# Patient Record
Sex: FEMALE | Race: WHITE | Employment: OTHER | URBAN - METROPOLITAN AREA
[De-identification: names, ages, dates, MRNs, and addresses within clinical notes are randomized per-mention and may not be internally consistent; named-entity substitution may affect disease eponyms.]

---

## 2020-01-17 ENCOUNTER — HOSPITAL ENCOUNTER (EMERGENCY)
Age: 40
Discharge: HOME | End: 2020-01-17
Payer: COMMERCIAL

## 2020-01-17 VITALS
HEART RATE: 104 BPM | BODY MASS INDEX: 25.51 KG/M2 | TEMPERATURE: 98.4 F | SYSTOLIC BLOOD PRESSURE: 147 MMHG | RESPIRATION RATE: 15 BRPM | OXYGEN SATURATION: 100 % | DIASTOLIC BLOOD PRESSURE: 107 MMHG | WEIGHT: 135 LBS

## 2020-01-17 LAB
EKG ATRIAL RATE: 98 BPM
EKG DIAGNOSIS: NORMAL
EKG P AXIS: 36 DEGREES
EKG P-R INTERVAL: 140 MS
EKG Q-T INTERVAL: 388 MS
EKG QRS DURATION: 86 MS
EKG QTC CALCULATION (BAZETT): 495 MS
EKG R AXIS: -25 DEGREES
EKG T AXIS: 55 DEGREES
EKG VENTRICULAR RATE: 98 BPM

## 2020-01-17 PROCEDURE — 93010 ELECTROCARDIOGRAM REPORT: CPT | Performed by: INTERNAL MEDICINE

## 2020-01-17 PROCEDURE — 6370000000 HC RX 637 (ALT 250 FOR IP): Performed by: NURSE PRACTITIONER

## 2020-01-17 PROCEDURE — 99284 EMERGENCY DEPT VISIT MOD MDM: CPT

## 2020-01-17 PROCEDURE — 93005 ELECTROCARDIOGRAM TRACING: CPT | Performed by: NURSE PRACTITIONER

## 2020-01-17 RX ADMIN — LIDOCAINE HYDROCHLORIDE: 20 SOLUTION ORAL; TOPICAL at 14:15

## 2020-01-17 ASSESSMENT — PAIN SCALES - GENERAL: PAINLEVEL_OUTOF10: 5

## 2020-01-17 NOTE — ED PROVIDER NOTES
CHIEF COMPLAINT  Heartburn (started last night, has not taken any meds to help herself, )      HISTORY OF PRESENT ILLNESS  Jasper Kaur is a 47 y.o. female who presents to the ED complaining of \"heartburn\". Patient is nontoxic in appearance and in no acute distress. Patient presents to the emergency department via private vehicle. Patient reports that last night she began with epigastric abdominal pain. Reports history of \"heartburn\" and states that symptoms are consistent with prior heartburn. States that she does have medication for this however reports that she did not take anything for this. Reports 5 out 10 burning discomfort to the epigastric region. Denies exacerbating or relieving factors. Denies radiation of pain. Denies associated chest pain or SOB. Denies nausea or vomiting, diarrhea or constipation. Denies urinary complaints. No reports of fever or chills. No other complaints, modifying factors or associated symptoms. Nursing notes reviewed.    Past Medical History:   Diagnosis Date    Anemia     Bacteremia 09/05/2017    E coli    Chronic kidney disease     [facility] mellitus type 1 ([facility] [address]     Hemodialysis patient ([facility] [address]     Hyperlipidemia     Hypertension     No history of procedure 4/20/16    colonoscopy     Past Surgical History:   Procedure Laterality Date    DENTAL SURGERY      DIALYSIS FISTULA CREATION      KIDNEY TRANSPLANT      OTHER SURGICAL HISTORY Left 9/29/2014    Port placed left chest    OTHER SURGICAL HISTORY  04/11/2016    removal of port-a-cath and insertion of new port-a-cath    UPPER GASTROINTESTINAL ENDOSCOPY  04-    Esophagitis     Family History   Problem Relation Age of Onset    Diabetes Mother     Cancer Maternal Aunt     Cancer Paternal Uncle      Social History     Socioeconomic History    Marital status: Single     Spouse name: Not on file    Number of children: Not on file    Years of education: Not on file    Highest education level: Not on file   Occupational History    Not on file   Social Needs    Financial resource strain: Not on file    Food insecurity:     Worry: Not on file     Inability: Not on file    Transportation needs:     Medical: Not on file     Non-medical: Not on file   Tobacco Use    Smoking status: Never Smoker    Smokeless tobacco: Never Used   Substance and Sexual Activity    Alcohol use: No    Drug use: No    Sexual activity: Never   Lifestyle    Physical activity:     Days per week: Not on file     Minutes per session: Not on file    Stress: Not on file   Relationships    Social connections:     Talks on phone: Not on file     Gets together: Not on file     Attends Roman Catholic service: Not on file     Active member of club or organization: Not on file     Attends meetings of clubs or organizations: Not on file     Relationship status: Not on file    Intimate partner violence:     Fear of current or ex partner: Not on file     Emotionally abused: Not on file     Physically abused: Not on file     Forced sexual activity: Not on file   Other Topics Concern    Not on file   Social History Narrative    Not on file     No current facility-administered medications for this encounter.       Current Outpatient Medications   Medication Sig Dispense Refill    tacrolimus (PROGRAF) 1 MG capsule Take 1 mg by mouth 2 times daily      insulin detemir (LEVEMIR) 100 UNIT/ML injection vial Inject 22 Units into the skin nightly      famotidine (PEPCID) 20 MG tablet Take 20 mg by mouth 2 times daily      nystatin (MYCOSTATIN) 382181 UNIT/ML suspension Take 500,000 Units by mouth 3 times daily      mycophenolate (CELLCEPT) 250 MG capsule Take 250 mg by mouth 2 times daily      folic acid (FOLVITE) 1 MG tablet Take 1 mg by mouth daily      sulfamethoxazole-trimethoprim (BACTRIM;SEPTRA) 400-80 MG per tablet Take 1 tablet by mouth      calcium carbonate (TUMS) 500 MG chewable tablet Take 1 tablet by mouth daily as needed rhythm without murmurs. LUNGS: Respirations unlabored. Speaking comfortably in full sentences. Bilateral lung sounds clear to auscultation without wheezing, rhonchi or rales. ABDOMEN: Soft, non-distended and non-tender. No hernias or masses appreciated. No organomegaly. No rebound or guarding. Bowel sounds audible and active in all four quadrants. EXTREMITIES: Moves all extremities equally and neurovascularly intact. No edema. SKIN: Pink ,warm and dry. No acute rashes. NEUROLOGICAL: Alert and oriented x4. Speech clear. No gross facial drooping. Strength is 5/5 in all extremities and sensation intact. Gait normal.   PSYCHIATRIC: Normal mood and affect. ED COURSE  I have evaluated this patient. Dr. Aaron Larose was available for consultation. Patient seen and evaluated. Old records reviewed. Patient presenting for evaluation of \"heartburn\". Patient noted to be hypertensive as well as mildly tachycardic upon arrival to the emergency department with otherwise stable vitals. Patient primarily requesting medication for her heartburn. She denies any further complaints. States that she has history of this and does have medication to take; however, she was unable to find it and therefore presented to the emergency department. I did offer the patient further work-up; however she declined. Patient was given GI cocktail with resolvent of her symptoms and patient requesting discharge home. Patient did not feel it was necessary to repeat her vitals and states that since she was feeling well she just wanted to go home. A discussion was had with the patient regarding plan of care. Patient instructed to follow up with her PCP in 2-3 days for further evaluation/treatment and repeat blood pressure. Patient will be discharged home with a prescription for the following medications below. Patient verbalizes understanding, all questions were answered and she is agreeable with the plan of care.  Patient will return to ED for any new/worsening symptoms. Patient was given scripts for the following medications. I counseled patient how to take these medications. Discharge Medication List as of 1/17/2020  3:39 PM        MDM  Results for orders placed or performed during the hospital encounter of 01/17/20   EKG 12 Lead   Result Value Ref Range    Ventricular Rate 98 BPM    Atrial Rate 98 BPM    P-R Interval 140 ms    QRS Duration 86 ms    Q-T Interval 388 ms    QTc Calculation (Bazett) 495 ms    P Axis 36 degrees    R Axis -25 degrees    T Axis 55 degrees    Diagnosis       Normal sinus rhythmLeft axis deviationProlonged QTAbnormal ECGConfirmed by Rossy Powell MD, Ceci Gunderson ([id number]) on 1/17/2020 5:11:38 PM       I estimate there is LOW risk for PULMONARY EMBOLISM, ACUTE CORONARY SYNDROME, OR THORACIC AORTIC DISSECTION, thus I consider the discharge disposition reasonable. Sonu 1827 and I have discussed the diagnosis and risks, and we agree with discharging home to follow-up with their primary doctor. We also discussed returning to the Emergency Department immediately if new or worsening symptoms occur. We have discussed the symptoms which are most concerning (e.g., bloody sputum, fever, worsening pain or shortness of breath, vomiting) that necessitate immediate return. FINAL Impression    1. Dyspepsia    2. Elevated blood pressure reading        Blood pressure (!) 147/107, pulse 104, temperature 98.4 °F (36.9 °C), temperature source Oral, resp. rate 15, weight 135 lb (61.2 kg), SpO2 100 %. DISPOSITION  Patient was discharged to home in stable condition. DISCLAIMER:  Please note this report has been produced using speech recognition Dragon software and may contain errors related to that system including errors in grammar, punctuation, and spelling, as well as words and phrases that may be inappropriate.  If there are any questions or concerns please feel free to contact the dictating provider for clarification.                 Saud Antunez, NIRAV - CNP  01/18/20 153

## 2020-06-14 ENCOUNTER — HOSPITAL ENCOUNTER (INPATIENT)
Age: 40
LOS: 1 days | Discharge: HOME | DRG: 690 | End: 2020-06-16
Attending: INTERNAL MEDICINE | Admitting: INTERNAL MEDICINE
Payer: COMMERCIAL

## 2020-06-14 LAB
A/G RATIO: 1 (ref 1.1–2.2)
ALBUMIN SERPL-MCNC: 3.6 G/DL (ref 3.4–5)
ALP BLD-CCNC: 149 U/L (ref 40–129)
ALT SERPL-CCNC: 7 U/L (ref 10–40)
ANION GAP SERPL CALCULATED.3IONS-SCNC: 15 MMOL/L (ref 3–16)
AST SERPL-CCNC: 12 U/L (ref 15–37)
BASE EXCESS VENOUS: 2.2 MMOL/L (ref -3–3)
BASOPHILS ABSOLUTE: 0.1 K/UL (ref 0–0.2)
BASOPHILS RELATIVE PERCENT: 0.8 %
BILIRUB SERPL-MCNC: 0.4 MG/DL (ref 0–1)
BILIRUBIN URINE: NEGATIVE
BLOOD, URINE: ABNORMAL
BUN BLDV-MCNC: 7 MG/DL (ref 7–20)
CALCIUM SERPL-MCNC: 8.7 MG/DL (ref 8.3–10.6)
CARBOXYHEMOGLOBIN: 2.2 % (ref 0–1.5)
CHLORIDE BLD-SCNC: 96 MMOL/L (ref 99–110)
CHP ED QC CHECK: 452
CLARITY: ABNORMAL
CO2: 25 MMOL/L (ref 21–32)
COLOR: YELLOW
COMMENT UA: ABNORMAL
CREAT SERPL-MCNC: 1.1 MG/DL (ref 0.6–1.1)
EOSINOPHILS ABSOLUTE: 0 K/UL (ref 0–0.6)
EOSINOPHILS RELATIVE PERCENT: 0.1 %
GFR AFRICAN AMERICAN: >60
GFR NON-AFRICAN AMERICAN: 55
GLOBULIN: 3.5 G/DL
GLUCOSE BLD-MCNC: 360 MG/DL (ref 70–99)
GLUCOSE BLD-MCNC: 452 MG/DL (ref 70–99)
GLUCOSE BLD-MCNC: 469 MG/DL (ref 70–99)
GLUCOSE URINE: >=1000 MG/DL
HCG QUALITATIVE: NEGATIVE
HCO3 VENOUS: 28.1 MMOL/L (ref 23–29)
HCT VFR BLD CALC: 39.4 % (ref 36–48)
HEMOGLOBIN: 12.5 G/DL (ref 12–16)
KETONES, URINE: 40 MG/DL
LEUKOCYTE ESTERASE, URINE: ABNORMAL
LIPASE: 5 U/L (ref 13–60)
LYMPHOCYTES ABSOLUTE: 0.6 K/UL (ref 1–5.1)
LYMPHOCYTES RELATIVE PERCENT: 5.2 %
MAGNESIUM: 1.5 MG/DL (ref 1.8–2.4)
MCH RBC QN AUTO: 25.2 PG (ref 26–34)
MCHC RBC AUTO-ENTMCNC: 31.8 G/DL (ref 31–36)
MCV RBC AUTO: 79.2 FL (ref 80–100)
METHEMOGLOBIN VENOUS: 0.6 %
MICROSCOPIC EXAMINATION: YES
MONOCYTES ABSOLUTE: 0.3 K/UL (ref 0–1.3)
MONOCYTES RELATIVE PERCENT: 2.4 %
NEUTROPHILS ABSOLUTE: 10.9 K/UL (ref 1.7–7.7)
NEUTROPHILS RELATIVE PERCENT: 91.5 %
NITRITE, URINE: NEGATIVE
O2 CONTENT, VEN: 16 VOL %
O2 SAT, VEN: 84 %
O2 THERAPY: ABNORMAL
PCO2, VEN: 48.6 MMHG (ref 40–50)
PDW BLD-RTO: 16.5 % (ref 12.4–15.4)
PERFORMED ON: ABNORMAL
PERFORMED ON: ABNORMAL
PH UA: 7.5 (ref 5–8)
PH VENOUS: 7.38 (ref 7.35–7.45)
PLATELET # BLD: 184 K/UL (ref 135–450)
PMV BLD AUTO: 10.1 FL (ref 5–10.5)
PO2, VEN: 48.4 MMHG (ref 25–40)
POTASSIUM SERPL-SCNC: 3.6 MMOL/L (ref 3.5–5.1)
PROTEIN UA: 30 MG/DL
RBC # BLD: 4.97 M/UL (ref 4–5.2)
RBC UA: ABNORMAL /HPF (ref 0–4)
SODIUM BLD-SCNC: 136 MMOL/L (ref 136–145)
SPECIFIC GRAVITY UA: 1.01 (ref 1–1.03)
TCO2 CALC VENOUS: 30 MMOL/L
TOTAL PROTEIN: 7.1 G/DL (ref 6.4–8.2)
URINE REFLEX TO CULTURE: YES
URINE TYPE: ABNORMAL
UROBILINOGEN, URINE: 0.2 E.U./DL
WBC # BLD: 11.9 K/UL (ref 4–11)
WBC UA: >100 /HPF (ref 0–5)

## 2020-06-14 PROCEDURE — 84703 CHORIONIC GONADOTROPIN ASSAY: CPT

## 2020-06-14 PROCEDURE — 80053 COMPREHEN METABOLIC PANEL: CPT

## 2020-06-14 PROCEDURE — 85025 COMPLETE CBC W/AUTO DIFF WBC: CPT

## 2020-06-14 PROCEDURE — 87077 CULTURE AEROBIC IDENTIFY: CPT

## 2020-06-14 PROCEDURE — 83690 ASSAY OF LIPASE: CPT

## 2020-06-14 PROCEDURE — 6360000002 HC RX W HCPCS: Performed by: PHYSICIAN ASSISTANT

## 2020-06-14 PROCEDURE — 2580000003 HC RX 258: Performed by: PHYSICIAN ASSISTANT

## 2020-06-14 PROCEDURE — 87186 SC STD MICRODIL/AGAR DIL: CPT

## 2020-06-14 PROCEDURE — 87086 URINE CULTURE/COLONY COUNT: CPT

## 2020-06-14 PROCEDURE — 81001 URINALYSIS AUTO W/SCOPE: CPT

## 2020-06-14 PROCEDURE — 99285 EMERGENCY DEPT VISIT HI MDM: CPT

## 2020-06-14 PROCEDURE — 83735 ASSAY OF MAGNESIUM: CPT

## 2020-06-14 PROCEDURE — 96365 THER/PROPH/DIAG IV INF INIT: CPT

## 2020-06-14 PROCEDURE — 96367 TX/PROPH/DG ADDL SEQ IV INF: CPT

## 2020-06-14 PROCEDURE — 6370000000 HC RX 637 (ALT 250 FOR IP): Performed by: PHYSICIAN ASSISTANT

## 2020-06-14 PROCEDURE — 82803 BLOOD GASES ANY COMBINATION: CPT

## 2020-06-14 PROCEDURE — 96375 TX/PRO/DX INJ NEW DRUG ADDON: CPT

## 2020-06-14 RX ORDER — POTASSIUM CHLORIDE 7.45 MG/ML
10 INJECTION INTRAVENOUS ONCE
Status: COMPLETED | OUTPATIENT
Start: 2020-06-14 | End: 2020-06-15

## 2020-06-14 RX ORDER — MAGNESIUM SULFATE 1 G/100ML
1 INJECTION INTRAVENOUS ONCE
Status: COMPLETED | OUTPATIENT
Start: 2020-06-14 | End: 2020-06-15

## 2020-06-14 RX ORDER — DIPHENHYDRAMINE HYDROCHLORIDE 50 MG/ML
12.5 INJECTION INTRAMUSCULAR; INTRAVENOUS ONCE
Status: COMPLETED | OUTPATIENT
Start: 2020-06-15 | End: 2020-06-15

## 2020-06-14 RX ORDER — MORPHINE SULFATE 4 MG/ML
4 INJECTION, SOLUTION INTRAMUSCULAR; INTRAVENOUS ONCE
Status: COMPLETED | OUTPATIENT
Start: 2020-06-14 | End: 2020-06-14

## 2020-06-14 RX ORDER — PROMETHAZINE HYDROCHLORIDE 25 MG/ML
25 INJECTION, SOLUTION INTRAMUSCULAR; INTRAVENOUS ONCE
Status: COMPLETED | OUTPATIENT
Start: 2020-06-14 | End: 2020-06-14

## 2020-06-14 RX ORDER — 0.9 % SODIUM CHLORIDE 0.9 %
2000 INTRAVENOUS SOLUTION INTRAVENOUS ONCE
Status: COMPLETED | OUTPATIENT
Start: 2020-06-14 | End: 2020-06-15

## 2020-06-14 RX ORDER — ONDANSETRON 2 MG/ML
4 INJECTION INTRAMUSCULAR; INTRAVENOUS ONCE
Status: COMPLETED | OUTPATIENT
Start: 2020-06-14 | End: 2020-06-14

## 2020-06-14 RX ORDER — METOCLOPRAMIDE HYDROCHLORIDE 5 MG/ML
10 INJECTION INTRAMUSCULAR; INTRAVENOUS ONCE
Status: COMPLETED | OUTPATIENT
Start: 2020-06-15 | End: 2020-06-15

## 2020-06-14 RX ADMIN — POTASSIUM CHLORIDE 10 MEQ: 7.46 INJECTION, SOLUTION INTRAVENOUS at 23:32

## 2020-06-14 RX ADMIN — PROMETHAZINE HYDROCHLORIDE 25 MG: 25 INJECTION INTRAMUSCULAR; INTRAVENOUS at 22:17

## 2020-06-14 RX ADMIN — INSULIN HUMAN 10 UNITS: 100 INJECTION, SOLUTION PARENTERAL at 23:32

## 2020-06-14 RX ADMIN — MORPHINE SULFATE 4 MG: 4 INJECTION, SOLUTION INTRAMUSCULAR; INTRAVENOUS at 22:06

## 2020-06-14 RX ADMIN — CEFTRIAXONE SODIUM 1 G: 1 INJECTION, POWDER, FOR SOLUTION INTRAMUSCULAR; INTRAVENOUS at 23:01

## 2020-06-14 RX ADMIN — MAGNESIUM SULFATE HEPTAHYDRATE 1 G: 1 INJECTION, SOLUTION INTRAVENOUS at 23:32

## 2020-06-14 RX ADMIN — ONDANSETRON 4 MG: 2 INJECTION INTRAMUSCULAR; INTRAVENOUS at 22:00

## 2020-06-14 RX ADMIN — SODIUM CHLORIDE 2000 ML: 9 INJECTION, SOLUTION INTRAVENOUS at 22:00

## 2020-06-14 ASSESSMENT — PAIN DESCRIPTION - DESCRIPTORS: DESCRIPTORS: TENDER

## 2020-06-14 ASSESSMENT — PAIN SCALES - GENERAL
PAINLEVEL_OUTOF10: 8
PAINLEVEL_OUTOF10: 8
PAINLEVEL_OUTOF10: 6

## 2020-06-14 ASSESSMENT — ENCOUNTER SYMPTOMS
SHORTNESS OF BREATH: 0
BACK PAIN: 1
EYES NEGATIVE: 1
VOMITING: 1
ABDOMINAL PAIN: 1
NAUSEA: 1
COUGH: 0

## 2020-06-14 ASSESSMENT — PAIN DESCRIPTION - PAIN TYPE: TYPE: ACUTE PAIN

## 2020-06-14 ASSESSMENT — PAIN DESCRIPTION - LOCATION: LOCATION: ABDOMEN

## 2020-06-14 ASSESSMENT — PAIN DESCRIPTION - FREQUENCY: FREQUENCY: CONTINUOUS

## 2020-06-15 ENCOUNTER — APPOINTMENT (OUTPATIENT)
Dept: CT IMAGING | Age: 40
DRG: 690 | End: 2020-06-15
Payer: COMMERCIAL

## 2020-06-15 PROBLEM — Z94.0 HISTORY OF RENAL TRANSPLANT: Status: ACTIVE | Noted: 2020-06-15

## 2020-06-15 PROBLEM — I51.3 RIGHT ATRIAL THROMBUS: Chronic | Status: ACTIVE | Noted: 2020-06-15

## 2020-06-15 PROBLEM — E10.65 TYPE 1 DIABETES MELLITUS WITH HYPERGLYCEMIA (HCC): Status: ACTIVE | Noted: 2020-06-15

## 2020-06-15 PROBLEM — T86.10 RENAL TRANSPLANT DISORDER: Chronic | Status: ACTIVE | Noted: 2020-06-15

## 2020-06-15 PROBLEM — Z79.01 CHRONIC ANTICOAGULATION: Chronic | Status: ACTIVE | Noted: 2020-06-15

## 2020-06-15 PROBLEM — N39.0 UTI (URINARY TRACT INFECTION): Status: ACTIVE | Noted: 2020-06-15

## 2020-06-15 LAB
ANION GAP SERPL CALCULATED.3IONS-SCNC: 7 MMOL/L (ref 3–16)
BASOPHILS ABSOLUTE: 0 K/UL (ref 0–0.2)
BASOPHILS RELATIVE PERCENT: 0.3 %
BUN BLDV-MCNC: 6 MG/DL (ref 7–20)
CALCIUM SERPL-MCNC: 8.4 MG/DL (ref 8.3–10.6)
CHLORIDE BLD-SCNC: 107 MMOL/L (ref 99–110)
CO2: 28 MMOL/L (ref 21–32)
CREAT SERPL-MCNC: 1 MG/DL (ref 0.6–1.1)
EOSINOPHILS ABSOLUTE: 0 K/UL (ref 0–0.6)
EOSINOPHILS RELATIVE PERCENT: 0 %
ESTIMATED AVERAGE GLUCOSE: 300.6 MG/DL
GFR AFRICAN AMERICAN: >60
GFR NON-AFRICAN AMERICAN: >60
GLUCOSE BLD-MCNC: 117 MG/DL (ref 70–99)
GLUCOSE BLD-MCNC: 137 MG/DL (ref 70–99)
GLUCOSE BLD-MCNC: 226 MG/DL (ref 70–99)
GLUCOSE BLD-MCNC: 257 MG/DL (ref 70–99)
GLUCOSE BLD-MCNC: 294 MG/DL (ref 70–99)
GLUCOSE BLD-MCNC: 81 MG/DL (ref 70–99)
GLUCOSE BLD-MCNC: 89 MG/DL (ref 70–99)
HBA1C MFR BLD: 12.1 %
HCT VFR BLD CALC: 33.5 % (ref 36–48)
HEMOGLOBIN: 10.6 G/DL (ref 12–16)
LYMPHOCYTES ABSOLUTE: 0.9 K/UL (ref 1–5.1)
LYMPHOCYTES RELATIVE PERCENT: 6.8 %
MAGNESIUM: 1.8 MG/DL (ref 1.8–2.4)
MCH RBC QN AUTO: 24.8 PG (ref 26–34)
MCHC RBC AUTO-ENTMCNC: 31.8 G/DL (ref 31–36)
MCV RBC AUTO: 77.9 FL (ref 80–100)
MONOCYTES ABSOLUTE: 0.6 K/UL (ref 0–1.3)
MONOCYTES RELATIVE PERCENT: 4.4 %
NEUTROPHILS ABSOLUTE: 11.1 K/UL (ref 1.7–7.7)
NEUTROPHILS RELATIVE PERCENT: 88.5 %
PDW BLD-RTO: 16.6 % (ref 12.4–15.4)
PERFORMED ON: ABNORMAL
PERFORMED ON: NORMAL
PERFORMED ON: NORMAL
PLATELET # BLD: 177 K/UL (ref 135–450)
PMV BLD AUTO: 9.6 FL (ref 5–10.5)
POTASSIUM SERPL-SCNC: 4.1 MMOL/L (ref 3.5–5.1)
RBC # BLD: 4.3 M/UL (ref 4–5.2)
SODIUM BLD-SCNC: 142 MMOL/L (ref 136–145)
TACROLIMUS BLOOD: 1 NG/ML (ref 5–20)
WBC # BLD: 12.6 K/UL (ref 4–11)

## 2020-06-15 PROCEDURE — 80197 ASSAY OF TACROLIMUS: CPT

## 2020-06-15 PROCEDURE — 80048 BASIC METABOLIC PNL TOTAL CA: CPT

## 2020-06-15 PROCEDURE — 6370000000 HC RX 637 (ALT 250 FOR IP): Performed by: INTERNAL MEDICINE

## 2020-06-15 PROCEDURE — 2580000003 HC RX 258: Performed by: INTERNAL MEDICINE

## 2020-06-15 PROCEDURE — 83735 ASSAY OF MAGNESIUM: CPT

## 2020-06-15 PROCEDURE — 1200000000 HC SEMI PRIVATE

## 2020-06-15 PROCEDURE — 6360000002 HC RX W HCPCS: Performed by: PHYSICIAN ASSISTANT

## 2020-06-15 PROCEDURE — 85025 COMPLETE CBC W/AUTO DIFF WBC: CPT

## 2020-06-15 PROCEDURE — 6360000002 HC RX W HCPCS: Performed by: INTERNAL MEDICINE

## 2020-06-15 PROCEDURE — 2580000003 HC RX 258: Performed by: NURSE PRACTITIONER

## 2020-06-15 PROCEDURE — C9113 INJ PANTOPRAZOLE SODIUM, VIA: HCPCS | Performed by: INTERNAL MEDICINE

## 2020-06-15 PROCEDURE — 2580000003 HC RX 258

## 2020-06-15 PROCEDURE — 83036 HEMOGLOBIN GLYCOSYLATED A1C: CPT

## 2020-06-15 PROCEDURE — 36591 DRAW BLOOD OFF VENOUS DEVICE: CPT

## 2020-06-15 PROCEDURE — 74176 CT ABD & PELVIS W/O CONTRAST: CPT

## 2020-06-15 RX ORDER — MAGNESIUM SULFATE 1 G/100ML
1 INJECTION INTRAVENOUS PRN
Status: DISCONTINUED | OUTPATIENT
Start: 2020-06-15 | End: 2020-06-16 | Stop reason: HOSPADM

## 2020-06-15 RX ORDER — PANTOPRAZOLE SODIUM 40 MG/10ML
40 INJECTION, POWDER, LYOPHILIZED, FOR SOLUTION INTRAVENOUS DAILY
Status: DISCONTINUED | OUTPATIENT
Start: 2020-06-15 | End: 2020-06-16 | Stop reason: HOSPADM

## 2020-06-15 RX ORDER — SODIUM CHLORIDE 0.9 % (FLUSH) 0.9 %
10 SYRINGE (ML) INJECTION PRN
Status: DISCONTINUED | OUTPATIENT
Start: 2020-06-15 | End: 2020-06-16 | Stop reason: HOSPADM

## 2020-06-15 RX ORDER — SODIUM CHLORIDE 9 MG/ML
INJECTION, SOLUTION INTRAVENOUS
Status: COMPLETED
Start: 2020-06-15 | End: 2020-06-16

## 2020-06-15 RX ORDER — DEXTROSE MONOHYDRATE 25 G/50ML
12.5 INJECTION, SOLUTION INTRAVENOUS PRN
Status: DISCONTINUED | OUTPATIENT
Start: 2020-06-15 | End: 2020-06-16 | Stop reason: HOSPADM

## 2020-06-15 RX ORDER — PROMETHAZINE HYDROCHLORIDE 25 MG/1
12.5 TABLET ORAL EVERY 4 HOURS PRN
Status: DISCONTINUED | OUTPATIENT
Start: 2020-06-15 | End: 2020-06-16 | Stop reason: HOSPADM

## 2020-06-15 RX ORDER — ACETAMINOPHEN 650 MG/1
650 SUPPOSITORY RECTAL EVERY 4 HOURS PRN
Status: DISCONTINUED | OUTPATIENT
Start: 2020-06-15 | End: 2020-06-16 | Stop reason: HOSPADM

## 2020-06-15 RX ORDER — SODIUM CHLORIDE, SODIUM LACTATE, POTASSIUM CHLORIDE, CALCIUM CHLORIDE 600; 310; 30; 20 MG/100ML; MG/100ML; MG/100ML; MG/100ML
2000 INJECTION, SOLUTION INTRAVENOUS ONCE
Status: COMPLETED | OUTPATIENT
Start: 2020-06-15 | End: 2020-06-15

## 2020-06-15 RX ORDER — MYCOPHENOLATE MOFETIL 250 MG/1
1000 CAPSULE ORAL 2 TIMES DAILY
Status: DISCONTINUED | OUTPATIENT
Start: 2020-06-15 | End: 2020-06-16 | Stop reason: HOSPADM

## 2020-06-15 RX ORDER — ONDANSETRON 2 MG/ML
4 INJECTION INTRAMUSCULAR; INTRAVENOUS EVERY 4 HOURS PRN
Status: DISCONTINUED | OUTPATIENT
Start: 2020-06-15 | End: 2020-06-15

## 2020-06-15 RX ORDER — PROMETHAZINE HYDROCHLORIDE 6.25 MG/5ML
12.5 SYRUP ORAL EVERY 4 HOURS PRN
Status: DISCONTINUED | OUTPATIENT
Start: 2020-06-15 | End: 2020-06-16 | Stop reason: HOSPADM

## 2020-06-15 RX ORDER — NICOTINE POLACRILEX 4 MG
15 LOZENGE BUCCAL PRN
Status: DISCONTINUED | OUTPATIENT
Start: 2020-06-15 | End: 2020-06-16 | Stop reason: HOSPADM

## 2020-06-15 RX ORDER — PROCHLORPERAZINE EDISYLATE 5 MG/ML
10 INJECTION INTRAMUSCULAR; INTRAVENOUS EVERY 6 HOURS PRN
Status: DISCONTINUED | OUTPATIENT
Start: 2020-06-15 | End: 2020-06-16 | Stop reason: HOSPADM

## 2020-06-15 RX ORDER — ONDANSETRON 4 MG/1
4 TABLET, ORALLY DISINTEGRATING ORAL EVERY 4 HOURS PRN
Status: DISCONTINUED | OUTPATIENT
Start: 2020-06-15 | End: 2020-06-15

## 2020-06-15 RX ORDER — ACETAMINOPHEN 160 MG/5ML
650 SOLUTION ORAL EVERY 4 HOURS PRN
Status: DISCONTINUED | OUTPATIENT
Start: 2020-06-15 | End: 2020-06-16 | Stop reason: HOSPADM

## 2020-06-15 RX ORDER — MORPHINE SULFATE 2 MG/ML
2 INJECTION, SOLUTION INTRAMUSCULAR; INTRAVENOUS EVERY 4 HOURS PRN
Status: DISCONTINUED | OUTPATIENT
Start: 2020-06-15 | End: 2020-06-16 | Stop reason: HOSPADM

## 2020-06-15 RX ORDER — POTASSIUM CHLORIDE 20 MEQ/1
40 TABLET, EXTENDED RELEASE ORAL PRN
Status: DISCONTINUED | OUTPATIENT
Start: 2020-06-15 | End: 2020-06-16 | Stop reason: HOSPADM

## 2020-06-15 RX ORDER — SODIUM CHLORIDE, SODIUM LACTATE, POTASSIUM CHLORIDE, CALCIUM CHLORIDE 600; 310; 30; 20 MG/100ML; MG/100ML; MG/100ML; MG/100ML
INJECTION, SOLUTION INTRAVENOUS CONTINUOUS
Status: DISCONTINUED | OUTPATIENT
Start: 2020-06-15 | End: 2020-06-16

## 2020-06-15 RX ORDER — TACROLIMUS 0.5 MG/1
0.5 CAPSULE ORAL 2 TIMES DAILY
Status: DISCONTINUED | OUTPATIENT
Start: 2020-06-15 | End: 2020-06-16

## 2020-06-15 RX ORDER — POTASSIUM CHLORIDE 7.45 MG/ML
10 INJECTION INTRAVENOUS PRN
Status: DISCONTINUED | OUTPATIENT
Start: 2020-06-15 | End: 2020-06-16 | Stop reason: HOSPADM

## 2020-06-15 RX ORDER — PROMETHAZINE HYDROCHLORIDE 25 MG/ML
12.5 INJECTION, SOLUTION INTRAMUSCULAR; INTRAVENOUS EVERY 4 HOURS PRN
Status: DISCONTINUED | OUTPATIENT
Start: 2020-06-15 | End: 2020-06-16 | Stop reason: HOSPADM

## 2020-06-15 RX ORDER — ACETAMINOPHEN 325 MG/1
650 TABLET ORAL EVERY 4 HOURS PRN
Status: DISCONTINUED | OUTPATIENT
Start: 2020-06-15 | End: 2020-06-16 | Stop reason: HOSPADM

## 2020-06-15 RX ORDER — INSULIN GLARGINE 100 [IU]/ML
22 INJECTION, SOLUTION SUBCUTANEOUS NIGHTLY
Status: DISCONTINUED | OUTPATIENT
Start: 2020-06-15 | End: 2020-06-16 | Stop reason: HOSPADM

## 2020-06-15 RX ORDER — DEXTROSE MONOHYDRATE 50 MG/ML
100 INJECTION, SOLUTION INTRAVENOUS PRN
Status: DISCONTINUED | OUTPATIENT
Start: 2020-06-15 | End: 2020-06-16 | Stop reason: HOSPADM

## 2020-06-15 RX ADMIN — INSULIN GLARGINE 22 UNITS: 100 INJECTION, SOLUTION SUBCUTANEOUS at 02:08

## 2020-06-15 RX ADMIN — SODIUM CHLORIDE, POTASSIUM CHLORIDE, SODIUM LACTATE AND CALCIUM CHLORIDE: 600; 310; 30; 20 INJECTION, SOLUTION INTRAVENOUS at 20:52

## 2020-06-15 RX ADMIN — METOCLOPRAMIDE 10 MG: 5 INJECTION, SOLUTION INTRAMUSCULAR; INTRAVENOUS at 00:39

## 2020-06-15 RX ADMIN — INSULIN LISPRO 3 UNITS: 100 INJECTION, SOLUTION INTRAVENOUS; SUBCUTANEOUS at 02:08

## 2020-06-15 RX ADMIN — INSULIN LISPRO 2 UNITS: 100 INJECTION, SOLUTION INTRAVENOUS; SUBCUTANEOUS at 05:01

## 2020-06-15 RX ADMIN — TACROLIMUS 0.5 MG: 0.5 CAPSULE ORAL at 20:31

## 2020-06-15 RX ADMIN — MORPHINE SULFATE 2 MG: 2 INJECTION, SOLUTION INTRAMUSCULAR; INTRAVENOUS at 02:04

## 2020-06-15 RX ADMIN — PROMETHAZINE HYDROCHLORIDE 12.5 MG: 25 INJECTION INTRAMUSCULAR; INTRAVENOUS at 20:34

## 2020-06-15 RX ADMIN — Medication 10 ML: at 02:05

## 2020-06-15 RX ADMIN — SODIUM CHLORIDE, POTASSIUM CHLORIDE, SODIUM LACTATE AND CALCIUM CHLORIDE 2000 ML: 600; 310; 30; 20 INJECTION, SOLUTION INTRAVENOUS at 02:06

## 2020-06-15 RX ADMIN — SODIUM CHLORIDE, POTASSIUM CHLORIDE, SODIUM LACTATE AND CALCIUM CHLORIDE: 600; 310; 30; 20 INJECTION, SOLUTION INTRAVENOUS at 20:25

## 2020-06-15 RX ADMIN — PROMETHAZINE HYDROCHLORIDE 12.5 MG: 25 INJECTION INTRAMUSCULAR; INTRAVENOUS at 02:04

## 2020-06-15 RX ADMIN — PANTOPRAZOLE SODIUM 40 MG: 40 INJECTION, POWDER, FOR SOLUTION INTRAVENOUS at 09:22

## 2020-06-15 RX ADMIN — MYCOPHENOLATE MOFETIL 1000 MG: 250 CAPSULE ORAL at 20:29

## 2020-06-15 RX ADMIN — SODIUM CHLORIDE, POTASSIUM CHLORIDE, SODIUM LACTATE AND CALCIUM CHLORIDE: 600; 310; 30; 20 INJECTION, SOLUTION INTRAVENOUS at 11:04

## 2020-06-15 RX ADMIN — SODIUM CHLORIDE, POTASSIUM CHLORIDE, SODIUM LACTATE AND CALCIUM CHLORIDE: 600; 310; 30; 20 INJECTION, SOLUTION INTRAVENOUS at 20:28

## 2020-06-15 RX ADMIN — ONDANSETRON 4 MG: 2 INJECTION INTRAMUSCULAR; INTRAVENOUS at 15:00

## 2020-06-15 RX ADMIN — DIPHENHYDRAMINE HYDROCHLORIDE 12.5 MG: 50 INJECTION, SOLUTION INTRAMUSCULAR; INTRAVENOUS at 00:39

## 2020-06-15 RX ADMIN — CEFTRIAXONE SODIUM 1 G: 1 INJECTION, POWDER, FOR SOLUTION INTRAMUSCULAR; INTRAVENOUS at 20:42

## 2020-06-15 RX ADMIN — SODIUM CHLORIDE 250 ML: 9 INJECTION, SOLUTION INTRAVENOUS at 20:38

## 2020-06-15 RX ADMIN — ENOXAPARIN SODIUM 40 MG: 40 INJECTION SUBCUTANEOUS at 09:22

## 2020-06-15 RX ADMIN — INSULIN GLARGINE 22 UNITS: 100 INJECTION, SOLUTION SUBCUTANEOUS at 22:15

## 2020-06-15 RX ADMIN — Medication 2 MG: at 20:31

## 2020-06-15 ASSESSMENT — PAIN DESCRIPTION - DESCRIPTORS: DESCRIPTORS: TENDER

## 2020-06-15 ASSESSMENT — PAIN SCALES - GENERAL
PAINLEVEL_OUTOF10: 7
PAINLEVEL_OUTOF10: 3
PAINLEVEL_OUTOF10: 3

## 2020-06-15 ASSESSMENT — PAIN DESCRIPTION - FREQUENCY: FREQUENCY: CONTINUOUS

## 2020-06-15 ASSESSMENT — PAIN DESCRIPTION - LOCATION: LOCATION: ABDOMEN

## 2020-06-15 ASSESSMENT — PAIN DESCRIPTION - PAIN TYPE: TYPE: ACUTE PAIN

## 2020-06-15 NOTE — CARE COORDINATION
CASE MANAGEMENT INITIAL ASSESSMENT      Reviewed chart and completed assessment with: patient  Explained Case Management role/services. Primary contact information: Mark Palmer    Admit date/status: 6/15/20  Diagnosis: UTI  Is this a Readmission?:  n    Insurance: [place] required for SNF - Y        3 night stay required - N    Living arrangements, Adls, care needs, prior to admission:lives in home with mother. Will be moving to [place] when friend gets here    Transportation: [facility]    [address] at home: Walker__Cane__RTS__ BSC__Shower Chair__  02__ HHN__ CPAP__  [facility] Bed__ W/C___ Other__________    Services in the home and/or outpatient, prior to admission: none    Dialysis Facility (if applicable) none  · Name:  · Address:  · Dialysis Schedule:  · Phone:  · Fax:    PT/OT recs: none    Hospital Exemption Notification (HEN): needed for SNF    Barriers to discharge: none    Plan/comments: spoke with patient. states will d/c to her mothers home at discharge. Reports she has no home of her own. Will be moving to [place] when her friend gets here to pick her up. Is a kidney transplant patient. Does not receive dialysis. Denied any homeless resources.  Elías Luong, RN      ECOC on chart for MD signature

## 2020-06-15 NOTE — CONSULTS
[facility]  Consult Note           Reason for Consult:  Kidney transplant  Requesting Physician:  Dr. Shy Perkins    Chief Complaint:  Nausea, vomiting  History Obtained From:  patient, electronic medical record    History of Present Ilness:    47year old female DM1, living related kidney transplant in 2016 admitted with refractory nausea, vomiting. We have been asked to assist in further mgmt of her kidney transplant. States has been throwing up since Sunday night with associated abdominal pain. Intake reduced, no dysuria, no fevers, no sob. Past Medical History:        Diagnosis Date    Anemia     Bacteremia 09/05/2017    E coli    Chronic kidney disease     Diabetes mellitus type 1 ([facility] [address]     Hyperlipidemia     Hypertension     Kidney transplant recipient     No history of procedure 4/20/16    colonoscopy       Past Surgical History:        Procedure Laterality Date    DENTAL SURGERY      DIALYSIS FISTULA CREATION      KIDNEY TRANSPLANT      OTHER SURGICAL HISTORY Left 9/29/2014    Port placed left chest    OTHER SURGICAL HISTORY  04/11/2016    removal of port-a-cath and insertion of new port-a-cath    UPPER GASTROINTESTINAL ENDOSCOPY  04-    Esophagitis       Home Medications:    No current facility-administered medications on file prior to encounter.       Current Outpatient Medications on File Prior to Encounter   Medication Sig Dispense Refill    tacrolimus (PROGRAF) 1 MG capsule Take 1 mg by mouth 2 times daily      insulin detemir (LEVEMIR) 100 UNIT/ML injection vial Inject 22 Units into the skin nightly      famotidine (PEPCID) 20 MG tablet Take 20 mg by mouth 2 times daily      nystatin (MYCOSTATIN) 465456 UNIT/ML suspension Take 500,000 Units by mouth 3 times daily      mycophenolate (CELLCEPT) 250 MG capsule Take 250 mg by mouth 2 times daily      folic acid (FOLVITE) 1 MG tablet Take 1 mg by mouth daily      sulfamethoxazole-trimethoprim 06/15/2020    MCH 24.8 06/15/2020    MCHC 31.8 06/15/2020    RDW 16.6 06/15/2020     06/15/2020    MPV 9.6 06/15/2020     BMP:    Lab Results   Component Value Date     06/15/2020    K 4.1 06/15/2020     06/15/2020    CO2 28 06/15/2020    BUN 6 06/15/2020    LABALBU 3.6 06/14/2020    CREATININE 1.0 06/15/2020    CALCIUM 8.4 06/15/2020    GFRAA >60 06/15/2020    GFRAA >60 05/17/2013    LABGLOM >60 06/15/2020    GLUCOSE 257 06/15/2020         Assessment/    Living related donor renal transplant   - End-stage renal disease secondary to Diabetic nephropathy, now status post kidney transplant. She received a renal transplant on 5/17/2016. 3-antigen mismatch kidney with 0% PRA. She was in the BEST protocol and randomized to group C and received standard of care therapy. - Renal function stable. Cr 1.2. Chronic DSA noted. - Developed class II donor specific antibodies. Renal transplant biopsy showed borderline rejection   without evidence of antibody mediated rejection. She was treated with intravenous Solu-Medrol   followed by oral prednisone taper. She completed the steroid taper on the day prior to admission. DM type 1    Hypertension     Refractory n/v, UTI  - CT A/P unrevealing      Plan/    - Continue home doses of tacrolimus 0.5 mg po bid & cellcept 1,000 mg po bid  - Trend labs, prograf levels, & urine culture      Thank you for the consultation. Please do not hesitate to call with questions.     [employer]

## 2020-06-15 NOTE — ED NOTES
[facility] @ [id number]   Re: Intractable nausea and vomiting, UTI, diabetic with hyperglycemia  Dr. Shy Perkins @ [place] [id number]  06/14/20 [id number]

## 2020-06-15 NOTE — ED NOTES
Attempted to place patient in fall precautions. Pt refuses to wear arm band or yellow socks. PT initially refused bed alarm. Pt states that if the bed alarm goes off one time she will throw it and not care if it hits anyone. Yellow socks tied to bedrails. Be safe sign placed on door. Patient instructed on importance of not getting out of bed or ambulating without assistance for safety.         Sean Vaz RN  06/14/20 [id number]

## 2020-06-15 NOTE — ED PROVIDER NOTES
[facility]  ED  EMERGENCY DEPARTMENT ENCOUNTER        Pt Name: Tapan Wright  MRN: [id number]  Armschacortagfkay 1980  Date of evaluation: 6/14/2020  Provider: Payal Olvera PA-C  PCP: No primary care provider on file. ED Attending: Desiree Sheppard DO      This patient was not seen by the attending provider     History provided by the patient    CHIEF COMPLAINT:     Chief Complaint   Patient presents with    Emesis     emesis starting today; Diabetic; cannot find glucometer;  frequent DKA       HISTORY OF PRESENT ILLNESS:      Tapan Wright is a 47 y.o. female who arrives to the ED by private vehicle. Patient is here describing nausea and vomiting that started this morning. She reports being a type II diabetic and says she has been in DKA in the past.  She was unable to check her blood sugar today because she states she could not find her glucometer. She is here describing generalized body pain including abdominal pain as well as chest pain, headaches and body aches. She has not taken anything for symptom relief and cannot identify exacerbating or alleviating factors to her symptoms. Nursing Notes were reviewed     REVIEW OF SYSTEMS:     Review of Systems   Constitutional: Positive for activity change and appetite change. Negative for chills and fever. HENT: Negative. Eyes: Negative. Respiratory: Negative for cough and shortness of breath. Cardiovascular: Positive for chest pain. Gastrointestinal: Positive for abdominal pain, nausea and vomiting. Genitourinary: Negative for difficulty urinating and dysuria. Musculoskeletal: Positive for arthralgias, back pain, myalgias and neck pain. Negative for gait problem. Skin: Negative for rash. Neurological: Positive for headaches. Negative for dizziness. All other systems reviewed and are negative. Except as noted above in the ROS, all other systems were reviewed and negative.          PAST MEDICAL HISTORY:     Past Medical by mouth 3 times daily    OMEPRAZOLE (PRILOSEC) 20 MG CAPSULE    Take 1 capsule by mouth daily. ONDANSETRON (ZOFRAN) 4 MG TABLET    Take 1 tablet by mouth every 8 hours as needed for Nausea    OXYCODONE (ROXICODONE) 5 MG IMMEDIATE RELEASE TABLET    Take 1-2 tablets PO every 4 hours as needed for pain.     SULFAMETHOXAZOLE-TRIMETHOPRIM (BACTRIM;SEPTRA) 400-80 MG PER TABLET    Take 1 tablet by mouth    TACROLIMUS (PROGRAF) 1 MG CAPSULE    Take 1 mg by mouth 2 times daily         ALLERGIES:    Latex and Hydrocodone    FAMILY HISTORY:       Family History   Problem Relation Age of Onset    Diabetes Mother     Cancer Maternal Aunt     Cancer Paternal Uncle           SOCIAL HISTORY:       Social History     Socioeconomic History    Marital status: Single     Spouse name: None    Number of children: None    Years of education: None    Highest education level: None   Occupational History    None   Social Needs    Financial resource strain: None    Food insecurity     Worry: None     Inability: None    Transportation needs     Medical: None     Non-medical: None   Tobacco Use    Smoking status: Never Smoker    Smokeless tobacco: Never Used   Substance and Sexual Activity    Alcohol use: No    Drug use: No    Sexual activity: Never   Lifestyle    Physical activity     Days per week: None     Minutes per session: None    Stress: None   Relationships    Social connections     Talks on phone: None     Gets together: None     Attends Judaism service: None     Active member of club or organization: None     Attends meetings of clubs or organizations: None     Relationship status: None    Intimate partner violence     Fear of current or ex partner: None     Emotionally abused: None     Physically abused: None     Forced sexual activity: None   Other Topics Concern    None   Social History Narrative    None       SCREENINGS:             PHYSICAL EXAM:       ED Triage Vitals [06/14/20 2034]   BP Temp Temp Urinalysis Comments see below    POCT glucose   Result Value Ref Range    QC OK? 452    POCT Glucose   Result Value Ref Range    POC Glucose 452 (H) 70 - 99 mg/dl    Performed on ACCU-CHEK    POCT Glucose   Result Value Ref Range    POC Glucose 360 (H) 70 - 99 mg/dl    Performed on ACCU-CHEK    POCT Glucose   Result Value Ref Range    POC Glucose 294 (H) 70 - 99 mg/dl    Performed on ACCU-CHEK          RADIOLOGY:  All x-ray studies areviewed/reviewed by me. Formal interpretations per the radiologist are as follows:    Ct Abdomen Pelvis Wo Contrast Additional Contrast? None    Result Date: 6/15/2020  EXAMINATION: CT OF THE ABDOMEN AND PELVIS WITHOUT CONTRAST 6/14/2020 11:01 pm TECHNIQUE: CT of the abdomen and pelvis was performed without the administration of intravenous contrast. Multiplanar reformatted images are provided for review. Dose modulation, iterative reconstruction, and/or weight based adjustment of the mA/kV was utilized to reduce the radiation dose to as low as reasonably achievable. COMPARISON: September 5, 2017 HISTORY: ORDERING SYSTEM PROVIDED HISTORY: UTI presenting in early sepsis. Study requested to evaluate for  tract obstruction, pyelonephritis, etc. TECHNOLOGIST PROVIDED HISTORY: Additional Contrast?->None Reason for exam:->UTI presenting in early sepsis. Study requested to evaluate for  tract obstruction, pyelonephritis, etc. Is the patient pregnant?->No Reason for Exam: UTI presenting in early sepsis. Study requested to evaluate for  tract obstruction, pyelonephritis, Acuity: Acute Type of Exam: Initial FINDINGS: Lower Chest: Lung bases are clear. Organs: Hepatic steatosis is present. The gallbladder surgically absent. Pancreas is largely fatty replaced. The spleen and adrenal glands appear normal.  Native kidneys are atrophic.   Transplant kidney is present within the right lower quadrant minimal fullness of the pelvocaliceal system and ureter is again noted, with mild degree of

## 2020-06-15 NOTE — PLAN OF CARE
Problem: Nutrition  Goal: Optimal nutrition therapy  Outcome: Ongoing  Note: Nutrition Problem: Inadequate oral intake  Intervention: Food and/or Nutrient Delivery: Continue current diet  Nutritional Goals:  Tolerate oral diet progression without further N/V and consume greater than 50% of meals this admission

## 2020-06-15 NOTE — DISCHARGE INSTR - COC
Continuity of Care Form    Patient Name: Dilcia Patel   :  1980  MRN:  [id number]    Admit date:  2020  Discharge date:  ***    Code Status Order: Full Code   Advance Directives:   Advance Care Flowsheet Documentation     Date/Time Healthcare Directive Type of Healthcare Directive Copy in [address] Agent's Name Healthcare Agent's Phone Number    06/15/20 0121  No, patient does not have an advance directive for healthcare treatment -- -- -- -- --          Admitting Physician:  Zoey York MD  PCP: No primary care provider on file. Discharging Nurse: [facility] Unit/Room#: 1399/2412-77  Discharging Unit Phone Number: ***    Emergency Contact:   Extended Emergency Contact Information  Primary Emergency Contact: Darvin Adrien  Address: [address] [place] [address], [address] [facility] Phone: [phone]  Relation: Parent    Past Surgical History:  Past Surgical History:   Procedure Laterality Date    DENTAL SURGERY      DIALYSIS FISTULA CREATION      KIDNEY TRANSPLANT      OTHER SURGICAL HISTORY Left 2014    Port placed left chest    OTHER SURGICAL HISTORY  2016    removal of port-a-cath and insertion of new port-a-cath    UPPER GASTROINTESTINAL ENDOSCOPY  2016    Esophagitis       Immunization History:   Immunization History   Administered Date(s) Administered    Influenza Virus Vaccine 10/05/2014, 2015    Influenza Whole 2010    Pneumococcal Polysaccharide ([id number]) 2014       Active Problems:  Patient Active Problem List   Diagnosis Code    Hypertension I10    Hyperlipidemia E78.5    Nausea & vomiting R11.2    Hypokalemia E87.6    Vomiting R11.10    DM (diabetes mellitus) ([facility] [address] E11.9    ESRD (end stage renal disease) ([facility] [address] N18.6    Fluid overload E87.70    Vascular port complication D45. 9XXA    UTI (urinary tract infection) N39.0    Type 1 diabetes mellitus

## 2020-06-15 NOTE — ED PROVIDER NOTES
Ketones, Urine 40 (A) Negative mg/dL    Specific Gravity, UA 1.015 1.005 - 1.030    Blood, Urine SMALL (A) Negative    pH, UA 7.5 5.0 - 8.0    Protein, UA 30 (A) Negative mg/dL    Urobilinogen, Urine 0.2 <2.0 E.U./dL    Nitrite, Urine Negative Negative    Leukocyte Esterase, Urine SMALL (A) Negative    Microscopic Examination YES     Urine Type NotGiven     Urine Reflex to Culture Yes    Microscopic Urinalysis   Result Value Ref Range    WBC, UA >100 (A) 0 - 5 /HPF    RBC, UA see below (A) 0 - 4 /HPF    Urinalysis Comments see below    POCT glucose   Result Value Ref Range    QC OK? 452    POCT Glucose   Result Value Ref Range    POC Glucose 452 (H) 70 - 99 mg/dl    Performed on ACCU-CHEK    POCT Glucose   Result Value Ref Range    POC Glucose 360 (H) 70 - 99 mg/dl    Performed on ACCU-CHEK        CT ABDOMEN PELVIS WO CONTRAST Additional Contrast? None   Final Result   1. Transplant kidney again seen within the right lower quadrant, with minimal   fullness of the pelvocaliceal system and ureter, similar compared to the   prior study. Minimal soft tissue stranding is again seen around the   transplant kidney. No intrarenal calculi are present   2. Atrophic appearance to the native kidneys again visualized   3. Hepatic steatosis   4. Prior cholecystectomy             CLINICAL IMPRESSION  1. Intractable nausea and vomiting    2. Urinary tract infection without hematuria, site unspecified    3. Type 1 diabetes mellitus with hyperglycemia (HCC)    4. Hypomagnesemia    5. Glucosuria        Blood pressure (!) 143/105, pulse 108, temperature 98.2 °F (36.8 °C), temperature source Oral, resp. rate 15, height 5' 1\" (1.549 m), weight 126 lb (57.2 kg), last menstrual period 05/17/2020, SpO2 100 %. DISPOSITION  Som Cabrales was dmitted in stable condition.      This chart was generated in part by using Dragon Dictation system and may contain errors related to that system including errors in grammar, punctuation, and

## 2020-06-15 NOTE — H&P
06/14/2020    MCV 79.2 (L) 06/14/2020     06/14/2020     Lab Results   Component Value Date    CREATININE 1.1 06/14/2020    BUN 7 06/14/2020     06/14/2020    K 3.6 06/14/2020    CL 96 (L) 06/14/2020    CO2 25 06/14/2020     Lab Results   Component Value Date    ALT 7 (L) 06/14/2020    AST 12 (L) 06/14/2020    ALKPHOS 149 (H) 06/14/2020    BILITOT 0.4 06/14/2020     Lab Results   Component Value Date    TROPONINI 0.08 (H) 10/04/2015     No results for input(s): PHART, TGB8WCK, PO2ART in the last 72 hours. IMAGING:  CT Abdomen/Pelvis w/o contrast    1. Transplant kidney again seen within the right lower quadrant, with minimal fullness of the pelvocaliceal system and ureter, similar compared to the prior study. Minimal soft tissue stranding is again seen around the transplant kidney. No intrarenal calculi are present 2. Atrophic appearance to the native kidneys again visualized 3. Hepatic steatosis 4. Prior cholecystectomy       Active Hospital Problems    Diagnosis Date Noted    UTI (urinary tract infection) [N39.0] 06/15/2020    Type 1 diabetes mellitus with hyperglycemia ([facility] [address] [E10.65] 06/15/2020    History of renal transplant [Z94.0] 06/15/2020    Renal transplant disorder [T86.10] 06/15/2020    Right atrial thrombus [I51.3] 06/15/2020    Chronic anticoagulation [Z79.01] 06/15/2020       ASSESSMENT & PLAN  UTI,  H/O renal transplant (5/17/2016), immune suppression  -  Recent baseline creatinine has been ~ 1.2 mg/dL. Creatinine is currently at baseline.  -  CT Ab/P w/o e/o obstruction of the engrafted kidney's urine collection system. -  Avoid nephrotoxin exposure where possible. -  Will request nephrology assistance to manage immune suppression. Home regimen is cellcept 1000mg bid and tacrolimus 0.5mg bid. Tacrolimus level will be added to her a.m labs. -  Based on prior cultures, ceftriaxone should be appropriate antimicrobial coverage. Will continue at 1g daily.     Type 1 DM w/

## 2020-06-16 VITALS
TEMPERATURE: 98.1 F | OXYGEN SATURATION: 99 % | SYSTOLIC BLOOD PRESSURE: 97 MMHG | DIASTOLIC BLOOD PRESSURE: 68 MMHG | WEIGHT: 131.25 LBS | HEART RATE: 77 BPM | HEIGHT: 61 IN | RESPIRATION RATE: 14 BRPM | BODY MASS INDEX: 24.78 KG/M2

## 2020-06-16 LAB
ANION GAP SERPL CALCULATED.3IONS-SCNC: 7 MMOL/L (ref 3–16)
BASOPHILS ABSOLUTE: 0.1 K/UL (ref 0–0.2)
BASOPHILS RELATIVE PERCENT: 1 %
BUN BLDV-MCNC: 4 MG/DL (ref 7–20)
CALCIUM SERPL-MCNC: 8.4 MG/DL (ref 8.3–10.6)
CHLORIDE BLD-SCNC: 103 MMOL/L (ref 99–110)
CO2: 27 MMOL/L (ref 21–32)
CREAT SERPL-MCNC: 1.1 MG/DL (ref 0.6–1.1)
EOSINOPHILS ABSOLUTE: 0.2 K/UL (ref 0–0.6)
EOSINOPHILS RELATIVE PERCENT: 1.9 %
GFR AFRICAN AMERICAN: >60
GFR NON-AFRICAN AMERICAN: 55
GLUCOSE BLD-MCNC: 184 MG/DL (ref 70–99)
GLUCOSE BLD-MCNC: 59 MG/DL (ref 70–99)
GLUCOSE BLD-MCNC: 64 MG/DL (ref 70–99)
GLUCOSE BLD-MCNC: 71 MG/DL (ref 70–99)
GLUCOSE BLD-MCNC: 72 MG/DL (ref 70–99)
GLUCOSE BLD-MCNC: 74 MG/DL (ref 70–99)
GLUCOSE BLD-MCNC: 75 MG/DL (ref 70–99)
GLUCOSE BLD-MCNC: 78 MG/DL (ref 70–99)
HCT VFR BLD CALC: 35.8 % (ref 36–48)
HEMOGLOBIN: 11.4 G/DL (ref 12–16)
LYMPHOCYTES ABSOLUTE: 1.7 K/UL (ref 1–5.1)
LYMPHOCYTES RELATIVE PERCENT: 17.1 %
MAGNESIUM: 1.6 MG/DL (ref 1.8–2.4)
MCH RBC QN AUTO: 25.3 PG (ref 26–34)
MCHC RBC AUTO-ENTMCNC: 32 G/DL (ref 31–36)
MCV RBC AUTO: 78.9 FL (ref 80–100)
MONOCYTES ABSOLUTE: 0.7 K/UL (ref 0–1.3)
MONOCYTES RELATIVE PERCENT: 7.3 %
NEUTROPHILS ABSOLUTE: 7.2 K/UL (ref 1.7–7.7)
NEUTROPHILS RELATIVE PERCENT: 72.7 %
ORGANISM: ABNORMAL
PDW BLD-RTO: 16.5 % (ref 12.4–15.4)
PERFORMED ON: ABNORMAL
PERFORMED ON: NORMAL
PLATELET # BLD: 186 K/UL (ref 135–450)
PMV BLD AUTO: 9.5 FL (ref 5–10.5)
POTASSIUM SERPL-SCNC: 3.6 MMOL/L (ref 3.5–5.1)
RBC # BLD: 4.53 M/UL (ref 4–5.2)
SODIUM BLD-SCNC: 137 MMOL/L (ref 136–145)
TACROLIMUS BLOOD: 3 NG/ML (ref 5–20)
URINE CULTURE, ROUTINE: ABNORMAL
URINE CULTURE, ROUTINE: ABNORMAL
WBC # BLD: 9.9 K/UL (ref 4–11)

## 2020-06-16 PROCEDURE — 80048 BASIC METABOLIC PNL TOTAL CA: CPT

## 2020-06-16 PROCEDURE — C9113 INJ PANTOPRAZOLE SODIUM, VIA: HCPCS | Performed by: INTERNAL MEDICINE

## 2020-06-16 PROCEDURE — 6360000002 HC RX W HCPCS: Performed by: INTERNAL MEDICINE

## 2020-06-16 PROCEDURE — 85025 COMPLETE CBC W/AUTO DIFF WBC: CPT

## 2020-06-16 PROCEDURE — 83735 ASSAY OF MAGNESIUM: CPT

## 2020-06-16 PROCEDURE — 6370000000 HC RX 637 (ALT 250 FOR IP): Performed by: NURSE PRACTITIONER

## 2020-06-16 PROCEDURE — 2580000003 HC RX 258: Performed by: INTERNAL MEDICINE

## 2020-06-16 PROCEDURE — 80197 ASSAY OF TACROLIMUS: CPT

## 2020-06-16 PROCEDURE — 36591 DRAW BLOOD OFF VENOUS DEVICE: CPT

## 2020-06-16 RX ORDER — AMOXICILLIN AND CLAVULANATE POTASSIUM 875; 125 MG/1; MG/1
1 TABLET, FILM COATED ORAL 2 TIMES DAILY
Qty: 28 TABLET | Refills: 0 | Status: SHIPPED | OUTPATIENT
Start: 2020-06-16 | End: 2020-06-30

## 2020-06-16 RX ORDER — CIPROFLOXACIN 500 MG/1
500 TABLET, FILM COATED ORAL EVERY 12 HOURS SCHEDULED
Qty: 10 TABLET | Refills: 0 | Status: SHIPPED | OUTPATIENT
Start: 2020-06-16 | End: 2020-06-16 | Stop reason: HOSPADM

## 2020-06-16 RX ORDER — LISINOPRIL 5 MG/1
5 TABLET ORAL DAILY
Status: DISCONTINUED | OUTPATIENT
Start: 2020-06-16 | End: 2020-06-16 | Stop reason: HOSPADM

## 2020-06-16 RX ORDER — CIPROFLOXACIN 500 MG/1
500 TABLET, FILM COATED ORAL EVERY 12 HOURS SCHEDULED
Status: DISCONTINUED | OUTPATIENT
Start: 2020-06-16 | End: 2020-06-16

## 2020-06-16 RX ORDER — DOXAZOSIN 2 MG/1
2 TABLET ORAL NIGHTLY
Status: CANCELLED | OUTPATIENT
Start: 2020-06-16

## 2020-06-16 RX ORDER — TACROLIMUS 0.5 MG/1
0.5 CAPSULE ORAL 2 TIMES DAILY
Qty: 60 CAPSULE | Refills: 0 | Status: SHIPPED | OUTPATIENT
Start: 2020-06-16

## 2020-06-16 RX ORDER — TACROLIMUS 1 MG/1
1 CAPSULE ORAL 2 TIMES DAILY
Status: DISCONTINUED | OUTPATIENT
Start: 2020-06-16 | End: 2020-06-16

## 2020-06-16 RX ORDER — ASPIRIN 81 MG/1
81 TABLET ORAL DAILY
Status: CANCELLED | OUTPATIENT
Start: 2020-06-16

## 2020-06-16 RX ORDER — METOPROLOL TARTRATE 50 MG/1
50 TABLET, FILM COATED ORAL 2 TIMES DAILY
Status: DISCONTINUED | OUTPATIENT
Start: 2020-06-16 | End: 2020-06-16 | Stop reason: HOSPADM

## 2020-06-16 RX ADMIN — PROMETHAZINE HYDROCHLORIDE 12.5 MG: 25 INJECTION INTRAMUSCULAR; INTRAVENOUS at 08:18

## 2020-06-16 RX ADMIN — PANTOPRAZOLE SODIUM 40 MG: 40 INJECTION, POWDER, FOR SOLUTION INTRAVENOUS at 09:12

## 2020-06-16 RX ADMIN — APIXABAN 2.5 MG: 2.5 TABLET, FILM COATED ORAL at 13:07

## 2020-06-16 RX ADMIN — MAGNESIUM SULFATE HEPTAHYDRATE 1 G: 1 INJECTION, SOLUTION INTRAVENOUS at 09:14

## 2020-06-16 RX ADMIN — MYCOPHENOLATE MOFETIL 1000 MG: 250 CAPSULE ORAL at 09:12

## 2020-06-16 RX ADMIN — ENOXAPARIN SODIUM 40 MG: 40 INJECTION SUBCUTANEOUS at 09:12

## 2020-06-16 RX ADMIN — METOPROLOL TARTRATE 50 MG: 50 TABLET, FILM COATED ORAL at 13:07

## 2020-06-16 RX ADMIN — LISINOPRIL 5 MG: 5 TABLET ORAL at 13:06

## 2020-06-16 RX ADMIN — MAGNESIUM SULFATE HEPTAHYDRATE 1 G: 1 INJECTION, SOLUTION INTRAVENOUS at 06:48

## 2020-06-16 RX ADMIN — Medication 10 ML: at 08:18

## 2020-06-16 RX ADMIN — MORPHINE SULFATE 2 MG: 2 INJECTION, SOLUTION INTRAMUSCULAR; INTRAVENOUS at 09:24

## 2020-06-16 RX ADMIN — TACROLIMUS 0.5 MG: 0.5 CAPSULE ORAL at 09:19

## 2020-06-16 ASSESSMENT — PAIN DESCRIPTION - DESCRIPTORS: DESCRIPTORS: TENDER

## 2020-06-16 ASSESSMENT — PAIN DESCRIPTION - LOCATION: LOCATION: ABDOMEN

## 2020-06-16 ASSESSMENT — PAIN DESCRIPTION - FREQUENCY: FREQUENCY: INTERMITTENT

## 2020-06-16 ASSESSMENT — PAIN SCALES - GENERAL
PAINLEVEL_OUTOF10: 5
PAINLEVEL_OUTOF10: 2
PAINLEVEL_OUTOF10: 2
PAINLEVEL_OUTOF10: 5

## 2020-06-16 ASSESSMENT — PAIN DESCRIPTION - PAIN TYPE: TYPE: ACUTE PAIN

## 2020-06-16 ASSESSMENT — PAIN DESCRIPTION - ORIENTATION: ORIENTATION: RIGHT

## 2020-06-16 ASSESSMENT — PAIN DESCRIPTION - PROGRESSION: CLINICAL_PROGRESSION: GRADUALLY WORSENING

## 2020-06-16 NOTE — PROGRESS NOTES
gallops. Abdomen: Soft, non-tender, non-distended with normal bowel sounds. Musculoskeletal: No clubbing, cyanosis or edema bilaterally. Full range of motion without deformity. Skin: Skin color, texture, turgor normal.  No rashes or lesions. Neurologic:  Neurovascularly intact without any focal sensory/motor deficits. Cranial nerves: II-XII intact, grossly non-focal.  Psychiatric: Alert and oriented, thought content appropriate, normal insight  Capillary Refill: Brisk,< 3 seconds   Peripheral Pulses: +2 palpable, equal bilaterally       Labs:   Recent Labs     06/14/20  2157 06/15/20  0503 06/16/20  0400   WBC 11.9* 12.6* 9.9   HGB 12.5 10.6* 11.4*   HCT 39.4 33.5* 35.8*    177 186     Recent Labs     06/14/20  2157 06/15/20  0503 06/16/20  0400    142 137   K 3.6 4.1 3.6   CL 96* 107 103   CO2 25 28 27   BUN 7 6* 4*   CREATININE 1.1 1.0 1.1   CALCIUM 8.7 8.4 8.4     Recent Labs     06/14/20 2157   AST 12*   ALT 7*   BILITOT 0.4   ALKPHOS 149*     No results for input(s): INR in the last 72 hours. No results for input(s): Willena Valle in the last 72 hours. Urinalysis:      Lab Results   Component Value Date    NITRU Negative 06/14/2020    WBCUA >100 06/14/2020    BACTERIA 2+ 09/29/2015    RBCUA see below 06/14/2020    BLOODU SMALL 06/14/2020    SPECGRAV 1.015 06/14/2020    GLUCOSEU >=1000 06/14/2020    GLUCOSEU NEGATIVE 08/04/2011       Radiology:  CT ABDOMEN PELVIS WO CONTRAST Additional Contrast? None   Final Result   1. Transplant kidney again seen within the right lower quadrant, with minimal   fullness of the pelvocaliceal system and ureter, similar compared to the   prior study. Minimal soft tissue stranding is again seen around the   transplant kidney. No intrarenal calculi are present   2. Atrophic appearance to the native kidneys again visualized   3. Hepatic steatosis   4.  Prior cholecystectomy                 Assessment/Plan:    Active Hospital Problems    Diagnosis    UTI (urinary tract infection) [N39.0]    Type 1 diabetes mellitus with hyperglycemia (HCC) [E10.65]    History of renal transplant [Z94.0]    Right atrial thrombus [I51.3]    Chronic anticoagulation [Z79.01]     Acute cystitis, H/O renal transplant (5/17/2016), immune suppression  -  Recent baseline creatinine has been ~ 1.2 mg/dL. Creatinine is currently at baseline.  -  CT Ab/P w/o e/o obstruction of the engrafted kidney's urine collection system. -  Avoid nephrotoxin exposure where possible. -  Will request nephrology assistance to manage immune suppression. Home regimen is cellcept 1000mg bid and tacrolimus 1 mg bid. Tacrolimus level was low. - Ucx reveals E. Faecalis - changed abx to Cipro 6/16 based on sensitivities.     Type 1 DM w/ hyperglycemia  -  Start s.c. Insulin regimen based on home regimen. -  Antiemetics available prn.     Intracardiac thrombosis, Chronic anticoagulation  -  Chronic right atrial thrombus.   Anticoagulated with apixaban.       DVT Prophylaxis: Eliquis  Diet: DIET GENERAL;  Code Status: Full Code    PT/OT Eval Status: not indicated    Dispo - possibly later today if tolerating PO    NIRAV Casey - CNP

## 2020-06-16 NOTE — CARE COORDINATION
Chart reviewed. Per notes could d/c later today if tolerating po. Denied any DCP needs. Will go to mothers home till friend gets here to move her to [place].  Kalee Tavarez RN

## 2020-06-16 NOTE — PLAN OF CARE
Problem: Falls - Risk of:  Goal: Will remain free from falls  Description: Will remain free from falls  Outcome: Ongoing     Problem: Falls - Risk of:  Goal: Absence of physical injury  Description: Absence of physical injury  6/16/2020 1033 by Paula Marie RN  Outcome: Ongoing  6/16/2020 0035 by Jesi Cope RN  Outcome: Ongoing  Note: Pt remained free from any accidental physical injury or harm this shift. Will continue to monitor. Problem: Falls - Risk of:  Goal: Will remain free from falls  Description: Will remain free from falls  Outcome: Ongoing  Goal: Absence of physical injury  Description: Absence of physical injury  6/16/2020 1033 by Paula Marie RN  Outcome: Ongoing  6/16/2020 0035 by Jesi Cope RN  Outcome: Ongoing  Note: Pt remained free from any accidental physical injury or harm this shift. Will continue to monitor. Problem: Pain:  Goal: Pain level will decrease  Description: Pain level will decrease  Outcome: Ongoing  Goal: Control of acute pain  Description: Control of acute pain  Outcome: Ongoing     Problem: Coping:  Goal: Level of anxiety will decrease  Description: Level of anxiety will decrease  Outcome: Ongoing     Problem: Health Behavior:  Goal: Ability to manage health-related needs will improve  Description: Ability to manage health-related needs will improve  Outcome: Ongoing     Problem: Sensory:  Goal: Pain level will decrease  Description: Pain level will decrease  Outcome: Ongoing     Bed in lowest position, wheels locked, 2/4 side rails up, nonskid footwear on. Bed/ chair check alarm in place, call light within reach. Pt instructed to call out when needing assistance. Pt stated understanding. Nurse will continue to monitor.      Reviewed appropriate pain management and educated on medication

## 2020-06-16 NOTE — PLAN OF CARE
Problem: Falls - Risk of:  Goal: Absence of physical injury  Description: Absence of physical injury  Outcome: Ongoing  Note: Pt remained free from any accidental physical injury or harm this shift. Will continue to monitor.

## 2020-06-16 NOTE — PROGRESS NOTES
Patient and family given discharge instructions. All questions and concerns were addressed. Patient port de-accessed and clean dressing placed on site. Patient wheeled to car with all patient belongings.

## 2020-06-16 NOTE — PROGRESS NOTES
CT A/P unrevealing    Plan/      - Continue home doses of tacrolimus 0.5 mg po bid & cellcept 1,000 mg po bid  - Suggest 2 week course of augmentin for UTI  - Trend labs, prograf levels    Okay for discharge  Case d/w Admitting and Transplant team  Arranged for repeat labs in 2 weeks, f/u with us in office

## 2020-06-24 NOTE — DISCHARGE SUMMARY
the skin 2 times daily (before meals), Disp-1 vial, R-3      lisinopril (PRINIVIL;ZESTRIL) 5 MG tablet Take 1 tablet by mouth nightly, Disp-30 tablet, R-3      ezetimibe (ZETIA) 10 MG tablet Take 10 mg by mouth daily      furosemide (LASIX) 40 MG tablet Take 40 mg by mouth daily      doxazosin (CARDURA) 1 MG tablet Take 2 mg by mouth nightly       omeprazole (PRILOSEC) 20 MG capsule Take 1 capsule by mouth daily. , Disp-30 capsule, R-0      metoprolol (LOPRESSOR) 50 MG tablet Take 1 tablet by mouth 2 times daily. , Disp-60 tablet, R-0      Cholecalciferol (VITAMIN D) 2000 UNITS TABS Take  by mouth. aspirin 81 MG EC tablet Take 81 mg by mouth daily. Time Spent on discharge is more than 30 minutes in the examination, evaluation, counseling and review of medications and discharge plan. Signed:    NIRAV Escobedo CNP   6/23/2020      Thank you No primary care provider on file. for the opportunity to be involved in this patient's care. If you have any questions or concerns please feel free to contact me at [phone].

## 2020-07-15 PROBLEM — N39.0 UTI (URINARY TRACT INFECTION): Status: RESOLVED | Noted: 2020-06-15 | Resolved: 2020-07-15

## 2021-07-10 ENCOUNTER — HOSPITAL ENCOUNTER (INPATIENT)
Age: 41
LOS: 1 days | Discharge: ANOTHER HOSPITAL | DRG: 699 | End: 2021-07-11
Attending: EMERGENCY MEDICINE | Admitting: INTERNAL MEDICINE
Payer: COMMERCIAL

## 2021-07-10 DIAGNOSIS — N17.9 AKI (ACUTE KIDNEY INJURY) (HCC): Primary | ICD-10-CM

## 2021-07-10 DIAGNOSIS — R73.9 HYPERGLYCEMIA: ICD-10-CM

## 2021-07-10 LAB
A/G RATIO: 0.8 (ref 1.1–2.2)
ALBUMIN SERPL-MCNC: 3.8 G/DL (ref 3.4–5)
ALP BLD-CCNC: 177 U/L (ref 40–129)
ALT SERPL-CCNC: 10 U/L (ref 10–40)
ANION GAP SERPL CALCULATED.3IONS-SCNC: 12 MMOL/L (ref 3–16)
AST SERPL-CCNC: 15 U/L (ref 15–37)
BASE EXCESS VENOUS: 3.6 MMOL/L (ref -3–3)
BASOPHILS ABSOLUTE: 0.1 K/UL (ref 0–0.2)
BASOPHILS RELATIVE PERCENT: 0.7 %
BILIRUB SERPL-MCNC: 0.3 MG/DL (ref 0–1)
BUN BLDV-MCNC: 18 MG/DL (ref 7–20)
CALCIUM SERPL-MCNC: 9.5 MG/DL (ref 8.3–10.6)
CARBOXYHEMOGLOBIN: 1.9 % (ref 0–1.5)
CHLORIDE BLD-SCNC: 91 MMOL/L (ref 99–110)
CO2: 26 MMOL/L (ref 21–32)
CREAT SERPL-MCNC: 1.9 MG/DL (ref 0.6–1.1)
EOSINOPHILS ABSOLUTE: 0.2 K/UL (ref 0–0.6)
EOSINOPHILS RELATIVE PERCENT: 1.1 %
GFR AFRICAN AMERICAN: 36
GFR NON-AFRICAN AMERICAN: 29
GLOBULIN: 5 G/DL
GLUCOSE BLD-MCNC: 466 MG/DL (ref 70–99)
HCO3 VENOUS: 29.1 MMOL/L (ref 23–29)
HCT VFR BLD CALC: 34.7 % (ref 36–48)
HEMOGLOBIN: 11.6 G/DL (ref 12–16)
LYMPHOCYTES ABSOLUTE: 1 K/UL (ref 1–5.1)
LYMPHOCYTES RELATIVE PERCENT: 7 %
MCH RBC QN AUTO: 27.5 PG (ref 26–34)
MCHC RBC AUTO-ENTMCNC: 33.5 G/DL (ref 31–36)
MCV RBC AUTO: 82.1 FL (ref 80–100)
METHEMOGLOBIN VENOUS: 0.7 %
MONOCYTES ABSOLUTE: 0.5 K/UL (ref 0–1.3)
MONOCYTES RELATIVE PERCENT: 3.8 %
NEUTROPHILS ABSOLUTE: 12.8 K/UL (ref 1.7–7.7)
NEUTROPHILS RELATIVE PERCENT: 87.4 %
O2 SAT, VEN: 81 %
O2 THERAPY: ABNORMAL
PCO2, VEN: 47.6 MMHG (ref 40–50)
PDW BLD-RTO: 14.3 % (ref 12.4–15.4)
PH VENOUS: 7.4 (ref 7.35–7.45)
PLATELET # BLD: 252 K/UL (ref 135–450)
PMV BLD AUTO: 8.7 FL (ref 5–10.5)
PO2, VEN: 44.4 MMHG (ref 25–40)
POTASSIUM SERPL-SCNC: 5 MMOL/L (ref 3.5–5.1)
RBC # BLD: 4.23 M/UL (ref 4–5.2)
SODIUM BLD-SCNC: 129 MMOL/L (ref 136–145)
SPECIMEN STATUS: NORMAL
TCO2 CALC VENOUS: 31 MMOL/L
TOTAL PROTEIN: 8.8 G/DL (ref 6.4–8.2)
WBC # BLD: 14.6 K/UL (ref 4–11)

## 2021-07-10 PROCEDURE — 96375 TX/PRO/DX INJ NEW DRUG ADDON: CPT

## 2021-07-10 PROCEDURE — 96361 HYDRATE IV INFUSION ADD-ON: CPT

## 2021-07-10 PROCEDURE — 99285 EMERGENCY DEPT VISIT HI MDM: CPT

## 2021-07-10 PROCEDURE — 85025 COMPLETE CBC W/AUTO DIFF WBC: CPT

## 2021-07-10 PROCEDURE — 6360000002 HC RX W HCPCS: Performed by: NURSE PRACTITIONER

## 2021-07-10 PROCEDURE — 82803 BLOOD GASES ANY COMBINATION: CPT

## 2021-07-10 PROCEDURE — 96374 THER/PROPH/DIAG INJ IV PUSH: CPT

## 2021-07-10 PROCEDURE — 80053 COMPREHEN METABOLIC PANEL: CPT

## 2021-07-10 PROCEDURE — 2580000003 HC RX 258: Performed by: NURSE PRACTITIONER

## 2021-07-10 RX ORDER — MORPHINE SULFATE 4 MG/ML
4 INJECTION, SOLUTION INTRAMUSCULAR; INTRAVENOUS
Status: COMPLETED | OUTPATIENT
Start: 2021-07-10 | End: 2021-07-11

## 2021-07-10 RX ORDER — 0.9 % SODIUM CHLORIDE 0.9 %
1000 INTRAVENOUS SOLUTION INTRAVENOUS ONCE
Status: COMPLETED | OUTPATIENT
Start: 2021-07-10 | End: 2021-07-11

## 2021-07-10 RX ORDER — ONDANSETRON 2 MG/ML
4 INJECTION INTRAMUSCULAR; INTRAVENOUS ONCE
Status: COMPLETED | OUTPATIENT
Start: 2021-07-10 | End: 2021-07-10

## 2021-07-10 RX ADMIN — MORPHINE SULFATE 4 MG: 4 INJECTION, SOLUTION INTRAMUSCULAR; INTRAVENOUS at 23:18

## 2021-07-10 RX ADMIN — ONDANSETRON 4 MG: 2 INJECTION INTRAMUSCULAR; INTRAVENOUS at 23:20

## 2021-07-10 RX ADMIN — SODIUM CHLORIDE 1000 ML: 9 INJECTION, SOLUTION INTRAVENOUS at 23:17

## 2021-07-10 ASSESSMENT — PAIN SCALES - GENERAL
PAINLEVEL_OUTOF10: 8
PAINLEVEL_OUTOF10: 8

## 2021-07-11 ENCOUNTER — APPOINTMENT (OUTPATIENT)
Dept: ULTRASOUND IMAGING | Age: 41
DRG: 699 | End: 2021-07-11
Payer: COMMERCIAL

## 2021-07-11 VITALS
BODY MASS INDEX: 23.98 KG/M2 | TEMPERATURE: 97.8 F | DIASTOLIC BLOOD PRESSURE: 81 MMHG | WEIGHT: 127 LBS | RESPIRATION RATE: 18 BRPM | HEIGHT: 61 IN | OXYGEN SATURATION: 98 % | SYSTOLIC BLOOD PRESSURE: 132 MMHG | HEART RATE: 83 BPM

## 2021-07-11 PROBLEM — N17.9 AKI (ACUTE KIDNEY INJURY) (HCC): Status: ACTIVE | Noted: 2021-07-11

## 2021-07-11 LAB
ANION GAP SERPL CALCULATED.3IONS-SCNC: 10 MMOL/L (ref 3–16)
BILIRUBIN URINE: NEGATIVE
BLOOD, URINE: ABNORMAL
BUN BLDV-MCNC: 16 MG/DL (ref 7–20)
CALCIUM SERPL-MCNC: 9 MG/DL (ref 8.3–10.6)
CHLORIDE BLD-SCNC: 102 MMOL/L (ref 99–110)
CLARITY: ABNORMAL
CO2: 25 MMOL/L (ref 21–32)
COLOR: YELLOW
CREAT SERPL-MCNC: 1.8 MG/DL (ref 0.6–1.1)
EPITHELIAL CELLS, UA: ABNORMAL /HPF (ref 0–5)
GFR AFRICAN AMERICAN: 38
GFR NON-AFRICAN AMERICAN: 31
GLUCOSE BLD-MCNC: 141 MG/DL (ref 70–99)
GLUCOSE BLD-MCNC: 143 MG/DL (ref 70–99)
GLUCOSE BLD-MCNC: 147 MG/DL (ref 70–99)
GLUCOSE BLD-MCNC: 262 MG/DL (ref 70–99)
GLUCOSE URINE: >=1000 MG/DL
KETONES, URINE: NEGATIVE MG/DL
LEUKOCYTE ESTERASE, URINE: ABNORMAL
MICROSCOPIC EXAMINATION: YES
NITRITE, URINE: NEGATIVE
PERFORMED ON: ABNORMAL
PH UA: 7.5 (ref 5–8)
POTASSIUM REFLEX MAGNESIUM: 4.8 MMOL/L (ref 3.5–5.1)
PROTEIN UA: ABNORMAL MG/DL
RBC UA: ABNORMAL /HPF (ref 0–4)
SODIUM BLD-SCNC: 137 MMOL/L (ref 136–145)
SPECIFIC GRAVITY UA: 1.01 (ref 1–1.03)
URINE TYPE: ABNORMAL
UROBILINOGEN, URINE: 0.2 E.U./DL
WBC UA: ABNORMAL /HPF (ref 0–5)

## 2021-07-11 PROCEDURE — 6370000000 HC RX 637 (ALT 250 FOR IP): Performed by: NURSE PRACTITIONER

## 2021-07-11 PROCEDURE — 76770 US EXAM ABDO BACK WALL COMP: CPT

## 2021-07-11 PROCEDURE — 6360000002 HC RX W HCPCS: Performed by: NURSE PRACTITIONER

## 2021-07-11 PROCEDURE — 6360000002 HC RX W HCPCS: Performed by: EMERGENCY MEDICINE

## 2021-07-11 PROCEDURE — 2580000003 HC RX 258: Performed by: INTERNAL MEDICINE

## 2021-07-11 PROCEDURE — 2580000003 HC RX 258: Performed by: NURSE PRACTITIONER

## 2021-07-11 PROCEDURE — 80048 BASIC METABOLIC PNL TOTAL CA: CPT

## 2021-07-11 PROCEDURE — 6360000002 HC RX W HCPCS: Performed by: INTERNAL MEDICINE

## 2021-07-11 PROCEDURE — 6370000000 HC RX 637 (ALT 250 FOR IP): Performed by: INTERNAL MEDICINE

## 2021-07-11 PROCEDURE — 96361 HYDRATE IV INFUSION ADD-ON: CPT

## 2021-07-11 PROCEDURE — 96375 TX/PRO/DX INJ NEW DRUG ADDON: CPT

## 2021-07-11 PROCEDURE — 87086 URINE CULTURE/COLONY COUNT: CPT

## 2021-07-11 PROCEDURE — 96365 THER/PROPH/DIAG IV INF INIT: CPT

## 2021-07-11 PROCEDURE — 2060000000 HC ICU INTERMEDIATE R&B

## 2021-07-11 PROCEDURE — 81001 URINALYSIS AUTO W/SCOPE: CPT

## 2021-07-11 PROCEDURE — 96372 THER/PROPH/DIAG INJ SC/IM: CPT

## 2021-07-11 RX ORDER — SODIUM CHLORIDE 0.9 % (FLUSH) 0.9 %
5-40 SYRINGE (ML) INJECTION PRN
Status: DISCONTINUED | OUTPATIENT
Start: 2021-07-11 | End: 2021-07-11 | Stop reason: HOSPADM

## 2021-07-11 RX ORDER — FAMOTIDINE 20 MG/1
20 TABLET, FILM COATED ORAL DAILY
Status: DISCONTINUED | OUTPATIENT
Start: 2021-07-11 | End: 2021-07-11 | Stop reason: HOSPADM

## 2021-07-11 RX ORDER — MYCOPHENOLATE MOFETIL 250 MG/1
250 CAPSULE ORAL 2 TIMES DAILY
Status: DISCONTINUED | OUTPATIENT
Start: 2021-07-11 | End: 2021-07-11

## 2021-07-11 RX ORDER — DOXAZOSIN 2 MG/1
2 TABLET ORAL NIGHTLY
Status: DISCONTINUED | OUTPATIENT
Start: 2021-07-11 | End: 2021-07-11 | Stop reason: HOSPADM

## 2021-07-11 RX ORDER — ASPIRIN 81 MG/1
81 TABLET ORAL DAILY
Status: DISCONTINUED | OUTPATIENT
Start: 2021-07-11 | End: 2021-07-11 | Stop reason: HOSPADM

## 2021-07-11 RX ORDER — SODIUM CHLORIDE 0.9 % (FLUSH) 0.9 %
5-40 SYRINGE (ML) INJECTION EVERY 12 HOURS SCHEDULED
Status: DISCONTINUED | OUTPATIENT
Start: 2021-07-11 | End: 2021-07-11 | Stop reason: HOSPADM

## 2021-07-11 RX ORDER — DEXTROSE MONOHYDRATE 25 G/50ML
12.5 INJECTION, SOLUTION INTRAVENOUS PRN
Status: DISCONTINUED | OUTPATIENT
Start: 2021-07-11 | End: 2021-07-11 | Stop reason: HOSPADM

## 2021-07-11 RX ORDER — INSULIN GLARGINE 100 [IU]/ML
20 INJECTION, SOLUTION SUBCUTANEOUS NIGHTLY
Status: DISCONTINUED | OUTPATIENT
Start: 2021-07-11 | End: 2021-07-11 | Stop reason: HOSPADM

## 2021-07-11 RX ORDER — ONDANSETRON 2 MG/ML
4 INJECTION INTRAMUSCULAR; INTRAVENOUS ONCE
Status: COMPLETED | OUTPATIENT
Start: 2021-07-11 | End: 2021-07-11

## 2021-07-11 RX ORDER — TACROLIMUS 1 MG/1
1 CAPSULE ORAL 2 TIMES DAILY
Status: DISCONTINUED | OUTPATIENT
Start: 2021-07-11 | End: 2021-07-11

## 2021-07-11 RX ORDER — TACROLIMUS 0.5 MG/1
0.5 CAPSULE ORAL 2 TIMES DAILY
Status: DISCONTINUED | OUTPATIENT
Start: 2021-07-11 | End: 2021-07-11

## 2021-07-11 RX ORDER — MYCOPHENOLATE MOFETIL 250 MG/1
1000 CAPSULE ORAL 2 TIMES DAILY
Status: DISCONTINUED | OUTPATIENT
Start: 2021-07-11 | End: 2021-07-11 | Stop reason: HOSPADM

## 2021-07-11 RX ORDER — ACETAMINOPHEN 650 MG/1
650 SUPPOSITORY RECTAL EVERY 6 HOURS PRN
Status: DISCONTINUED | OUTPATIENT
Start: 2021-07-11 | End: 2021-07-11 | Stop reason: HOSPADM

## 2021-07-11 RX ORDER — ONDANSETRON 4 MG/1
4 TABLET, ORALLY DISINTEGRATING ORAL EVERY 8 HOURS PRN
Status: DISCONTINUED | OUTPATIENT
Start: 2021-07-11 | End: 2021-07-11

## 2021-07-11 RX ORDER — MYCOPHENOLATE MOFETIL 250 MG/1
1000 CAPSULE ORAL 2 TIMES DAILY
Status: DISCONTINUED | OUTPATIENT
Start: 2021-07-11 | End: 2021-07-11

## 2021-07-11 RX ORDER — SODIUM CHLORIDE 9 MG/ML
INJECTION, SOLUTION INTRAVENOUS CONTINUOUS
Status: DISCONTINUED | OUTPATIENT
Start: 2021-07-11 | End: 2021-07-11 | Stop reason: HOSPADM

## 2021-07-11 RX ORDER — METOPROLOL TARTRATE 50 MG/1
50 TABLET, FILM COATED ORAL 2 TIMES DAILY
Status: DISCONTINUED | OUTPATIENT
Start: 2021-07-11 | End: 2021-07-11 | Stop reason: HOSPADM

## 2021-07-11 RX ORDER — DOCUSATE SODIUM 100 MG/1
100 CAPSULE, LIQUID FILLED ORAL 2 TIMES DAILY
Status: DISCONTINUED | OUTPATIENT
Start: 2021-07-11 | End: 2021-07-11 | Stop reason: HOSPADM

## 2021-07-11 RX ORDER — ONDANSETRON 2 MG/ML
4 INJECTION INTRAMUSCULAR; INTRAVENOUS EVERY 6 HOURS PRN
Status: DISCONTINUED | OUTPATIENT
Start: 2021-07-11 | End: 2021-07-11 | Stop reason: HOSPADM

## 2021-07-11 RX ORDER — FOLIC ACID 1 MG/1
1 TABLET ORAL DAILY
Status: DISCONTINUED | OUTPATIENT
Start: 2021-07-11 | End: 2021-07-11 | Stop reason: HOSPADM

## 2021-07-11 RX ORDER — ACETAMINOPHEN 325 MG/1
650 TABLET ORAL EVERY 6 HOURS PRN
Status: DISCONTINUED | OUTPATIENT
Start: 2021-07-11 | End: 2021-07-11 | Stop reason: HOSPADM

## 2021-07-11 RX ORDER — NICOTINE POLACRILEX 4 MG
15 LOZENGE BUCCAL PRN
Status: DISCONTINUED | OUTPATIENT
Start: 2021-07-11 | End: 2021-07-11 | Stop reason: HOSPADM

## 2021-07-11 RX ORDER — CALCIUM CARBONATE 200(500)MG
1 TABLET,CHEWABLE ORAL DAILY PRN
Status: DISCONTINUED | OUTPATIENT
Start: 2021-07-11 | End: 2021-07-11 | Stop reason: HOSPADM

## 2021-07-11 RX ORDER — EZETIMIBE 10 MG/1
10 TABLET ORAL DAILY
Status: DISCONTINUED | OUTPATIENT
Start: 2021-07-11 | End: 2021-07-11 | Stop reason: HOSPADM

## 2021-07-11 RX ORDER — PANTOPRAZOLE SODIUM 40 MG/1
40 TABLET, DELAYED RELEASE ORAL
Status: DISCONTINUED | OUTPATIENT
Start: 2021-07-12 | End: 2021-07-11 | Stop reason: HOSPADM

## 2021-07-11 RX ORDER — SODIUM CHLORIDE 9 MG/ML
1000 INJECTION, SOLUTION INTRAVENOUS CONTINUOUS
Status: DISCONTINUED | OUTPATIENT
Start: 2021-07-11 | End: 2021-07-11

## 2021-07-11 RX ORDER — SODIUM CHLORIDE 9 MG/ML
25 INJECTION, SOLUTION INTRAVENOUS PRN
Status: DISCONTINUED | OUTPATIENT
Start: 2021-07-11 | End: 2021-07-11 | Stop reason: HOSPADM

## 2021-07-11 RX ORDER — DEXTROSE MONOHYDRATE 50 MG/ML
100 INJECTION, SOLUTION INTRAVENOUS PRN
Status: DISCONTINUED | OUTPATIENT
Start: 2021-07-11 | End: 2021-07-11 | Stop reason: HOSPADM

## 2021-07-11 RX ORDER — TACROLIMUS 1 MG/1
1 CAPSULE ORAL 2 TIMES DAILY
Status: DISCONTINUED | OUTPATIENT
Start: 2021-07-11 | End: 2021-07-11 | Stop reason: HOSPADM

## 2021-07-11 RX ADMIN — SODIUM CHLORIDE 1000 ML: 9 INJECTION, SOLUTION INTRAVENOUS at 04:24

## 2021-07-11 RX ADMIN — FAMOTIDINE 20 MG: 20 TABLET ORAL at 13:12

## 2021-07-11 RX ADMIN — MYCOPHENOLATE MOFETIL 1000 MG: 250 CAPSULE ORAL at 13:11

## 2021-07-11 RX ADMIN — ONDANSETRON 4 MG: 2 INJECTION INTRAMUSCULAR; INTRAVENOUS at 12:46

## 2021-07-11 RX ADMIN — CEFTRIAXONE SODIUM 1000 MG: 1 INJECTION, POWDER, FOR SOLUTION INTRAMUSCULAR; INTRAVENOUS at 01:19

## 2021-07-11 RX ADMIN — ASPIRIN 81 MG: 81 TABLET, COATED ORAL at 13:12

## 2021-07-11 RX ADMIN — SODIUM CHLORIDE: 9 INJECTION, SOLUTION INTRAVENOUS at 12:40

## 2021-07-11 RX ADMIN — EZETIMIBE 10 MG: 10 TABLET ORAL at 13:12

## 2021-07-11 RX ADMIN — FOLIC ACID 1 MG: 1 TABLET ORAL at 13:12

## 2021-07-11 RX ADMIN — METOPROLOL TARTRATE 50 MG: 50 TABLET, FILM COATED ORAL at 13:12

## 2021-07-11 RX ADMIN — ONDANSETRON 4 MG: 2 INJECTION INTRAMUSCULAR; INTRAVENOUS at 04:46

## 2021-07-11 RX ADMIN — TACROLIMUS 0.5 MG: 1 CAPSULE ORAL at 12:18

## 2021-07-11 RX ADMIN — MORPHINE SULFATE 4 MG: 4 INJECTION, SOLUTION INTRAMUSCULAR; INTRAVENOUS at 04:46

## 2021-07-11 RX ADMIN — MEROPENEM 1000 MG: 1 INJECTION, POWDER, FOR SOLUTION INTRAVENOUS at 12:44

## 2021-07-11 RX ADMIN — INSULIN LISPRO 8 UNITS: 100 INJECTION, SOLUTION INTRAVENOUS; SUBCUTANEOUS at 00:34

## 2021-07-11 RX ADMIN — APIXABAN 2.5 MG: 5 TABLET, FILM COATED ORAL at 12:18

## 2021-07-11 ASSESSMENT — PAIN SCALES - GENERAL
PAINLEVEL_OUTOF10: 8
PAINLEVEL_OUTOF10: 0
PAINLEVEL_OUTOF10: 8

## 2021-07-11 NOTE — ED NOTES
Spoke with [facility] @ [id number] regarding bed status. MAC call back with update, Sarabjit still discharge dependent with no predictable openings. They will contact MAC when there is a status change.      Ja Sellers  07/11/21 9031

## 2021-07-11 NOTE — ED TRIAGE NOTES
Pt states she is on two antibiotics for an abscess the had on her labia, she states she has abd pain every time she takes her medication and she has not been taking them with any food.

## 2021-07-11 NOTE — ED PROVIDER NOTES
Emergency Department Attending Provider Note  Location: [facility]  ED  7/10/2021     Patient Identification  Vince Moore is a 46 y.o. female      Vince Moore was evaluated in the Emergency Department for generalized abdominal pain nausea and vomiting. Renal transplant patient noncompliant with medications had transplant 2016 followed by [facility] nephrology. History of chronic UTIs. Expresses depressive symptoms denies suicidal ideation denies active plan. .     Physical exam revealed:  Vital signs reviewed  Gen: Alert and oriented, no acute distress  Card: RRR, no murmurs, equal radial pulses  Resp: CBSBL, no wheezes rales or rhonchi  Abd: Soft nontender, nondistended abdomen, no guarding or rebound, no CVA tenderness  Ext: No deformities noted  Neuro: Grossly normal moving extremities equally      Patient seen and evaluated. Relevant records reviewed. Patient here with nausea and vomiting renal transplant in setting of medication noncompliance and I suspect depressive symptoms play a part of her noncompliance. On exam she is nontoxic-appearing in no acute distress hemodynamically stable. Has metabolic derangements including leukocytosis hyperglycemia and MARCO A. Her creatinine is now up to 1.9 previously 1.3. Also has some evidence of urinary tract infection. She was given antibiotics in the emergency department. Her transplant nephrology services at [facility].  Attempted to admit per patient preference there initially however they have no available beds and cannot anticipate when bed availability will occur. Hospital service was then contacted here at [facility] who has declined admission and recommends transferring to [facility] if [facility] is not available because patient would benefit from renal transplant service. Of note patient has not had recent recent renal transplants proximately 5 years ago.   SOFÍA discussed the case with our nephrology service  Harish who agrees there is no emergent procedure or treatment at this time that cannot be performed here at [facility].  See SOFÍA note for details of this conversation. I then discussed the case with the hospitalist at [facility], Dr. Tin Botello, who states that she would agree to accept patient's care if they had any bed availability. Unfortunately still unable to anticipate when any bed would be available. Given the fact that there is no emergent procedure requiring emergent transfer, I feel it is in the patient's best interest to be admitted to our hospital service given the fact that she will be a delayed disposition to [address] [place] [address] or any other at their facility with a renal transplant service. 7:08 AM          Although initial history and physical exam information was obtained by SOFÍA/NPP/MD/ (who also dictated a record of this visit), I independently examined and evaluated this patient and made all diagnostic, treatment, and disposition decisions. This chart was generated in part by using Dragon Dictation system and may contain errors related to that system including errors in grammar, punctuation, and spelling, as well as words and phrases that may be inappropriate. If there are any questions or concerns please feel free to contact the dictating provider for clarification.      Armando Denney MD  [address] MD  07/11/21 1240

## 2021-07-11 NOTE — ED NOTES
[facility] @ 0906  Re: MARCO A, UTI, kidney txp  Dr Cristobal Baxter responded @ [facility]  07/11/21 0694

## 2021-07-11 NOTE — ED NOTES
Current Outpatient Medications:    magnesium oxide 400 mg (241.3 mg magnesium) tablet, Take 400 mg by mouth daily. , Disp: , Rfl:    acetaminophen (TYLENOL) 325 mg tablet, Take 2 tablets (650 mg total) by mouth., Disp: 30 tablet, Rfl:    cholecalciferol (VITAMIN D-3) 25 mcg (1,000 unit) capsule, Take 1 capsule (1,000 Units total) by mouth., Disp: , Rfl:    Eliquis 2.5 mg tablet, Take 1 tablet by mouth 2 (two) times a day., Disp: , Rfl:    escitalopram (LEXAPRO) 10 mg tablet, Take 1 tablet (10 mg total) by mouth 1 (one) time each day., Disp: , Rfl: 2   ferric citrate (Auryxia) 210 mg iron tablet, Take 210 mg by mouth daily. , Disp: , Rfl:    folic acid (FOLVITE) 1 mg tablet, Take 1 tablet (1,000 mcg total) by mouth 1 (one) time each day in the morning., Disp: , Rfl: 2   insulin aspart U-100 (NovoLOG Flexpen U-100 Insulin) 100 unit/mL (3 mL) insulin pen, , Disp: , Rfl:    insulin glargine (Basaglar KwikPen U-100 Insulin) 100 unit/mL (3 mL) insulin pen, Inject 20 Units under the skin., Disp: , Rfl:    melatonin 3 mg tablet, TAKE 1 TAB BY MOUTH NIGHTLY AS NEEDED FOR OTHER (INSOMNIA). , Disp: , Rfl:    mycophenolate (CELLCEPT) 250 mg capsule, , Disp: , Rfl: 3   ondansetron ODT (ZOFRAN-ODT) 4 mg dispersible tablet, TAKE 1 TAB BY MOUTH EVERY 8 HOURS AS NEEDED FOR NAUSEA FOR UP TO 90 DAYS., Disp: , Rfl:    pantoprazole (PROTONIX) 40 mg EC tablet, Take 1 tablet (40 mg total) by mouth 1 (one) time each day., Disp: , Rfl: 6   sodium bicarbonate 650 mg tablet, Take 1 tablet (650 mg total) by mouth 2 (two) times a day., Disp: , Rfl:    tacrolimus (PROGRAF) 1 mg capsule, Take 1 capsule (1 mg total) by mouth 1 (one) time each day in the morning., Disp: , Rfl: 5   Zetia 10 mg tablet, Take 1 tablet (10 mg total) by mouth every night., Disp: , Rfl: 3  Medication review completed?: Yes       Aryan Diaz RN  07/11/21 3228

## 2021-07-11 NOTE — CONSULTS
[facility], [address]                                  CONSULTATION    PATIENT NAME: Robb Lord                     :        1980  MED REC NO:   [id number]                          ROOM:       [id number]  ACCOUNT NO:   [de-identified]                           ADMIT DATE: 07/10/2021  PROVIDER:     Shanelle Duarte MD    CONSULT DATE:  2021    REASON FOR CONSULTATION:  Acute kidney injury. HISTORY OF PRESENT ILLNESS:  The patient is a 28-year-old   female patient with a past medical history significant for end-stage  renal disease, status post living related kidney transplant back in  . The patient presented to [facility] with a few days  duration of nausea, vomiting, and abdominal pain. She also stated that  she stopped taking her immunosuppressive medications for the last few  weeks. Upon presentation, she was noted to have an acute kidney injury  with a serum creatinine of 1.9 (baseline 1 to 1.2). The patient was  started on IV fluids and IV antibiotics for signs of urinary tract  infection and Nephrology were consulted for further management of her  kidney transplant. PAST MEDICAL HISTORY:  1. End-stage renal disease. 2.  Kidney transplant. 3.  Hypertension. 4.  Hyperlipidemia. 5.  Diabetes mellitus. 6.  Anemia. PAST SURGICAL HISTORY:  1.  AV fistula creation. 2.  Kidney transplant. 3.  EGD. ALLERGIES:  The patient is allergic to LATEX, ACETAMINOPHEN, and  HYDROCODONE.    SOCIAL HISTORY:  The patient does not smoke or drink alcohol. FAMILY HISTORY:  Significant for diabetes mellitus in her mother. REVIEW OF SYSTEM:  The patient denied any fever, chills, cough or  expectorations. Otherwise, a 10-point review of systems was relatively  unremarkable.     PHYSICAL EXAMINATION:  VITAL SIGNS:  Blood pressure 132/81, heart rate 83, respirations 18,  temperature 97.8 Fahrenheit. The patient is saturating 98% on room air. GENERAL APPEARANCE:  The patient is alert, oriented x3, not in acute  distress. HEENT:  Eyes reveal normal conjunctivae, reactive pupils. NECK:  Revealed midline trachea, nonpalpable thyroid. LUNGS:  Clear to anterior auscultation bilaterally. Nonlabored  breathing. CARDIOVASCULAR:  Exam revealed S1, S2.  Regular rate and rhythm. No  murmurs. No rubs. No peripheral edema. ABDOMEN:  Exam revealed a soft abdomen. No organomegaly. SKIN:  Exam revealed no lesion or rash. Warm to touch. PSYCHIATRIC:  Good judgment and insight. LYMPHATICS:  Revealed no cervical or axillary adenopathy. ASSESSMENT AND PLAN:  1. Acute kidney injury likely secondary to a prerenal component,  although kidney transplant rejection cannot be ruled out (though less  likely) as the patient has not been taking her immunosuppressive  medications. 2.  Kidney transplant living related donor back in 05/2016. We will  resume Prograf and CellCept per outpatient orders. 3.  Hyponatremia/hyperkalemia secondary to hyperglycemia, monitor. 4.  Hypertension, blood pressure within acceptable range. 5.  Anemia, stable hemoglobin, monitor. 6.  Urinary tract infection, currently on antibiotics per primary team.    RECOMMENDATIONS:  1. Continue volume expansion. 2.  Check kidney transplant ultrasound. 3.  Avoid all nephrotoxic agents at this time. 4.  Maintain systolic blood pressure above 120 mmHg.         Kobe Rodriges MD    D: 07/11/2021 13:12:03       T: 07/11/2021 13:15:50     [email or web address]  Job#: [id number]     Doc#: [id number]    CC:

## 2021-07-11 NOTE — PROGRESS NOTES
Pt ok for discharge per MD. Discharge instructions and script given. Chest port in place. Report recalled to inform receiving RN of status. No questions or concerns at this time.  Transported by squad for transfer to [facility]

## 2021-07-11 NOTE — PROGRESS NOTES
Md notified: Pt arrived to floor no diet order in place and pt refusing tele. Pt states should be on diabetic diet at home but states does not follow it.

## 2021-07-11 NOTE — H&P
Department of Internal Medicine  General Internal Medicine   History and Physical      CHIEF COMPLAINT:  abdominal pain, vomiting. Reason for Admission:  MARCO A    HISTORY OF PRESENT ILLNESS:    46 y.o. female with h/o kidney transplant (2016), chronic UTIs, HTN, diabetes and depression presented to ED with complaints of abdominal pain and vomiting. Patient states that she stopped taking all of her medication several weeks ago. She does not take her medication because she is depressed and forgets to take them. Currently denies abdominal pain or nausea. Asking for regular food.     Past Medical History:        Diagnosis Date    Anemia     Bacteremia 09/05/2017    E coli    Chronic kidney disease     Diabetes mellitus type 1 ([facility] [address]     Hyperlipidemia     Hypertension     Kidney transplant recipient     No history of procedure 4/20/16    colonoscopy     Past Surgical History:        Procedure Laterality Date    DENTAL SURGERY      DIALYSIS FISTULA CREATION      KIDNEY TRANSPLANT      OTHER SURGICAL HISTORY Left 9/29/2014    Port placed left chest    OTHER SURGICAL HISTORY  04/11/2016    removal of port-a-cath and insertion of new port-a-cath    UPPER GASTROINTESTINAL ENDOSCOPY  04-    Esophagitis     Medications Prior to Admission:    Medications Prior to Admission: apixaban (ELIQUIS) 2.5 MG TABS tablet, Take 1 tablet by mouth 2 times daily  tacrolimus (PROGRAF) 0.5 MG capsule, Take 1 capsule by mouth 2 times daily (Patient taking differently: Take 1 mg by mouth 2 times daily )  insulin detemir (LEVEMIR) 100 UNIT/ML injection vial, Inject 22 Units into the skin nightly  famotidine (PEPCID) 20 MG tablet, Take 20 mg by mouth 2 times daily  nystatin (MYCOSTATIN) 822159 UNIT/ML suspension, Take 500,000 Units by mouth 3 times daily  mycophenolate (CELLCEPT) 250 MG capsule, Take 1,000 mg by mouth 2 times daily   folic acid (FOLVITE) 1 MG tablet, Take 1 mg by mouth daily  calcium carbonate (TUMS) 500 MG chewable tablet, Take 1 tablet by mouth daily as needed for Heartburn  docusate sodium (COLACE) 100 MG capsule, Take 100 mg by mouth 2 times daily  ondansetron (ZOFRAN) 4 MG tablet, Take 1 tablet by mouth every 8 hours as needed for Nausea  insulin NPH (NOVOLIN N) 100 UNIT/ML injection vial, Inject 18 Units into the skin 2 times daily (before meals)  lisinopril (PRINIVIL;ZESTRIL) 5 MG tablet, Take 1 tablet by mouth nightly (Patient taking differently: Take 20 mg by mouth nightly )  ezetimibe (ZETIA) 10 MG tablet, Take 10 mg by mouth daily  furosemide (LASIX) 40 MG tablet, Take 40 mg by mouth daily  doxazosin (CARDURA) 1 MG tablet, Take 2 mg by mouth nightly   omeprazole (PRILOSEC) 20 MG capsule, Take 1 capsule by mouth daily. (Patient taking differently: Take 40 mg by mouth daily )  metoprolol (LOPRESSOR) 50 MG tablet, Take 1 tablet by mouth 2 times daily. Cholecalciferol (VITAMIN D) 2000 UNITS TABS, Take  by mouth. aspirin 81 MG EC tablet, Take 81 mg by mouth daily. Allergies:  Latex, Acetaminophen, Hydrocodone, and Hydrocodone-acetaminophen    Social History:   Socioeconomic History    Marital status: Single       Spouse name: Not on file    Number of children: Not on file    Years of education: Not on file    Highest education level: Not on file   Occupational History    Not on file   Tobacco Use    Smoking status: Never Smoker    Smokeless tobacco: Never Used   Substance and Sexual Activity    Alcohol use: No    Drug use: No    Sexual activity: Never         Family History:       Problem Relation Age of Onset    Diabetes Mother     Cancer Maternal Aunt     Cancer Paternal Uncle      REVIEW OF SYSTEMS:  All systems, atotal of 10, are reviewed and negative except for those that were just noted in history present illness.     PHYSICAL EXAM:    Vitals:  /81   Pulse 83   Temp 97.8 °F (36.6 °C) (Oral)   Resp 18   Wt 127 lb (57.6 kg)   LMP 06/12/2021   SpO2 98%   BMI 24.00 kg/m² CONSTITUTIONAL: Awake and alert. Cooperative. Well-developed. Well-nourished  HENT: Normocephalic. Atraumatic. External ears normal, without discharge. TMs clear bilaterally. No nasal discharge. Oropharynx clear, no erythema. Mucous membranes moist.  EYES: Conjunctiva non-injected, no lid abnormalities noted. No scleral icterus. PERRL. EOM's grossly intact. Anterior chambers clear. NECK: Supple. Normal ROM. No meningismus. No thyroid tenderness or swelling noted. CARDIOVASCULAR: RRR. No Murmer. PULMONARY/CHEST WALL: Effort normal. No tachypnea. Lungs clear to ausculation. ABDOMEN: Normal BS. Soft. Nondistended. Mild diffuse tenderness without focality. No guarding. No hernias noted. No splenomegaly. Back: Spine is midline. No ecchymosis. No crepitus on palpation. No obvious subluxation of vertebral column. No saddle anesthesia or evidence of cauda equina. /ANORECTAL: Not assessed  MUSKULOSKELETAL: Normal ROM. No acute deformities. No edema. No tenderness to palpate. SKIN: Warm and dry. NEUROLOGICAL:  GCS 15. CN II-XII grossly intact. Strength is 5/5 in allextremities and sensation is intact. PSYCHIATRIC: Somewhat withdrawn, admits to depression. Denied active suicidality. Has hx of suicidal attempt    DATA:  Renal US: Bilateral native kidneys are atrophic compatible with underlying medical  renal disease.     Minimal dilation of the transplant kidney collecting system which appears to  be chronic.     Urinary bladder appears grossly unremarkable. ASSESSMENT AND PLAN:      MARCO A  -Likely secondary to a pre-renal component, although kidney transplant rejection cannot be ruled out  -Resume resume Prograf and Cellcept per outpatient orders.  -Nephrology consulted  -Continue volume expansion  -Renal US w/o acute process  -Avoid all nephrotoxic agents at this time.     UTI  - IV meropenem to treat the UTI in the setting of recent hospitalization  - F/u urine cx results     Hyponatremia/Hyperkalemia  - Secondary to hyperglycemia  - Monitor closely     DM II  - Cont home lantus  - ISS  - Diabetic diet     Depression  - Pt is severely depressed  - Refusing Psych consult    Full code  DVT ppx: lovenox

## 2021-07-11 NOTE — ED NOTES
Called [facility] @ [phone]  RE: Tee Thrasher, transplant patient, hyperglycemia, uti per Toby Scruggs NP  [facility] is discharge dependent; Toby Scruggs NP to speak to the pt first before proceeding. Pt has agreed to be admitted to [facility] recommended pt be transferred to [address] [place] [address] @ [phone] to place a call to Mushtaq Hunt called back @ [phone]; UC also discharge dependent.   Called [facility] @ [phone] to admit pt to [facility]  Dr. Umanzor [facility]) called back @ [phone]  Pt admitted to [facility] pending room assignment     Meti Lemu  07/11/21 0226       Meti Lemu  07/11/21 0245       Meti Lemu  07/11/21 [id number]

## 2021-07-11 NOTE — DISCHARGE SUMMARY
Physician Discharge Summary     Patient ID:  Vargas Berumen  [id number]  87 y.o.  1980    Admit date: 7/10/2021    Discharge date and time: 7/11/2021  3:52 PM     Admitting Physician: Chelsea Robertson MD     Discharge Physician: Chelsea Robertson MD    Admission Diagnoses: MARCO A (acute kidney injury) [facility]) [N17.9]    Discharge Diagnoses: MARCO A    Admission Condition: fair    Discharged Condition: good    Indication for Admission: MARCO A    Hospital Course:  46 y.o. female with h/o kidney transplant (2016), chronic UTIs, HTN, diabetes and depression presented to [facility] complaints of abdominal pain and vomiting. Patient stopped taking all of her medication several weeks ago. She does not take her medication because she is depressed and forgets to take them. Started on iv fluids. Renal US negative for acute process. Seen by Nephrology. Transferred to  for further management. Consults: Nephrology    Discharge Exam:  CONSTITUTIONAL: Awake and alert. Cooperative. Well-developed. Well-nourished  HENT: Normocephalic. Atraumatic. External ears normal, without discharge. TMs clear bilaterally. No nasal discharge. Oropharynx clear, no erythema. Mucous membranes moist.  EYES: Conjunctiva non-injected, no lid abnormalities noted. No scleral icterus. PERRL. EOM's grossly intact. Anterior chambers clear. NECK: Supple. Normal ROM. No meningismus. No thyroid tenderness or swelling noted. CARDIOVASCULAR: RRR. No Murmer. PULMONARY/CHEST WALL: Effort normal. No tachypnea. Lungs clear to ausculation. ABDOMEN: Normal BS. Soft. Nondistended. Mild diffuse tenderness without focality. No guarding. No hernias noted. No splenomegaly. Disposition: Transfer to     In process/preliminary results:  Outstanding Order Results     No orders found for last 30 day(s).           Patient Instructions:   Discharge Medication List as of 7/11/2021  4:31 PM      CONTINUE these medications which have NOT CHANGED    Details   apixaban (ELIQUIS) 2.5 MG TABS tablet Take 1 tablet by mouth 2 times daily, Disp-60 tablet, R-2NO PRINT      tacrolimus (PROGRAF) 0.5 MG capsule Take 1 capsule by mouth 2 times daily, Disp-60 capsule, R-0Print      insulin detemir (LEVEMIR) 100 UNIT/ML injection vial Inject 22 Units into the skin nightly      famotidine (PEPCID) 20 MG tablet Take 20 mg by mouth 2 times daily      nystatin (MYCOSTATIN) 848562 UNIT/ML suspension Take 500,000 Units by mouth 3 times daily, Oral, 3 TIMES DAILY, Until Discontinued, Historical Med      mycophenolate (CELLCEPT) 250 MG capsule Take 1,000 mg by mouth 2 times daily Historical Med      folic acid (FOLVITE) 1 MG tablet Take 1 mg by mouth daily      calcium carbonate (TUMS) 500 MG chewable tablet Take 1 tablet by mouth daily as needed for HeartburnHistorical Med      docusate sodium (COLACE) 100 MG capsule Take 100 mg by mouth 2 times dailyHistorical Med      ondansetron (ZOFRAN) 4 MG tablet Take 1 tablet by mouth every 8 hours as needed for Nausea, Disp-10 tablet, R-0      insulin NPH (NOVOLIN N) 100 UNIT/ML injection vial Inject 18 Units into the skin 2 times daily (before meals), Disp-1 vial, R-3      lisinopril (PRINIVIL;ZESTRIL) 5 MG tablet Take 1 tablet by mouth nightly, Disp-30 tablet, R-3      ezetimibe (ZETIA) 10 MG tablet Take 10 mg by mouth daily      furosemide (LASIX) 40 MG tablet Take 40 mg by mouth daily      doxazosin (CARDURA) 1 MG tablet Take 2 mg by mouth nightly       omeprazole (PRILOSEC) 20 MG capsule Take 1 capsule by mouth daily. , Disp-30 capsule, R-0      metoprolol (LOPRESSOR) 50 MG tablet Take 1 tablet by mouth 2 times daily. , Disp-60 tablet, R-0      Cholecalciferol (VITAMIN D) 2000 UNITS TABS Take  by mouth. aspirin 81 MG EC tablet Take 81 mg by mouth daily.          STOP taking these medications       oxyCODONE (ROXICODONE) 5 MG immediate release tablet Comments:   Reason for Stopping:         acetaminophen 650 MG TABS Comments:   Reason for Stopping:             Activity: activity as tolerated  Diet: renal diet  Wound Care: none needed    Signed:  Ana Cheng MD  Time spent > 35 mins  7/11/2021  4:51 PM

## 2021-07-11 NOTE — ED PROVIDER NOTES
EMERGENCY DEPARTMENT ENCOUNTER      This patient was seen and evaluated by the attending physician. Pt Name: Lynsey Guardado  MRN: [id number]  Kaitlyngfkay 1980  Date of evaluation: 7/10/2021  Provider: NIRAV AvilaC  PCP: No primary care provider on file. ED Attending: Brittney Julien MD    History provided by the patient. CHIEF COMPLAINT:     Chief Complaint   Patient presents with    Abdominal Pain    Emesis       HISTORY OF PRESENT ILLNESS:      Lynsey Guardado is a 46 y.o. female who presents [facility]  ED with complaints of abdominal pain, vomiting. Patient states that she stopped taking all of her medication several weeks ago, states that she does not take her medication because she is depressed, states that sometimes she forgets but also does not take them because she is depressed. Patient states that she is had vomiting abdominal pain today. Patient is a kidney transplant patient, had a transplant 2016. Patient denies any trauma, denies any fevers, does have a history of chronic UTIs. She is here for further evaluation, patient states that she has been on some antibiotics for labial abscess that was drained. Ziggy Benitez  Severity:8  Duration:today  Modifying factors:none noted    Nursing Notes were reviewed     REVIEW OF SYSTEMS:     Review of Systems  All systems, atotal of 10, are reviewed and negative except for those that were just noted in history present illness.         PAST MEDICAL HISTORY:     Past Medical History:   Diagnosis Date    Anemia     Bacteremia 09/05/2017    E coli    Chronic kidney disease     Diabetes mellitus type 1 ([facility] [address]     Hyperlipidemia     Hypertension     Kidney transplant recipient     No history of procedure 4/20/16    colonoscopy         SURGICAL HISTORY:      Past Surgical History:   Procedure Laterality Date    DENTAL SURGERY      DIALYSIS FISTULA CREATION      KIDNEY TRANSPLANT  OTHER SURGICAL HISTORY Left 9/29/2014    Port placed left chest    OTHER SURGICAL HISTORY  04/11/2016    removal of port-a-cath and insertion of new port-a-cath    UPPER GASTROINTESTINAL ENDOSCOPY  04-    Esophagitis         CURRENT MEDICATIONS:       Previous Medications    APIXABAN (ELIQUIS) 2.5 MG TABS TABLET    Take 1 tablet by mouth 2 times daily    ASPIRIN 81 MG EC TABLET    Take 81 mg by mouth daily. CALCIUM CARBONATE (TUMS) 500 MG CHEWABLE TABLET    Take 1 tablet by mouth daily as needed for Heartburn    CHOLECALCIFEROL (VITAMIN D) 2000 UNITS TABS    Take  by mouth. DOCUSATE SODIUM (COLACE) 100 MG CAPSULE    Take 100 mg by mouth 2 times daily    DOXAZOSIN (CARDURA) 1 MG TABLET    Take 2 mg by mouth nightly     EZETIMIBE (ZETIA) 10 MG TABLET    Take 10 mg by mouth daily    FAMOTIDINE (PEPCID) 20 MG TABLET    Take 20 mg by mouth 2 times daily    FOLIC ACID (FOLVITE) 1 MG TABLET    Take 1 mg by mouth daily    FUROSEMIDE (LASIX) 40 MG TABLET    Take 40 mg by mouth daily    INSULIN DETEMIR (LEVEMIR) 100 UNIT/ML INJECTION VIAL    Inject 22 Units into the skin nightly    INSULIN NPH (NOVOLIN N) 100 UNIT/ML INJECTION VIAL    Inject 18 Units into the skin 2 times daily (before meals)    LISINOPRIL (PRINIVIL;ZESTRIL) 5 MG TABLET    Take 1 tablet by mouth nightly    METOPROLOL (LOPRESSOR) 50 MG TABLET    Take 1 tablet by mouth 2 times daily. MYCOPHENOLATE (CELLCEPT) 250 MG CAPSULE    Take 250 mg by mouth 2 times daily    NYSTATIN (MYCOSTATIN) 465526 UNIT/ML SUSPENSION    Take 500,000 Units by mouth 3 times daily    OMEPRAZOLE (PRILOSEC) 20 MG CAPSULE    Take 1 capsule by mouth daily.     ONDANSETRON (ZOFRAN) 4 MG TABLET    Take 1 tablet by mouth every 8 hours as needed for Nausea    TACROLIMUS (PROGRAF) 0.5 MG CAPSULE    Take 1 capsule by mouth 2 times daily         ALLERGIES:    Latex, Acetaminophen, Hydrocodone, and Hydrocodone-acetaminophen    FAMILY HISTORY:       Family History   Problem Relation Age of Onset    Diabetes Mother     Cancer Maternal Aunt     Cancer Paternal Uncle           SOCIAL HISTORY:       Social History     Socioeconomic History    Marital status: Single     Spouse name: Not on file    Number of children: Not on file    Years of education: Not on file    Highest education level: Not on file   Occupational History    Not on file   Tobacco Use    Smoking status: Never Smoker    Smokeless tobacco: Never Used   Substance and Sexual Activity    Alcohol use: No    Drug use: No    Sexual activity: Never   Other Topics Concern    Not on file   Social History Narrative    Not on file     Social Determinants of Health     Financial Resource Strain:     Difficulty of Paying Living Expenses:    Food Insecurity:     Worried About Running Out of Food in the Last Year:     Ran Out of Food in the Last Year:    Transportation Needs:     Lack of Transportation (Medical):  Lack of Transportation (Non-Medical):    Physical Activity:     Days of Exercise per Week:     Minutes of Exercise per Session:    Stress:     Feeling of Stress :    Social Connections:     Frequency of Communication with Friends and Family:     Frequency of Social Gatherings with Friends and Family:     Attends Church Services:     Active Member of Clubs or Organizations:     Attends Club or Organization Meetings:     Marital Status:    Intimate Partner Violence:     Fear of Current or Ex-Partner:     Emotionally Abused:     Physically Abused:     Sexually Abused:        SCREENINGS:            PHYSICAL EXAM:       ED Triage Vitals [07/10/21 2142]   BP Temp Temp Source Pulse Resp SpO2 Height Weight   (!) 131/90 97.7 °F (36.5 °C) Temporal 80 16 99 % -- 127 lb (57.6 kg)       Physical Exam    CONSTITUTIONAL: Awake and alert. Cooperative. Well-developed. Well-nourished.     Vitals:    07/10/21 2142 07/11/21 0021 07/11/21 0150 07/11/21 0250   BP: (!) 131/90 (!) 160/96 106/76 94/68   Pulse: 80 81 73 76   Resp: 16 18 18 16   Temp: 97.7 °F (36.5 °C)      TempSrc: Temporal      SpO2: 99% 97% 98% 97%   Weight: 127 lb (57.6 kg)        HENT: Normocephalic. Atraumatic. External ears normal, without discharge. TMs clear bilaterally. No nasal discharge. Oropharynx clear, no erythema. Mucous membranes moist.  EYES: Conjunctiva non-injected, no lid abnormalities noted. No scleral icterus. PERRL. EOM's grossly intact. Anterior chambers clear. NECK: Supple. Normal ROM. No meningismus. No thyroid tenderness or swelling noted. CARDIOVASCULAR: RRR. No Murmer. PULMONARY/CHEST WALL: Effort normal. No tachypnea. Lungs clear to ausculation. ABDOMEN: Normal BS. Soft. Nondistended. Mild diffuse tenderness without focality. No guarding. No hernias noted. No splenomegaly. Back: Spine is midline. No ecchymosis. No crepitus on palpation. No obvious subluxation of vertebral column. No saddle anesthesia or evidence of cauda equina. /ANORECTAL: Not assessed  MUSKULOSKELETAL: Normal ROM. No acute deformities. No edema. No tenderness to palpate. SKIN: Warm and dry. NEUROLOGICAL:  GCS 15. CN II-XII grossly intact. Strength is 5/5 in allextremities and sensation is intact. PSYCHIATRIC: Somewhat withdrawn, admits to depression. Denied active suicidality.  Has hx of suicidal attempt        DIAGNOSTIC RESULTS:     LABS:    Results for orders placed or performed during the hospital encounter of 07/10/21   CBC auto differential   Result Value Ref Range    WBC 14.6 (H) 4.0 - 11.0 K/uL    RBC 4.23 4.00 - 5.20 M/uL    Hemoglobin 11.6 (L) 12.0 - 16.0 g/dL    Hematocrit 34.7 (L) 36.0 - 48.0 %    MCV 82.1 80.0 - 100.0 fL    MCH 27.5 26.0 - 34.0 pg    MCHC 33.5 31.0 - 36.0 g/dL    RDW 14.3 12.4 - 15.4 %    Platelets 885 023 - 257 K/uL    MPV 8.7 5.0 - 10.5 fL    Neutrophils % 87.4 %    Lymphocytes % 7.0 %    Monocytes % 3.8 %    Eosinophils % 1.1 %    Basophils % 0.7 %    Neutrophils Absolute 12.8 (H) 1.7 - 7.7 K/uL    Lymphocytes Absolute 1.0 1.0 - 5.1 K/uL    Monocytes Absolute 0.5 0.0 - 1.3 K/uL    Eosinophils Absolute 0.2 0.0 - 0.6 K/uL    Basophils Absolute 0.1 0.0 - 0.2 K/uL   Comprehensive metabolic panel   Result Value Ref Range    Sodium 129 (L) 136 - 145 mmol/L    Potassium 5.0 3.5 - 5.1 mmol/L    Chloride 91 (L) 99 - 110 mmol/L    CO2 26 21 - 32 mmol/L    Anion Gap 12 3 - 16    Glucose 466 (H) 70 - 99 mg/dL    BUN 18 7 - 20 mg/dL    CREATININE 1.9 (H) 0.6 - 1.1 mg/dL    GFR Non-African American 29 (A) >60    GFR  36 (A) >60    Calcium 9.5 8.3 - 10.6 mg/dL    Total Protein 8.8 (H) 6.4 - 8.2 g/dL    Albumin 3.8 3.4 - 5.0 g/dL    Albumin/Globulin Ratio 0.8 (L) 1.1 - 2.2    Total Bilirubin 0.3 0.0 - 1.0 mg/dL    Alkaline Phosphatase 177 (H) 40 - 129 U/L    ALT 10 10 - 40 U/L    AST 15 15 - 37 U/L    Globulin 5.0 g/dL   Urinalysis   Result Value Ref Range    Color, UA Yellow Straw/Yellow    Clarity, UA CLOUDY (A) Clear    Glucose, Ur >=1000 (A) Negative mg/dL    Bilirubin Urine Negative Negative    Ketones, Urine Negative Negative mg/dL    Specific Gravity, UA 1.010 1.005 - 1.030    Blood, Urine TRACE-INTACT (A) Negative    pH, UA 7.5 5.0 - 8.0    Protein, UA TRACE (A) Negative mg/dL    Urobilinogen, Urine 0.2 <2.0 E.U./dL    Nitrite, Urine Negative Negative    Leukocyte Esterase, Urine MODERATE (A) Negative    Microscopic Examination YES     Urine Type NotGiven    Blood Gas, Venous   Result Value Ref Range    pH, Gustavo 7.404 7.350 - 7.450    pCO2, Gustavo 47.6 40.0 - 50.0 mmHg    pO2, Gustavo 44.4 (H) 25 - 40 mmHg    HCO3, Venous 29.1 (H) 23.0 - 29.0 mmol/L    Base Excess, Gustavo 3.6 (H) -3.0 - 3.0 mmol/L    O2 Sat, Gustavo 81 Not Established %    Carboxyhemoglobin 1.9 (H) 0.0 - 1.5 %    MetHgb, Gustavo 0.7 <1.5 %    TC02 (Calc), Gustavo 31 Not Established mmol/L    O2 Therapy Unknown    Sample possible blood bank testing   Result Value Ref Range    Specimen Status TE    Microscopic Urinalysis   Result Value Ref Range    WBC, UA 21-50 (A) 0 - 5 /HPF RBC, UA 3-4 0 - 4 /HPF    Epithelial Cells, UA 11-20 (A) 0 - 5 /HPF         RADIOLOGY:  All x-ray studies are viewed/reviewedby me. Formal interpretations per the radiologist are as follows: No orders to display           EKG:  See EKG interpretation by an attending phsyician      PROCEDURES:   N/A    CRITICAL CARE TIME:   Total critical care time today provided was at least 43 minutes. This excludes seperately billable procedure. Critical care time provided for ese with kidney transplant that required close evaluation and/or intervention with concern for patient decompensation. CONSULTS:  IP CONSULT TO HOSPITALIST  IP CONSULT TO NEPHROLOGY      EMERGENCYDEPARTMENT COURSE and DIFFERENTIAL DIAGNOSIS/MDM:   Vitals:    Vitals:    07/10/21 2142 07/11/21 0021 07/11/21 0150 07/11/21 0250   BP: (!) 131/90 (!) 160/96 106/76 94/68   Pulse: 80 81 73 76   Resp: 16 18 18 16   Temp: 97.7 °F (36.5 °C)      TempSrc: Temporal      SpO2: 99% 97% 98% 97%   Weight: 127 lb (57.6 kg)          Patient was given the following medications:  Medications   morphine (PF) injection 4 mg (4 mg Intravenous Given 7/10/21 2318)   0.9 % sodium chloride bolus (1,000 mLs Intravenous New Bag 7/10/21 2317)   ondansetron (ZOFRAN) injection 4 mg (4 mg Intravenous Given 7/10/21 2320)   insulin lispro (HUMALOG) injection vial 8 Units (8 Units Subcutaneous Given 7/11/21 0034)   cefTRIAXone (ROCEPHIN) 1000 mg IVPB in 50 mL D5W minibag (0 mg Intravenous Stopped 7/11/21 0207)         Patient was evaluated by both myself and Jessica Villagomez MD.   Patient presented to the emergency room today with complaints of nausea and vomiting abdominal discomfort. Patient is an uncontrolled diabetic, she is noncompliant with her medications, has a history of a kidney transplant. Her work-up today showed an elevated white blood cell count of 14.6 with no bandemia.   Patient glucose significantly elevated at 466, her creatinine level was elevated 1.9 up from was communicated to the hospitalist, he did come down and speak with the attending physician, see the attending physician notations for full details but he did persistently declined admission, see his consultation note for details there. Patient is been hemodynamically stable here, she is given a dose of pain medication. At this time I did sign the patient out to the attending physician he will do final disposition on this patient.,   Patient laboratory studies, radiographic imaging, andassessment were all discussed with the patient and/or patient family. There was shared decision-making between myself, the attending physician, as well as the patient and/or their surrogate and we are all in agreement with admission/transfer. There was an opportunity for questions and all questions were answered to the best of my ability and to the satisfaction of the patient and/or patient family. FINAL IMPRESSION:      1. MARCO A (acute kidney injury) ([facility] [address]    2. Hyperglycemia          DISPOSITION/PLAN:   DISPOSITIONDecision To Admit      PATIENT REFERRED TO:  No follow-up provider specified.     DISCHARGE MEDICATIONS:  New Prescriptions    No medications on file                  (Please note that portions of this note were completed with a voice recognition program.  Efforts were made to edit the dictations, but occasionally words are mis-transcribed.)    NIRAV Oreilly CNP-C (electronically signed)        NIRAV Oreilly CNP  07/11/21 NIRAV Jorge CNP  07/16/21 0417

## 2021-07-11 NOTE — ED NOTES
Pt resting comfortably. No needs at this time. IV fluid maintained.       Dionne Colón RN  07/11/21 8473

## 2021-07-11 NOTE — SIGNIFICANT EVENT
I was paged for patient to be admitted for MARCO A . Patient is a 72-year-old female with past medical history of kidney transplantation at [facility] presented for vomiting and abdominal pain. Patient reported that she is stopped taking her medications few months ago. Patient does report that she is depressed and is having suicidal thoughts but no plan. Patient reports that she also been having forgetfulness and lives with her mom. Patient reports that there has been no change in diet or oral intake and continues to stay hydrated. Patient was mostly recently admitted to our facility on 06/14/2020 for acute cystitis during this admission patient's creatinine was at baseline. On examination patient is tearful, depressed, euvolemic on exam, no guarding or tenderness and abdominal exam.    Patient today is having acute renal failure with creatinine 1.9. I discussed with ED provider that she is best suited to be placed at [facility] or any facility that has transplant  work-up treatment capabilities. Patient's last admission here was for a UTI during that admission patient did not have acute kidney injury to warrant transfer. I also explained the importance for patients with acute rejection to undergo further investigation needs to occur in addition to looking for the etiology of the acute transplant rejection and severity which we do not have the capabilities to perform in our facility. This patient is inappropriately being requested to be placed in our facility. Patient needs to be at a facility that can best serve the patient. I recommended patient to be transferred to a facility that has transplant capabilities or if possible to do an ED to an ED transfer for higher level of care in a timely manner for best care for Ms. Arguelles ideally at [facility].  I also do not recommend allowing the patient to leave AMA as she is having suicidal thoughts. This was share with the patient.       Until patient is transferred to a different facility I recommend primary (The ED provider) taking care of this patient to:    1)Nephrology consultation specifically both at [facility] and our facility for assistance with management. 2) BMP every 6 hours  3) inflammatory markers  4) IV methylprednisolone 300 mg once  5)Recommend avoiding using morphine for pain in the setting of MARCO A. Recommend using low-dose Dilaudid if needed  6)Recommend meropenem to treat the UTI in the setting of recent hospitalization and broad abx usage   7) insulin sliding scale every 4 hours with IVF  8) psych consultation for suicidal thoughts     I also sent a message via perfect serve around 2/3am sharing that I documented my consult note. Thank you for your consultation.

## 2021-07-11 NOTE — ED PROVIDER NOTES
The patient's care was signed out to my by the preceding provider. Please refer to their documentation for further information. CHIEF COMPLAINT  Chief Complaint   Patient presents with    Abdominal Pain    Emesis       Briefly, Niurka Guidry is a 46 y.o. female  who presents to the ED complaining of Win pain nausea and vomiting    FOCUSED PHYSICAL EXAMINATION  /81   Pulse 83   Temp 97.8 °F (36.6 °C) (Oral)   Resp 18   Ht 5' 1\" (1.549 m)   Wt 127 lb (57.6 kg)   LMP 06/12/2021   SpO2 98%   BMI 24.00 kg/m²      Focused physical examination:    General appearance:  Cooperative. No acute distress. Skin:  Warm. Dry. Ears, nose, mouth and throat:  Oral mucosa moist,  Perfusion:  intact  Respiratory: Respirations nonlabored. Neurological:  Alert. Moves all extremities spontaneously  Musculoskeletal:   Normal ROM, no deformities          Psychiatric:  Normal mood      MDM: Patient was seen in the emergency department and ultimately diagnosed with evidence of acute renal failure and urinary tract infection started on antibiotics given IV fluids. She was hyperglycemic here was also given insulin. Previous providers attempted to transfer the patient to [facility] where her transplant nephrologist practices, but unfortunate there is no beds and [facility] is unable to provide any outlook as to when a bed will be available. Previous team then tried the [facility], but they too had no beds were unable to accept the transfer. At that point time we attempted to admit the patient to the hospitalist here as we had no other alternatives however the hospitalist was reticent to accept the patient without transplant nephrology. At that point time we did speak with on-call nephrology who were able to speak with the patient's transplant nephrologist himself and ultimately the nephrology team has felt comfortable admitting the patient here with IV fluids and antibiotics.   Given nephrology his ultimate acceptance, the hospitalist has agreed to admit the patient here to the hospital for continued care    During the patient's ED course, the patient was given:  Medications   0.9 % sodium chloride bolus (0 mLs Intravenous Stopped 7/11/21 0411)   ondansetron (ZOFRAN) injection 4 mg (4 mg Intravenous Given 7/10/21 2320)   morphine (PF) injection 4 mg (4 mg Intravenous Given 7/11/21 0446)   insulin lispro (HUMALOG) injection vial 8 Units (8 Units Subcutaneous Given 7/11/21 0034)   cefTRIAXone (ROCEPHIN) 1000 mg IVPB in 50 mL D5W minibag (0 mg Intravenous Stopped 7/11/21 0207)   ondansetron (ZOFRAN) injection 4 mg (4 mg Intravenous Given 7/11/21 0446)        CLINICAL IMPRESSION  1. MARCO A (acute kidney injury) ([facility] [address]    2. Hyperglycemia        DISPOSITION  Admission      This chart was created using Dragon dictation software. Efforts were made by me to ensure accuracy, however some errors may be present due to limitations of this technology.         Danika Johansen MD  07/13/21 2232

## 2021-07-11 NOTE — PROGRESS NOTES
Md notified: [facility] bed available. Awaiting transport time. Pt does not drive or have ride available.

## 2021-07-11 NOTE — PROGRESS NOTES
Report called to [facility] for pt transfer, all questions answered.

## 2021-07-11 NOTE — CONSULTS
Patient seen and examined, consult note dictated. Assessment and Plan:    1- MARCO A: Likely secondary to a pre-renal component, although kidney transplant rejection cannot be ruled out (though less likely) as patient has not been taking her immunosuppressive medications. - Continue volume expansion.  - Check kidney transplant ultrasound. - Avoid all nephrotoxic agents at this time. - Maintain systolic blood pressure > 120 mmHg. 2- Kidney transplant: Living related donor back in May of 2016; will resume Prograf and Cellcept per outpatient orders. 3- Hyponatremia/Hyperkalemia: Secondary to hyperglycemia, monitor. 4- HTN: Blood pressure within acceptable range. 5- Anemia: Stable hemoglobin, monitor.   6- UTI: Currently on antibiotics per primary team.

## 2021-07-12 LAB
ORGANISM: ABNORMAL
URINE CULTURE, ROUTINE: ABNORMAL
URINE CULTURE, ROUTINE: ABNORMAL

## 2023-08-10 ENCOUNTER — HOSPITAL ENCOUNTER (EMERGENCY)
Age: 43
Discharge: HOME | End: 2023-08-10
Attending: STUDENT IN AN ORGANIZED HEALTH CARE EDUCATION/TRAINING PROGRAM
Payer: COMMERCIAL

## 2023-08-10 ENCOUNTER — APPOINTMENT (OUTPATIENT)
Dept: GENERAL RADIOLOGY | Age: 43
End: 2023-08-10
Payer: COMMERCIAL

## 2023-08-10 VITALS
RESPIRATION RATE: 18 BRPM | BODY MASS INDEX: 24.84 KG/M2 | SYSTOLIC BLOOD PRESSURE: 135 MMHG | HEART RATE: 80 BPM | WEIGHT: 135 LBS | HEIGHT: 62 IN | OXYGEN SATURATION: 99 % | TEMPERATURE: 97.8 F | DIASTOLIC BLOOD PRESSURE: 90 MMHG

## 2023-08-10 DIAGNOSIS — N39.0 URINARY TRACT INFECTION WITH HEMATURIA, SITE UNSPECIFIED: ICD-10-CM

## 2023-08-10 DIAGNOSIS — R20.2 PARESTHESIA: Primary | ICD-10-CM

## 2023-08-10 DIAGNOSIS — R31.9 URINARY TRACT INFECTION WITH HEMATURIA, SITE UNSPECIFIED: ICD-10-CM

## 2023-08-10 LAB
ALBUMIN SERPL-MCNC: 3.5 G/DL (ref 3.4–5)
ALBUMIN/GLOB SERPL: 0.9 {RATIO} (ref 1.1–2.2)
ALP SERPL-CCNC: 166 U/L (ref 40–129)
ALT SERPL-CCNC: 8 U/L (ref 10–40)
ANION GAP SERPL CALCULATED.3IONS-SCNC: 11 MMOL/L (ref 3–16)
AST SERPL-CCNC: 14 U/L (ref 15–37)
BACTERIA URNS QL MICRO: ABNORMAL /HPF
BASE EXCESS BLDV CALC-SCNC: -4.1 MMOL/L (ref -3–3)
BASOPHILS # BLD: 0 K/UL (ref 0–0.2)
BASOPHILS NFR BLD: 0.4 %
BILIRUB SERPL-MCNC: 0.3 MG/DL (ref 0–1)
BILIRUB UR QL STRIP.AUTO: NEGATIVE
BUN SERPL-MCNC: 15 MG/DL (ref 7–20)
CALCIUM SERPL-MCNC: 8.3 MG/DL (ref 8.3–10.6)
CHLORIDE SERPL-SCNC: 105 MMOL/L (ref 99–110)
CLARITY UR: ABNORMAL
CO2 BLDV-SCNC: 20 MMOL/L
CO2 SERPL-SCNC: 21 MMOL/L (ref 21–32)
COHGB MFR BLDV: 1.6 % (ref 0–1.5)
COLOR UR: YELLOW
CREAT SERPL-MCNC: 1.6 MG/DL (ref 0.6–1.1)
DEPRECATED RDW RBC AUTO: 13.6 % (ref 12.4–15.4)
EKG ATRIAL RATE: 85 BPM
EKG DIAGNOSIS: NORMAL
EKG P AXIS: 54 DEGREES
EKG P-R INTERVAL: 148 MS
EKG Q-T INTERVAL: 384 MS
EKG QRS DURATION: 86 MS
EKG QTC CALCULATION (BAZETT): 456 MS
EKG R AXIS: 0 DEGREES
EKG T AXIS: 42 DEGREES
EKG VENTRICULAR RATE: 85 BPM
EOSINOPHIL # BLD: 0.2 K/UL (ref 0–0.6)
EOSINOPHIL NFR BLD: 1.4 %
GFR SERPLBLD CREATININE-BSD FMLA CKD-EPI: 41 ML/MIN/{1.73_M2}
GLUCOSE BLD-MCNC: 364 MG/DL (ref 70–99)
GLUCOSE SERPL-MCNC: 188 MG/DL (ref 70–99)
GLUCOSE UR STRIP.AUTO-MCNC: 500 MG/DL
HCG UR QL: NEGATIVE
HCO3 BLDV-SCNC: 19 MMOL/L (ref 23–29)
HCT VFR BLD AUTO: 35.3 % (ref 36–48)
HGB BLD-MCNC: 11.8 G/DL (ref 12–16)
HGB UR QL STRIP.AUTO: ABNORMAL
KETONES UR STRIP.AUTO-MCNC: NEGATIVE MG/DL
LEUKOCYTE ESTERASE UR QL STRIP.AUTO: ABNORMAL
LYMPHOCYTES # BLD: 1.4 K/UL (ref 1–5.1)
LYMPHOCYTES NFR BLD: 12 %
MCH RBC QN AUTO: 28.7 PG (ref 26–34)
MCHC RBC AUTO-ENTMCNC: 33.3 G/DL (ref 31–36)
MCV RBC AUTO: 86.3 FL (ref 80–100)
METHGB MFR BLDV: 0.6 %
MONOCYTES # BLD: 0.5 K/UL (ref 0–1.3)
MONOCYTES NFR BLD: 4.5 %
NEUTROPHILS # BLD: 9.7 K/UL (ref 1.7–7.7)
NEUTROPHILS NFR BLD: 81.7 %
NITRITE UR QL STRIP.AUTO: NEGATIVE
O2 THERAPY: ABNORMAL
PCO2 BLDV: 29.2 MMHG (ref 40–50)
PERFORMED ON: ABNORMAL
PH BLDV: 7.43 [PH] (ref 7.35–7.45)
PH UR STRIP.AUTO: 6 [PH] (ref 5–8)
PLATELET # BLD AUTO: 306 K/UL (ref 135–450)
PMV BLD AUTO: 8.5 FL (ref 5–10.5)
PO2 BLDV: 150.7 MMHG (ref 25–40)
POTASSIUM SERPL-SCNC: 4 MMOL/L (ref 3.5–5.1)
PROT SERPL-MCNC: 7.3 G/DL (ref 6.4–8.2)
PROT UR STRIP.AUTO-MCNC: ABNORMAL MG/DL
RBC # BLD AUTO: 4.09 M/UL (ref 4–5.2)
RBC #/AREA URNS HPF: ABNORMAL /HPF (ref 0–4)
SAO2 % BLDV: 99 %
SODIUM SERPL-SCNC: 137 MMOL/L (ref 136–145)
SP GR UR STRIP.AUTO: 1.01 (ref 1–1.03)
UA COMPLETE W REFLEX CULTURE PNL UR: YES
UA DIPSTICK W REFLEX MICRO PNL UR: YES
URN SPEC COLLECT METH UR: ABNORMAL
UROBILINOGEN UR STRIP-ACNC: 0.2 E.U./DL
WBC # BLD AUTO: 11.9 K/UL (ref 4–11)
WBC #/AREA URNS HPF: >100 /HPF (ref 0–5)

## 2023-08-10 PROCEDURE — 87186 SC STD MICRODIL/AGAR DIL: CPT

## 2023-08-10 PROCEDURE — 6370000000 HC RX 637 (ALT 250 FOR IP): Performed by: STUDENT IN AN ORGANIZED HEALTH CARE EDUCATION/TRAINING PROGRAM

## 2023-08-10 PROCEDURE — 81001 URINALYSIS AUTO W/SCOPE: CPT

## 2023-08-10 PROCEDURE — 93010 ELECTROCARDIOGRAM REPORT: CPT | Performed by: INTERNAL MEDICINE

## 2023-08-10 PROCEDURE — 84703 CHORIONIC GONADOTROPIN ASSAY: CPT

## 2023-08-10 PROCEDURE — 87086 URINE CULTURE/COLONY COUNT: CPT

## 2023-08-10 PROCEDURE — 87077 CULTURE AEROBIC IDENTIFY: CPT

## 2023-08-10 PROCEDURE — 85025 COMPLETE CBC W/AUTO DIFF WBC: CPT

## 2023-08-10 PROCEDURE — 82803 BLOOD GASES ANY COMBINATION: CPT

## 2023-08-10 PROCEDURE — 80053 COMPREHEN METABOLIC PANEL: CPT

## 2023-08-10 PROCEDURE — 71045 X-RAY EXAM CHEST 1 VIEW: CPT

## 2023-08-10 PROCEDURE — 93005 ELECTROCARDIOGRAM TRACING: CPT | Performed by: STUDENT IN AN ORGANIZED HEALTH CARE EDUCATION/TRAINING PROGRAM

## 2023-08-10 PROCEDURE — 99285 EMERGENCY DEPT VISIT HI MDM: CPT

## 2023-08-10 RX ORDER — CEFUROXIME AXETIL 500 MG/1
250 TABLET ORAL 2 TIMES DAILY
Qty: 7 TABLET | Refills: 0 | Status: SHIPPED | OUTPATIENT
Start: 2023-08-10 | End: 2023-08-17

## 2023-08-10 RX ORDER — CEFUROXIME AXETIL 250 MG/1
500 TABLET ORAL ONCE
Status: COMPLETED | OUTPATIENT
Start: 2023-08-10 | End: 2023-08-10

## 2023-08-10 RX ADMIN — CEFUROXIME AXETIL 500 MG: 250 TABLET ORAL at 17:29

## 2023-08-10 ASSESSMENT — ENCOUNTER SYMPTOMS
VOMITING: 0
NAUSEA: 0
SHORTNESS OF BREATH: 0
COUGH: 0
CONSTIPATION: 0
DIARRHEA: 0
WHEEZING: 0

## 2023-08-10 NOTE — DISCHARGE INSTRUCTIONS
Return to nearest ED if you have severe worsening numbness and tingling, weakness, or fevers. Follow-up to PCP in 2 to 3 days.

## 2023-08-10 NOTE — ED NOTES
Accessed patient's port, no blood return noted. Pt feels properly accessed d/t being able to taste and smell the saline flush. No blood return despite multiple efforts. Pt ambulatory with cane, baseline, to bathroom to provide UA.      Alpesh Gibson RN  08/10/23 1569

## 2023-08-10 NOTE — ED NOTES
INTERVENTIONAL RADIOLOGY    PROCEDURE: Port exchange    ANESTHESIA: Conscious sedation    DESCRIPTION: Single lumen power injectable Xcela port exchanged through original access and pocket. Tip at cavoatrial junction. Ready to use. Above note copied from patient's visit at [facility] on July 20, 2023.      Makayla Ireland RN  08/10/23 4476

## 2023-08-12 LAB
BACTERIA UR CULT: ABNORMAL
ORGANISM: ABNORMAL

## 2023-09-16 ENCOUNTER — APPOINTMENT (OUTPATIENT)
Dept: CT IMAGING | Age: 43
End: 2023-09-16
Payer: COMMERCIAL

## 2023-09-16 ENCOUNTER — APPOINTMENT (OUTPATIENT)
Dept: GENERAL RADIOLOGY | Age: 43
End: 2023-09-16
Payer: COMMERCIAL

## 2023-09-16 ENCOUNTER — HOSPITAL ENCOUNTER (EMERGENCY)
Age: 43
Discharge: ANOTHER HOSPITAL | End: 2023-09-16
Attending: EMERGENCY MEDICINE
Payer: COMMERCIAL

## 2023-09-16 VITALS
HEART RATE: 104 BPM | OXYGEN SATURATION: 100 % | RESPIRATION RATE: 20 BRPM | TEMPERATURE: 97.9 F | DIASTOLIC BLOOD PRESSURE: 85 MMHG | SYSTOLIC BLOOD PRESSURE: 103 MMHG

## 2023-09-16 DIAGNOSIS — N18.9 ACUTE RENAL FAILURE SUPERIMPOSED ON CHRONIC KIDNEY DISEASE, UNSPECIFIED CKD STAGE, UNSPECIFIED ACUTE RENAL FAILURE TYPE (HCC): Primary | ICD-10-CM

## 2023-09-16 DIAGNOSIS — N30.01 ACUTE CYSTITIS WITH HEMATURIA: ICD-10-CM

## 2023-09-16 DIAGNOSIS — R93.6 ABNORMAL COMPUTED TOMOGRAPHY OF LOWER EXTREMITY: ICD-10-CM

## 2023-09-16 DIAGNOSIS — N17.9 ACUTE RENAL FAILURE SUPERIMPOSED ON CHRONIC KIDNEY DISEASE, UNSPECIFIED CKD STAGE, UNSPECIFIED ACUTE RENAL FAILURE TYPE (HCC): Primary | ICD-10-CM

## 2023-09-16 LAB
ALBUMIN SERPL-MCNC: 3.1 G/DL (ref 3.4–5)
ALBUMIN/GLOB SERPL: 0.7 {RATIO} (ref 1.1–2.2)
ALP SERPL-CCNC: 135 U/L (ref 40–129)
ALT SERPL-CCNC: 12 U/L (ref 10–40)
ANION GAP SERPL CALCULATED.3IONS-SCNC: 29 MMOL/L (ref 3–16)
AST SERPL-CCNC: 21 U/L (ref 15–37)
BASE EXCESS BLDV CALC-SCNC: -19.9 MMOL/L (ref -3–3)
BASOPHILS # BLD: 0 K/UL (ref 0–0.2)
BASOPHILS NFR BLD: 0.3 %
BETA-HYDROXYBUTYRATE: 0.6 MMOL/L (ref 0–0.27)
BILIRUB SERPL-MCNC: 0.3 MG/DL (ref 0–1)
BILIRUB UR QL STRIP.AUTO: NEGATIVE
BUN SERPL-MCNC: 104 MG/DL (ref 7–20)
CALCIUM SERPL-MCNC: 7.6 MG/DL (ref 8.3–10.6)
CHLORIDE SERPL-SCNC: 91 MMOL/L (ref 99–110)
CLARITY UR: CLEAR
CO2 BLDV-SCNC: 9 MMOL/L
CO2 SERPL-SCNC: 7 MMOL/L (ref 21–32)
COHGB MFR BLDV: 0.4 % (ref 0–1.5)
COLOR UR: YELLOW
CREAT SERPL-MCNC: 11.7 MG/DL (ref 0.6–1.1)
DEPRECATED RDW RBC AUTO: 14.5 % (ref 12.4–15.4)
EOSINOPHIL # BLD: 0 K/UL (ref 0–0.6)
EOSINOPHIL NFR BLD: 0.3 %
GFR SERPLBLD CREATININE-BSD FMLA CKD-EPI: 4 ML/MIN/{1.73_M2}
GLUCOSE BLD-MCNC: 226 MG/DL (ref 70–99)
GLUCOSE SERPL-MCNC: 249 MG/DL (ref 70–99)
GLUCOSE UR STRIP.AUTO-MCNC: NEGATIVE MG/DL
HCO3 BLDV-SCNC: 8.5 MMOL/L (ref 23–29)
HCT VFR BLD AUTO: 28.5 % (ref 36–48)
HGB BLD-MCNC: 9.2 G/DL (ref 12–16)
HGB UR QL STRIP.AUTO: ABNORMAL
KETONES UR STRIP.AUTO-MCNC: NEGATIVE MG/DL
LEUKOCYTE ESTERASE UR QL STRIP.AUTO: ABNORMAL
LIPASE SERPL-CCNC: 7 U/L (ref 13–60)
LYMPHOCYTES # BLD: 0.2 K/UL (ref 1–5.1)
LYMPHOCYTES NFR BLD: 2 %
MCH RBC QN AUTO: 27.9 PG (ref 26–34)
MCHC RBC AUTO-ENTMCNC: 32.5 G/DL (ref 31–36)
MCV RBC AUTO: 85.8 FL (ref 80–100)
METHGB MFR BLDV: 0.3 %
MONOCYTES # BLD: 0.4 K/UL (ref 0–1.3)
MONOCYTES NFR BLD: 4.2 %
NEUTROPHILS # BLD: 9.3 K/UL (ref 1.7–7.7)
NEUTROPHILS NFR BLD: 93.2 %
NITRITE UR QL STRIP.AUTO: POSITIVE
O2 THERAPY: ABNORMAL
PCO2 BLDV: 28.3 MMHG (ref 40–50)
PERFORMED ON: ABNORMAL
PH BLDV: 7.09 [PH] (ref 7.35–7.45)
PH UR STRIP.AUTO: 6 [PH] (ref 5–8)
PLATELET # BLD AUTO: 164 K/UL (ref 135–450)
PMV BLD AUTO: 10.6 FL (ref 5–10.5)
PO2 BLDV: 56.6 MMHG (ref 25–40)
POTASSIUM SERPL-SCNC: 4.8 MMOL/L (ref 3.5–5.1)
PROT SERPL-MCNC: 7.5 G/DL (ref 6.4–8.2)
PROT UR STRIP.AUTO-MCNC: >=300 MG/DL
RBC # BLD AUTO: 3.32 M/UL (ref 4–5.2)
RBC #/AREA URNS HPF: ABNORMAL /HPF (ref 0–4)
SAO2 % BLDV: 79 %
SODIUM SERPL-SCNC: 127 MMOL/L (ref 136–145)
SP GR UR STRIP.AUTO: 1.02 (ref 1–1.03)
TROPONIN, HIGH SENSITIVITY: 31 NG/L (ref 0–14)
TROPONIN, HIGH SENSITIVITY: 34 NG/L (ref 0–14)
UA COMPLETE W REFLEX CULTURE PNL UR: YES
UA DIPSTICK W REFLEX MICRO PNL UR: YES
URN SPEC COLLECT METH UR: ABNORMAL
UROBILINOGEN UR STRIP-ACNC: 0.2 E.U./DL
WBC # BLD AUTO: 10 K/UL (ref 4–11)
WBC #/AREA URNS HPF: ABNORMAL /HPF (ref 0–5)

## 2023-09-16 PROCEDURE — 70450 CT HEAD/BRAIN W/O DYE: CPT

## 2023-09-16 PROCEDURE — 2580000003 HC RX 258: Performed by: EMERGENCY MEDICINE

## 2023-09-16 PROCEDURE — 87086 URINE CULTURE/COLONY COUNT: CPT

## 2023-09-16 PROCEDURE — 85025 COMPLETE CBC W/AUTO DIFF WBC: CPT

## 2023-09-16 PROCEDURE — 74176 CT ABD & PELVIS W/O CONTRAST: CPT

## 2023-09-16 PROCEDURE — 96366 THER/PROPH/DIAG IV INF ADDON: CPT | Performed by: EMERGENCY MEDICINE

## 2023-09-16 PROCEDURE — 80053 COMPREHEN METABOLIC PANEL: CPT

## 2023-09-16 PROCEDURE — 96361 HYDRATE IV INFUSION ADD-ON: CPT | Performed by: EMERGENCY MEDICINE

## 2023-09-16 PROCEDURE — 6360000002 HC RX W HCPCS: Performed by: EMERGENCY MEDICINE

## 2023-09-16 PROCEDURE — 82803 BLOOD GASES ANY COMBINATION: CPT

## 2023-09-16 PROCEDURE — 2500000003 HC RX 250 WO HCPCS: Performed by: EMERGENCY MEDICINE

## 2023-09-16 PROCEDURE — 99285 EMERGENCY DEPT VISIT HI MDM: CPT | Performed by: EMERGENCY MEDICINE

## 2023-09-16 PROCEDURE — 82010 KETONE BODYS QUAN: CPT

## 2023-09-16 PROCEDURE — 87088 URINE BACTERIA CULTURE: CPT

## 2023-09-16 PROCEDURE — 83690 ASSAY OF LIPASE: CPT

## 2023-09-16 PROCEDURE — 84484 ASSAY OF TROPONIN QUANT: CPT

## 2023-09-16 PROCEDURE — 71045 X-RAY EXAM CHEST 1 VIEW: CPT

## 2023-09-16 PROCEDURE — 36415 COLL VENOUS BLD VENIPUNCTURE: CPT

## 2023-09-16 PROCEDURE — 96365 THER/PROPH/DIAG IV INF INIT: CPT | Performed by: EMERGENCY MEDICINE

## 2023-09-16 PROCEDURE — 81001 URINALYSIS AUTO W/SCOPE: CPT

## 2023-09-16 PROCEDURE — 87186 SC STD MICRODIL/AGAR DIL: CPT

## 2023-09-16 PROCEDURE — 93005 ELECTROCARDIOGRAM TRACING: CPT | Performed by: EMERGENCY MEDICINE

## 2023-09-16 PROCEDURE — 96375 TX/PRO/DX INJ NEW DRUG ADDON: CPT | Performed by: EMERGENCY MEDICINE

## 2023-09-16 RX ORDER — ONDANSETRON 2 MG/ML
4 INJECTION INTRAMUSCULAR; INTRAVENOUS ONCE
Status: COMPLETED | OUTPATIENT
Start: 2023-09-16 | End: 2023-09-16

## 2023-09-16 RX ORDER — 0.9 % SODIUM CHLORIDE 0.9 %
500 INTRAVENOUS SOLUTION INTRAVENOUS ONCE
Status: COMPLETED | OUTPATIENT
Start: 2023-09-16 | End: 2023-09-16

## 2023-09-16 RX ORDER — MORPHINE SULFATE 2 MG/ML
2 INJECTION, SOLUTION INTRAMUSCULAR; INTRAVENOUS ONCE
Status: COMPLETED | OUTPATIENT
Start: 2023-09-16 | End: 2023-09-16

## 2023-09-16 RX ADMIN — MORPHINE SULFATE 2 MG: 2 INJECTION, SOLUTION INTRAMUSCULAR; INTRAVENOUS at 16:40

## 2023-09-16 RX ADMIN — SODIUM BICARBONATE: 84 INJECTION, SOLUTION INTRAVENOUS at 19:07

## 2023-09-16 RX ADMIN — ONDANSETRON 4 MG: 2 INJECTION INTRAMUSCULAR; INTRAVENOUS at 16:40

## 2023-09-16 RX ADMIN — SODIUM CHLORIDE 500 ML: 9 INJECTION, SOLUTION INTRAVENOUS at 16:39

## 2023-09-16 RX ADMIN — CEFTRIAXONE SODIUM 1000 MG: 1 INJECTION, POWDER, FOR SOLUTION INTRAMUSCULAR; INTRAVENOUS at 19:32

## 2023-09-16 ASSESSMENT — PAIN SCALES - GENERAL
PAINLEVEL_OUTOF10: 10
PAINLEVEL_OUTOF10: 0

## 2023-09-16 ASSESSMENT — ENCOUNTER SYMPTOMS
ABDOMINAL PAIN: 1
SHORTNESS OF BREATH: 0
DIARRHEA: 0
BACK PAIN: 1
VOMITING: 1

## 2023-09-16 ASSESSMENT — PAIN - FUNCTIONAL ASSESSMENT: PAIN_FUNCTIONAL_ASSESSMENT: 0-10

## 2023-09-16 ASSESSMENT — PAIN DESCRIPTION - LOCATION: LOCATION: GENERALIZED

## 2023-09-16 ASSESSMENT — PAIN DESCRIPTION - FREQUENCY: FREQUENCY: CONTINUOUS

## 2023-09-16 ASSESSMENT — PAIN DESCRIPTION - PAIN TYPE: TYPE: CHRONIC PAIN

## 2023-09-16 NOTE — ED NOTES
1835-Call placed to [facility] requesting consult to Hospitalist at [facility], placed by Dr. Teofilo Morillo.      Mayur Medley  09/16/23 1837

## 2023-09-16 NOTE — ED NOTES
Mara Wilcox returned phone call with the  From [facility] on the line who spoke directly to Dr. Geronimo Half  09/16/23 1913

## 2023-09-16 NOTE — ED NOTES
1726-Call placed to Nephrology for consult placed by Dr. Marciano Phelan. Joann Quivers  09/16/23 1726  1728-Call placed to Critical care pulmonology for consult placed by Dr. Marciano Phelan. Tova Quivers  09/16/23 1730  1743-Call back received from Nephrology  [facility] spoke with Dr. Marciano Phelan. 1745-Call back received from [facility] spoke with Dr. Marciano Phelan.       Tova Quivers  09/16/23 1747

## 2023-09-17 LAB
BACTERIA UR CULT: ABNORMAL
EKG ATRIAL RATE: 106 BPM
EKG DIAGNOSIS: NORMAL
EKG P AXIS: 38 DEGREES
EKG P-R INTERVAL: 152 MS
EKG Q-T INTERVAL: 402 MS
EKG QRS DURATION: 76 MS
EKG QTC CALCULATION (BAZETT): 533 MS
EKG R AXIS: 20 DEGREES
EKG T AXIS: 77 DEGREES
EKG VENTRICULAR RATE: 106 BPM
ORGANISM: ABNORMAL

## 2023-09-17 PROCEDURE — 93010 ELECTROCARDIOGRAM REPORT: CPT | Performed by: INTERNAL MEDICINE

## 2023-09-18 LAB
BACTERIA UR CULT: ABNORMAL
ORGANISM: ABNORMAL

## 2024-02-21 ENCOUNTER — APPOINTMENT (OUTPATIENT)
Dept: CT IMAGING | Age: 44
DRG: 641 | End: 2024-02-21
Payer: COMMERCIAL

## 2024-02-21 ENCOUNTER — HOSPITAL ENCOUNTER (INPATIENT)
Age: 44
LOS: 5 days | Discharge: HOME HEALTH | DRG: 641 | End: 2024-02-26
Attending: EMERGENCY MEDICINE | Admitting: STUDENT IN AN ORGANIZED HEALTH CARE EDUCATION/TRAINING PROGRAM
Payer: COMMERCIAL

## 2024-02-21 DIAGNOSIS — Z78.9 INTERMITTENT SELF-CATHETERIZATION OF BLADDER: ICD-10-CM

## 2024-02-21 DIAGNOSIS — R74.8 ELEVATED LIVER ENZYMES: ICD-10-CM

## 2024-02-21 DIAGNOSIS — Z94.0 H/O KIDNEY TRANSPLANT: ICD-10-CM

## 2024-02-21 DIAGNOSIS — N17.9 ACUTE KIDNEY INJURY SUPERIMPOSED ON CKD (HCC): Primary | ICD-10-CM

## 2024-02-21 DIAGNOSIS — N39.0 URINARY TRACT INFECTION WITHOUT HEMATURIA, SITE UNSPECIFIED: ICD-10-CM

## 2024-02-21 DIAGNOSIS — N18.9 ACUTE KIDNEY INJURY SUPERIMPOSED ON CKD (HCC): Primary | ICD-10-CM

## 2024-02-21 DIAGNOSIS — R33.9 URINARY RETENTION: ICD-10-CM

## 2024-02-21 DIAGNOSIS — Z93.1 GASTROSTOMY TUBE IN PLACE (HCC): ICD-10-CM

## 2024-02-21 PROBLEM — E83.51 HYPOCALCEMIA: Status: ACTIVE | Noted: 2024-02-21

## 2024-02-21 LAB
ALBUMIN SERPL-MCNC: 3.8 G/DL (ref 3.4–5)
ALBUMIN/GLOB SERPL: 1.2 {RATIO} (ref 1.1–2.2)
ALP SERPL-CCNC: 369 U/L (ref 40–129)
ALT SERPL-CCNC: 108 U/L (ref 10–40)
ANION GAP SERPL CALCULATED.3IONS-SCNC: 23 MMOL/L (ref 3–16)
AST SERPL-CCNC: 167 U/L (ref 15–37)
BACTERIA URNS QL MICRO: ABNORMAL /HPF
BASOPHILS # BLD: 0 K/UL (ref 0–0.2)
BASOPHILS NFR BLD: 0.3 %
BILIRUB SERPL-MCNC: 0.3 MG/DL (ref 0–1)
BILIRUB UR QL STRIP.AUTO: NEGATIVE
BUN SERPL-MCNC: 31 MG/DL (ref 7–20)
CA-I BLD-SCNC: 3.07 MMOL/L (ref 1.12–1.32)
CALCIUM SERPL-MCNC: 5 MG/DL (ref 8.3–10.6)
CHLORIDE SERPL-SCNC: 97 MMOL/L (ref 99–110)
CLARITY UR: ABNORMAL
CO2 SERPL-SCNC: 23 MMOL/L (ref 21–32)
COLOR UR: ABNORMAL
CREAT SERPL-MCNC: 3.2 MG/DL (ref 0.6–1.1)
DEPRECATED RDW RBC AUTO: 16.6 % (ref 12.4–15.4)
EOSINOPHIL # BLD: 0 K/UL (ref 0–0.6)
EOSINOPHIL NFR BLD: 0.2 %
GFR SERPLBLD CREATININE-BSD FMLA CKD-EPI: 18 ML/MIN/{1.73_M2}
GLUCOSE SERPL-MCNC: 137 MG/DL (ref 70–99)
GLUCOSE UR STRIP.AUTO-MCNC: NEGATIVE MG/DL
HCT VFR BLD AUTO: 28.5 % (ref 36–48)
HGB BLD-MCNC: 9.4 G/DL (ref 12–16)
HGB UR QL STRIP.AUTO: NEGATIVE
KETONES UR STRIP.AUTO-MCNC: NEGATIVE MG/DL
LEUKOCYTE ESTERASE UR QL STRIP.AUTO: ABNORMAL
LIPASE SERPL-CCNC: 9 U/L (ref 13–60)
LYMPHOCYTES # BLD: 1 K/UL (ref 1–5.1)
LYMPHOCYTES NFR BLD: 10.2 %
MAGNESIUM SERPL-MCNC: 1.1 MG/DL (ref 1.8–2.4)
MCH RBC QN AUTO: 29.5 PG (ref 26–34)
MCHC RBC AUTO-ENTMCNC: 32.9 G/DL (ref 31–36)
MCV RBC AUTO: 89.7 FL (ref 80–100)
MONOCYTES # BLD: 0.3 K/UL (ref 0–1.3)
MONOCYTES NFR BLD: 3.2 %
NEUTROPHILS # BLD: 8.7 K/UL (ref 1.7–7.7)
NEUTROPHILS NFR BLD: 86.1 %
NITRITE UR QL STRIP.AUTO: POSITIVE
PH BLDV: 7.38 [PH] (ref 7.35–7.45)
PH UR STRIP.AUTO: 6 [PH] (ref 5–8)
PLATELET # BLD AUTO: 160 K/UL (ref 135–450)
PMV BLD AUTO: 8.4 FL (ref 5–10.5)
POTASSIUM SERPL-SCNC: 3.7 MMOL/L (ref 3.5–5.1)
PROT SERPL-MCNC: 6.9 G/DL (ref 6.4–8.2)
PROT UR STRIP.AUTO-MCNC: NEGATIVE MG/DL
RBC # BLD AUTO: 3.18 M/UL (ref 4–5.2)
RBC #/AREA URNS HPF: ABNORMAL /HPF (ref 0–4)
SODIUM SERPL-SCNC: 143 MMOL/L (ref 136–145)
SP GR UR STRIP.AUTO: 1.01 (ref 1–1.03)
UA DIPSTICK W REFLEX MICRO PNL UR: YES
URN SPEC COLLECT METH UR: ABNORMAL
UROBILINOGEN UR STRIP-ACNC: 0.2 E.U./DL
WBC # BLD AUTO: 10.1 K/UL (ref 4–11)
WBC #/AREA URNS HPF: ABNORMAL /HPF (ref 0–5)

## 2024-02-21 PROCEDURE — 87077 CULTURE AEROBIC IDENTIFY: CPT

## 2024-02-21 PROCEDURE — 83735 ASSAY OF MAGNESIUM: CPT

## 2024-02-21 PROCEDURE — 83690 ASSAY OF LIPASE: CPT

## 2024-02-21 PROCEDURE — 80197 ASSAY OF TACROLIMUS: CPT

## 2024-02-21 PROCEDURE — 82330 ASSAY OF CALCIUM: CPT

## 2024-02-21 PROCEDURE — 85025 COMPLETE CBC W/AUTO DIFF WBC: CPT

## 2024-02-21 PROCEDURE — 6360000002 HC RX W HCPCS: Performed by: NURSE PRACTITIONER

## 2024-02-21 PROCEDURE — 2580000003 HC RX 258: Performed by: INTERNAL MEDICINE

## 2024-02-21 PROCEDURE — 96367 TX/PROPH/DG ADDL SEQ IV INF: CPT

## 2024-02-21 PROCEDURE — 6370000000 HC RX 637 (ALT 250 FOR IP): Performed by: EMERGENCY MEDICINE

## 2024-02-21 PROCEDURE — 87086 URINE CULTURE/COLONY COUNT: CPT

## 2024-02-21 PROCEDURE — 96366 THER/PROPH/DIAG IV INF ADDON: CPT

## 2024-02-21 PROCEDURE — 99285 EMERGENCY DEPT VISIT HI MDM: CPT

## 2024-02-21 PROCEDURE — 6360000002 HC RX W HCPCS: Performed by: INTERNAL MEDICINE

## 2024-02-21 PROCEDURE — 6360000002 HC RX W HCPCS: Performed by: STUDENT IN AN ORGANIZED HEALTH CARE EDUCATION/TRAINING PROGRAM

## 2024-02-21 PROCEDURE — 74176 CT ABD & PELVIS W/O CONTRAST: CPT

## 2024-02-21 PROCEDURE — 1200000000 HC SEMI PRIVATE

## 2024-02-21 PROCEDURE — 80053 COMPREHEN METABOLIC PANEL: CPT

## 2024-02-21 PROCEDURE — 2580000003 HC RX 258: Performed by: NURSE PRACTITIONER

## 2024-02-21 PROCEDURE — 81001 URINALYSIS AUTO W/SCOPE: CPT

## 2024-02-21 PROCEDURE — 96365 THER/PROPH/DIAG IV INF INIT: CPT

## 2024-02-21 PROCEDURE — 6370000000 HC RX 637 (ALT 250 FOR IP): Performed by: STUDENT IN AN ORGANIZED HEALTH CARE EDUCATION/TRAINING PROGRAM

## 2024-02-21 PROCEDURE — 2580000003 HC RX 258: Performed by: STUDENT IN AN ORGANIZED HEALTH CARE EDUCATION/TRAINING PROGRAM

## 2024-02-21 RX ORDER — ASPIRIN 81 MG/1
81 TABLET ORAL DAILY
Status: DISCONTINUED | OUTPATIENT
Start: 2024-02-22 | End: 2024-02-26 | Stop reason: HOSPADM

## 2024-02-21 RX ORDER — TACROLIMUS 0.5 MG/1
0.5 CAPSULE ORAL 2 TIMES DAILY
Status: DISCONTINUED | OUTPATIENT
Start: 2024-02-21 | End: 2024-02-26 | Stop reason: HOSPADM

## 2024-02-21 RX ORDER — ONDANSETRON 2 MG/ML
4 INJECTION INTRAMUSCULAR; INTRAVENOUS EVERY 6 HOURS PRN
Status: DISCONTINUED | OUTPATIENT
Start: 2024-02-21 | End: 2024-02-21

## 2024-02-21 RX ORDER — MAGNESIUM SULFATE IN WATER 40 MG/ML
2000 INJECTION, SOLUTION INTRAVENOUS ONCE
Status: COMPLETED | OUTPATIENT
Start: 2024-02-21 | End: 2024-02-21

## 2024-02-21 RX ORDER — FLUOXETINE HYDROCHLORIDE 20 MG/1
20 CAPSULE ORAL DAILY
Status: DISCONTINUED | OUTPATIENT
Start: 2024-02-22 | End: 2024-02-26 | Stop reason: HOSPADM

## 2024-02-21 RX ORDER — OLANZAPINE 5 MG/1
7.5 TABLET ORAL NIGHTLY
Status: DISCONTINUED | OUTPATIENT
Start: 2024-02-21 | End: 2024-02-26 | Stop reason: HOSPADM

## 2024-02-21 RX ORDER — ONDANSETRON 4 MG/1
4 TABLET, ORALLY DISINTEGRATING ORAL EVERY 8 HOURS PRN
Status: DISCONTINUED | OUTPATIENT
Start: 2024-02-21 | End: 2024-02-26 | Stop reason: HOSPADM

## 2024-02-21 RX ORDER — TRAZODONE HYDROCHLORIDE 50 MG/1
100 TABLET ORAL NIGHTLY
Status: DISCONTINUED | OUTPATIENT
Start: 2024-02-21 | End: 2024-02-26 | Stop reason: HOSPADM

## 2024-02-21 RX ORDER — CALCIUM GLUCONATE 20 MG/ML
2000 INJECTION, SOLUTION INTRAVENOUS ONCE
Status: COMPLETED | OUTPATIENT
Start: 2024-02-21 | End: 2024-02-21

## 2024-02-21 RX ORDER — TORSEMIDE 20 MG/1
20 TABLET ORAL DAILY
Status: DISCONTINUED | OUTPATIENT
Start: 2024-02-22 | End: 2024-02-22

## 2024-02-21 RX ORDER — ACETAMINOPHEN 500 MG
1000 TABLET ORAL ONCE
Status: COMPLETED | OUTPATIENT
Start: 2024-02-21 | End: 2024-02-21

## 2024-02-21 RX ORDER — SODIUM CHLORIDE 0.9 % (FLUSH) 0.9 %
5-40 SYRINGE (ML) INJECTION EVERY 12 HOURS SCHEDULED
Status: DISCONTINUED | OUTPATIENT
Start: 2024-02-21 | End: 2024-02-26 | Stop reason: HOSPADM

## 2024-02-21 RX ORDER — POLYETHYLENE GLYCOL 3350 17 G/17G
17 POWDER, FOR SOLUTION ORAL DAILY PRN
Status: DISCONTINUED | OUTPATIENT
Start: 2024-02-21 | End: 2024-02-26 | Stop reason: HOSPADM

## 2024-02-21 RX ORDER — SODIUM CHLORIDE 9 MG/ML
INJECTION, SOLUTION INTRAVENOUS PRN
Status: DISCONTINUED | OUTPATIENT
Start: 2024-02-21 | End: 2024-02-26 | Stop reason: HOSPADM

## 2024-02-21 RX ORDER — SODIUM CHLORIDE, SODIUM LACTATE, POTASSIUM CHLORIDE, CALCIUM CHLORIDE 600; 310; 30; 20 MG/100ML; MG/100ML; MG/100ML; MG/100ML
INJECTION, SOLUTION INTRAVENOUS CONTINUOUS
Status: DISCONTINUED | OUTPATIENT
Start: 2024-02-21 | End: 2024-02-23

## 2024-02-21 RX ORDER — ALOGLIPTIN 6.25 MG/1
6.25 TABLET, FILM COATED ORAL DAILY
Status: DISCONTINUED | OUTPATIENT
Start: 2024-02-22 | End: 2024-02-26 | Stop reason: HOSPADM

## 2024-02-21 RX ORDER — MIDODRINE HYDROCHLORIDE 5 MG/1
10 TABLET ORAL
Status: DISCONTINUED | OUTPATIENT
Start: 2024-02-22 | End: 2024-02-22

## 2024-02-21 RX ORDER — METHOCARBAMOL 500 MG/1
500 TABLET, FILM COATED ORAL 4 TIMES DAILY
Status: DISCONTINUED | OUTPATIENT
Start: 2024-02-21 | End: 2024-02-26 | Stop reason: HOSPADM

## 2024-02-21 RX ORDER — EZETIMIBE 10 MG/1
10 TABLET ORAL DAILY
Status: DISCONTINUED | OUTPATIENT
Start: 2024-02-22 | End: 2024-02-26 | Stop reason: HOSPADM

## 2024-02-21 RX ORDER — ENOXAPARIN SODIUM 100 MG/ML
30 INJECTION SUBCUTANEOUS DAILY
Status: DISCONTINUED | OUTPATIENT
Start: 2024-02-22 | End: 2024-02-26 | Stop reason: HOSPADM

## 2024-02-21 RX ORDER — ATORVASTATIN CALCIUM 40 MG/1
40 TABLET, FILM COATED ORAL NIGHTLY
Status: DISCONTINUED | OUTPATIENT
Start: 2024-02-21 | End: 2024-02-26 | Stop reason: HOSPADM

## 2024-02-21 RX ORDER — PANTOPRAZOLE SODIUM 40 MG/1
40 TABLET, DELAYED RELEASE ORAL
Status: DISCONTINUED | OUTPATIENT
Start: 2024-02-22 | End: 2024-02-26 | Stop reason: HOSPADM

## 2024-02-21 RX ORDER — METHOCARBAMOL 500 MG/1
1000 TABLET, FILM COATED ORAL ONCE
Status: COMPLETED | OUTPATIENT
Start: 2024-02-21 | End: 2024-02-21

## 2024-02-21 RX ORDER — PREDNISONE 10 MG/1
10 TABLET ORAL DAILY
Status: DISCONTINUED | OUTPATIENT
Start: 2024-02-22 | End: 2024-02-26 | Stop reason: HOSPADM

## 2024-02-21 RX ORDER — SODIUM CHLORIDE 0.9 % (FLUSH) 0.9 %
5-40 SYRINGE (ML) INJECTION PRN
Status: DISCONTINUED | OUTPATIENT
Start: 2024-02-21 | End: 2024-02-26 | Stop reason: HOSPADM

## 2024-02-21 RX ORDER — CLOPIDOGREL BISULFATE 75 MG/1
75 TABLET ORAL DAILY
Status: DISCONTINUED | OUTPATIENT
Start: 2024-02-22 | End: 2024-02-26 | Stop reason: HOSPADM

## 2024-02-21 RX ORDER — FOLIC ACID 1 MG/1
1 TABLET ORAL DAILY
Status: DISCONTINUED | OUTPATIENT
Start: 2024-02-22 | End: 2024-02-26 | Stop reason: HOSPADM

## 2024-02-21 RX ADMIN — ATORVASTATIN CALCIUM 40 MG: 40 TABLET, FILM COATED ORAL at 22:36

## 2024-02-21 RX ADMIN — Medication 10 ML: at 22:35

## 2024-02-21 RX ADMIN — CALCIUM GLUCONATE 2000 MG: 20 INJECTION, SOLUTION INTRAVENOUS at 14:53

## 2024-02-21 RX ADMIN — SODIUM CHLORIDE, POTASSIUM CHLORIDE, SODIUM LACTATE AND CALCIUM CHLORIDE: 600; 310; 30; 20 INJECTION, SOLUTION INTRAVENOUS at 22:34

## 2024-02-21 RX ADMIN — CALCIUM GLUCONATE 2000 MG: 20 INJECTION, SOLUTION INTRAVENOUS at 19:53

## 2024-02-21 RX ADMIN — SODIUM CHLORIDE, POTASSIUM CHLORIDE, SODIUM LACTATE AND CALCIUM CHLORIDE: 600; 310; 30; 20 INJECTION, SOLUTION INTRAVENOUS at 19:50

## 2024-02-21 RX ADMIN — OLANZAPINE 7.5 MG: 5 TABLET, FILM COATED ORAL at 22:36

## 2024-02-21 RX ADMIN — METHOCARBAMOL 1000 MG: 500 TABLET ORAL at 17:14

## 2024-02-21 RX ADMIN — MAGNESIUM SULFATE HEPTAHYDRATE 2000 MG: 40 INJECTION, SOLUTION INTRAVENOUS at 16:10

## 2024-02-21 RX ADMIN — CEFTRIAXONE SODIUM 1000 MG: 1 INJECTION, POWDER, FOR SOLUTION INTRAMUSCULAR; INTRAVENOUS at 18:12

## 2024-02-21 RX ADMIN — TACROLIMUS 0.5 MG: 0.5 CAPSULE ORAL at 22:56

## 2024-02-21 RX ADMIN — METHOCARBAMOL 500 MG: 500 TABLET ORAL at 22:36

## 2024-02-21 RX ADMIN — ACETAMINOPHEN 1000 MG: 500 TABLET ORAL at 17:14

## 2024-02-21 RX ADMIN — TRAZODONE HYDROCHLORIDE 100 MG: 50 TABLET ORAL at 22:36

## 2024-02-21 ASSESSMENT — PAIN - FUNCTIONAL ASSESSMENT: PAIN_FUNCTIONAL_ASSESSMENT: 0-10

## 2024-02-21 ASSESSMENT — PAIN SCALES - GENERAL
PAINLEVEL_OUTOF10: 4
PAINLEVEL_OUTOF10: 0
PAINLEVEL_OUTOF10: 5
PAINLEVEL_OUTOF10: 5

## 2024-02-21 ASSESSMENT — PAIN DESCRIPTION - ORIENTATION
ORIENTATION: LOWER

## 2024-02-21 ASSESSMENT — PAIN DESCRIPTION - DESCRIPTORS: DESCRIPTORS: ACHING

## 2024-02-21 ASSESSMENT — PAIN DESCRIPTION - LOCATION
LOCATION: BACK

## 2024-02-21 NOTE — ED NOTES
Pt baseline is mostly dependent of family for her care. Has been home from nursing home for ~3 weeks. History of CVA in July 2023. Kidney transplant at [facility] in 2016. Was taken off dialysis completely 1 month ago. Family reports loose stools over last few days. Pt is alert, oriented. Baseline for patient per family at bedside.

## 2024-02-21 NOTE — ED PROVIDER NOTES
HPI.     MEDICAL HISTORY     Past Medical History:   Diagnosis Date    Anemia     Bacteremia 09/05/2017    E coli    Chronic kidney disease     Diabetes mellitus type 1 (HCC)     Hyperlipidemia     Hypertension     Kidney transplant recipient     No history of procedure 4/20/16    colonoscopy       SURGICAL HISTORY     Past Surgical History:   Procedure Laterality Date    DENTAL SURGERY      DIALYSIS FISTULA CREATION      KIDNEY TRANSPLANT      OTHER SURGICAL HISTORY Left 9/29/2014    Port placed left chest    OTHER SURGICAL HISTORY  04/11/2016    removal of port-a-cath and insertion of new port-a-cath    UPPER GASTROINTESTINAL ENDOSCOPY  04-    Esophagitis       CURRENTMEDICATIONS       Previous Medications    APIXABAN (ELIQUIS) 2.5 MG TABS TABLET    Take 1 tablet by mouth 2 times daily    ASPIRIN 81 MG EC TABLET    Take 81 mg by mouth daily.    CALCIUM CARBONATE (TUMS) 500 MG CHEWABLE TABLET    Take 1 tablet by mouth daily as needed for Heartburn    CHOLECALCIFEROL (VITAMIN D) 2000 UNITS TABS    Take  by mouth.      DOCUSATE SODIUM (COLACE) 100 MG CAPSULE    Take 100 mg by mouth 2 times daily    DOXAZOSIN (CARDURA) 1 MG TABLET    Take 2 mg by mouth nightly     EZETIMIBE (ZETIA) 10 MG TABLET    Take 10 mg by mouth daily    FAMOTIDINE (PEPCID) 20 MG TABLET    Take 20 mg by mouth 2 times daily    FOLIC ACID (FOLVITE) 1 MG TABLET    Take 1 mg by mouth daily    FUROSEMIDE (LASIX) 40 MG TABLET    Take 40 mg by mouth daily    INSULIN DETEMIR (LEVEMIR) 100 UNIT/ML INJECTION VIAL    Inject 22 Units into the skin nightly    INSULIN NPH (NOVOLIN N) 100 UNIT/ML INJECTION VIAL    Inject 18 Units into the skin 2 times daily (before meals)    LISINOPRIL (PRINIVIL;ZESTRIL) 5 MG TABLET    Take 1 tablet by mouth nightly    METOPROLOL (LOPRESSOR) 50 MG TABLET    Take 1 tablet by mouth 2 times daily.    MYCOPHENOLATE (CELLCEPT) 250 MG CAPSULE    Take 1,000 mg by mouth 2 times daily     NYSTATIN (MYCOSTATIN) [id number] UNIT/ML

## 2024-02-21 NOTE — ED NOTES
Patient changed for jordin colored bowel movement. Patient is alert and oriented. Blankets replaced at rails up x2 call light in reach.

## 2024-02-21 NOTE — ED NOTES
1447 - PS to Dr Garza at [facility]  Re: low calcium, elevated creatinine  1501 - called [facility]. Grover is on-call. PS to Dr Ness at this time  1520 - Dr Ness called back to speak with JULIA Chamorro

## 2024-02-21 NOTE — ED PROVIDER NOTES
Seems slightly pale in appearance, and has aches and pains all over.          CT ABDOMEN PELVIS WO CONTRAST Additional Contrast? None   Final Result   Air in the nondependent right renal calyx in a transplanted kidney, right   ureter, and within the non dependent bladder which may be iatrogenic or   infectious in etiology.  This finding similar to the prior examination.   Stable diffuse mural thickening of the bladder wall related to infectious or   inflammatory cystitis.  Correlation with urine laboratory exams may be   helpful.      Small appearing native kidneys.  No hydronephrosis involving the native or   transplanted kidney.      No acute hepatic abnormality identified.      Mural thickening of the distal descending colon, sigmoid rectum junction, and   rectum which may be inflammatory in etiology.      Additional findings noted above.             Labs Reviewed   CBC WITH AUTO DIFFERENTIAL - Abnormal; Notable for the following components:       Result Value    RBC 3.18 (*)     Hemoglobin 9.4 (*)     Hematocrit 28.5 (*)     RDW 16.6 (*)     Neutrophils Absolute 8.7 (*)     All other components within normal limits   COMPREHENSIVE METABOLIC PANEL W/ REFLEX TO MG FOR LOW K - Abnormal; Notable for the following components:    Chloride 97 (*)     Anion Gap 23 (*)     Glucose 137 (*)     BUN 31 (*)     Creatinine 3.2 (*)     Est, Glom Filt Rate 18 (*)     Calcium 5.0 (*)     Alkaline Phosphatase 369 (*)      (*)      (*)     All other components within normal limits    Narrative:     CALL  Elias  [facility] tel. [phone],  Chemistry results called to and read back by quincy rodríguez rn, 02/21/2024 14:38,  by PATRIC   LIPASE - Abnormal; Notable for the following components:    Lipase 9.0 (*)     All other components within normal limits    Narrative:     CALL  Elias  [facility] tel. [phone],  Chemistry results called to and read back by quincy rodríguez rn, 02/21/2024 14:38,  by PATRIC   URINALYSIS - Abnormal; Notable for

## 2024-02-22 LAB
ALBUMIN SERPL-MCNC: 3.5 G/DL (ref 3.4–5)
ALBUMIN/GLOB SERPL: 1.3 {RATIO} (ref 1.1–2.2)
ALP SERPL-CCNC: 331 U/L (ref 40–129)
ALT SERPL-CCNC: 111 U/L (ref 10–40)
ANION GAP SERPL CALCULATED.3IONS-SCNC: 24 MMOL/L (ref 3–16)
AST SERPL-CCNC: 157 U/L (ref 15–37)
BACTERIA UR CULT: NORMAL
BASOPHILS # BLD: 0 K/UL (ref 0–0.2)
BASOPHILS NFR BLD: 0.6 %
BILIRUB SERPL-MCNC: 0.3 MG/DL (ref 0–1)
BUN SERPL-MCNC: 28 MG/DL (ref 7–20)
CALCIUM SERPL-MCNC: 6 MG/DL (ref 8.3–10.6)
CHLORIDE SERPL-SCNC: 101 MMOL/L (ref 99–110)
CO2 SERPL-SCNC: 20 MMOL/L (ref 21–32)
CREAT SERPL-MCNC: 3 MG/DL (ref 0.6–1.1)
DEPRECATED RDW RBC AUTO: 16.3 % (ref 12.4–15.4)
EOSINOPHIL # BLD: 0 K/UL (ref 0–0.6)
EOSINOPHIL NFR BLD: 0.5 %
GFR SERPLBLD CREATININE-BSD FMLA CKD-EPI: 19 ML/MIN/{1.73_M2}
GLUCOSE BLD-MCNC: 117 MG/DL (ref 70–99)
GLUCOSE BLD-MCNC: 123 MG/DL (ref 70–99)
GLUCOSE BLD-MCNC: 176 MG/DL (ref 70–99)
GLUCOSE BLD-MCNC: 84 MG/DL (ref 70–99)
GLUCOSE BLD-MCNC: 85 MG/DL (ref 70–99)
GLUCOSE SERPL-MCNC: 93 MG/DL (ref 70–99)
HCT VFR BLD AUTO: 26.8 % (ref 36–48)
HGB BLD-MCNC: 8.9 G/DL (ref 12–16)
LYMPHOCYTES # BLD: 1.5 K/UL (ref 1–5.1)
LYMPHOCYTES NFR BLD: 22.1 %
MAGNESIUM SERPL-MCNC: 1.3 MG/DL (ref 1.8–2.4)
MCH RBC QN AUTO: 29.5 PG (ref 26–34)
MCHC RBC AUTO-ENTMCNC: 33.1 G/DL (ref 31–36)
MCV RBC AUTO: 89.2 FL (ref 80–100)
MONOCYTES # BLD: 0.3 K/UL (ref 0–1.3)
MONOCYTES NFR BLD: 5.2 %
NEUTROPHILS # BLD: 4.8 K/UL (ref 1.7–7.7)
NEUTROPHILS NFR BLD: 71.6 %
PERFORMED ON: ABNORMAL
PERFORMED ON: NORMAL
PERFORMED ON: NORMAL
PLATELET # BLD AUTO: 133 K/UL (ref 135–450)
PMV BLD AUTO: 8.5 FL (ref 5–10.5)
POTASSIUM SERPL-SCNC: 3.4 MMOL/L (ref 3.5–5.1)
PROT SERPL-MCNC: 6.3 G/DL (ref 6.4–8.2)
RBC # BLD AUTO: 3 M/UL (ref 4–5.2)
SODIUM SERPL-SCNC: 145 MMOL/L (ref 136–145)
TACROLIMUS BLOOD: 0.8 NG/ML (ref 5–20)
WBC # BLD AUTO: 6.6 K/UL (ref 4–11)

## 2024-02-22 PROCEDURE — 6360000002 HC RX W HCPCS: Performed by: INTERNAL MEDICINE

## 2024-02-22 PROCEDURE — 85025 COMPLETE CBC W/AUTO DIFF WBC: CPT

## 2024-02-22 PROCEDURE — 51798 US URINE CAPACITY MEASURE: CPT

## 2024-02-22 PROCEDURE — 6360000002 HC RX W HCPCS: Performed by: STUDENT IN AN ORGANIZED HEALTH CARE EDUCATION/TRAINING PROGRAM

## 2024-02-22 PROCEDURE — 2580000003 HC RX 258: Performed by: INTERNAL MEDICINE

## 2024-02-22 PROCEDURE — 80053 COMPREHEN METABOLIC PANEL: CPT

## 2024-02-22 PROCEDURE — 83735 ASSAY OF MAGNESIUM: CPT

## 2024-02-22 PROCEDURE — 83036 HEMOGLOBIN GLYCOSYLATED A1C: CPT

## 2024-02-22 PROCEDURE — 51701 INSERT BLADDER CATHETER: CPT

## 2024-02-22 PROCEDURE — 6370000000 HC RX 637 (ALT 250 FOR IP): Performed by: STUDENT IN AN ORGANIZED HEALTH CARE EDUCATION/TRAINING PROGRAM

## 2024-02-22 PROCEDURE — 2580000003 HC RX 258: Performed by: STUDENT IN AN ORGANIZED HEALTH CARE EDUCATION/TRAINING PROGRAM

## 2024-02-22 PROCEDURE — 6370000000 HC RX 637 (ALT 250 FOR IP): Performed by: INTERNAL MEDICINE

## 2024-02-22 PROCEDURE — 36415 COLL VENOUS BLD VENIPUNCTURE: CPT

## 2024-02-22 PROCEDURE — 1200000000 HC SEMI PRIVATE

## 2024-02-22 RX ORDER — MYCOPHENOLATE MOFETIL 250 MG/1
500 CAPSULE ORAL 2 TIMES DAILY
Status: DISCONTINUED | OUTPATIENT
Start: 2024-02-22 | End: 2024-02-26

## 2024-02-22 RX ORDER — TRAZODONE HYDROCHLORIDE 100 MG/1
100 TABLET ORAL NIGHTLY
COMMUNITY

## 2024-02-22 RX ORDER — METHOCARBAMOL 500 MG/1
500 TABLET, FILM COATED ORAL EVERY 6 HOURS PRN
COMMUNITY

## 2024-02-22 RX ORDER — TORSEMIDE 20 MG/1
20 TABLET ORAL DAILY
COMMUNITY

## 2024-02-22 RX ORDER — DEXTROSE MONOHYDRATE 100 MG/ML
INJECTION, SOLUTION INTRAVENOUS CONTINUOUS PRN
Status: DISCONTINUED | OUTPATIENT
Start: 2024-02-22 | End: 2024-02-26 | Stop reason: HOSPADM

## 2024-02-22 RX ORDER — INSULIN LISPRO 100 [IU]/ML
0-4 INJECTION, SOLUTION INTRAVENOUS; SUBCUTANEOUS NIGHTLY
Status: DISCONTINUED | OUTPATIENT
Start: 2024-02-22 | End: 2024-02-26 | Stop reason: HOSPADM

## 2024-02-22 RX ORDER — MIDODRINE HYDROCHLORIDE 5 MG/1
10 TABLET ORAL 3 TIMES DAILY
Status: ON HOLD | COMMUNITY
End: 2024-02-26 | Stop reason: HOSPADM

## 2024-02-22 RX ORDER — INSULIN LISPRO 100 [IU]/ML
0-4 INJECTION, SOLUTION INTRAVENOUS; SUBCUTANEOUS
Status: DISCONTINUED | OUTPATIENT
Start: 2024-02-22 | End: 2024-02-26 | Stop reason: HOSPADM

## 2024-02-22 RX ORDER — PREDNISONE 10 MG/1
10 TABLET ORAL DAILY
COMMUNITY

## 2024-02-22 RX ORDER — FLUOXETINE HYDROCHLORIDE 20 MG/1
20 CAPSULE ORAL DAILY
COMMUNITY

## 2024-02-22 RX ORDER — MAGNESIUM SULFATE IN WATER 40 MG/ML
2000 INJECTION, SOLUTION INTRAVENOUS ONCE
Status: COMPLETED | OUTPATIENT
Start: 2024-02-22 | End: 2024-02-22

## 2024-02-22 RX ORDER — OLANZAPINE 7.5 MG/1
7.5 TABLET, FILM COATED ORAL NIGHTLY
COMMUNITY

## 2024-02-22 RX ORDER — ATORVASTATIN CALCIUM 40 MG/1
40 TABLET, FILM COATED ORAL DAILY
COMMUNITY

## 2024-02-22 RX ORDER — CLOPIDOGREL BISULFATE 75 MG/1
75 TABLET ORAL DAILY
COMMUNITY

## 2024-02-22 RX ADMIN — SODIUM CHLORIDE, POTASSIUM CHLORIDE, SODIUM LACTATE AND CALCIUM CHLORIDE: 600; 310; 30; 20 INJECTION, SOLUTION INTRAVENOUS at 06:27

## 2024-02-22 RX ADMIN — FOLIC ACID 1 MG: 1 TABLET ORAL at 09:34

## 2024-02-22 RX ADMIN — OLANZAPINE 7.5 MG: 5 TABLET, FILM COATED ORAL at 19:57

## 2024-02-22 RX ADMIN — TACROLIMUS 0.5 MG: 0.5 CAPSULE ORAL at 20:02

## 2024-02-22 RX ADMIN — ASPIRIN 81 MG: 81 TABLET, COATED ORAL at 09:34

## 2024-02-22 RX ADMIN — CEFTRIAXONE SODIUM 1000 MG: 1 INJECTION, POWDER, FOR SOLUTION INTRAMUSCULAR; INTRAVENOUS at 19:57

## 2024-02-22 RX ADMIN — ALOGLIPTIN 6.25 MG: 6.25 TABLET, FILM COATED ORAL at 09:35

## 2024-02-22 RX ADMIN — MAGNESIUM SULFATE HEPTAHYDRATE 2000 MG: 40 INJECTION, SOLUTION INTRAVENOUS at 14:30

## 2024-02-22 RX ADMIN — ENOXAPARIN SODIUM 30 MG: 100 INJECTION SUBCUTANEOUS at 09:34

## 2024-02-22 RX ADMIN — METHOCARBAMOL 500 MG: 500 TABLET ORAL at 09:35

## 2024-02-22 RX ADMIN — METHOCARBAMOL 500 MG: 500 TABLET ORAL at 19:58

## 2024-02-22 RX ADMIN — CALCIUM GLUCONATE 4000 MG: 98 INJECTION, SOLUTION INTRAVENOUS at 14:31

## 2024-02-22 RX ADMIN — TRAZODONE HYDROCHLORIDE 100 MG: 50 TABLET ORAL at 19:58

## 2024-02-22 RX ADMIN — CLOPIDOGREL BISULFATE 75 MG: 75 TABLET ORAL at 09:34

## 2024-02-22 RX ADMIN — METHOCARBAMOL 500 MG: 500 TABLET ORAL at 18:37

## 2024-02-22 RX ADMIN — FLUOXETINE HYDROCHLORIDE 20 MG: 20 CAPSULE ORAL at 09:35

## 2024-02-22 RX ADMIN — EZETIMIBE 10 MG: 10 TABLET ORAL at 09:35

## 2024-02-22 RX ADMIN — METHOCARBAMOL 500 MG: 500 TABLET ORAL at 14:32

## 2024-02-22 RX ADMIN — TACROLIMUS 0.5 MG: 0.5 CAPSULE ORAL at 09:35

## 2024-02-22 RX ADMIN — SODIUM CHLORIDE, POTASSIUM CHLORIDE, SODIUM LACTATE AND CALCIUM CHLORIDE: 600; 310; 30; 20 INJECTION, SOLUTION INTRAVENOUS at 22:56

## 2024-02-22 RX ADMIN — PANTOPRAZOLE SODIUM 40 MG: 40 TABLET, DELAYED RELEASE ORAL at 16:34

## 2024-02-22 RX ADMIN — ATORVASTATIN CALCIUM 40 MG: 40 TABLET, FILM COATED ORAL at 19:58

## 2024-02-22 RX ADMIN — PANTOPRAZOLE SODIUM 40 MG: 40 TABLET, DELAYED RELEASE ORAL at 06:25

## 2024-02-22 RX ADMIN — POTASSIUM BICARBONATE 25 MEQ: 978 TABLET, EFFERVESCENT ORAL at 19:58

## 2024-02-22 RX ADMIN — PREDNISONE 10 MG: 10 TABLET ORAL at 09:35

## 2024-02-22 ASSESSMENT — PAIN SCALES - GENERAL
PAINLEVEL_OUTOF10: 0

## 2024-02-22 NOTE — PLAN OF CARE
Preoperative History and Physical  Chief Complaint   Patient presents with   • Pre-Op Exam     Scheduled for Partial metatarsal head resection left foot with Dr. Hernandez on 5/9/19 at [facility] OR         SURGEON:  Dr. Javier Hernandez     PROCEDURE:  Partial Metatarsal head resection left foot     DATE OF PROCEDURE:  5/9/2019     FACILITY:  [facility] OR    HISTORY OF PRESENT ILLNESS:  The patient is a 84 year old male, who is being evaluated preoperatively at the request of . The patient states in January he developed ulceration of his great toe that  led to osteomyelitis and amputation in February. Since his amputation he has continued to have a non healing wound. He is undergoing hyperbaric treatments. He stop taking the Augmentin 875 mg today.  He is a non-insulin-dependent diabetic, with his blood sugars controlled. His blood pressure in office today is 140/62, Denies any dizziness or lightheadedness when going from a sitting to standing position. States taking medications as directed with no side effects. The patient denies chest pain, shortness of breath, palpitations, headaches.     ALLERGIES:    ALLERGIES:  No Known Allergies    CURRENT MEDICATIONS:    Current Outpatient Medications   Medication Sig Dispense Refill   • amoxicillin-clavulanate (AUGMENTIN) 875-125 MG per tablet Take 1 tablet by mouth 2 times daily for 14 days. 28 tablet 0   • glipiZIDE (GLUCOTROL) 5 MG tablet Take 5 mg by mouth daily (before breakfast).     • Cholecalciferol (VITAMIN D3) 2000 units Tab      • ferrous sulfate 325 (65 FE) MG tablet Take 325 mg by mouth daily (with breakfast).     • saccharomyces boulardii (FLORASTOR) 250 MG capsule Take 250 mg by mouth 2 times daily.     • ramipril (ALTACE) 2.5 MG capsule TAKE 1 CAPSULE BY MOUTH EVERY MORNING 90 capsule 0   • simvastatin (ZOCOR) 10 MG tablet Take 1 tablet by mouth daily. 90 tablet 0   • aspirin 81 MG tablet Take 1 tablet by mouth daily. 90 tablet 1   • ibuprofen  Problem: Pain  Goal: Verbalizes/displays adequate comfort level or baseline comfort level  Outcome: Progressing  Flowsheets (Taken 2/21/2024 2154)  Verbalizes/displays adequate comfort level or baseline comfort level:   Encourage patient to monitor pain and request assistance   Assess pain using appropriate pain scale   Administer analgesics based on type and severity of pain and evaluate response   Implement non-pharmacological measures as appropriate and evaluate response   Consider cultural and social influences on pain and pain management   Notify Licensed Independent Practitioner if interventions unsuccessful or patient reports new pain     Problem: Skin/Tissue Integrity  Goal: Absence of new skin breakdown  Description: 1.  Monitor for areas of redness and/or skin breakdown  2.  Assess vascular access sites hourly  3.  Every 4-6 hours minimum:  Change oxygen saturation probe site  4.  Every 4-6 hours:  If on nasal continuous positive airway pressure, respiratory therapy assess nares and determine need for appliance change or resting period.  Outcome: Progressing     Problem: Safety - Adult  Goal: Free from fall injury  Outcome: Progressing  Flowsheets (Taken 2/22/2024 0649)  Free From Fall Injury: Instruct family/caregiver on patient safety     Problem: Chronic Conditions and Co-morbidities  Goal: Patient's chronic conditions and co-morbidity symptoms are monitored and maintained or improved  Outcome: Progressing  Flowsheets (Taken 2/22/2024 0649)  Care Plan - Patient's Chronic Conditions and Co-Morbidity Symptoms are Monitored and Maintained or Improved:   Monitor and assess patient's chronic conditions and comorbid symptoms for stability, deterioration, or improvement   Collaborate with multidisciplinary team to address chronic and comorbid conditions and prevent exacerbation or deterioration   Update acute care plan with appropriate goals if chronic or comorbid symptoms are exacerbated and prevent overall  (MOTRIN) 200 MG tablet Take 200 mg by mouth daily as needed for Pain.     • metformin (GLUCOPHAGE) 1000 MG tablet Take 1,000 mg by mouth 2 times daily (with meals).     • metoPROLOL (TOPROL-XL) 50 MG 24 hr tablet Take 50 mg by mouth daily.     • Co-Enzyme Q-10 100 MG Cap Take 1 capsule by mouth daily.     • vitamin - therapeutic multivitamins w/minerals (CENTRUM SILVER,THERA-M) Tab Take 2 tablets by mouth daily.     • Omega-3 Fatty Acids (FISH OIL) 1000 MG capsule Take 2 g by mouth daily.     • travoprost, benzalkonium free, (TRAVATAN Z) 0.004 % ophthalmic solution Place 1 drop into both eyes nightly.       No current facility-administered medications for this visit.        MEDICAL HISTORY:    Past Medical History:   Diagnosis Date   • Anemia    • Arthritis    • Blood clot associated with vein wall inflammation    • CAD (coronary artery disease)     Angioplasty - Stent x5   • Diabetes mellitus (CMS/HCC)    • Diverticulosis of colon    • Essential (primary) hypertension    • Heart attack (CMS/HCC)    • High cholesterol    • Myocardial infarction (CMS/HCC)    • Osteoporosis    • PVD (peripheral vascular disease) (CMS/HCC)        SURGICAL HISTORY:    Past Surgical History:   Procedure Laterality Date   • Cardiac catherization     • Eye surgery         FAMILY HISTORY:    Family History   Problem Relation Age of Onset   • Diabetes Mother    • Myocardial Infarction Mother    • Diabetes Father        SOCIAL HISTORY:    Social History     Tobacco Use   • Smoking status: Never Smoker   • Smokeless tobacco: Never Used   Substance Use Topics   • Alcohol use: No   • Drug use: No       REVIEW OF SYSTEMS:  Constitutional:  Denies fevers.  Denies chills.  Denies tiredness or malaise.   Eyes:  Denies change in visual acuity.  Denies eye pain.  Denies eye burning.  Denies eye itching.   Immunologic:  Denies hives, seasonal allergies.   HENT:  Denies sinus problems.  Denies ear infections.  Denies nasal congestion. Denies nose  bleeding.  Denies gingival bleeding.  Denies sore throat.   Respiratory:  Denies cough, shortness of breath.  Denies history of problems with intubation or anesthesia.  Cardiovascular:  Denies chest pain, edema.   Gastrointestinal:  Denies abdominal pain, nausea, vomiting, bloody or dark stools, diarrhea.  Genitourinary:  Denies urine retention, painful urination, urinary frequency, blood in urine or nocturia.   Musculoskeletal:  Denies back pain, neck pain, joint pain  Integument:  Denies rash, itching.   Neurologic:  Denies headache, focal weakness or sensory changes.   Endocrine:  Denies polyuria, polydipsia or temperature intolerance.   Lymphatic:  Denies swollen glands, weight loss.  All other systems reviewed and negative.    PHYSICAL EXAM    VITAL SIGNS:    Vitals:    05/03/19 1632   BP: 140/62   Pulse: 76   Temp: 97.3 °F (36.3 °C)   SpO2: 99%   Weight: 72.4 kg   Height: 5' 9\" (1.753 m)     Constitutional:  A+O (Alert and oriented) x3.  In NAD (no acute distress).  HENT:  Normocephalic.  Atraumatic.  Bilateral external ears normal.  Oropharynx moist.  No oral exudates.  No tonsillar or uvular edema.  Nose normal.   Neck:  Normal range of motion.  No tenderness.  Supple.  No stridor.    Eyes:  PERRL (Pupils equal, round, reactive to light), EOMI (extraocular movements intact).  Conjunctivae normal.  No discharge.    Cardiovascular:  Normal rate.  Normal rhythm.  No murmurs, gallops, or rubs.    Respiratory:  No respiratory distress.  Normal breath sounds.  No rales.  No wheezing.    Gastrointestinal:  Bowel sounds normal.  Soft.  No tenderness.  No masses.  No pulsatile masses.    Integument:  Warm.  Dry.  No erythema.  No rash.    Musculoskeletal:  Intact distal pulses.     Back - No tenderness. Left foot in postop boot  Neurologic:  Alert and oriented x3.  Normal motor function.  Normal sensory function. No focal deficits noted.          Assessment    84 year old male with planned surgery as above.           plan      1. Left foot osteomyelitis. Scheduled for partial metatarsal head resection.  2. Patient is at intermediate risk going for a low-risk procedure therefore he is medically cleared to go ahead with the procedure.  3. Cardiac. Patient's hemodynamically stable. His EKG showed normal sinus rhythm no ST changes.      This is Corine Jefferson MA acting as a scribe for Dr. Dayday Gamino    The documentation recorded by the scribe accurately and completely reflects the service(s) I personally performed and the decisions made by me.       CC:  Dr. Javier Hernandez

## 2024-02-22 NOTE — CONSULTS
23   GLUCOSE 137*   MG 1.10*   BUN 31*   CREATININE 3.2*   LABGLOM 18*       Assessment:    Chronic Kidney Disease:  Stage 4  Failing transplant from 2016, kidney biopsy showed acute on chronic pyelonephritis as she had chronic UTIs.  Possible chronic rejection treated with steroids recently.  Had severe MARCO A and was on dialysis 9/2023 - 12/2023.  Now has recovered with baseline 2.5 - 3  Hypocalcemia:  Severely low.  ? Vitamin D deficiency vs. Medication effect vs. Low magnesium impairing the action of PTH  Hypomagnesemia:  Poor absorption and possible renal wasting   Malnutrition:  Has a PEG tube and planning to get it out.  Patient request that it get taken out here but seems she had an appointment in the next few weeks to get it out  Hypotension:  On midodrine   Anemia of chronic disease:  Due to CKD, no signs/symptoms of active bleeding   CVA:  prior CVA in the territory of the right PCA while on Eliquis   Access issues:  per report, patient has no central upper neck veins open for access     Plan:    IV calcium  IV fluids  Continue immunosuppression  Follow labs     Thank you for asking us to participate in the management of your patient, please do not hesitate to contact me for any concerns regarding my recommendations as outlined above.    -----------------------------  Lee Ann Ness M.D.   [facility]

## 2024-02-22 NOTE — H&P
Hospital Medicine History & Physical      Date of Admission: 2/21/2024    Date of Service:  Pt seen/examined on 02/21/2024     [x]Admitted to Inpatient with expected LOS greater than two midnights due to medical therapy.  []Placed in Observation status.    Chief Admission Complaint:  hypocalcemia     Presenting Admission History:      44 y.o. female with past medical history of CVA, renal failure s/p transplant, T2DM, depression, HLD presented to ED after outpatient labs found that she had increased Cr from baseline and Ca was low.  Patient does not have any complaints on exam      Assessment/Plan:      Current Principal Problem:  Hypocalcemia    Hypocalcemia   -Ca=5.0 on presentation   -prn replacement, nephro consult     MARCO A on CKD   -Cr= 3.2, baseline ~ 2.5   -IVF, nephro consult     UTI  -rocephin  -urine Cx pending     Hx of CVA  -asa, lipitor, plavix     HLD  -lipitor, zetia     Depression  -fluoxetine, zyprexa, trazodone     Hypotension   -midodrin    GERD   -protonix     Hx of renal transplant   -prednisone, tacrolimus    T2DM   -alogliptin, ssi, carb controlled diet     Discussed management and the need for Hospitalization of the patient w/ the Emergency Department Provider: Storm     CXR: I have reviewed the CXR with the following interpretation:   EKG:  I have reviewed the EKG with the following interpretation:     Physical Exam Performed:      BP (!) 148/97   Pulse 76   Temp 97.8 °F (36.6 °C) (Oral)   Resp 18   Ht 1.575 m (5' 2\")   Wt 54.4 kg (120 lb)   SpO2 98%   BMI 21.95 kg/m²     General appearance:  No apparent distress, appears stated age and cooperative.  HEENT:  Pupils equal, round, and reactive to light. Conjunctivae/corneas clear.  Respiratory:  Normal respiratory effort. Clear to auscultation, bilaterally without Rales/Wheezes/Rhonchi.  Cardiovascular:  Regular rate and rhythm with normal S1/S2 without murmurs, rubs or gallops.  Abdomen: hypoactive sounds, PEG tube in place

## 2024-02-22 NOTE — PLAN OF CARE
Problem: Pain  Goal: Verbalizes/displays adequate comfort level or baseline comfort level  2/22/2024 1045 by Elvia Lopez RN  Outcome: Progressing  Flowsheets (Taken 2/21/2024 2154 by Carolyn Hardy RN)  Verbalizes/displays adequate comfort level or baseline comfort level:   Encourage patient to monitor pain and request assistance   Assess pain using appropriate pain scale   Administer analgesics based on type and severity of pain and evaluate response   Implement non-pharmacological measures as appropriate and evaluate response   Consider cultural and social influences on pain and pain management   Notify Licensed Independent Practitioner if interventions unsuccessful or patient reports new pain     Problem: Skin/Tissue Integrity  Goal: Absence of new skin breakdown  Description: 1.  Monitor for areas of redness and/or skin breakdown  2.  Assess vascular access sites hourly  3.  Every 4-6 hours minimum:  Change oxygen saturation probe site  4.  Every 4-6 hours:  If on nasal continuous positive airway pressure, respiratory therapy assess nares and determine need for appliance change or resting period.  2/22/2024 1045 by Elvia Lopez RN  Outcome: Progressing     Problem: Safety - Adult  Goal: Free from fall injury  2/22/2024 1045 by Elvia Lopez RN  Outcome: Progressing  Flowsheets (Taken 2/22/2024 1045)  Free From Fall Injury: Based on caregiver fall risk screen, instruct family/caregiver to ask for assistance with transferring infant if caregiver noted to have fall risk factors

## 2024-02-22 NOTE — CARE COORDINATION
Case Management Assessment  Initial Evaluation    Date/Time of Evaluation: 2/22/2024 1:34 PM  Assessment Completed by: CHRISTOFER Mcarthur    If patient is discharged prior to next notation, then this note serves as note for discharge by case management.    Patient Name: Carolina Arguelles                   YOB: 1980  Diagnosis: Hypocalcemia [E83.51]  Urinary retention [R33.9]  Elevated liver enzymes [R74.8]  H/O kidney transplant [Z94.0]  Gastrostomy tube in place (HCC) [Z93.1]  Intermittent self-catheterization of bladder [Z78.9]  Urinary tract infection without hematuria, site unspecified [N39.0]  Acute kidney injury superimposed on CKD (HCC) [N17.9, N18.9]                   Date / Time: 2/21/2024  1:16 PM    Patient Admission Status: Inpatient   Readmission Risk (Low < 19, Mod (19-27), High > 27): Readmission Risk Score: 17.6    Current PCP: Radha Pompa APRN - CNP  PCP verified by CM? Yes    Chart Reviewed: Yes      History Provided by: Patient  Patient Orientation: Alert and Oriented, Person, Situation, Self    Patient Cognition: Alert    Hospitalization in the last 30 days (Readmission):  No      Advance Directives:      Code Status: Full Code   Patient's Primary Decision Maker is: Legal Next of Kin  Kelly Arguelles Parent [phone]    Discharge Planning:    Patient lives with: Parent Type of Home: House  Primary Care Giver: Self  Patient Support Systems include: Parent, Family Members, Home Care Staff   Current Financial resources: Medicare, Medicaid  Current community resources: None  Current services prior to admission: Durable Medical Equipment, Home Care            Current DME: Shower Chair, Wheelchair            Type of Home Care services:  PT, Nursing Services    ADLS  Prior functional level: Assistance with the following:, Bathing, Toileting, Cooking, Housework, Shopping, Mobility  Current functional level: Mobility, Assistance with the following:, Bathing, Toileting, Cooking, Housework,

## 2024-02-22 NOTE — CARE COORDINATION
Spoke with Mother via phone, updates provided. Mother report uses [facility], PT, OT- and aware in patient status.CHRISTOFER Mcarthur

## 2024-02-22 NOTE — ED NOTES
Report to floor RN at this time. Patient placed on tele and taken to the floor via stretcher. Patient has IV infusing and personal belongings to stretcher

## 2024-02-23 LAB
25(OH)D3 SERPL-MCNC: 11.2 NG/ML
ALBUMIN SERPL-MCNC: 3.4 G/DL (ref 3.4–5)
ALBUMIN/GLOB SERPL: 1.2 {RATIO} (ref 1.1–2.2)
ALP SERPL-CCNC: 322 U/L (ref 40–129)
ALT SERPL-CCNC: 92 U/L (ref 10–40)
ANION GAP SERPL CALCULATED.3IONS-SCNC: 18 MMOL/L (ref 3–16)
AST SERPL-CCNC: 84 U/L (ref 15–37)
BASOPHILS # BLD: 0 K/UL (ref 0–0.2)
BASOPHILS NFR BLD: 0.2 %
BILIRUB SERPL-MCNC: 0.3 MG/DL (ref 0–1)
BUN SERPL-MCNC: 24 MG/DL (ref 7–20)
CALCIUM SERPL-MCNC: 7.2 MG/DL (ref 8.3–10.6)
CHLORIDE SERPL-SCNC: 101 MMOL/L (ref 99–110)
CO2 SERPL-SCNC: 22 MMOL/L (ref 21–32)
CREAT SERPL-MCNC: 2.8 MG/DL (ref 0.6–1.1)
DEPRECATED RDW RBC AUTO: 16.2 % (ref 12.4–15.4)
EOSINOPHIL # BLD: 0 K/UL (ref 0–0.6)
EOSINOPHIL NFR BLD: 0.4 %
EST. AVERAGE GLUCOSE BLD GHB EST-MCNC: 162.8 MG/DL
GFR SERPLBLD CREATININE-BSD FMLA CKD-EPI: 21 ML/MIN/{1.73_M2}
GLUCOSE BLD-MCNC: 121 MG/DL (ref 70–99)
GLUCOSE BLD-MCNC: 285 MG/DL (ref 70–99)
GLUCOSE BLD-MCNC: 322 MG/DL (ref 70–99)
GLUCOSE BLD-MCNC: 372 MG/DL (ref 70–99)
GLUCOSE SERPL-MCNC: 119 MG/DL (ref 70–99)
HBA1C MFR BLD: 7.3 %
HCT VFR BLD AUTO: 27.6 % (ref 36–48)
HGB BLD-MCNC: 9.3 G/DL (ref 12–16)
LYMPHOCYTES # BLD: 0.9 K/UL (ref 1–5.1)
LYMPHOCYTES NFR BLD: 14.6 %
MAGNESIUM SERPL-MCNC: 1.8 MG/DL (ref 1.8–2.4)
MCH RBC QN AUTO: 29.8 PG (ref 26–34)
MCHC RBC AUTO-ENTMCNC: 33.6 G/DL (ref 31–36)
MCV RBC AUTO: 88.6 FL (ref 80–100)
MONOCYTES # BLD: 0.2 K/UL (ref 0–1.3)
MONOCYTES NFR BLD: 3.7 %
NEUTROPHILS # BLD: 5 K/UL (ref 1.7–7.7)
NEUTROPHILS NFR BLD: 81.1 %
PERFORMED ON: ABNORMAL
PHOSPHATE SERPL-MCNC: 6.8 MG/DL (ref 2.5–4.9)
PLATELET # BLD AUTO: 146 K/UL (ref 135–450)
PMV BLD AUTO: 8.5 FL (ref 5–10.5)
POTASSIUM SERPL-SCNC: 4.1 MMOL/L (ref 3.5–5.1)
POTASSIUM SERPL-SCNC: 4.1 MMOL/L (ref 3.5–5.1)
PROT SERPL-MCNC: 6.2 G/DL (ref 6.4–8.2)
PTH-INTACT SERPL-MCNC: 295.5 PG/ML (ref 14–72)
RBC # BLD AUTO: 3.12 M/UL (ref 4–5.2)
SODIUM SERPL-SCNC: 141 MMOL/L (ref 136–145)
WBC # BLD AUTO: 6.2 K/UL (ref 4–11)

## 2024-02-23 PROCEDURE — 51701 INSERT BLADDER CATHETER: CPT

## 2024-02-23 PROCEDURE — 97162 PT EVAL MOD COMPLEX 30 MIN: CPT

## 2024-02-23 PROCEDURE — 6370000000 HC RX 637 (ALT 250 FOR IP): Performed by: INTERNAL MEDICINE

## 2024-02-23 PROCEDURE — 97535 SELF CARE MNGMENT TRAINING: CPT

## 2024-02-23 PROCEDURE — 6360000002 HC RX W HCPCS: Performed by: INTERNAL MEDICINE

## 2024-02-23 PROCEDURE — 85025 COMPLETE CBC W/AUTO DIFF WBC: CPT

## 2024-02-23 PROCEDURE — 83970 ASSAY OF PARATHORMONE: CPT

## 2024-02-23 PROCEDURE — 87086 URINE CULTURE/COLONY COUNT: CPT

## 2024-02-23 PROCEDURE — 51798 US URINE CAPACITY MEASURE: CPT

## 2024-02-23 PROCEDURE — 97167 OT EVAL HIGH COMPLEX 60 MIN: CPT

## 2024-02-23 PROCEDURE — 2580000003 HC RX 258: Performed by: INTERNAL MEDICINE

## 2024-02-23 PROCEDURE — 97530 THERAPEUTIC ACTIVITIES: CPT

## 2024-02-23 PROCEDURE — 83036 HEMOGLOBIN GLYCOSYLATED A1C: CPT

## 2024-02-23 PROCEDURE — 6360000002 HC RX W HCPCS: Performed by: NURSE PRACTITIONER

## 2024-02-23 PROCEDURE — 82306 VITAMIN D 25 HYDROXY: CPT

## 2024-02-23 PROCEDURE — 36415 COLL VENOUS BLD VENIPUNCTURE: CPT

## 2024-02-23 PROCEDURE — 83735 ASSAY OF MAGNESIUM: CPT

## 2024-02-23 PROCEDURE — 6370000000 HC RX 637 (ALT 250 FOR IP): Performed by: STUDENT IN AN ORGANIZED HEALTH CARE EDUCATION/TRAINING PROGRAM

## 2024-02-23 PROCEDURE — 2580000003 HC RX 258: Performed by: STUDENT IN AN ORGANIZED HEALTH CARE EDUCATION/TRAINING PROGRAM

## 2024-02-23 PROCEDURE — 80053 COMPREHEN METABOLIC PANEL: CPT

## 2024-02-23 PROCEDURE — 2500000003 HC RX 250 WO HCPCS: Performed by: NURSE PRACTITIONER

## 2024-02-23 PROCEDURE — 1200000000 HC SEMI PRIVATE

## 2024-02-23 PROCEDURE — 6370000000 HC RX 637 (ALT 250 FOR IP): Performed by: NURSE PRACTITIONER

## 2024-02-23 PROCEDURE — 6360000002 HC RX W HCPCS: Performed by: STUDENT IN AN ORGANIZED HEALTH CARE EDUCATION/TRAINING PROGRAM

## 2024-02-23 RX ORDER — AMLODIPINE BESYLATE 5 MG/1
5 TABLET ORAL DAILY
Status: DISCONTINUED | OUTPATIENT
Start: 2024-02-23 | End: 2024-02-26 | Stop reason: HOSPADM

## 2024-02-23 RX ORDER — CALCIUM GLUCONATE 20 MG/ML
2000 INJECTION, SOLUTION INTRAVENOUS ONCE
Status: COMPLETED | OUTPATIENT
Start: 2024-02-23 | End: 2024-02-23

## 2024-02-23 RX ORDER — CALCIUM ACETATE 667 MG/1
1 CAPSULE ORAL
Status: DISCONTINUED | OUTPATIENT
Start: 2024-02-23 | End: 2024-02-26 | Stop reason: HOSPADM

## 2024-02-23 RX ORDER — METOPROLOL TARTRATE 1 MG/ML
2.5 INJECTION, SOLUTION INTRAVENOUS ONCE
Status: COMPLETED | OUTPATIENT
Start: 2024-02-23 | End: 2024-02-23

## 2024-02-23 RX ADMIN — METHOCARBAMOL 500 MG: 500 TABLET ORAL at 20:23

## 2024-02-23 RX ADMIN — FOLIC ACID 1 MG: 1 TABLET ORAL at 09:59

## 2024-02-23 RX ADMIN — PANTOPRAZOLE SODIUM 40 MG: 40 TABLET, DELAYED RELEASE ORAL at 16:36

## 2024-02-23 RX ADMIN — PREDNISONE 10 MG: 10 TABLET ORAL at 09:59

## 2024-02-23 RX ADMIN — ENOXAPARIN SODIUM 30 MG: 100 INJECTION SUBCUTANEOUS at 10:00

## 2024-02-23 RX ADMIN — CALCIUM GLUCONATE 4000 MG: 98 INJECTION, SOLUTION INTRAVENOUS at 16:33

## 2024-02-23 RX ADMIN — CALCIUM ACETATE 667 MG: 667 CAPSULE ORAL at 11:20

## 2024-02-23 RX ADMIN — ALOGLIPTIN 6.25 MG: 6.25 TABLET, FILM COATED ORAL at 09:58

## 2024-02-23 RX ADMIN — EZETIMIBE 10 MG: 10 TABLET ORAL at 10:00

## 2024-02-23 RX ADMIN — TACROLIMUS 0.5 MG: 0.5 CAPSULE ORAL at 10:00

## 2024-02-23 RX ADMIN — SODIUM CHLORIDE, POTASSIUM CHLORIDE, SODIUM LACTATE AND CALCIUM CHLORIDE: 600; 310; 30; 20 INJECTION, SOLUTION INTRAVENOUS at 09:06

## 2024-02-23 RX ADMIN — CLOPIDOGREL BISULFATE 75 MG: 75 TABLET ORAL at 10:00

## 2024-02-23 RX ADMIN — METOPROLOL TARTRATE 2.5 MG: 5 INJECTION INTRAVENOUS at 00:51

## 2024-02-23 RX ADMIN — POTASSIUM BICARBONATE 25 MEQ: 978 TABLET, EFFERVESCENT ORAL at 09:59

## 2024-02-23 RX ADMIN — TACROLIMUS 0.5 MG: 0.5 CAPSULE ORAL at 20:57

## 2024-02-23 RX ADMIN — CALCIUM ACETATE 667 MG: 667 CAPSULE ORAL at 16:35

## 2024-02-23 RX ADMIN — PANTOPRAZOLE SODIUM 40 MG: 40 TABLET, DELAYED RELEASE ORAL at 06:27

## 2024-02-23 RX ADMIN — CALCIUM GLUCONATE 2000 MG: 20 INJECTION, SOLUTION INTRAVENOUS at 12:13

## 2024-02-23 RX ADMIN — METHOCARBAMOL 500 MG: 500 TABLET ORAL at 16:36

## 2024-02-23 RX ADMIN — INSULIN LISPRO 4 UNITS: 100 INJECTION, SOLUTION INTRAVENOUS; SUBCUTANEOUS at 11:57

## 2024-02-23 RX ADMIN — INSULIN LISPRO 2 UNITS: 100 INJECTION, SOLUTION INTRAVENOUS; SUBCUTANEOUS at 16:33

## 2024-02-23 RX ADMIN — METHOCARBAMOL 500 MG: 500 TABLET ORAL at 09:59

## 2024-02-23 RX ADMIN — OLANZAPINE 7.5 MG: 5 TABLET, FILM COATED ORAL at 20:22

## 2024-02-23 RX ADMIN — AMLODIPINE BESYLATE 5 MG: 5 TABLET ORAL at 11:20

## 2024-02-23 RX ADMIN — CEFTRIAXONE SODIUM 1000 MG: 1 INJECTION, POWDER, FOR SOLUTION INTRAMUSCULAR; INTRAVENOUS at 22:58

## 2024-02-23 RX ADMIN — METOPROLOL TARTRATE 2.5 MG: 5 INJECTION INTRAVENOUS at 22:06

## 2024-02-23 RX ADMIN — ATORVASTATIN CALCIUM 40 MG: 40 TABLET, FILM COATED ORAL at 20:23

## 2024-02-23 RX ADMIN — FLUOXETINE HYDROCHLORIDE 20 MG: 20 CAPSULE ORAL at 10:00

## 2024-02-23 RX ADMIN — INSULIN LISPRO 4 UNITS: 100 INJECTION, SOLUTION INTRAVENOUS; SUBCUTANEOUS at 20:23

## 2024-02-23 RX ADMIN — ASPIRIN 81 MG: 81 TABLET, COATED ORAL at 10:00

## 2024-02-23 RX ADMIN — METHOCARBAMOL 500 MG: 500 TABLET ORAL at 12:15

## 2024-02-23 RX ADMIN — TRAZODONE HYDROCHLORIDE 100 MG: 50 TABLET ORAL at 20:23

## 2024-02-23 ASSESSMENT — PAIN SCALES - GENERAL
PAINLEVEL_OUTOF10: 0
PAINLEVEL_OUTOF10: 4
PAINLEVEL_OUTOF10: 0

## 2024-02-23 NOTE — CARE COORDINATION
Hospital day 2: Patient on C3 re Hypocalcemia care managed by IM and Nephrology. Patient from home with mother, active with [facility].CHRISTOFER Mcarthur

## 2024-02-23 NOTE — PLAN OF CARE
Problem: Safety - Adult  Goal: Free from fall injury  Outcome: Progressing  Flowsheets (Taken 2/22/2024 1045 by Elvia Lopez, RN)  Free From Fall Injury: Based on caregiver fall risk screen, instruct family/caregiver to ask for assistance with transferring infant if caregiver noted to have fall risk factors

## 2024-02-24 LAB
ALBUMIN SERPL-MCNC: 3.4 G/DL (ref 3.4–5)
ALBUMIN/GLOB SERPL: 1.3 {RATIO} (ref 1.1–2.2)
ALP SERPL-CCNC: 295 U/L (ref 40–129)
ALT SERPL-CCNC: 75 U/L (ref 10–40)
ANION GAP SERPL CALCULATED.3IONS-SCNC: 14 MMOL/L (ref 3–16)
AST SERPL-CCNC: 43 U/L (ref 15–37)
BACTERIA UR CULT: NORMAL
BASOPHILS # BLD: 0 K/UL (ref 0–0.2)
BASOPHILS NFR BLD: 0.3 %
BILIRUB SERPL-MCNC: <0.2 MG/DL (ref 0–1)
BUN SERPL-MCNC: 23 MG/DL (ref 7–20)
CA-I BLD-SCNC: 1.02 MMOL/L (ref 1.12–1.32)
CALCIUM SERPL-MCNC: 8.2 MG/DL (ref 8.3–10.6)
CHLORIDE SERPL-SCNC: 100 MMOL/L (ref 99–110)
CO2 SERPL-SCNC: 23 MMOL/L (ref 21–32)
CREAT SERPL-MCNC: 2.8 MG/DL (ref 0.6–1.1)
DEPRECATED RDW RBC AUTO: 16 % (ref 12.4–15.4)
EOSINOPHIL # BLD: 0 K/UL (ref 0–0.6)
EOSINOPHIL NFR BLD: 0.5 %
EST. AVERAGE GLUCOSE BLD GHB EST-MCNC: 159.9 MG/DL
GFR SERPLBLD CREATININE-BSD FMLA CKD-EPI: 21 ML/MIN/{1.73_M2}
GLUCOSE BLD-MCNC: 226 MG/DL (ref 70–99)
GLUCOSE BLD-MCNC: 227 MG/DL (ref 70–99)
GLUCOSE BLD-MCNC: 231 MG/DL (ref 70–99)
GLUCOSE BLD-MCNC: 262 MG/DL (ref 70–99)
GLUCOSE SERPL-MCNC: 237 MG/DL (ref 70–99)
HBA1C MFR BLD: 7.2 %
HCT VFR BLD AUTO: 27.8 % (ref 36–48)
HGB BLD-MCNC: 9.2 G/DL (ref 12–16)
LYMPHOCYTES # BLD: 1.1 K/UL (ref 1–5.1)
LYMPHOCYTES NFR BLD: 16.6 %
MAGNESIUM SERPL-MCNC: 1.7 MG/DL (ref 1.8–2.4)
MCH RBC QN AUTO: 29.7 PG (ref 26–34)
MCHC RBC AUTO-ENTMCNC: 33.1 G/DL (ref 31–36)
MCV RBC AUTO: 89.8 FL (ref 80–100)
MONOCYTES # BLD: 0.3 K/UL (ref 0–1.3)
MONOCYTES NFR BLD: 4.2 %
NEUTROPHILS # BLD: 5 K/UL (ref 1.7–7.7)
NEUTROPHILS NFR BLD: 78.4 %
PERFORMED ON: ABNORMAL
PH BLDV: 7.36 [PH] (ref 7.35–7.45)
PHOSPHATE SERPL-MCNC: 5.1 MG/DL (ref 2.5–4.9)
PLATELET # BLD AUTO: 164 K/UL (ref 135–450)
PMV BLD AUTO: 8.3 FL (ref 5–10.5)
POTASSIUM SERPL-SCNC: 4.7 MMOL/L (ref 3.5–5.1)
POTASSIUM SERPL-SCNC: 4.7 MMOL/L (ref 3.5–5.1)
PROT SERPL-MCNC: 6.1 G/DL (ref 6.4–8.2)
RBC # BLD AUTO: 3.09 M/UL (ref 4–5.2)
SODIUM SERPL-SCNC: 137 MMOL/L (ref 136–145)
WBC # BLD AUTO: 6.3 K/UL (ref 4–11)

## 2024-02-24 PROCEDURE — 85025 COMPLETE CBC W/AUTO DIFF WBC: CPT

## 2024-02-24 PROCEDURE — 82330 ASSAY OF CALCIUM: CPT

## 2024-02-24 PROCEDURE — 6370000000 HC RX 637 (ALT 250 FOR IP): Performed by: STUDENT IN AN ORGANIZED HEALTH CARE EDUCATION/TRAINING PROGRAM

## 2024-02-24 PROCEDURE — 36415 COLL VENOUS BLD VENIPUNCTURE: CPT

## 2024-02-24 PROCEDURE — 6360000002 HC RX W HCPCS: Performed by: STUDENT IN AN ORGANIZED HEALTH CARE EDUCATION/TRAINING PROGRAM

## 2024-02-24 PROCEDURE — 80053 COMPREHEN METABOLIC PANEL: CPT

## 2024-02-24 PROCEDURE — 2580000003 HC RX 258: Performed by: STUDENT IN AN ORGANIZED HEALTH CARE EDUCATION/TRAINING PROGRAM

## 2024-02-24 PROCEDURE — 6370000000 HC RX 637 (ALT 250 FOR IP): Performed by: NURSE PRACTITIONER

## 2024-02-24 PROCEDURE — 2580000003 HC RX 258: Performed by: NURSE PRACTITIONER

## 2024-02-24 PROCEDURE — 1200000000 HC SEMI PRIVATE

## 2024-02-24 PROCEDURE — 2500000003 HC RX 250 WO HCPCS: Performed by: NURSE PRACTITIONER

## 2024-02-24 PROCEDURE — 83735 ASSAY OF MAGNESIUM: CPT

## 2024-02-24 PROCEDURE — 6360000002 HC RX W HCPCS: Performed by: NURSE PRACTITIONER

## 2024-02-24 PROCEDURE — 51798 US URINE CAPACITY MEASURE: CPT

## 2024-02-24 PROCEDURE — 51701 INSERT BLADDER CATHETER: CPT

## 2024-02-24 PROCEDURE — 2580000003 HC RX 258: Performed by: INTERNAL MEDICINE

## 2024-02-24 PROCEDURE — 6360000002 HC RX W HCPCS: Performed by: INTERNAL MEDICINE

## 2024-02-24 RX ORDER — INSULIN GLARGINE 100 [IU]/ML
0.25 INJECTION, SOLUTION SUBCUTANEOUS NIGHTLY
Status: DISCONTINUED | OUTPATIENT
Start: 2024-02-24 | End: 2024-02-26 | Stop reason: HOSPADM

## 2024-02-24 RX ORDER — MAGNESIUM SULFATE IN WATER 40 MG/ML
2000 INJECTION, SOLUTION INTRAVENOUS ONCE
Status: COMPLETED | OUTPATIENT
Start: 2024-02-24 | End: 2024-02-24

## 2024-02-24 RX ORDER — ERGOCALCIFEROL 1.25 MG/1
50000 CAPSULE ORAL WEEKLY
Status: DISCONTINUED | OUTPATIENT
Start: 2024-02-24 | End: 2024-02-26 | Stop reason: HOSPADM

## 2024-02-24 RX ADMIN — TACROLIMUS 0.5 MG: 0.5 CAPSULE ORAL at 08:26

## 2024-02-24 RX ADMIN — INSULIN LISPRO 1 UNITS: 100 INJECTION, SOLUTION INTRAVENOUS; SUBCUTANEOUS at 13:17

## 2024-02-24 RX ADMIN — PREDNISONE 10 MG: 10 TABLET ORAL at 08:26

## 2024-02-24 RX ADMIN — MAGNESIUM SULFATE HEPTAHYDRATE 2000 MG: 40 INJECTION, SOLUTION INTRAVENOUS at 13:23

## 2024-02-24 RX ADMIN — TRAZODONE HYDROCHLORIDE 100 MG: 50 TABLET ORAL at 20:48

## 2024-02-24 RX ADMIN — FLUOXETINE HYDROCHLORIDE 20 MG: 20 CAPSULE ORAL at 08:26

## 2024-02-24 RX ADMIN — ATORVASTATIN CALCIUM 40 MG: 40 TABLET, FILM COATED ORAL at 20:46

## 2024-02-24 RX ADMIN — METHOCARBAMOL 500 MG: 500 TABLET ORAL at 13:17

## 2024-02-24 RX ADMIN — PANTOPRAZOLE SODIUM 40 MG: 40 TABLET, DELAYED RELEASE ORAL at 16:49

## 2024-02-24 RX ADMIN — PANTOPRAZOLE SODIUM 40 MG: 40 TABLET, DELAYED RELEASE ORAL at 08:27

## 2024-02-24 RX ADMIN — CALCIUM ACETATE 667 MG: 667 CAPSULE ORAL at 08:26

## 2024-02-24 RX ADMIN — EZETIMIBE 10 MG: 10 TABLET ORAL at 08:35

## 2024-02-24 RX ADMIN — TACROLIMUS 0.5 MG: 0.5 CAPSULE ORAL at 20:51

## 2024-02-24 RX ADMIN — CEFTRIAXONE SODIUM 1000 MG: 1 INJECTION, POWDER, FOR SOLUTION INTRAMUSCULAR; INTRAVENOUS at 22:47

## 2024-02-24 RX ADMIN — INSULIN LISPRO 1 UNITS: 100 INJECTION, SOLUTION INTRAVENOUS; SUBCUTANEOUS at 08:25

## 2024-02-24 RX ADMIN — INSULIN GLARGINE 14 UNITS: 100 INJECTION, SOLUTION SUBCUTANEOUS at 20:48

## 2024-02-24 RX ADMIN — CALCIUM ACETATE 667 MG: 667 CAPSULE ORAL at 16:49

## 2024-02-24 RX ADMIN — Medication 10 ML: at 08:27

## 2024-02-24 RX ADMIN — CALCIUM ACETATE 667 MG: 667 CAPSULE ORAL at 13:17

## 2024-02-24 RX ADMIN — METHOCARBAMOL 500 MG: 500 TABLET ORAL at 16:49

## 2024-02-24 RX ADMIN — FOLIC ACID 1 MG: 1 TABLET ORAL at 08:26

## 2024-02-24 RX ADMIN — CLOPIDOGREL BISULFATE 75 MG: 75 TABLET ORAL at 08:26

## 2024-02-24 RX ADMIN — CALCIUM GLUCONATE 4000 MG: 98 INJECTION, SOLUTION INTRAVENOUS at 16:08

## 2024-02-24 RX ADMIN — ERGOCALCIFEROL 50000 UNITS: 1.25 CAPSULE ORAL at 13:17

## 2024-02-24 RX ADMIN — METHOCARBAMOL 500 MG: 500 TABLET ORAL at 20:46

## 2024-02-24 RX ADMIN — METHOCARBAMOL 500 MG: 500 TABLET ORAL at 08:26

## 2024-02-24 RX ADMIN — ALOGLIPTIN 6.25 MG: 6.25 TABLET, FILM COATED ORAL at 08:26

## 2024-02-24 RX ADMIN — ENOXAPARIN SODIUM 30 MG: 100 INJECTION SUBCUTANEOUS at 08:25

## 2024-02-24 RX ADMIN — AMLODIPINE BESYLATE 5 MG: 5 TABLET ORAL at 08:26

## 2024-02-24 RX ADMIN — OLANZAPINE 7.5 MG: 5 TABLET, FILM COATED ORAL at 20:48

## 2024-02-24 RX ADMIN — ASPIRIN 81 MG: 81 TABLET, COATED ORAL at 08:26

## 2024-02-24 RX ADMIN — INSULIN LISPRO 1 UNITS: 100 INJECTION, SOLUTION INTRAVENOUS; SUBCUTANEOUS at 16:49

## 2024-02-24 ASSESSMENT — PAIN DESCRIPTION - DESCRIPTORS: DESCRIPTORS: ACHING

## 2024-02-24 ASSESSMENT — PAIN DESCRIPTION - ORIENTATION: ORIENTATION: LEFT;LOWER

## 2024-02-24 ASSESSMENT — PAIN DESCRIPTION - LOCATION: LOCATION: RIB CAGE

## 2024-02-24 ASSESSMENT — PAIN SCALES - GENERAL: PAINLEVEL_OUTOF10: 5

## 2024-02-24 NOTE — PLAN OF CARE
Problem: Pain  Goal: Verbalizes/displays adequate comfort level or baseline comfort level  Outcome: Progressing     Problem: Skin/Tissue Integrity  Goal: Absence of new skin breakdown  Description: 1.  Monitor for areas of redness and/or skin breakdown  2.  Assess vascular access sites hourly  3.  Every 4-6 hours minimum:  Change oxygen saturation probe site  4.  Every 4-6 hours:  If on nasal continuous positive airway pressure, respiratory therapy assess nares and determine need for appliance change or resting period.  Outcome: Progressing     Problem: Safety - Adult  Goal: Free from fall injury  2/24/2024 0148 by Elodia Gutierrez, RN  Outcome: Progressing  2/23/2024 1244 by Krissy Reza, RN  Outcome: Progressing  Flowsheets (Taken 2/22/2024 1045 by Elvia Lopez, RN)  Free From Fall Injury: Based on caregiver fall risk screen, instruct family/caregiver to ask for assistance with transferring infant if caregiver noted to have fall risk factors     Problem: Chronic Conditions and Co-morbidities  Goal: Patient's chronic conditions and co-morbidity symptoms are monitored and maintained or improved  Outcome: Progressing

## 2024-02-25 LAB
ALBUMIN SERPL-MCNC: 3.4 G/DL (ref 3.4–5)
ALBUMIN/GLOB SERPL: 1.3 {RATIO} (ref 1.1–2.2)
ALP SERPL-CCNC: 285 U/L (ref 40–129)
ALT SERPL-CCNC: 71 U/L (ref 10–40)
ANION GAP SERPL CALCULATED.3IONS-SCNC: 14 MMOL/L (ref 3–16)
AST SERPL-CCNC: 51 U/L (ref 15–37)
BASOPHILS # BLD: 0 K/UL (ref 0–0.2)
BASOPHILS NFR BLD: 0 %
BILIRUB SERPL-MCNC: <0.2 MG/DL (ref 0–1)
BUN SERPL-MCNC: 21 MG/DL (ref 7–20)
CALCIUM SERPL-MCNC: 8.6 MG/DL (ref 8.3–10.6)
CHLORIDE SERPL-SCNC: 102 MMOL/L (ref 99–110)
CO2 SERPL-SCNC: 21 MMOL/L (ref 21–32)
CREAT SERPL-MCNC: 2.6 MG/DL (ref 0.6–1.1)
DEPRECATED RDW RBC AUTO: 16.3 % (ref 12.4–15.4)
EOSINOPHIL # BLD: 0.1 K/UL (ref 0–0.6)
EOSINOPHIL NFR BLD: 1 %
GFR SERPLBLD CREATININE-BSD FMLA CKD-EPI: 23 ML/MIN/{1.73_M2}
GLUCOSE BLD-MCNC: 174 MG/DL (ref 70–99)
GLUCOSE BLD-MCNC: 182 MG/DL (ref 70–99)
GLUCOSE BLD-MCNC: 196 MG/DL (ref 70–99)
GLUCOSE BLD-MCNC: 211 MG/DL (ref 70–99)
GLUCOSE BLD-MCNC: 213 MG/DL (ref 70–99)
GLUCOSE SERPL-MCNC: 178 MG/DL (ref 70–99)
HCT VFR BLD AUTO: 26.1 % (ref 36–48)
HGB BLD-MCNC: 8.6 G/DL (ref 12–16)
LYMPHOCYTES # BLD: 0.7 K/UL (ref 1–5.1)
LYMPHOCYTES NFR BLD: 11 %
MAGNESIUM SERPL-MCNC: 2.2 MG/DL (ref 1.8–2.4)
MCH RBC QN AUTO: 29.4 PG (ref 26–34)
MCHC RBC AUTO-ENTMCNC: 32.8 G/DL (ref 31–36)
MCV RBC AUTO: 89.6 FL (ref 80–100)
METAMYELOCYTES NFR BLD MANUAL: 2 %
MONOCYTES # BLD: 0.2 K/UL (ref 0–1.3)
MONOCYTES NFR BLD: 3 %
MYELOCYTES NFR BLD MANUAL: 2 %
NEUTROPHILS # BLD: 5.5 K/UL (ref 1.7–7.7)
NEUTROPHILS NFR BLD: 79 %
NEUTS BAND NFR BLD MANUAL: 1 % (ref 0–7)
PERFORMED ON: ABNORMAL
PHOSPHATE SERPL-MCNC: 4.3 MG/DL (ref 2.5–4.9)
PLATELET # BLD AUTO: 136 K/UL (ref 135–450)
PLATELET BLD QL SMEAR: ADEQUATE
PMV BLD AUTO: 8.3 FL (ref 5–10.5)
POTASSIUM SERPL-SCNC: 4.7 MMOL/L (ref 3.5–5.1)
PROMYELOCYTES NFR BLD MANUAL: 1 %
PROT SERPL-MCNC: 6.1 G/DL (ref 6.4–8.2)
RBC # BLD AUTO: 2.91 M/UL (ref 4–5.2)
RBC MORPH BLD: NORMAL
SLIDE REVIEW: ABNORMAL
SODIUM SERPL-SCNC: 137 MMOL/L (ref 136–145)
WBC # BLD AUTO: 6.5 K/UL (ref 4–11)

## 2024-02-25 PROCEDURE — 6370000000 HC RX 637 (ALT 250 FOR IP): Performed by: NURSE PRACTITIONER

## 2024-02-25 PROCEDURE — 6370000000 HC RX 637 (ALT 250 FOR IP): Performed by: STUDENT IN AN ORGANIZED HEALTH CARE EDUCATION/TRAINING PROGRAM

## 2024-02-25 PROCEDURE — 80053 COMPREHEN METABOLIC PANEL: CPT

## 2024-02-25 PROCEDURE — 2500000003 HC RX 250 WO HCPCS: Performed by: NURSE PRACTITIONER

## 2024-02-25 PROCEDURE — 1200000000 HC SEMI PRIVATE

## 2024-02-25 PROCEDURE — 83735 ASSAY OF MAGNESIUM: CPT

## 2024-02-25 PROCEDURE — 85025 COMPLETE CBC W/AUTO DIFF WBC: CPT

## 2024-02-25 PROCEDURE — 6360000002 HC RX W HCPCS: Performed by: STUDENT IN AN ORGANIZED HEALTH CARE EDUCATION/TRAINING PROGRAM

## 2024-02-25 PROCEDURE — 51701 INSERT BLADDER CATHETER: CPT

## 2024-02-25 PROCEDURE — 2580000003 HC RX 258: Performed by: STUDENT IN AN ORGANIZED HEALTH CARE EDUCATION/TRAINING PROGRAM

## 2024-02-25 PROCEDURE — 6360000002 HC RX W HCPCS: Performed by: INTERNAL MEDICINE

## 2024-02-25 PROCEDURE — 36415 COLL VENOUS BLD VENIPUNCTURE: CPT

## 2024-02-25 PROCEDURE — 84100 ASSAY OF PHOSPHORUS: CPT

## 2024-02-25 RX ORDER — ASPIRIN 81 MG/1
81 TABLET ORAL DAILY
Qty: 30 TABLET | Refills: 3 | Status: CANCELLED | OUTPATIENT
Start: 2024-02-26

## 2024-02-25 RX ORDER — CALCIUM ACETATE 667 MG/1
1 CAPSULE ORAL
Qty: 180 CAPSULE | Refills: 1 | Status: CANCELLED | OUTPATIENT
Start: 2024-02-25

## 2024-02-25 RX ORDER — AMLODIPINE BESYLATE 5 MG/1
5 TABLET ORAL DAILY
Qty: 30 TABLET | Refills: 3 | Status: CANCELLED | OUTPATIENT
Start: 2024-02-26

## 2024-02-25 RX ORDER — PANTOPRAZOLE SODIUM 40 MG/1
40 TABLET, DELAYED RELEASE ORAL
Qty: 30 TABLET | Refills: 3 | Status: CANCELLED | OUTPATIENT
Start: 2024-02-25

## 2024-02-25 RX ADMIN — METHOCARBAMOL 500 MG: 500 TABLET ORAL at 12:52

## 2024-02-25 RX ADMIN — FOLIC ACID 1 MG: 1 TABLET ORAL at 08:29

## 2024-02-25 RX ADMIN — TACROLIMUS 0.5 MG: 0.5 CAPSULE ORAL at 23:16

## 2024-02-25 RX ADMIN — METHOCARBAMOL 500 MG: 500 TABLET ORAL at 16:14

## 2024-02-25 RX ADMIN — EZETIMIBE 10 MG: 10 TABLET ORAL at 08:28

## 2024-02-25 RX ADMIN — CALCIUM ACETATE 667 MG: 667 CAPSULE ORAL at 16:14

## 2024-02-25 RX ADMIN — INSULIN GLARGINE 14 UNITS: 100 INJECTION, SOLUTION SUBCUTANEOUS at 21:17

## 2024-02-25 RX ADMIN — METHOCARBAMOL 500 MG: 500 TABLET ORAL at 21:17

## 2024-02-25 RX ADMIN — CALCIUM ACETATE 667 MG: 667 CAPSULE ORAL at 08:28

## 2024-02-25 RX ADMIN — ALOGLIPTIN 6.25 MG: 6.25 TABLET, FILM COATED ORAL at 08:31

## 2024-02-25 RX ADMIN — ASPIRIN 81 MG: 81 TABLET, COATED ORAL at 08:28

## 2024-02-25 RX ADMIN — PREDNISONE 10 MG: 10 TABLET ORAL at 08:28

## 2024-02-25 RX ADMIN — FLUOXETINE HYDROCHLORIDE 20 MG: 20 CAPSULE ORAL at 08:28

## 2024-02-25 RX ADMIN — PANTOPRAZOLE SODIUM 40 MG: 40 TABLET, DELAYED RELEASE ORAL at 05:54

## 2024-02-25 RX ADMIN — Medication 10 ML: at 08:29

## 2024-02-25 RX ADMIN — TRAZODONE HYDROCHLORIDE 100 MG: 50 TABLET ORAL at 21:17

## 2024-02-25 RX ADMIN — ENOXAPARIN SODIUM 30 MG: 100 INJECTION SUBCUTANEOUS at 08:28

## 2024-02-25 RX ADMIN — Medication 10 ML: at 21:21

## 2024-02-25 RX ADMIN — ATORVASTATIN CALCIUM 40 MG: 40 TABLET, FILM COATED ORAL at 21:17

## 2024-02-25 RX ADMIN — CALCIUM ACETATE 667 MG: 667 CAPSULE ORAL at 12:52

## 2024-02-25 RX ADMIN — TACROLIMUS 0.5 MG: 0.5 CAPSULE ORAL at 08:31

## 2024-02-25 RX ADMIN — CLOPIDOGREL BISULFATE 75 MG: 75 TABLET ORAL at 08:29

## 2024-02-25 RX ADMIN — PANTOPRAZOLE SODIUM 40 MG: 40 TABLET, DELAYED RELEASE ORAL at 16:14

## 2024-02-25 RX ADMIN — AMLODIPINE BESYLATE 5 MG: 5 TABLET ORAL at 08:29

## 2024-02-25 RX ADMIN — OLANZAPINE 7.5 MG: 5 TABLET, FILM COATED ORAL at 21:17

## 2024-02-25 RX ADMIN — METHOCARBAMOL 500 MG: 500 TABLET ORAL at 08:28

## 2024-02-25 RX ADMIN — MYCOPHENOLATE MOFETIL 500 MG: 250 CAPSULE ORAL at 21:20

## 2024-02-25 NOTE — PLAN OF CARE
Problem: Safety - Adult  Goal: Free from fall injury  Flowsheets (Taken 2/24/2024 5384)  Free From Fall Injury:   Instruct family/caregiver on patient safety   Based on caregiver fall risk screen, instruct family/caregiver to ask for assistance with transferring infant if caregiver noted to have fall risk factors

## 2024-02-26 VITALS
WEIGHT: 120 LBS | HEART RATE: 84 BPM | DIASTOLIC BLOOD PRESSURE: 85 MMHG | BODY MASS INDEX: 22.08 KG/M2 | TEMPERATURE: 97.5 F | OXYGEN SATURATION: 100 % | SYSTOLIC BLOOD PRESSURE: 142 MMHG | HEIGHT: 62 IN | RESPIRATION RATE: 17 BRPM

## 2024-02-26 LAB
ALBUMIN SERPL-MCNC: 3.1 G/DL (ref 3.4–5)
ALBUMIN/GLOB SERPL: 1.1 {RATIO} (ref 1.1–2.2)
ALP SERPL-CCNC: 270 U/L (ref 40–129)
ALT SERPL-CCNC: 69 U/L (ref 10–40)
ANION GAP SERPL CALCULATED.3IONS-SCNC: 11 MMOL/L (ref 3–16)
AST SERPL-CCNC: 52 U/L (ref 15–37)
BILIRUB SERPL-MCNC: <0.2 MG/DL (ref 0–1)
BUN SERPL-MCNC: 22 MG/DL (ref 7–20)
CALCIUM SERPL-MCNC: 8.6 MG/DL (ref 8.3–10.6)
CHLORIDE SERPL-SCNC: 105 MMOL/L (ref 99–110)
CO2 SERPL-SCNC: 24 MMOL/L (ref 21–32)
CREAT SERPL-MCNC: 2.5 MG/DL (ref 0.6–1.1)
GFR SERPLBLD CREATININE-BSD FMLA CKD-EPI: 24 ML/MIN/{1.73_M2}
GLUCOSE BLD-MCNC: 113 MG/DL (ref 70–99)
GLUCOSE BLD-MCNC: 115 MG/DL (ref 70–99)
GLUCOSE SERPL-MCNC: 111 MG/DL (ref 70–99)
PERFORMED ON: ABNORMAL
PERFORMED ON: ABNORMAL
PHOSPHATE SERPL-MCNC: 3.8 MG/DL (ref 2.5–4.9)
POTASSIUM SERPL-SCNC: 4.5 MMOL/L (ref 3.5–5.1)
POTASSIUM SERPL-SCNC: 4.5 MMOL/L (ref 3.5–5.1)
PROT SERPL-MCNC: 5.9 G/DL (ref 6.4–8.2)
SODIUM SERPL-SCNC: 140 MMOL/L (ref 136–145)

## 2024-02-26 PROCEDURE — 6370000000 HC RX 637 (ALT 250 FOR IP): Performed by: STUDENT IN AN ORGANIZED HEALTH CARE EDUCATION/TRAINING PROGRAM

## 2024-02-26 PROCEDURE — 80053 COMPREHEN METABOLIC PANEL: CPT

## 2024-02-26 PROCEDURE — 36415 COLL VENOUS BLD VENIPUNCTURE: CPT

## 2024-02-26 PROCEDURE — 6360000002 HC RX W HCPCS: Performed by: INTERNAL MEDICINE

## 2024-02-26 PROCEDURE — 6360000002 HC RX W HCPCS: Performed by: STUDENT IN AN ORGANIZED HEALTH CARE EDUCATION/TRAINING PROGRAM

## 2024-02-26 PROCEDURE — 51701 INSERT BLADDER CATHETER: CPT

## 2024-02-26 PROCEDURE — 6370000000 HC RX 637 (ALT 250 FOR IP): Performed by: NURSE PRACTITIONER

## 2024-02-26 PROCEDURE — 2500000003 HC RX 250 WO HCPCS: Performed by: NURSE PRACTITIONER

## 2024-02-26 RX ORDER — ASPIRIN 81 MG/1
81 TABLET ORAL DAILY
Qty: 30 TABLET | Refills: 3 | Status: SHIPPED | OUTPATIENT
Start: 2024-02-27

## 2024-02-26 RX ORDER — ALOGLIPTIN 6.25 MG/1
6.25 TABLET, FILM COATED ORAL DAILY
Qty: 30 TABLET | Refills: 0 | Status: CANCELLED | OUTPATIENT
Start: 2024-02-26 | End: 2024-03-27

## 2024-02-26 RX ORDER — PANTOPRAZOLE SODIUM 40 MG/1
40 TABLET, DELAYED RELEASE ORAL
Qty: 30 TABLET | Refills: 3 | Status: SHIPPED | OUTPATIENT
Start: 2024-02-26

## 2024-02-26 RX ORDER — CALCIUM ACETATE 667 MG/1
1 CAPSULE ORAL
Qty: 180 CAPSULE | Refills: 1 | Status: SHIPPED | OUTPATIENT
Start: 2024-02-26

## 2024-02-26 RX ORDER — TACROLIMUS 0.5 MG/1
1 CAPSULE ORAL 2 TIMES DAILY
COMMUNITY
Start: 2024-02-26

## 2024-02-26 RX ORDER — AMLODIPINE BESYLATE 5 MG/1
5 TABLET ORAL DAILY
Qty: 30 TABLET | Refills: 3 | Status: SHIPPED | OUTPATIENT
Start: 2024-02-27

## 2024-02-26 RX ADMIN — FLUOXETINE HYDROCHLORIDE 20 MG: 20 CAPSULE ORAL at 08:41

## 2024-02-26 RX ADMIN — CALCIUM ACETATE 667 MG: 667 CAPSULE ORAL at 08:41

## 2024-02-26 RX ADMIN — ENOXAPARIN SODIUM 30 MG: 100 INJECTION SUBCUTANEOUS at 08:42

## 2024-02-26 RX ADMIN — EZETIMIBE 10 MG: 10 TABLET ORAL at 08:41

## 2024-02-26 RX ADMIN — MYCOPHENOLATE MOFETIL 500 MG: 250 CAPSULE ORAL at 08:41

## 2024-02-26 RX ADMIN — PANTOPRAZOLE SODIUM 40 MG: 40 TABLET, DELAYED RELEASE ORAL at 08:41

## 2024-02-26 RX ADMIN — PREDNISONE 10 MG: 10 TABLET ORAL at 08:41

## 2024-02-26 RX ADMIN — ASPIRIN 81 MG: 81 TABLET, COATED ORAL at 08:41

## 2024-02-26 RX ADMIN — CALCIUM ACETATE 667 MG: 667 CAPSULE ORAL at 13:16

## 2024-02-26 RX ADMIN — METHOCARBAMOL 500 MG: 500 TABLET ORAL at 13:16

## 2024-02-26 RX ADMIN — METHOCARBAMOL 500 MG: 500 TABLET ORAL at 08:41

## 2024-02-26 RX ADMIN — TACROLIMUS 0.5 MG: 0.5 CAPSULE ORAL at 08:41

## 2024-02-26 RX ADMIN — AMLODIPINE BESYLATE 5 MG: 5 TABLET ORAL at 08:41

## 2024-02-26 RX ADMIN — ALOGLIPTIN 6.25 MG: 6.25 TABLET, FILM COATED ORAL at 08:41

## 2024-02-26 RX ADMIN — FOLIC ACID 1 MG: 1 TABLET ORAL at 08:41

## 2024-02-26 NOTE — DISCHARGE INSTR - COC
Continuity of Care Form    Patient Name: Carolina Arguelles   :  1980  MRN:  [id number]    Admit date:  2024  Discharge date:  2024    Code Status Order: Full Code   Advance Directives:     Admitting Physician:  Maximilian Santos DO  PCP: Radha Pompa APRN - CNP    Discharging Nurse: SHIRA Ortez  Discharging Hospital Unit/Room#: 0107/0107-01  Discharging Unit Phone Number: [phone]    Emergency Contact:   Extended Emergency Contact Information  Primary Emergency Contact: Kelly Arguelles  Address: [address] [place] [address] [facility]  Home Phone: [phone]  Relation: Parent    Past Surgical History:  Past Surgical History:   Procedure Laterality Date    DENTAL SURGERY      DIALYSIS FISTULA CREATION      KIDNEY TRANSPLANT      OTHER SURGICAL HISTORY Left 2014    Port placed left chest    OTHER SURGICAL HISTORY  2016    removal of port-a-cath and insertion of new port-a-cath    UPPER GASTROINTESTINAL ENDOSCOPY  2016    Esophagitis       Immunization History:   Immunization History   Administered Date(s) Administered    COVID-19, PFIZER GRAY top, DO NOT Dilute, (age 12 y+), IM, 30 mcg/0.3 mL 2022    COVID-19, PFIZER PURPLE top, DILUTE for use, (age 12 y+), 30mcg/0.3mL 2021, 10/19/2021    Influenza Virus Vaccine 10/05/2014, 2015    Influenza Whole 2010    Pneumococcal, PPSV23, PNEUMOVAX 23, (age 2y+), SC/IM, 0.5mL 2014       Active Problems:  Patient Active Problem List   Diagnosis Code    Hypertension I10    Hyperlipidemia E78.5    Nausea & vomiting R11.2    Hypokalemia E87.6    Vomiting R11.10    DM (diabetes mellitus) (HCC) E11.9    ESRD (end stage renal disease) (HCC) N18.6    Fluid overload E87.70    Vascular port complication T82.9XXA    Type 1 diabetes mellitus with hyperglycemia (HCC) E10.65    History of renal transplant Z94.0    Right atrial thrombus I51.3    Chronic anticoagulation Z79.01    MARCO A (acute kidney

## 2024-02-26 NOTE — DISCHARGE SUMMARY
hours.    Recent Labs     02/24/24  0553 02/25/24  1059 02/26/24  0738   AST 43* 51* 52*   ALT 75* 71* 69*   BILITOT <0.2 <0.2 <0.2   ALKPHOS 295* 285* 270*     No results for input(s): \"INR\", \"LACTA\", \"TSH\" in the last 72 hours.    Urine Cultures:   Lab Results   Component Value Date/Time    LABURIN  02/23/2024 11:55 AM     <50,000 CFU/ml mixed skin/urogenital aimee. No further workup     Blood Cultures:   Lab Results   Component Value Date/Time    BC No growth after 5 days of incubation. 09/05/2017 08:28 PM     Lab Results   Component Value Date/Time    BLOODCULT2 See additional report for complete BCID panel. 09/05/2017 10:51 PM    BLOODCULT2 POSITIVE for  Isolated one out of two bottles   09/05/2017 10:51 PM     Organism:   Lab Results   Component Value Date/Time    ORG Escherichia coli 09/16/2023 07:13 PM       Signed:    Paige Hauser, NIRAV - CNP

## 2024-02-26 NOTE — PLAN OF CARE
Problem: Pain  Goal: Verbalizes/displays adequate comfort level or baseline comfort level  2/26/2024 1434 by Neelima Rees, RN  Outcome: Completed     Problem: Skin/Tissue Integrity  Goal: Absence of new skin breakdown  Description: 1.  Monitor for areas of redness and/or skin breakdown  2.  Assess vascular access sites hourly  3.  Every 4-6 hours minimum:  Change oxygen saturation probe site  4.  Every 4-6 hours:  If on nasal continuous positive airway pressure, respiratory therapy assess nares and determine need for appliance change or resting period.  2/26/2024 1434 by Neelima Rees, RN  Outcome: Completed     Problem: Safety - Adult  Goal: Free from fall injury  2/26/2024 1434 by Neelima Rees, RN  Outcome: Completed     Problem: Chronic Conditions and Co-morbidities  Goal: Patient's chronic conditions and co-morbidity symptoms are monitored and maintained or improved  2/26/2024 1434 by Neelima Rees, RN  Outcome: Completed     Problem: Discharge Planning  Goal: Discharge to home or other facility with appropriate resources  2/26/2024 1434 by Neelima Rees, RN  Outcome: Completed

## 2024-02-26 NOTE — CARE COORDINATION
CASE  MANAGEMENT DISCHARGE SUMMARY:    Discharge to: Home with family support; [facility] with [facility]    Transportation Mode/Time: via [facility] 4:30 pm; [facility], [phone]    Family Notified: Yes, family aware and agreeable to plan    New Durable Medical Equipment: N    HENS if applicable: n/a    Nurse responsible for completion of pg 1/2 of [facility] (continuity of care form), med reconciliation/AVS, and to place copies of each in patient's hard chart.     Nurse to ensure that any written prescriptions; copy of the patients chart to accompany patient to facility.    Alicia Samuel MSW LSW

## 2024-02-26 NOTE — CARE COORDINATION
Kelly from [facility] called to inquire about dc plan;   [phone]; explained home at 4:30 with mother's care and [facility].     LG

## 2024-02-26 NOTE — PLAN OF CARE
Problem: Pain  Goal: Verbalizes/displays adequate comfort level or baseline comfort level  Outcome: Progressing     Problem: Skin/Tissue Integrity  Goal: Absence of new skin breakdown  Description: 1.  Monitor for areas of redness and/or skin breakdown  2.  Assess vascular access sites hourly  3.  Every 4-6 hours minimum:  Change oxygen saturation probe site  4.  Every 4-6 hours:  If on nasal continuous positive airway pressure, respiratory therapy assess nares and determine need for appliance change or resting period.  Outcome: Progressing     Problem: Safety - Adult  Goal: Free from fall injury  Outcome: Progressing     Problem: Chronic Conditions and Co-morbidities  Goal: Patient's chronic conditions and co-morbidity symptoms are monitored and maintained or improved  Outcome: Progressing  Note: Pt will have accuchecks before meals and at bedtime with sliding scale insulin in place for coverage. Will continue to monitor for signs and symptoms of hypoglycemia and hyperglycemia throughout shift.      Problem: Discharge Planning  Goal: Discharge to home or other facility with appropriate resources  Outcome: Progressing

## 2024-02-26 NOTE — PROGRESS NOTES
Progress Note    HISTORY     CC:  Hypocalcemia/paraesthesias       Subjective/   HPI:   She is doing ok.  Calcium still low.  On IV fluids and blood pressure is trending up.  Asking again about PEG removal.      ROS:  Constitutional:  No fevers, No Chills, + weakness  Cardiovascular:  No palpations, + edema  Respiratory:  No wheezing, no cough  Skin:  No rash, no itching  :  No hematuria, no dysuria     Social Hx:  No Family at the bedside     Past Medical and Surgical History:  - Reviewed, no changes     EXAM       Objective/     Vitals:    02/21/24 2341 02/22/24 0131 02/22/24 0930 02/22/24 1229   BP:  136/82 (!) 152/101 (!) 156/104   Pulse:  76 79 84   Resp:  18 18 16   Temp:  97.4 °F (36.3 °C) 97.5 °F (36.4 °C) 97.7 °F (36.5 °C)   TempSrc:  Oral Oral Oral   SpO2: 98% 96% 100% 99%   Weight:       Height:         24HR INTAKE/OUTPUT:    Intake/Output Summary (Last 24 hours) at 2/22/2024 1714  Last data filed at 2/22/2024 1419  Gross per 24 hour   Intake 1666.2 ml   Output 850 ml   Net 816.2 ml     Constitutional:  Alert, awake, no apparent distress  Eyes:  Pupils reactive, sclera clear   Neck:  Normal thyroid, no masses   Cardiovascular:  Regular, no rub  Respiratory:  No distress, no wheezing  Psychiatry:  Appropriate mood/affect, alert  Abdomen: +bs, soft, nt, no masses   Musculoskeletal: No LE edema, no clubbing   Lymphatics:  No LAD in neck, no supraclavicular nodes       MEDICAL DECISION MAKING       Data/  Recent Labs     02/21/24  1408 02/22/24  0548   WBC 10.1 6.6   HGB 9.4* 8.9*   HCT 28.5* 26.8*   MCV 89.7 89.2    133*     Recent Labs     02/21/24  1408 02/22/24  0548    145   K 3.7 3.4*   CL 97* 101   CO2 23 20*   GLUCOSE 137* 93   MG 1.10* 1.30*   BUN 31* 28*   CREATININE 3.2* 3.0*   LABGLOM 18* 19*       Assessment/     Chronic Kidney Disease:  Stage 4  Failing transplant from 2016, kidney biopsy showed acute on chronic pyelonephritis as she had chronic UTIs.  Possible chronic 
           Progress Note    HISTORY     CC:  Hypocalcemia/paraesthesias       Subjective/   HPI:   She is doing ok. Norvasc started and BP coming down.  Calcium improving but not to goal.  Unclear if patient was taking MMF, she is not sure      ROS:  Constitutional:  No fevers, No Chills, + weakness  Cardiovascular:  No palpations, + edema  Respiratory:  No wheezing, no cough  Skin:  No rash, no itching  :  No hematuria, no dysuria     Social Hx:  No Family at the bedside     Past Medical and Surgical History:  - Reviewed, no changes     EXAM       Objective/     Vitals:    02/24/24 0005 02/24/24 0359 02/24/24 0824 02/24/24 1327   BP: (!) 140/90 (!) 156/107 (!) 132/92 (!) 138/93   Pulse: 79 83 82 80   Resp: 16 17 18 18   Temp: 98 °F (36.7 °C) 98 °F (36.7 °C) 98 °F (36.7 °C) 97.8 °F (36.6 °C)   TempSrc: Oral Oral Oral Oral   SpO2: 98% 98% 98% 96%   Weight:       Height:         24HR INTAKE/OUTPUT:    Intake/Output Summary (Last 24 hours) at 2/24/2024 1345  Last data filed at 2/24/2024 1321  Gross per 24 hour   Intake 460 ml   Output 450 ml   Net 10 ml       Constitutional:  Alert, awake, no apparent distress  Eyes:  Pupils reactive, sclera clear   Neck:  Normal thyroid, no masses   Cardiovascular:  Regular, no rub  Respiratory:  No distress, no wheezing  Psychiatry:  Appropriate mood/affect, alert  Abdomen: +bs, soft, nt, no masses   Musculoskeletal: No LE edema, no clubbing   Lymphatics:  No LAD in neck, no supraclavicular nodes       MEDICAL DECISION MAKING       Data/  Recent Labs     02/22/24  0548 02/23/24  0654 02/24/24  0553   WBC 6.6 6.2 6.3   HGB 8.9* 9.3* 9.2*   HCT 26.8* 27.6* 27.8*   MCV 89.2 88.6 89.8   * 146 164       Recent Labs     02/22/24  0548 02/23/24  0655 02/24/24  0553    141 137   K 3.4* 4.1  4.1 4.7  4.7    101 100   CO2 20* 22 23   GLUCOSE 93 119* 237*   PHOS  --  6.8* 5.1*   MG 1.30* 1.80 1.70*   BUN 28* 24* 23*   CREATININE 3.0* 2.8* 2.8*   LABGLOM 19* 21* 21* 
           Progress Note    HISTORY     CC:  Hypocalcemia/paraesthesias       Subjective/   HPI:   She is doing ok. Norvasc started and BP coming down.  Calcium improving but not to goal.  Unclear if patient was taking MMF, she is not sure.  Labs at baseline       ROS:  Constitutional:  No fevers, No Chills, + weakness  Cardiovascular:  No palpations, + edema  Respiratory:  No wheezing, no cough  Skin:  No rash, no itching  :  No hematuria, no dysuria     Social Hx:  No Family at the bedside     Past Medical and Surgical History:  - Reviewed, no changes     EXAM       Objective/     Vitals:    02/24/24 1928 02/24/24 2342 02/25/24 0445 02/25/24 0745   BP: (!) 147/95 125/86 (!) 151/94 128/84   Pulse: 86 81 85 88   Resp: 16 16 16 17   Temp: 98.2 °F (36.8 °C) 97.9 °F (36.6 °C) 97.8 °F (36.6 °C) 97.3 °F (36.3 °C)   TempSrc: Oral Oral Oral Axillary   SpO2: 98% 99% 100% 100%   Weight:       Height:         24HR INTAKE/OUTPUT:    Intake/Output Summary (Last 24 hours) at 2/25/2024 1121  Last data filed at 2/25/2024 0851  Gross per 24 hour   Intake 1332.22 ml   Output 500 ml   Net 832.22 ml       Constitutional:  Alert, awake, no apparent distress  Eyes:  Pupils reactive, sclera clear   Neck:  Normal thyroid, no masses   Cardiovascular:  Regular, no rub  Respiratory:  No distress, no wheezing  Psychiatry:  Appropriate mood/affect, alert  Abdomen: +bs, soft, nt, no masses   Musculoskeletal: No LE edema, no clubbing   Lymphatics:  No LAD in neck, no supraclavicular nodes       MEDICAL DECISION MAKING       Data/  Recent Labs     02/23/24  0654 02/24/24  0553 02/25/24  1059   WBC 6.2 6.3 6.5   HGB 9.3* 9.2* 8.6*   HCT 27.6* 27.8* 26.1*   MCV 88.6 89.8 89.6    164 136       Recent Labs     02/23/24  0655 02/24/24  0553    137   K 4.1  4.1 4.7  4.7    100   CO2 22 23   GLUCOSE 119* 237*   PHOS 6.8* 5.1*   MG 1.80 1.70*   BUN 24* 23*   CREATININE 2.8* 2.8*   LABGLOM 21* 21*         Assessment/     Chronic 
           Progress Note    HISTORY     CC:  Hypocalcemia/paraesthesias       Subjective/   HPI:   She is doing ok. Norvasc started and BP coming down.  Calcium improving but not to goal.  Unclear if patient was taking MMF.      ROS:  Constitutional:  No fevers, No Chills, + weakness  Cardiovascular:  No palpations, + edema  Respiratory:  No wheezing, no cough  Skin:  No rash, no itching  :  No hematuria, no dysuria     Social Hx:  No Family at the bedside     Past Medical and Surgical History:  - Reviewed, no changes     EXAM       Objective/     Vitals:    02/23/24 1000 02/23/24 1053 02/23/24 1158 02/23/24 1407   BP: (!) 176/108 (!) 168/114 (!) 160/89 115/77   Pulse: 77 75 78 81   Resp: 18  16 16   Temp: 97.9 °F (36.6 °C)  97.4 °F (36.3 °C) 97.6 °F (36.4 °C)   TempSrc: Oral  Oral Oral   SpO2: 100% 100% 100% 98%   Weight:       Height:         24HR INTAKE/OUTPUT:    Intake/Output Summary (Last 24 hours) at 2/23/2024 1427  Last data filed at 2/23/2024 1126  Gross per 24 hour   Intake 250 ml   Output 700 ml   Net -450 ml       Constitutional:  Alert, awake, no apparent distress  Eyes:  Pupils reactive, sclera clear   Neck:  Normal thyroid, no masses   Cardiovascular:  Regular, no rub  Respiratory:  No distress, no wheezing  Psychiatry:  Appropriate mood/affect, alert  Abdomen: +bs, soft, nt, no masses   Musculoskeletal: No LE edema, no clubbing   Lymphatics:  No LAD in neck, no supraclavicular nodes       MEDICAL DECISION MAKING       Data/  Recent Labs     02/21/24  1408 02/22/24  0548 02/23/24  0654   WBC 10.1 6.6 6.2   HGB 9.4* 8.9* 9.3*   HCT 28.5* 26.8* 27.6*   MCV 89.7 89.2 88.6    133* 146       Recent Labs     02/21/24  1408 02/22/24  0548 02/23/24  0655    145 141   K 3.7 3.4* 4.1  4.1   CL 97* 101 101   CO2 23 20* 22   GLUCOSE 137* 93 119*   PHOS  --   --  6.8*   MG 1.10* 1.30* 1.80   BUN 31* 28* 24*   CREATININE 3.2* 3.0* 2.8*   LABGLOM 18* 19* 21*         Assessment/     Chronic Kidney 
           Progress Note    HISTORY     CC:  Hypocalcemia/paraesthesias       Subjective/   HPI:   resting     ROS:  Constitutional:  No fevers, No Chills, + weakness  Cardiovascular:  No palpations, no edema  Respiratory:  No wheezing, no cough  Skin:  No rash, no itching  :  No hematuria, no dysuria     Social Hx:  No Family at the bedside     Past Medical and Surgical History:  - Reviewed, no changes     EXAM       Objective/     Vitals:    02/25/24 1617 02/25/24 2100 02/26/24 0115 02/26/24 0705   BP: 128/85 (!) 150/100 125/82 (!) 141/88   Pulse: 84 80 80 79   Resp: 18 18 17 17   Temp: 97.6 °F (36.4 °C) 97.9 °F (36.6 °C) 97.8 °F (36.6 °C) 97.3 °F (36.3 °C)   TempSrc: Oral Oral Oral Oral   SpO2: 98% 100% 99% 99%   Weight:       Height:         24HR INTAKE/OUTPUT:    Intake/Output Summary (Last 24 hours) at 2/26/2024 1108  Last data filed at 2/26/2024 0030  Gross per 24 hour   Intake 120 ml   Output 800 ml   Net -680 ml       Constitutional:  Alert, awake, no apparent distress  Eyes:  Pupils reactive, sclera clear   Neck:  Normal thyroid, no masses   Cardiovascular:  Regular, no rub  Respiratory:  No distress, no wheezing  Psychiatry:  Appropriate mood/affect, alert  Abdomen: +bs, soft, nt, no masses   Musculoskeletal: No LE edema, no clubbing   Lymphatics:  No LAD in neck, no supraclavicular nodes       MEDICAL DECISION MAKING       Data/  Recent Labs     02/24/24  0553 02/25/24  1059   WBC 6.3 6.5   HGB 9.2* 8.6*   HCT 27.8* 26.1*   MCV 89.8 89.6    136       Recent Labs     02/24/24  0553 02/25/24  1059 02/26/24  0738    137 140   K 4.7  4.7 4.7 4.5  4.5    102 105   CO2 23 21 24   GLUCOSE 237* 178* 111*   PHOS 5.1* 4.3 3.8   MG 1.70* 2.20  --    BUN 23* 21* 22*   CREATININE 2.8* 2.6* 2.5*   LABGLOM 21* 23* 24*         Assessment/     Chronic Kidney Disease:  Stage 4  Failing transplant from 2016, kidney biopsy showed acute on chronic pyelonephritis as she had chronic UTIs.  Possible 
  Hospital Medicine Progress Note      Date of Admission: 2/21/2024  Hospital Day: 2    Chief Admission Complaint:  hypocalcemia     Subjective:  NAD, asking about PEG removal    Presenting Admission History:       \"44 y.o. female with past medical history of CVA, renal failure s/p transplant, T2DM, depression, HLD presented to ED after outpatient labs found that she had increased Cr from baseline and Ca was low.  Patient does not have any complaints on exam\"        Assessment/Plan:      Current Principal Problem:  Hypocalcemia    Hypocalcemia/Hypokalemia/Hypomagnesemia  -Ca=5.0 on presentation, 6.0 today  -prn replacement, nephro consult      CKD stage 4, Agustin ruled out, s/p renal transplant  -Cr= 3.2, baseline ~ 2.5-3  -IVF, nephro consult   -continue cellcept, prograff, prednisone     Abnl U/A-patient's mother assists with straight cath at home 3-4 x daily  -rocephin  -urine Cx: >50k cfu/ml mult org isolated     Hx of CVA  -asa, lipitor, plavix      HLD  -lipitor, zetia      Depression  -fluoxetine, zyprexa, trazodone      Hypotension   -midodrine on hold due to HTN     GERD   -protonix      T2DM   -alogliptin, ssi, carb controlled diet        Physical Exam Performed:      General appearance: NAD, chronically ill appearing  Respiratory:  Normal respiratory effort.   Cardiovascular:  Regular rate and rhythm.  Abdomen:  Soft, non-tender, non-distended., peg tune in place  Musculoskeletal:  Left arm and hand contractures due to prior CVA.   Neurologic:  Non-focal  Psychiatric:  Alert and oriented    /82   Pulse 76   Temp 97.4 °F (36.3 °C) (Oral)   Resp 18   Ht 1.575 m (5' 2\")   Wt 54.4 kg (120 lb)   SpO2 96%   BMI 21.95 kg/m²     Diet: ADULT DIET; Regular; 4 carb choices (60 gm/meal)  DVT Prophylaxis: [x]PPx LMWH  []SQ Heparin  []IPC/SCDs  []Eliquis  []Xarelto  []Coumadin  []Other -      Code status: Full Code  PT/OT Eval Status:   [x]NOT yet ordered  []Ordered and Pending   []Seen with Recommendations 
  Hospital Medicine Progress Note      Date of Admission: 2/21/2024  Hospital Day: 5    Chief Admission Complaint:  Hypocalcemia     Subjective:     Patient is lying in bed. She feels well. She is eager to go home. We discuss that we are waiting on her lab work to normalize and hopefully tomorrow she can be discharged home.    Spoke with Mother of patient: Unfortunately, the patient is on Plavix and will need to be off Plavix for 5 days prior to removal. I encouraged her to follow up as an outpatient to have this done after speaking with provider who prescribed Plavix.     Presenting Admission History:       This is a 44 y.o. female with past medical history of CVA, renal failure s/p transplant, T2DM, depression, HLD presented to ED after outpatient labs found that she had increased Cr from baseline and Ca was low.  Patient does not have any complaints on exam.     Assessment/Plan:      Current Principal Problem:  Hypocalcemia    Hypocalcemia 2/2 Vitamin D deficiency   Hypokalemia possibly secondary to poor PO intake  Hypomagnesemia possibly secondary to renal wasting or poor absorption  Hyperphosphatemia  - Ca+ 5.0 on presentation, 8.2 today. Ionized 1.02  - Replete all electrolytes as needed.   - Nephrology consulted; appreciate their input  - Vit D 11.2. Begin Vit D replacement today.   - PTH high, which is appropriate with hypocalcemia  - Continue phoslo       CKD stage 4, MARCO A ruled out, s/p failed renal transplant  - CRT 2.8 today, baseline ~ 2.5-3  - Continue IVF  - Nephrology consulted and following  - Continue prograf and prednisone  - Cellcept on hold. Last note from Dr. Garza on 1/3/24 states no Cellcept on medication list??? I will call his office in morning to confirm. [phone]      Abnl U/A  - Patient's mother assists with straight cath at home 3-4 x daily  - Stop Rocephin after today's dose, she will have received 3 days of treatment and culture is negative  - Urine Cx: >50k cfu/ml mult org 
  Physician Progress Note      PATIENT:               JENNIFER QUARLES  [facility] #:                  [id number]  :                       1980  ADMIT DATE:       2024 1:16 PM  DISCH DATE:  RESPONDING  PROVIDER #:        SPENCER GUTIERREZ          QUERY TEXT:    Pt admitted with Hypocalcemia/Hypokalemia/Hypomagnesemia/CKD-4, UTI.  Pt noted   to have intermittent self caths. If possible, please document in the progress   notes and discharge summary if you are evaluating and/or treating any of the   following:      The medical record reflects the following:  Risk Factors: CKD 4- renal transplant -Intermittent self-catheterization of   bladder  Clinical Indicators: ED- \"Intermittent self-catheterization of bladder,   Urinary tract infection without hematuria, site unspecified -UA with positive   nitrites, on microscopic there is  WBC and 4+ bacteria. This will be   sent for culture. Giving rocephin, I reviewed prior culture results and most   recently she had 2 different organisms and they were both sensitive to   rocephin.  PN- - Abnl U/A-patient's mother assists with straight cath at   home 3-4 x daily  Treatment: Rocephin IV, ua/ C&S, IVF, nephro consult -continue cellcept,   prograff, prednisone    Thank-You, Nahomi Bañuelos RN, BSN, CCDS  Options provided:  -- UTI due to self catheterization  -- UTI not due to self catheterization  -- Other - I will add my own diagnosis  -- Disagree - Not applicable / Not valid  -- Disagree - Clinically unable to determine / Unknown  -- Refer to Clinical Documentation Reviewer    PROVIDER RESPONSE TEXT:    Provider disagreed with this query.    Query created by: Shahrma Bañuelos on 2024 11:06 AM      Electronically signed by:  SPENCER GUTIERREZ 2024 6:27 PM          
.4 Eyes Skin Assessment     The patient is being assess for  4 Eye Shift Assessment     I agree that 2 RN's have performed a thorough Head to Toe Skin Assessment on the patient. ALL assessment sites listed below have been assessed.      Areas assessed by both nurses: SHIRA Rey &   [x]   Head, Face, and Ears   [x]   Shoulders, Back, and Chest  [x]   Arms, Elbows, and Hands   [x]   Coccyx, Sacrum, and IschIum  [x]   Legs, Feet, and Heels        Does the Patient have Skin Breakdown?  No         Bobby Prevention initiated:  Yes   Wound Care Orders initiated:  NA      [facility] nurse consulted for Pressure Injury (Stage 3,4, Unstageable, DTI, NWPT, and Complex wounds), New and Established Ostomies:  NA      Nurse 1 eSignature: Electronically signed by Krissy Reza RN on 2/23/24 at 12:52 PM EST    **SHARE this note so that the co-signing nurse is able to place an eSignature**    Nurse 2 eSignature: {Esignature:[id number]}             
4 Eyes Skin Assessment     NAME:  Carolina Arguelles  YOB: 1980  MEDICAL RECORD NUMBER:  [id number]    The patient is being assessed for  Low Bobby Scale    I agree that at least one RN has performed a thorough Head to Toe Skin Assessment on the patient. ALL assessment sites listed below have been assessed.      Areas assessed by both nurses:    Head, Face, Ears, Shoulders, Back, Chest, Arms, Elbows, Hands, Sacrum. Buttock, Coccyx, Ischium, and Legs. Feet and Heels        Does the Patient have a Wound? Yes wound(s) were present on assessment. LDA wound assessment was Initiated and completed by RN       Bobby Prevention initiated by RN: Yes  Wound Care Orders initiated by RN: No    Pressure Injury (Stage 3,4, Unstageable, DTI, NWPT, and Complex wounds) if present, place Wound referral order by RN under : No    New Ostomies, if present place, Ostomy referral order under : No     Nurse 1 eSignature: Electronically signed by Shyla Smith RN on 2/24/24 at 11:54 PM EST    **SHARE this note so that the co-signing nurse can place an eSignature**    Nurse 2 eSignature: Electronically signed by Carline Agee RN on 2/25/24 at 2:04 AM EST   
4 Eyes Skin Assessment     The patient is being assess for  Admission    I agree that 2 RN's have performed a thorough Head to Toe Skin Assessment on the patient. ALL assessment sites listed below have been assessed.       Areas assessed by both nurses: Carolyn / Sahara  [x]   Head, Face, and Ears   [x]   Shoulders, Back, and Chest  [x]   Arms, Elbows, and Hands   [x]   Coccyx, Sacrum, and IschIum  [x]   Legs, Feet, and Heels        Does the Patient have Skin Breakdown?  No         Bobby Prevention initiated:  Yes   Wound Care Orders initiated:  No      [facility] nurse consulted for Pressure Injury (Stage 3,4, Unstageable, DTI, NWPT, and Complex wounds), New and Established Ostomies:  No      Nurse 1 eSignature: Electronically signed by Carolyn Hardy RN on 2/22/24 at 12:59 AM EST    **SHARE this note so that the co-signing nurse is able to place an eSignature**    Nurse 2 eSignature: Electronically signed by Sahara Herrmann RN on 2/22/24 at 6:25 AM EST             
4 Eyes Skin Assessment     The patient is being assess for  Low bobby     I agree that 2 RN's have performed a thorough Head to Toe Skin Assessment on the patient. ALL assessment sites listed below have been assessed.       Areas assessed by both nurses: Magdalena/ Elvia   [x]   Head, Face, and Ears   [x]   Shoulders, Back, and Chest  [x]   Arms, Elbows, and Hands   [x]   Coccyx, Sacrum, and IschIum  [x]   Legs, Feet, and Heels        Does the Patient have Skin Breakdown?  No         Bobby Prevention initiated:  Yes   Wound Care Orders initiated:  No      [facility] nurse consulted for Pressure Injury (Stage 3,4, Unstageable, DTI, NWPT, and Complex wounds), New and Established Ostomies:  No      Nurse 1 eSignature: Electronically signed by Elvia Lopez RN on 2/22/24 at 10:07 AM EST    **SHARE this note so that the co-signing nurse is able to place an eSignature**    Nurse 2 eSignature: Electronically signed by Magdalena Whipple RN on 2/22/24 at 3:00 PM EST             
4 Eyes Skin Assessment     The patient is being assess for  Low kenneth    I agree that 2 RN's have performed a thorough Head to Toe Skin Assessment on the patient. ALL assessment sites listed below have been assessed.       Areas assessed by both nurses: Ese RN, Carolyn RN  [x]   Head, Face, and Ears   [x]   Shoulders, Back, and Chest  [x]   Arms, Elbows, and Hands   [x]   Coccyx, Sacrum, and Ischum  [x]   Legs, Feet, and Heels        Does the Patient have Skin Breakdown?  No         Kenneth Prevention initiated:  Yes   Wound Care Orders initiated:  SOFIA      [facility] nurse consulted for Pressure Injury (Stage 3,4, Unstageable, DTI, NWPT, and Complex wounds):  NA      Nurse 1 eSignature: Electronically signed by Ese Wang RN on 2/24/24 at 3:43 PM EST    **SHARE this note so that the co-signing nurse is able to place an eSignature**    Nurse 2 eSignature: {Esignature:[id number]}             
Awake on bed, alert and oriented. IV on left hand. Noted peg on Side rails up x 2. Bed alarm on. Call light within reach. Will continue to monitor.  
Bladder scan 454 mL.  Straight cath 500 mL malodorous, hazy yellow urine.  Electronically signed by Carolyn Hardy RN on 2/22/2024 at 2:01 AM    
I spoke with Dr. Garza's office and went over her medications. She was taken off Cellcept beginning of December 2023. She is to continue Prograf 0.5 mg PO BID and Prednisone 10 mg PO QD.    Paige Hauser, APRN - CNP    
Paged NP concerning straight cath order. Pt cath's at home 3-4 times a day. Awaiting orders.   
Paged nephro per MD request concerning K, Mag, and calcium replacement. Awaiting orders.       Per nephro they will take care of replacement orders.   
Physical Therapy  Facility/Department: [facility] C3 TELE/MED SURG/ONC  Physical Therapy Initial Assessment and Discharge    Name: Carolina Arguelles  : 1980  MRN: [id number]  Date of Service: 2024    Discharge Recommendations:  24 hour supervision or assist   PT Equipment Recommendations  Equipment Needed: No      Patient Diagnosis(es): The primary encounter diagnosis was Acute kidney injury superimposed on CKD (HCC). Diagnoses of Gastrostomy tube in place (HCC), Urinary retention, Intermittent self-catheterization of bladder, Urinary tract infection without hematuria, site unspecified, H/O kidney transplant, and Elevated liver enzymes were also pertinent to this visit.  Past Medical History:  has a past medical history of Anemia, Bacteremia, Chronic kidney disease, Diabetes mellitus type 1 (HCC), Hyperlipidemia, Hypertension, Kidney transplant recipient, and No history of procedure.  Past Surgical History:  has a past surgical history that includes Dental surgery; other surgical history (Left, 2014); Dialysis fistula creation; other surgical history (2016); Upper gastrointestinal endoscopy (2016); and Kidney transplant.    Assessment   Assessment: Pt is a 45 y/o female who presents with hypocalcemia. Pt was living with her mom in single story home with 1 ARAVIND and was requiring assist for transfers to wheelchair and was non ambulatory at baseline. Pt currently requires mod/max A for transfers and is functioning at baseline level. No further skilled PT indicated at this time. Pt safe to return home with 24hr assist.  Therapy Prognosis: Good  Decision Making: Medium Complexity  No Skilled PT: At baseline function  Requires PT Follow-Up: No  Activity Tolerance  Activity Tolerance: Patient tolerated evaluation without incident     Plan   Physical Therapy Plan  General Plan: Discharge with evaluation only  Current Treatment Recommendations: Therapeutic activities  Safety Devices  Type of Devices: All 
Provider Jose PITT notified of persistently elevated BP , advised to monitor for now. Patient asymptomatic at present .   
Pt a/o. VSS. Shift assessment updated and documented. Still with concerns of elevated blood pressure , oncall provider notified and one time dose of metoprolol 2.5mg IV ordered. Unable to void , bladder scanned as per order and straight cath. Hygiene care given , assisted to reposition. Denies other needs at this point.  
Pt a/o. VSS. Shift assessment updated and documented. Straight cath done at 11:30 this am, 400 mL out. Urine culture sent from straight cath collected. Paige Hauser NP notified of elevated BP this am, Norvasc added, see mar. Calcium 7.2 this am, 2,000 mg added and infusing at this time, see mar.   
Pt admitted to C3, room 328 from ED.  Transported by ED RN on stretcher.  Belongings at bedside.  Oriented to room, call light and POC.  A/O x 4.  VSS.  SpO2 >90% RA.  Admission assessment complete as charted.  4 eye skin assessment complete with SHIRA [place].  Pt incontinent of soft, formed jordin colored stool.  Tolerating PO intake.  Denies needs.  Bed in low position, alarm on, wheels locked, SR x 2.  Call light within reach.    
Pt assessment completed and charted. VSS. Pt a/ox4, but delayed. Pt has order to straight cathed PRN, see flowsheet for more info. Pt denies pain. Pt tolerating diet but not eating much throughout day, but at all of dinner. Pt having soft tan stools. Incontinent of stools.  Pt is bed bound at baseline r/t previous stroke and has L sided weakness. Pt is a set up to feed. Pt denies any other needs at this time.  Pt calls out appropriately.       Pt is a fall risk;  -Bed in lowest position and wheels locked.   -Call light within reach.   -Bedside table within reach.   -Non-skid footwear in place.  -bed check in place.   
Pt ok for discharge per MD. Discharge instructions given. IV removed. Telemetry monitor removed and CMU notified. No questions or concerns at this time. Transported via stretcher by [facility] to home with mother. Mother notified that patient is on the way home.   
Shift assessment completed. VSS. A/O x4, childlike. Patient with no complaints.   
Spoke with NP about elevated BP. NP asked for updated med rec.    Updated med rec per list that mother provided from nursing home. Informed NP.   
Spoke with nephron about BP. Per nephro just hold midodrine and continue to monitor BP. Pt's like this can be very sensitive with BP meds.   
Transported off unit in bed; en route to A107. Patient notified her mom prior to moving.   
position/activity restrictions: MDRO, PEG tube but does not use, up with assist    Subjective   General  Chart Reviewed: Yes  Patient assessed for rehabilitation services?: Yes  Family / Caregiver Present: No  Referring Practitioner: EDY Hauser  Diagnosis: hypocalcemia     Social/Functional History  Social/Functional History  Lives With: Parent (Lives with mom who provides 24/7 support)  Type of Home: House  Home Layout: One level  Home Access: Stairs to enter without rails  Entrance Stairs - Number of Steps: 1 ARAVIND at front entrance, 3 ARAVIND in back  (Pt is pushed up 1 step in front with w/c with assist from brother's girlfriend)  Bathroom Shower/Tub: Tub/Shower unit  Bathroom Toilet: Standard  Bathroom Equipment: Shower chair  Home Equipment: Wheelchair-manual (Pt sleeps on couch)  Has the patient had two or more falls in the past year or any fall with injury in the past year?: No  ADL Assistance: Needs assistance (She has assist from mother for sponge bathing, dressing and pericare/changing brief. Pt wears Depends for toileting.  Pt reports that she can feed herself and perform grooming.)  Toileting: Needs assistance  Homemaking Responsibilities: No  Ambulation Assistance: Non-ambulatory  Transfer Assistance: Needs assistance (Mother assists with transfers)  Leisure & Hobbies: video games     Objective   Pulse: 81  Heart Rate Source: Monitor  BP: 115/77  BP Location: Right upper arm  BP Method: Automatic  Patient Position: Semi fowlers  MAP (Calculated): 90  Respirations: 16  SpO2: 98 %  O2 Device: None (Room air)  Temp: 97.6 °F (36.4 °C)         Safety Devices  Type of Devices: All fall risk precautions in place;Call light within reach;Bed alarm in place;Patient at risk for falls;Left in bed;Nurse notified;Gait belt  Bed Mobility Training  Bed Mobility Training: Yes  Supine to Sit: Moderate assistance (assist with trunk, HOB elevated)  Sit to Supine: Moderate assistance  Balance  Sitting: Intact  Standing: 
    This patient has a high likelihood of being discharged tomorrow and is appropriate for A1 Discharge Unit in AM pending clinical course overnight: []Yes  [x]No    ------------------------------------------------------------------------------------------------------------------------------------------------------------------------    MDM      [x] High (any 2)    A. Problems (any 1)  [x] Acute/Chronic Illness/injury posing threat to life or bodily function:  Electrolyte derangement and CKD 4  [] Severe exacerbation of chronic illness:    ---------------------------------------------------------------------  B. Risk of Treatment (any 1)   [x] Drugs/treatments that require intensive monitoring for toxicity include:  Prograf and prednisone  [] Change in code status:    [] Decision to escalate care:    [] Major surgery/procedure with associated risk factors:    ----------------------------------------------------------------------  C. Data (any 2)  [x] Discussed current management and discharge planning options with Case Management.  [x] Discussed management of the case with: Nephrology     [] Telemetry personally reviewed and interpreted as documented above    [x] Imaging personally reviewed and interpreted, includes:  CXR  [x] Data Review (any 3)  [x] Collateral history obtained from:  EMR  [x] All available Consultant notes from yesterday/today were reviewed  [x] All current labs were reviewed and interpreted for clinical significance   [x] Appropriate follow-up labs were ordered    Medications:  Personally reviewed in detail in conjunction w/ labs as documented for evidence of drug toxicity.     Infusion Medications    dextrose      sodium chloride       Scheduled Medications    insulin glargine  0.25 Units/kg SubCUTAneous Nightly    amLODIPine  5 mg Oral Daily    calcium acetate  1 capsule Oral TID WC    cefTRIAXone (ROCEPHIN) IV  1,000 mg IntraVENous Q24H    insulin lispro  0-4 Units SubCUTAneous TID WC    
times per day    enoxaparin  30 mg SubCUTAneous Daily     PRN Meds: glucose, dextrose bolus **OR** dextrose bolus, glucagon (rDNA), dextrose, sodium chloride flush, sodium chloride, ondansetron **OR** [DISCONTINUED] ondansetron, polyethylene glycol     Labs:  Personally reviewed and interpreted for clinical significance.     Recent Labs     02/21/24  1408 02/22/24  0548 02/23/24  0654   WBC 10.1 6.6 6.2   HGB 9.4* 8.9* 9.3*   HCT 28.5* 26.8* 27.6*    133* 146       Recent Labs     02/21/24  1408 02/22/24  0548 02/23/24  0655    145 141   K 3.7 3.4* 4.1   CL 97* 101 101   CO2 23 20* 22   BUN 31* 28* 24*   CREATININE 3.2* 3.0* 2.8*   CALCIUM 5.0* 6.0* 7.2*   MG 1.10* 1.30* 1.80   PHOS  --   --  6.8*       No results for input(s): \"PROBNP\", \"TROPHS\" in the last 72 hours.  No results for input(s): \"LABA1C\" in the last 72 hours.  Recent Labs     02/21/24  1408 02/22/24  0548 02/23/24  0655   * 157* 84*   * 111* 92*   BILITOT 0.3 0.3 0.3   ALKPHOS 369* 331* 322*       No results for input(s): \"INR\", \"LACTA\", \"TSH\" in the last 72 hours.    Urine Cultures:   Lab Results   Component Value Date/Time    LABURIN  02/21/2024 02:20 PM     >50,000 CFU/ml Mixed pathogens  Multiple organisms isolated, no predominance. Culture  indicates probable contamination. Please review colony count  and clinical indications to determine if a repeat culture is  necessary. No further workup to be done.       Blood Cultures:   Lab Results   Component Value Date/Time    BC No growth after 5 days of incubation. 09/05/2017 08:28 PM     Lab Results   Component Value Date/Time    BLOODCULT2 See additional report for complete BCID panel. 09/05/2017 10:51 PM    BLOODCULT2 POSITIVE for  Isolated one out of two bottles   09/05/2017 10:51 PM     Organism:   Lab Results   Component Value Date/Time    ORG Escherichia coli 09/16/2023 07:13 PM         NIRAV Hoffman - CNP

## 2024-03-06 ENCOUNTER — HOSPITAL ENCOUNTER (OUTPATIENT)
Age: 44
Setting detail: SPECIMEN
Discharge: HOME | End: 2024-03-06
Payer: COMMERCIAL

## 2024-03-06 LAB
AMORPH SED URNS QL MICRO: ABNORMAL /HPF
BACTERIA URNS QL MICRO: ABNORMAL /HPF
BILIRUB UR QL STRIP.AUTO: NEGATIVE
CLARITY UR: ABNORMAL
COLOR UR: YELLOW
CREAT UR-MCNC: 59.8 MG/DL (ref 28–259)
EPI CELLS #/AREA URNS HPF: ABNORMAL /HPF (ref 0–5)
GLUCOSE UR STRIP.AUTO-MCNC: NEGATIVE MG/DL
HGB UR QL STRIP.AUTO: ABNORMAL
KETONES UR STRIP.AUTO-MCNC: NEGATIVE MG/DL
LEUKOCYTE ESTERASE UR QL STRIP.AUTO: ABNORMAL
MUCOUS THREADS #/AREA URNS LPF: ABNORMAL /LPF
NITRITE UR QL STRIP.AUTO: NEGATIVE
PH UR STRIP.AUTO: 7 [PH] (ref 5–8)
PROT UR STRIP.AUTO-MCNC: 30 MG/DL
PROT UR-MCNC: 48 MG/DL
PROT/CREAT UR-RTO: 0.8 MG/DL
RBC #/AREA URNS HPF: ABNORMAL /HPF (ref 0–4)
SP GR UR STRIP.AUTO: 1.02 (ref 1–1.03)
UA DIPSTICK W REFLEX MICRO PNL UR: YES
URN SPEC COLLECT METH UR: ABNORMAL
UROBILINOGEN UR STRIP-ACNC: 0.2 E.U./DL
WBC #/AREA URNS HPF: ABNORMAL /HPF (ref 0–5)

## 2024-03-06 PROCEDURE — 87077 CULTURE AEROBIC IDENTIFY: CPT

## 2024-03-06 PROCEDURE — 81001 URINALYSIS AUTO W/SCOPE: CPT

## 2024-03-06 PROCEDURE — 82570 ASSAY OF URINE CREATININE: CPT

## 2024-03-06 PROCEDURE — 87186 SC STD MICRODIL/AGAR DIL: CPT

## 2024-03-06 PROCEDURE — 84156 ASSAY OF PROTEIN URINE: CPT

## 2024-03-06 PROCEDURE — 87086 URINE CULTURE/COLONY COUNT: CPT

## 2024-03-08 ENCOUNTER — HOSPITAL ENCOUNTER (INPATIENT)
Age: 44
LOS: 3 days | Discharge: HOME HEALTH | DRG: 690 | End: 2024-03-12
Attending: STUDENT IN AN ORGANIZED HEALTH CARE EDUCATION/TRAINING PROGRAM | Admitting: FAMILY MEDICINE
Payer: COMMERCIAL

## 2024-03-08 DIAGNOSIS — E83.51 HYPOCALCEMIA: Primary | ICD-10-CM

## 2024-03-08 DIAGNOSIS — I95.9 HYPOTENSION, UNSPECIFIED HYPOTENSION TYPE: ICD-10-CM

## 2024-03-08 DIAGNOSIS — N30.90 CYSTITIS: ICD-10-CM

## 2024-03-08 LAB
ALBUMIN SERPL-MCNC: 3.4 G/DL (ref 3.4–5)
ALBUMIN/GLOB SERPL: 1.2 {RATIO} (ref 1.1–2.2)
ALP SERPL-CCNC: 171 U/L (ref 40–129)
ALT SERPL-CCNC: 26 U/L (ref 10–40)
ANION GAP SERPL CALCULATED.3IONS-SCNC: 19 MMOL/L (ref 3–16)
AST SERPL-CCNC: 27 U/L (ref 15–37)
BACTERIA URNS QL MICRO: ABNORMAL /HPF
BASOPHILS # BLD: 0 K/UL (ref 0–0.2)
BASOPHILS NFR BLD: 0.3 %
BILIRUB SERPL-MCNC: <0.2 MG/DL (ref 0–1)
BILIRUB UR QL STRIP.AUTO: NEGATIVE
BUN SERPL-MCNC: 29 MG/DL (ref 7–20)
CALCIUM SERPL-MCNC: 5.2 MG/DL (ref 8.3–10.6)
CHLORIDE SERPL-SCNC: 97 MMOL/L (ref 99–110)
CLARITY UR: CLEAR
CO2 SERPL-SCNC: 26 MMOL/L (ref 21–32)
COLOR UR: YELLOW
CREAT SERPL-MCNC: 2.9 MG/DL (ref 0.6–1.1)
DEPRECATED RDW RBC AUTO: 16.9 % (ref 12.4–15.4)
EOSINOPHIL # BLD: 0.1 K/UL (ref 0–0.6)
EOSINOPHIL NFR BLD: 1.1 %
EPI CELLS #/AREA URNS HPF: ABNORMAL /HPF (ref 0–5)
GFR SERPLBLD CREATININE-BSD FMLA CKD-EPI: 20 ML/MIN/{1.73_M2}
GLUCOSE SERPL-MCNC: 117 MG/DL (ref 70–99)
GLUCOSE UR STRIP.AUTO-MCNC: NEGATIVE MG/DL
HCT VFR BLD AUTO: 25.9 % (ref 36–48)
HGB BLD-MCNC: 8.5 G/DL (ref 12–16)
HGB UR QL STRIP.AUTO: ABNORMAL
KETONES UR STRIP.AUTO-MCNC: NEGATIVE MG/DL
LACTATE BLDV-SCNC: 1.9 MMOL/L (ref 0.4–1.9)
LEUKOCYTE ESTERASE UR QL STRIP.AUTO: ABNORMAL
LYMPHOCYTES # BLD: 1.3 K/UL (ref 1–5.1)
LYMPHOCYTES NFR BLD: 15.7 %
MCH RBC QN AUTO: 29.4 PG (ref 26–34)
MCHC RBC AUTO-ENTMCNC: 32.7 G/DL (ref 31–36)
MCV RBC AUTO: 90.2 FL (ref 80–100)
MONOCYTES # BLD: 0.5 K/UL (ref 0–1.3)
MONOCYTES NFR BLD: 6 %
NEUTROPHILS # BLD: 6.5 K/UL (ref 1.7–7.7)
NEUTROPHILS NFR BLD: 76.9 %
NITRITE UR QL STRIP.AUTO: POSITIVE
PH UR STRIP.AUTO: 7 [PH] (ref 5–8)
PLATELET # BLD AUTO: 123 K/UL (ref 135–450)
PMV BLD AUTO: 8.7 FL (ref 5–10.5)
POTASSIUM SERPL-SCNC: 3.8 MMOL/L (ref 3.5–5.1)
PROT SERPL-MCNC: 6.3 G/DL (ref 6.4–8.2)
PROT UR STRIP.AUTO-MCNC: ABNORMAL MG/DL
RBC # BLD AUTO: 2.87 M/UL (ref 4–5.2)
RBC #/AREA URNS HPF: ABNORMAL /HPF (ref 0–4)
SODIUM SERPL-SCNC: 142 MMOL/L (ref 136–145)
SP GR UR STRIP.AUTO: 1.01 (ref 1–1.03)
UA COMPLETE W REFLEX CULTURE PNL UR: YES
UA DIPSTICK W REFLEX MICRO PNL UR: YES
URN SPEC COLLECT METH UR: ABNORMAL
UROBILINOGEN UR STRIP-ACNC: 0.2 E.U./DL
WBC # BLD AUTO: 8.4 K/UL (ref 4–11)
WBC #/AREA URNS HPF: >100 /HPF (ref 0–5)

## 2024-03-08 PROCEDURE — 2580000003 HC RX 258: Performed by: PHYSICIAN ASSISTANT

## 2024-03-08 PROCEDURE — 87186 SC STD MICRODIL/AGAR DIL: CPT

## 2024-03-08 PROCEDURE — 87086 URINE CULTURE/COLONY COUNT: CPT

## 2024-03-08 PROCEDURE — 81001 URINALYSIS AUTO W/SCOPE: CPT

## 2024-03-08 PROCEDURE — 87077 CULTURE AEROBIC IDENTIFY: CPT

## 2024-03-08 PROCEDURE — 96366 THER/PROPH/DIAG IV INF ADDON: CPT

## 2024-03-08 PROCEDURE — 6360000002 HC RX W HCPCS: Performed by: PHYSICIAN ASSISTANT

## 2024-03-08 PROCEDURE — 99285 EMERGENCY DEPT VISIT HI MDM: CPT

## 2024-03-08 PROCEDURE — 96368 THER/DIAG CONCURRENT INF: CPT

## 2024-03-08 PROCEDURE — 96365 THER/PROPH/DIAG IV INF INIT: CPT

## 2024-03-08 PROCEDURE — 83605 ASSAY OF LACTIC ACID: CPT

## 2024-03-08 PROCEDURE — 85025 COMPLETE CBC W/AUTO DIFF WBC: CPT

## 2024-03-08 PROCEDURE — 80053 COMPREHEN METABOLIC PANEL: CPT

## 2024-03-08 RX ORDER — SODIUM CHLORIDE 9 MG/ML
30 INJECTION, SOLUTION INTRAVENOUS ONCE
Status: COMPLETED | OUTPATIENT
Start: 2024-03-08 | End: 2024-03-08

## 2024-03-08 RX ORDER — PIPERACILLIN SODIUM, TAZOBACTAM SODIUM 3; .375 G/15ML; G/15ML
INJECTION, POWDER, LYOPHILIZED, FOR SOLUTION INTRAVENOUS
Status: DISPENSED
Start: 2024-03-08 | End: 2024-03-09

## 2024-03-08 RX ORDER — SODIUM CHLORIDE 9 MG/ML
INJECTION, SOLUTION INTRAVENOUS
Status: DISCONTINUED
Start: 2024-03-08 | End: 2024-03-09

## 2024-03-08 RX ORDER — CALCIUM GLUCONATE 20 MG/ML
2000 INJECTION, SOLUTION INTRAVENOUS ONCE
Status: COMPLETED | OUTPATIENT
Start: 2024-03-08 | End: 2024-03-09

## 2024-03-08 RX ADMIN — SODIUM CHLORIDE 30 ML/KG/HR: 9 INJECTION, SOLUTION INTRAVENOUS at 20:03

## 2024-03-08 RX ADMIN — PIPERACILLIN AND TAZOBACTAM 3375 MG: 3; .375 INJECTION, POWDER, LYOPHILIZED, FOR SOLUTION INTRAVENOUS at 22:50

## 2024-03-08 RX ADMIN — CALCIUM GLUCONATE 2000 MG: 20 INJECTION, SOLUTION INTRAVENOUS at 20:36

## 2024-03-08 ASSESSMENT — PAIN - FUNCTIONAL ASSESSMENT: PAIN_FUNCTIONAL_ASSESSMENT: NONE - DENIES PAIN

## 2024-03-09 PROBLEM — N30.01 ACUTE CYSTITIS WITH HEMATURIA: Status: ACTIVE | Noted: 2024-03-09

## 2024-03-09 LAB
ANION GAP SERPL CALCULATED.3IONS-SCNC: 20 MMOL/L (ref 3–16)
BASOPHILS # BLD: 0 K/UL (ref 0–0.2)
BASOPHILS NFR BLD: 0.2 %
BUN SERPL-MCNC: 29 MG/DL (ref 7–20)
CALCIUM SERPL-MCNC: 5.3 MG/DL (ref 8.3–10.6)
CHLORIDE SERPL-SCNC: 101 MMOL/L (ref 99–110)
CO2 SERPL-SCNC: 26 MMOL/L (ref 21–32)
CREAT SERPL-MCNC: 2.7 MG/DL (ref 0.6–1.1)
DEPRECATED RDW RBC AUTO: 16 % (ref 12.4–15.4)
EOSINOPHIL # BLD: 0.1 K/UL (ref 0–0.6)
EOSINOPHIL NFR BLD: 1.6 %
GFR SERPLBLD CREATININE-BSD FMLA CKD-EPI: 22 ML/MIN/{1.73_M2}
GLUCOSE BLD-MCNC: 159 MG/DL (ref 70–99)
GLUCOSE BLD-MCNC: 244 MG/DL (ref 70–99)
GLUCOSE BLD-MCNC: 85 MG/DL (ref 70–99)
GLUCOSE SERPL-MCNC: 65 MG/DL (ref 70–99)
HCT VFR BLD AUTO: 25.7 % (ref 36–48)
HGB BLD-MCNC: 8.5 G/DL (ref 12–16)
LYMPHOCYTES # BLD: 1.5 K/UL (ref 1–5.1)
LYMPHOCYTES NFR BLD: 19.4 %
MAGNESIUM SERPL-MCNC: 1.1 MG/DL (ref 1.8–2.4)
MCH RBC QN AUTO: 29.9 PG (ref 26–34)
MCHC RBC AUTO-ENTMCNC: 33.2 G/DL (ref 31–36)
MCV RBC AUTO: 90.1 FL (ref 80–100)
MONOCYTES # BLD: 0.4 K/UL (ref 0–1.3)
MONOCYTES NFR BLD: 5.6 %
NEUTROPHILS # BLD: 5.6 K/UL (ref 1.7–7.7)
NEUTROPHILS NFR BLD: 73.2 %
PERFORMED ON: ABNORMAL
PERFORMED ON: ABNORMAL
PERFORMED ON: NORMAL
PLATELET # BLD AUTO: 119 K/UL (ref 135–450)
PMV BLD AUTO: 8.2 FL (ref 5–10.5)
POTASSIUM SERPL-SCNC: 3.2 MMOL/L (ref 3.5–5.1)
RBC # BLD AUTO: 2.85 M/UL (ref 4–5.2)
SODIUM SERPL-SCNC: 147 MMOL/L (ref 136–145)
WBC # BLD AUTO: 7.7 K/UL (ref 4–11)

## 2024-03-09 PROCEDURE — 97166 OT EVAL MOD COMPLEX 45 MIN: CPT

## 2024-03-09 PROCEDURE — 51701 INSERT BLADDER CATHETER: CPT

## 2024-03-09 PROCEDURE — 6360000002 HC RX W HCPCS: Performed by: INTERNAL MEDICINE

## 2024-03-09 PROCEDURE — 97162 PT EVAL MOD COMPLEX 30 MIN: CPT

## 2024-03-09 PROCEDURE — 6370000000 HC RX 637 (ALT 250 FOR IP): Performed by: NURSE PRACTITIONER

## 2024-03-09 PROCEDURE — 6360000002 HC RX W HCPCS: Performed by: NURSE PRACTITIONER

## 2024-03-09 PROCEDURE — 1200000000 HC SEMI PRIVATE

## 2024-03-09 PROCEDURE — 85025 COMPLETE CBC W/AUTO DIFF WBC: CPT

## 2024-03-09 PROCEDURE — 97535 SELF CARE MNGMENT TRAINING: CPT

## 2024-03-09 PROCEDURE — 2580000003 HC RX 258: Performed by: NURSE PRACTITIONER

## 2024-03-09 PROCEDURE — 83735 ASSAY OF MAGNESIUM: CPT

## 2024-03-09 PROCEDURE — 2500000003 HC RX 250 WO HCPCS: Performed by: FAMILY MEDICINE

## 2024-03-09 PROCEDURE — 2580000003 HC RX 258: Performed by: INTERNAL MEDICINE

## 2024-03-09 PROCEDURE — 97530 THERAPEUTIC ACTIVITIES: CPT

## 2024-03-09 PROCEDURE — 36415 COLL VENOUS BLD VENIPUNCTURE: CPT

## 2024-03-09 PROCEDURE — 83036 HEMOGLOBIN GLYCOSYLATED A1C: CPT

## 2024-03-09 PROCEDURE — 99223 1ST HOSP IP/OBS HIGH 75: CPT | Performed by: INTERNAL MEDICINE

## 2024-03-09 PROCEDURE — 6370000000 HC RX 637 (ALT 250 FOR IP): Performed by: INTERNAL MEDICINE

## 2024-03-09 PROCEDURE — 6370000000 HC RX 637 (ALT 250 FOR IP): Performed by: FAMILY MEDICINE

## 2024-03-09 PROCEDURE — 80048 BASIC METABOLIC PNL TOTAL CA: CPT

## 2024-03-09 RX ORDER — INSULIN LISPRO 100 [IU]/ML
0-8 INJECTION, SOLUTION INTRAVENOUS; SUBCUTANEOUS
Status: DISCONTINUED | OUTPATIENT
Start: 2024-03-09 | End: 2024-03-12 | Stop reason: HOSPADM

## 2024-03-09 RX ORDER — AMLODIPINE BESYLATE 5 MG/1
5 TABLET ORAL DAILY
Status: DISCONTINUED | OUTPATIENT
Start: 2024-03-09 | End: 2024-03-12 | Stop reason: HOSPADM

## 2024-03-09 RX ORDER — ONDANSETRON 4 MG/1
4 TABLET, ORALLY DISINTEGRATING ORAL EVERY 8 HOURS PRN
Status: DISCONTINUED | OUTPATIENT
Start: 2024-03-09 | End: 2024-03-12 | Stop reason: HOSPADM

## 2024-03-09 RX ORDER — POLYETHYLENE GLYCOL 3350 17 G/17G
17 POWDER, FOR SOLUTION ORAL DAILY PRN
Status: DISCONTINUED | OUTPATIENT
Start: 2024-03-09 | End: 2024-03-12 | Stop reason: HOSPADM

## 2024-03-09 RX ORDER — ONDANSETRON 2 MG/ML
4 INJECTION INTRAMUSCULAR; INTRAVENOUS EVERY 6 HOURS PRN
Status: DISCONTINUED | OUTPATIENT
Start: 2024-03-09 | End: 2024-03-12 | Stop reason: HOSPADM

## 2024-03-09 RX ORDER — CALCIUM ACETATE 667 MG/1
1 CAPSULE ORAL
Status: DISCONTINUED | OUTPATIENT
Start: 2024-03-09 | End: 2024-03-09

## 2024-03-09 RX ORDER — ATORVASTATIN CALCIUM 40 MG/1
40 TABLET, FILM COATED ORAL DAILY
Status: DISCONTINUED | OUTPATIENT
Start: 2024-03-09 | End: 2024-03-12 | Stop reason: HOSPADM

## 2024-03-09 RX ORDER — OLANZAPINE 5 MG/1
7.5 TABLET ORAL NIGHTLY
Status: DISCONTINUED | OUTPATIENT
Start: 2024-03-09 | End: 2024-03-12 | Stop reason: HOSPADM

## 2024-03-09 RX ORDER — SODIUM CHLORIDE 9 MG/ML
INJECTION, SOLUTION INTRAVENOUS PRN
Status: DISCONTINUED | OUTPATIENT
Start: 2024-03-09 | End: 2024-03-12 | Stop reason: HOSPADM

## 2024-03-09 RX ORDER — INSULIN LISPRO 100 [IU]/ML
0-4 INJECTION, SOLUTION INTRAVENOUS; SUBCUTANEOUS NIGHTLY
Status: DISCONTINUED | OUTPATIENT
Start: 2024-03-09 | End: 2024-03-12 | Stop reason: HOSPADM

## 2024-03-09 RX ORDER — HEPARIN SODIUM 5000 [USP'U]/ML
5000 INJECTION, SOLUTION INTRAVENOUS; SUBCUTANEOUS EVERY 8 HOURS SCHEDULED
Status: DISCONTINUED | OUTPATIENT
Start: 2024-03-09 | End: 2024-03-12 | Stop reason: HOSPADM

## 2024-03-09 RX ORDER — TRAZODONE HYDROCHLORIDE 50 MG/1
100 TABLET ORAL NIGHTLY
Status: DISCONTINUED | OUTPATIENT
Start: 2024-03-09 | End: 2024-03-12 | Stop reason: HOSPADM

## 2024-03-09 RX ORDER — FLUOXETINE HYDROCHLORIDE 20 MG/1
20 CAPSULE ORAL DAILY
Status: DISCONTINUED | OUTPATIENT
Start: 2024-03-09 | End: 2024-03-12 | Stop reason: HOSPADM

## 2024-03-09 RX ORDER — EZETIMIBE 10 MG/1
10 TABLET ORAL DAILY
Status: DISCONTINUED | OUTPATIENT
Start: 2024-03-09 | End: 2024-03-12 | Stop reason: HOSPADM

## 2024-03-09 RX ORDER — TACROLIMUS 1 MG/1
1 CAPSULE ORAL 2 TIMES DAILY
Status: DISCONTINUED | OUTPATIENT
Start: 2024-03-09 | End: 2024-03-12

## 2024-03-09 RX ORDER — INSULIN GLARGINE 100 [IU]/ML
22 INJECTION, SOLUTION SUBCUTANEOUS NIGHTLY
Status: DISCONTINUED | OUTPATIENT
Start: 2024-03-09 | End: 2024-03-12 | Stop reason: HOSPADM

## 2024-03-09 RX ORDER — VITAMIN B COMPLEX
2000 TABLET ORAL DAILY
Status: DISCONTINUED | OUTPATIENT
Start: 2024-03-09 | End: 2024-03-12 | Stop reason: HOSPADM

## 2024-03-09 RX ORDER — DEXTROSE AND SODIUM CHLORIDE 5; .2 G/100ML; G/100ML
INJECTION, SOLUTION INTRAVENOUS CONTINUOUS
Status: DISCONTINUED | OUTPATIENT
Start: 2024-03-09 | End: 2024-03-10

## 2024-03-09 RX ORDER — SODIUM CHLORIDE 0.9 % (FLUSH) 0.9 %
5-40 SYRINGE (ML) INJECTION EVERY 12 HOURS SCHEDULED
Status: DISCONTINUED | OUTPATIENT
Start: 2024-03-09 | End: 2024-03-12 | Stop reason: HOSPADM

## 2024-03-09 RX ORDER — ASPIRIN 81 MG/1
81 TABLET ORAL DAILY
Status: DISCONTINUED | OUTPATIENT
Start: 2024-03-09 | End: 2024-03-12 | Stop reason: HOSPADM

## 2024-03-09 RX ORDER — ACETAMINOPHEN 650 MG/1
650 SUPPOSITORY RECTAL EVERY 6 HOURS PRN
Status: DISCONTINUED | OUTPATIENT
Start: 2024-03-09 | End: 2024-03-12 | Stop reason: HOSPADM

## 2024-03-09 RX ORDER — SODIUM CHLORIDE 0.9 % (FLUSH) 0.9 %
5-40 SYRINGE (ML) INJECTION PRN
Status: DISCONTINUED | OUTPATIENT
Start: 2024-03-09 | End: 2024-03-12 | Stop reason: HOSPADM

## 2024-03-09 RX ORDER — ACETAMINOPHEN 325 MG/1
650 TABLET ORAL EVERY 6 HOURS PRN
Status: DISCONTINUED | OUTPATIENT
Start: 2024-03-09 | End: 2024-03-12 | Stop reason: HOSPADM

## 2024-03-09 RX ORDER — TORSEMIDE 20 MG/1
20 TABLET ORAL DAILY
Status: DISCONTINUED | OUTPATIENT
Start: 2024-03-09 | End: 2024-03-09

## 2024-03-09 RX ORDER — GLUCAGON 1 MG/ML
1 KIT INJECTION PRN
Status: DISCONTINUED | OUTPATIENT
Start: 2024-03-09 | End: 2024-03-12 | Stop reason: HOSPADM

## 2024-03-09 RX ORDER — MAGNESIUM SULFATE 1 G/100ML
1000 INJECTION INTRAVENOUS ONCE
Status: COMPLETED | OUTPATIENT
Start: 2024-03-09 | End: 2024-03-09

## 2024-03-09 RX ORDER — PREDNISONE 10 MG/1
10 TABLET ORAL DAILY
Status: DISCONTINUED | OUTPATIENT
Start: 2024-03-09 | End: 2024-03-12 | Stop reason: HOSPADM

## 2024-03-09 RX ORDER — METHOCARBAMOL 500 MG/1
500 TABLET, FILM COATED ORAL EVERY 6 HOURS PRN
Status: DISCONTINUED | OUTPATIENT
Start: 2024-03-09 | End: 2024-03-12 | Stop reason: HOSPADM

## 2024-03-09 RX ORDER — DEXTROSE MONOHYDRATE 100 MG/ML
INJECTION, SOLUTION INTRAVENOUS CONTINUOUS PRN
Status: DISCONTINUED | OUTPATIENT
Start: 2024-03-09 | End: 2024-03-12 | Stop reason: HOSPADM

## 2024-03-09 RX ORDER — CLOPIDOGREL BISULFATE 75 MG/1
75 TABLET ORAL DAILY
Status: DISCONTINUED | OUTPATIENT
Start: 2024-03-09 | End: 2024-03-12 | Stop reason: HOSPADM

## 2024-03-09 RX ORDER — PANTOPRAZOLE SODIUM 40 MG/1
40 TABLET, DELAYED RELEASE ORAL
Status: DISCONTINUED | OUTPATIENT
Start: 2024-03-09 | End: 2024-03-12 | Stop reason: HOSPADM

## 2024-03-09 RX ADMIN — TACROLIMUS 1 MG: 1 CAPSULE ORAL at 20:48

## 2024-03-09 RX ADMIN — PREDNISONE 10 MG: 10 TABLET ORAL at 09:16

## 2024-03-09 RX ADMIN — INSULIN GLARGINE 22 UNITS: 100 INJECTION, SOLUTION SUBCUTANEOUS at 20:52

## 2024-03-09 RX ADMIN — Medication 10 ML: at 09:17

## 2024-03-09 RX ADMIN — DEXTROSE AND SODIUM CHLORIDE: 5; 200 INJECTION, SOLUTION INTRAVENOUS at 12:29

## 2024-03-09 RX ADMIN — TRAZODONE HYDROCHLORIDE 100 MG: 50 TABLET ORAL at 20:48

## 2024-03-09 RX ADMIN — METHOCARBAMOL 500 MG: 500 TABLET ORAL at 17:15

## 2024-03-09 RX ADMIN — CLOPIDOGREL BISULFATE 75 MG: 75 TABLET ORAL at 09:13

## 2024-03-09 RX ADMIN — ASPIRIN 81 MG: 81 TABLET, COATED ORAL at 09:13

## 2024-03-09 RX ADMIN — HEPARIN SODIUM 5000 UNITS: 5000 INJECTION INTRAVENOUS; SUBCUTANEOUS at 21:06

## 2024-03-09 RX ADMIN — PIPERACILLIN AND TAZOBACTAM 3375 MG: 3; .375 INJECTION, POWDER, LYOPHILIZED, FOR SOLUTION INTRAVENOUS at 21:09

## 2024-03-09 RX ADMIN — ATORVASTATIN CALCIUM 40 MG: 40 TABLET, FILM COATED ORAL at 09:13

## 2024-03-09 RX ADMIN — OLANZAPINE 7.5 MG: 5 TABLET, FILM COATED ORAL at 20:48

## 2024-03-09 RX ADMIN — FLUOXETINE HYDROCHLORIDE 20 MG: 20 CAPSULE ORAL at 09:13

## 2024-03-09 RX ADMIN — PIPERACILLIN AND TAZOBACTAM 3375 MG: 3; .375 INJECTION, POWDER, LYOPHILIZED, FOR SOLUTION INTRAVENOUS at 13:50

## 2024-03-09 RX ADMIN — MAGNESIUM SULFATE HEPTAHYDRATE 1000 MG: 1 INJECTION, SOLUTION INTRAVENOUS at 10:29

## 2024-03-09 RX ADMIN — TACROLIMUS 1 MG: 1 CAPSULE ORAL at 10:27

## 2024-03-09 RX ADMIN — METHOCARBAMOL 500 MG: 500 TABLET ORAL at 10:40

## 2024-03-09 RX ADMIN — PANTOPRAZOLE SODIUM 40 MG: 40 TABLET, DELAYED RELEASE ORAL at 17:15

## 2024-03-09 RX ADMIN — CALCIUM ACETATE 667 MG: 667 CAPSULE ORAL at 09:13

## 2024-03-09 RX ADMIN — PIPERACILLIN AND TAZOBACTAM 3375 MG: 3; .375 INJECTION, POWDER, LYOPHILIZED, FOR SOLUTION INTRAVENOUS at 09:25

## 2024-03-09 RX ADMIN — EZETIMIBE 10 MG: 10 TABLET ORAL at 09:13

## 2024-03-09 RX ADMIN — AMLODIPINE BESYLATE 5 MG: 5 TABLET ORAL at 09:13

## 2024-03-09 RX ADMIN — PANTOPRAZOLE SODIUM 40 MG: 40 TABLET, DELAYED RELEASE ORAL at 09:13

## 2024-03-09 RX ADMIN — TORSEMIDE 20 MG: 20 TABLET ORAL at 09:13

## 2024-03-09 RX ADMIN — Medication 2000 UNITS: at 09:13

## 2024-03-09 RX ADMIN — HEPARIN SODIUM 5000 UNITS: 5000 INJECTION INTRAVENOUS; SUBCUTANEOUS at 13:49

## 2024-03-09 RX ADMIN — POTASSIUM BICARBONATE 25 MEQ: 978 TABLET, EFFERVESCENT ORAL at 10:26

## 2024-03-09 RX ADMIN — Medication 10 ML: at 20:52

## 2024-03-09 ASSESSMENT — PAIN SCALES - GENERAL
PAINLEVEL_OUTOF10: 0
PAINLEVEL_OUTOF10: 2

## 2024-03-09 NOTE — ED PROVIDER NOTES
I independently examined and evaluated Carolina Arguelles.  I personally saw the patient and performed a substantive portion of the visit including all aspects of the medical decision making.    In brief, this 44-year-old female is presenting due to rash to the left wrist, low blood pressure, and concern for UTI on outpatient workup.  Denies any fevers, chills, nausea, vomiting.  Was advised that she has 2 different bacteria in her urine and was sent in for evaluation and possible IV antibiotics.    Focused exam revealed   General: Alert, no acute distress, patient resting comfortably   Skin: warm, intact, no pallor noted   Head: Normocephalic, atraumatic   Eye: Normal conjunctiva   Cardiac: Normal peripheral perfusion  Respiratory: No acute distress   Musculoskeletal: No deformity, full ROM.   Neurological: alert and oriented, normal sensory and motor observed.   Psychiatric: Cooperative    ED course: Patient was hypotensive on arrival and given a liter of IV fluid with improvement.  Lab work shows mild azotemia, but near patient's baseline.  She does have a severe hypocalcemia of 5.2.  She has had hypocalcemia in the past and actually takes oral replacement, unclear of the etiology of this at this time.  Given 2 g calcium IV in the ED.  Her previous urine culture is reviewed and is growing Pseudomonas and Enterococcus.  These both seem to be sensitive to Zosyn, which we will start here in the ED.  Due to the patient's hypotension initially and her hypocalcemia, will admit for further treatment.    Critical care    Critical care time 32 minutes exclusive from separate billable procedures that were performed. The following was considered in the determination of critical care but not limited to the level of medical decision making, intensive cardiac and/or respiratory monitoring, frequent vital sign monitoring, evaluation of laboratory studies, evaluation of radiographic studies, oxygen monitoring, and constant  monitoring and speaking to family at bedside.    All diagnostic, treatment, and disposition decisions were made by myself in conjunction with the advanced practice provider/resident.    I personally saw the patient and made/approved the management plan and take responsibility for the patient management.     For all further details of the patient's emergency department visit, please see the advanced practice provider's/resident's documentation.    Comment: Please note this report has been produced using speech recognition software and may contain errors related to that system including errors in grammar, punctuation, and spelling, as well as words and phrases that may be inappropriate. If there are any questions or concerns please feel free to contact the dictating provider for clarification.        Vic Fallon,   03/09/24 0105

## 2024-03-09 NOTE — PLAN OF CARE
Problem: Safety - Adult  Goal: Free from fall injury  Outcome: Progressing  Flowsheets (Taken 3/9/2024 8031)  Free From Fall Injury: Instruct family/caregiver on patient safety

## 2024-03-09 NOTE — CONSULTS
Patient seen and examined, consult note dictated.    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 Her MARCO A is likely secondary to a pre-renal component, although a non oliguric ATN in the setting of hypotension cannot be ruled out. Her urinary output is well maintained and her serum creatinine is slowly trending down.  - Continue volume expansion.  - Avoid all nephrotoxic agents at this time.  - Maintain systolic blood pressure > 120 mmHg.    2- Hypernatremia: We will attempt hypotonic IV fluids.    3- Kidney transplant: Continue current immunosuppressive regimen consisting of Prograf and Prednisone.    4- Anemia: Stable hemoglobin, monitor.    5- Multi-drug resistant UTI: Currently on IV antibiotics per primary team.

## 2024-03-09 NOTE — PROGRESS NOTES
Occupational Therapy  Facility/Department: [place] [id number] [facility]  Occupational Therapy Initial Assessment    Name: Carolina Arguelles  : 1980  MRN: [id number]  Date of Service: 3/9/2024    Discharge Recommendations:  Subacute/Skilled Nursing Facility, 24 hour supervision or assist, Home with nursing aide, Home with Home health OT  OT Equipment Recommendations  Equipment Needed: Yes (CTA)  Mobility Devices: ADL Assistive Devices  ADL Assistive Devices: Transfer Tub Bench;Toileting - Drop Arm Commode, Heavy Duty Drop Arm Commode;Hand-held Shower     Therapy discharge recommendations are subject to collaboration from the patient’s interdisciplinary healthcare team, including MD and case management recommendations.    Barriers to Home Discharge:   [] Steps to access home entry or bed/bath:   [x] Unable to transfer, ambulate, or propel wheelchair household distances without assist   [] Limited available assist at home upon discharge    [] Patient or family requests d/c to post-acute facility    [x] Poor cognition/safety awareness for d/c to home alone    [] Unable to maintain ordered weight bearing status    [x] Patient with salient signs of long-standing immobility   [x] Decreased independence with ADLs   [x] Increased risk for falls   [] Other:    If pt is unable to be seen after this session, please let this note serve as discharge summary.  Please see case management note for discharge disposition.  Thank you.    Patient Diagnosis(es): The primary encounter diagnosis was Hypocalcemia. Diagnoses of Hypotension, unspecified hypotension type and Cystitis were also pertinent to this visit.  Past Medical History:  has a past medical history of Anemia, Bacteremia, Chronic kidney disease, Diabetes mellitus type 1 (HCC), Hyperlipidemia, Hypertension, Kidney transplant recipient, and No history of procedure.  Past Surgical History:  has a past surgical history that includes Dental surgery; other surgical history (Left,  Cognition  Education Outcome: Unable to demonstrate understanding;Unable to verbalize;Continued education needed                    AM-PAC - ADL  AM-PAC Daily Activity - Inpatient   How much help is needed for putting on and taking off regular lower body clothing?: Total  How much help is needed for bathing (which includes washing, rinsing, drying)?: Total  How much help is needed for toileting (which includes using toilet, bedpan, or urinal)?: Total  How much help is needed for putting on and taking off regular upper body clothing?: Total  How much help is needed for taking care of personal grooming?: A Lot  How much help for eating meals?: A Lot  AM-[facility] Inpatient Daily Activity Raw Score: 8  AM-PAC Inpatient ADL T-Scale Score : 22.86  ADL Inpatient CMS 0-100% Score: 85.69  ADL Inpatient CMS G-Code Modifier : CM    Goals  Short Term Goals  Time Frame for Short Term Goals: 1 week by 3/16/24  Short Term Goal 1: pt will complete stand pivot to BSC with mod A x1  Short Term Goal 2: pt will complete  Short Term Goal 3: pt  Patient Goals   Patient goals : did not verbalize       Therapy Time   Individual Concurrent Group Co-treatment   Time In 1240         Time Out 1326         Minutes 46         Timed Code Treatment Minutes: 36 Minutes (10 minute evaluation)       Ross Stringer OT

## 2024-03-09 NOTE — PLAN OF CARE
Problem: Discharge Planning  Goal: Discharge to home or other facility with appropriate resources  Outcome: Progressing     Problem: Pain  Goal: Verbalizes/displays adequate comfort level or baseline comfort level  3/9/2024 1014 by Wendy Lewis RN  Outcome: Progressing  3/9/2024 0538 by LAUREN WARREN  Outcome: Progressing

## 2024-03-09 NOTE — PROGRESS NOTES
Physical Therapy  Facility/Department: [place] [address] [facility]  Physical Therapy Initial Assessment    Name: Carolina Arguelles  : 1980  MRN: [id number]  Date of Service: 3/9/2024    Discharge Recommendations:  Subacute/Skilled Nursing Facility   PT Equipment Recommendations  Equipment Needed: No    Barriers to home discharge:   [x] Steps to access home entry or bed/bath:   [] No rail with steps to access home entry or bed/bath   [] Reported available assist at home upon discharge limited   [] Patient or family requests DC to other than home    [] Patient reports expectation of post acute Discharge disposition to other than home   [] Other:        Patient Diagnosis(es): The primary encounter diagnosis was Hypocalcemia. Diagnoses of Hypotension, unspecified hypotension type and Cystitis were also pertinent to this visit.  Past Medical History:  has a past medical history of Anemia, Bacteremia, Chronic kidney disease, Diabetes mellitus type 1 (HCC), Hyperlipidemia, Hypertension, Kidney transplant recipient, and No history of procedure.  Past Surgical History:  has a past surgical history that includes Dental surgery; other surgical history (Left, 2014); Dialysis fistula creation; other surgical history (2016); Upper gastrointestinal endoscopy (2016); and Kidney transplant.    Assessment   Body Structures, Functions, Activity Limitations Requiring Skilled Therapeutic Intervention: Decreased functional mobility ;Decreased cognition;Decreased endurance;Decreased ADL status;Decreased body mechanics;Decreased strength;Decreased safe awareness;Decreased high-level IADLs;Decreased vision/visual deficit;Decreased fine motor control;Decreased balance;Decreased coordination;Decreased posture  Assessment: Pt tolerated therapy session fair. Therapy this date focused on bed mobility, sitting balance, and sit to stand attempts with and without stedy. She requires moderate assist x2 people for bed mobility and  Wheelchair-manual  ADL Assistance: Needs assistance  Bath: Dependent/Total  Dressing: Dependent/Total  Toileting: Needs assistance  Homemaking Responsibilities: No  Ambulation Assistance: Needs assistance  Transfer Assistance: Needs assistance  Vision/Hearing  Vision  Vision: Impaired (endorses decreased L visual field)  Hearing  Hearing: Within functional limits    Cognition   Orientation  Orientation Level: Oriented to place;Oriented to person;Oriented to situation  Cognition  Overall Cognitive Status: Exceptions  Arousal/Alertness: Delayed responses to stimuli  Following Commands: Follows one step commands with increased time;Follows one step commands with repetition  Attention Span: Attends with cues to redirect  Memory: Decreased short term memory  Safety Judgement: Good awareness of safety precautions  Problem Solving: Assistance required to identify errors made  Insights: Decreased awareness of deficits  Initiation: Requires cues for some  Sequencing: Requires cues for some  Cognition Comment: cues for march, initially states February     Objective   Pulse: 78  Heart Rate Source: Monitor  BP: 121/87  BP Location: Right upper arm  BP Method: Automatic  Patient Position: Semi fowlers  MAP (Calculated): 98  Respirations: 16  SpO2: 99 %  O2 Device: None (Room air)  Temp: 97.8 °F (36.6 °C)     Observation/Palpation  Posture: Fair  Gross Assessment  AROM: Generally decreased, functional  Strength: Generally decreased, functional (RLE WNL, LLE grossly 4-/5)  Coordination: Generally decreased, functional                 Bed Mobility Training  Bed Mobility Training: Yes  Overall Level of Assistance: Moderate assistance;Assist X2  Supine to Sit: Moderate assistance  Sit to Supine: Moderate assistance  Gait Training  Gait Training: No  Bed mobility  Rolling to Left: Stand by assistance  Rolling to Right: Minimal assistance  Supine to Sit: Moderate assistance;2 Person assistance  Sit to Supine: Moderate assistance;2  Person assistance  Transfers  Sit to Stand: Moderate Assistance;2 Person Assistance (decreased initiation of task 2/2 fear of falling; x1 to RW, x1 to stedy)  Stand to Sit: Moderate Assistance;2 Person Assistance  Comment: pt endorses lightheadedness with prolonged standing in stedy; BP in room unable to take, returned to supine                    AM-PAC - Mobility    AM-PAC Basic Mobility - Inpatient   How much help is needed turning from your back to your side while in a flat bed without using bedrails?: A Little  How much help is needed moving from lying on your back to sitting on the side of a flat bed without using bedrails?: A Lot  How much help is needed moving to and from a bed to a chair?: Total  How much help is needed standing up from a chair using your arms?: A Lot  How much help is needed walking in hospital room?: Total  How much help is needed climbing 3-5 steps with a railing?: Total  AM-PAC Inpatient Mobility Raw Score : 10  AM-PAC Inpatient T-Scale Score : 32.29  Mobility Inpatient CMS 0-100% Score: 76.75  Mobility Inpatient CMS G-Code Modifier : CL         Goals  Short Term Goals  Time Frame for Short Term Goals: 3/16/24  Short Term Goal 1: Pt will complete bed mobility with minimal assistance  Short Term Goal 2: Pt will demo sit to stand with minimal assistance  Short Term Goal 3: Pt will transfer from bed>chair with LRAD and minimal assistance  Additional Goals?: No  Long Term Goals  Time Frame for Long Term Goals : 3/23/24  Long Term Goal 1: Pt will complete bed mobility with mod I  Long Term Goal 2: Pt will complete sit to stand with CGA  Long Term Goal 3: Pt will complete bed>chair transfer with SBA  Additional Goals?: No  Patient Goals   Patient Goals : To go home       Education  Patient Education  Education Given To: Patient  Education Provided: Role of Therapy;Plan of Care;Transfer Training;Orientation  Education Method: Demonstration;Verbal  Barriers to Learning: Cognition  Education

## 2024-03-09 NOTE — PROGRESS NOTES
Shift assessment completed.  Pt A&O, VSS.  Denies any needs at this time. Bed locked and in lowest position.  Call light within reach. Will continue to monitor.

## 2024-03-09 NOTE — PROGRESS NOTES
Pt arrived to floor via stretcher A&OX4, pt unable to ambulate at baseline, vitals obtained, admission assessment partially completed, med rec unable to be completed d/t patient not knowing what medications she takes at home. Bed alarm in place for safety and bed in lowest locked position with 2/4 rails up. Pt with bordered red area to left wrist. Call light in reach.

## 2024-03-09 NOTE — CONSULTS
[facility]              [address] [place] [address]                              CONSULTATION      PATIENT NAME: JENNIFER QUARLES               : 1980  MED REC NO: [id number]                      ROOM: 0343  ACCOUNT NO: [id number]                       ADMIT DATE: 2024  PROVIDER: Haile Parker MD      REASON FOR CONSULTATION:  Acute on chronic kidney injury.    HISTORY OF PRESENT ILLNESS:  The patient is a 44-year-old  female patient with past medical history significant for end-stage renal disease, status post kidney transplant.  She presented to [facility] complaining of progressive weakness and recurrent multi-drug resistant urinary tract infections.  The patient was admitted for further evaluation.  Nephrology was consulted for further management of her kidney transplant and worsening renal function.    PAST MEDICAL HISTORY:    1. Anemia.  2. End-stage renal disease.  3. Kidney transplant.  4. Hyperlipidemia.  5. Hypertension.  6. Diabetes mellitus.    PAST SURGICAL HISTORY:    1. Colonoscopy.  2. AV fistula creation.  3. Kidney transplant.    ALLERGIES:  THE PATIENT IS ALLERGIC TO LATEX, ACETAMINOPHEN, AND HYDROCODONE.      REVIEW OF SYSTEMS:  The patient denied any nausea, vomiting, or abdominal pain.  Otherwise, a 10-point review of systems was relatively unremarkable.    PHYSICAL EXAMINATION:  VITAL SIGNS:  Blood pressure 121/87, heart rate 78, respirations 16, temperature 97.8 Fahrenheit.  The patient satting 99% on room air.  GENERAL APPEARANCE:  The patient is alert, oriented x3, not in acute distress.  HEENT:  Eyes revealed normal conjunctivae, reactive pupils.  NECK:  Revealed midline trachea.  Nonpalpable thyroid.  LUNGS:  Clear to anterior auscultation bilaterally.  Nonlabored breathing.  CARDIOVASCULAR:  Revealed S1, S2.  Regular rate and rhythm.  No added murmurs or rubs.  No peripheral edema.  ABDOMEN:  Revealed

## 2024-03-09 NOTE — CONSULTS
Infectious Diseases   Consult Note      Reason for Consult: MDR UTI hx renal transplant    Requesting Physician: Dr. 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  Patient also reported a rash on the left upper arm with area of blanching erythema.  Denies any chest pain, shortness of breath, fevers or chills.  He denies any suprapubic or CVA tenderness.  Upon presentation, WBC was noted to be 8.4 and she was started on piperacillin/tazobactam.  Creatinine on presentation was 2.9.                      Current abx: [id number]         Past Surgical History:       Diagnosis Date    Anemia     Bacteremia 09/05/2017    E coli    Chronic kidney disease     Diabetes mellitus type 1 (HCC)     Hyperlipidemia     Hypertension     Kidney transplant recipient     No history of procedure 4/20/16    colonoscopy         Procedure Laterality Date    DENTAL SURGERY      DIALYSIS FISTULA CREATION      KIDNEY TRANSPLANT      OTHER SURGICAL HISTORY Left 9/29/2014    Port placed left chest    OTHER SURGICAL HISTORY  04/11/2016    removal of port-a-cath and insertion of new port-a-cath    UPPER GASTROINTESTINAL ENDOSCOPY  04-    Esophagitis       Social History:    TOBACCO:   reports that she has never smoked. She has never used smokeless tobacco.  ETOH:   reports no history of alcohol use.  There is no history of illicit drug use or other significant epidemiologic exposures.    Family History:       Problem Relation Age of Onset    Diabetes Mother       VITALS:  /75   Pulse 82   Temp 97.8 °F (36.6 °C)   Resp 16   Ht 1.575 m (5' 2\")   Wt 52.2 kg (115 lb 1.3 oz)   LMP  (LMP Unknown)   SpO2 97%   BMI 21.05 kg/m²      24HR INTAKE/OUTPUT:    Intake/Output Summary (Last 24 hours) at 3/9/2024 1239  Last data filed at 3/9/2024 1010  Gross per 24 hour   Intake --   Output 450 ml   Net -450 ml     CONSTITUTIONAL:  Awake, alert, cooperative, no apparent distress  HEENT: NCAT, PERRL, EOMI.  Sclera white, conjunctive full.  OP with moist mucosal membranes, no thrush, tongue protrudes midline  NECK:  Supple, symmetrical, trachea midline, no adenopathy  LUNGS:  no increased work of breathing and clear to auscultation. No accessory muscle use  CARDIOVASCULAR: S1 and S2, no murmur  ABDOMEN:  normal bowel sounds, non-tender, non-distended, no hepatosplenomegaly  LYMPHADENOPATHY:  no axillary or supraclavicular adenopathy. No cervical adnenopathy  PSYCHIATRIC: Oriented to person place and time. No obvious depression or anxiety.  MUSCULOSKELETAL: Left arm weakness from CVA.   SKIN:  normal skin color, texture, turgor and no redness, warmth, or swelling. No palpable nodules or stigmata of embolic phenomenon  NEUROLOGIC: nonfocal exam  ACCESS: PIV     DATA:    Old records have been reviewed    CBC:  Recent Labs     03/08/24 1905 03/09/24  0841   WBC 8.4 7.7   RBC 2.87* 2.85*   HGB 8.5* 8.5*   HCT 25.9* 25.7*   * 119*   MCV 90.2 90.1   MCH 29.4 29.9   MCHC 32.7 33.2   RDW 16.9* 16.0*      BMP:  Recent Labs     03/08/24 1905 03/09/24  0841    147*   K 3.8 3.2*   CL 97* 101   CO2 26 26   BUN 29* 29*   CREATININE 2.9* 2.7*   CALCIUM 5.2* 5.3*   GLUCOSE 117* 65*        Cultures:   Urine culture 3/6: 50,000 CFU Pseudomonas aeruginosa and 25,000 CFU Enterococcus faecalis.  Susceptibility     Pseudomonas aeruginosa Enterococcus faecalis     BACTERIAL SUSCEPTIBILITY PANEL BY GERALDO BACTERIAL SUSCEPTIBILITY PANEL BY GERALDO     ampicillin   8 mcg/mL Sensitive

## 2024-03-09 NOTE — ED PROVIDER NOTES
[facility]  ED  EMERGENCY DEPARTMENT ENCOUNTER        Pt Name: Carolina Arguelles  MRN: [id number]  Birthdate 1980  Date of evaluation: 3/8/2024  Provider: HERMELINDA Miller  PCP: Radha Pompa APRN - CNP  Note Started: 9:36 PM EST 3/8/24       I have seen and evaluated this patient with my supervising physician Vic Fallon DO.      CHIEF COMPLAINT       Chief Complaint   Patient presents with    Rash     Pt here for redness to left wrist area, pt family concerned for cellulitis. Pt denies any injury, pt has numbness in left arm due to CVA.       HISTORY OF PRESENT ILLNESS: 1 or more Elements     History from : Patient    Limitations to history : None    Carolina Arguelles is a 44 y.o. female who presents to the emergency department for concerns of failing outpatient antibiotic for urinary tract infection.  Patient also developed a rash on her left wrist over the past 2 days.  They did see their primary care doctor today who referred them to the emergency department for further evaluation.  She was diagnosed with urinary tract infection on 3/6 and started on antibiotics.  The urine culture came back today which shows 2 different types of bacteria.  She has not had any fevers or chills.  She is eating and drinking appropriately.  Denies any abdominal pain, nausea or vomiting.      Nursing Notes were all reviewed and agreed with or any disagreements were addressed in the HPI.    REVIEW OF SYSTEMS :      Review of Systems    Positives and Pertinent negatives as per HPI.     SURGICAL HISTORY     Past Surgical History:   Procedure Laterality Date    DENTAL SURGERY      DIALYSIS FISTULA CREATION      KIDNEY TRANSPLANT      OTHER SURGICAL HISTORY Left 9/29/2014    Port placed left chest    OTHER SURGICAL HISTORY  04/11/2016    removal of port-a-cath and insertion of new port-a-cath    UPPER GASTROINTESTINAL ENDOSCOPY  04-    Esophagitis       CURRENTMEDICATIONS       Previous Medications    Take 1 tablet by mouth nightly       ALLERGIES     Latex, Acetaminophen, Hydrocodone, and Hydrocodone-acetaminophen    FAMILYHISTORY       Family History   Problem Relation Age of Onset    Diabetes Mother     Cancer Maternal Aunt     Cancer Paternal Uncle         SOCIAL HISTORY       Social History     Tobacco Use    Smoking status: Never    Smokeless tobacco: Never   Vaping Use    Vaping Use: Never used   Substance Use Topics    Alcohol use: No    Drug use: No       SCREENINGS        Coker Coma Scale  Eye Opening: Spontaneous  Best Verbal Response: Oriented  Best Motor Response: Obeys commands  Clementine Coma Scale Score: 15                CIWA Assessment  BP: 99/68  Pulse: 81           PHYSICAL EXAM  1 or more Elements     ED Triage Vitals [03/08/24 1801]   BP Temp Temp Source Pulse Respirations SpO2 Height Weight - Scale   (!) 89/73 97.7 °F (36.5 °C) Oral 81 16 100 % -- 54.4 kg (120 lb)       Physical Exam  Vitals and nursing note reviewed.   Constitutional:       Appearance: Normal appearance. She is not diaphoretic.   HENT:      Head: Normocephalic and atraumatic.      Nose: Nose normal.      Mouth/Throat:      Mouth: Mucous membranes are moist.   Eyes:      General:         Right eye: No discharge.         Left eye: No discharge.      Extraocular Movements: Extraocular movements intact.   Cardiovascular:      Rate and Rhythm: Normal rate and regular rhythm.      Pulses: Normal pulses.      Heart sounds: Normal heart sounds. No murmur heard.     No friction rub. No gallop.   Pulmonary:      Effort: Pulmonary effort is normal. No respiratory distress.      Breath sounds: Normal breath sounds. No stridor. No wheezing, rhonchi or rales.   Musculoskeletal:         General: Normal range of motion.      Cervical back: Normal range of motion and neck supple.   Skin:     General: Skin is warm and dry.      Coloration: Skin is not pale.      Comments: Patient has some erythema to her left wrist, no area of fluctuance.

## 2024-03-09 NOTE — PROGRESS NOTES
.4 Eyes Skin Assessment     The patient is being assess for  Transfer to New Unit    I agree that 2 RN's have performed a thorough Head to Toe Skin Assessment on the patient. ALL assessment sites listed below have been assessed.      Areas assessed by both nurses: SHIRA Rey & SHIRA Kaiser  [x]   Head, Face, and Ears   [x]   Shoulders, Back, and Chest  [x]   Arms, Elbows, and Hands   [x]   Coccyx, Sacrum, and IschIum  [x]   Legs, Feet, and Heels        Does the Patient have Skin Breakdown?  No         Bobby Prevention initiated:  Yes   Wound Care Orders initiated:  NA      [facility] nurse consulted for Pressure Injury (Stage 3,4, Unstageable, DTI, NWPT, and Complex wounds), New and Established Ostomies:  NA      Nurse 1 eSignature: Electronically signed by Krissy Reza RN on 3/9/24 at 4:21 PM EST    **SHARE this note so that the co-signing nurse is able to place an eSignature**    Nurse 2 eSignature: Electronically signed by Awilda Wang RN on 3/9/24 at 7:17 PM EST

## 2024-03-09 NOTE — PROGRESS NOTES
Hospital Medicine Progress Note      Date of Admission: 3/8/2024  Hospital Day: 2    Chief Admission Complaint:   Weakness      Subjective: She is sitting up in bed.  In no distress.  States she is feeling a little better.  She is eating and drinking well.  She denies any chest pain or shortness of breath    Presenting Admission History:       44 y.o. female with past medical history of CVA, with a residual left upper and lower extremity weakness, chronic urinary retention who has to do daily self-catheterization who was recently treated for multidrug-resistant urinary tract infection outpatient by PCP.  Upon follow-up with PCP, patient was sent to the ER because urine cultures came back growing Pseudomonas and Enterococcus faecalis.    Patient also reports a rash on the outer aspect of the left upper arm.  The rash is about 10 cm in length with blanching erythema.  Patient otherwise looks comfortable in bed, she denies chest pain, shortness of breath, fever or chills.       Assessment/Plan:      Current Principal Problem:  Acute cystitis with hematuria    Multidrug-resistant urinary tract infection: Patient has been started on IV Zosyn in the ER, will continue same.  Follow urine cultures.  Will ask infectious disease to see in consultation for further input     Left upper extremity cellulitis: Continue with IV Zosyn as above.     Type 2 diabetes: Continue basal insulin with Accu-Cheks and sliding scale coverage.     History of renal transplant: Stable, continue tacrolimus and low-dose prednisone.  Will ask nephrology to see in consultation.      History of CVA: She does have residual left upper and lower extremity weakness.  Stable continue aspirin and Plavix.  Will ask PT/OT to eval     Hypertension: Continue Norvasc.  Follow BP adjust as needed optimize control     Hyperlipidemia: Continue Lipitor.     Depressive disorder: Continue antidepressants.  Her mood seems stable at this time     Discussed management  Oral TID WC    predniSONE  10 mg Oral Daily    Vitamin D  2,000 Units Oral Daily    magnesium sulfate  1,000 mg IntraVENous Once    potassium bicarbonate  25 mEq Oral Once    piperacillin-tazobactam         PRN Meds: sodium chloride flush, sodium chloride, ondansetron **OR** ondansetron, acetaminophen **OR** acetaminophen, polyethylene glycol, glucose, dextrose bolus **OR** dextrose bolus, glucagon (rDNA), dextrose, methocarbamol, piperacillin-tazobactam     Labs:  Personally reviewed and interpreted for clinical significance.     Recent Labs     03/08/24 1905 03/09/24  0841   WBC 8.4 7.7   HGB 8.5* 8.5*   HCT 25.9* 25.7*   * 119*     Recent Labs     03/08/24 1905 03/09/24  0841    147*   K 3.8 3.2*   CL 97* 101   CO2 26 26   BUN 29* 29*   CREATININE 2.9* 2.7*   CALCIUM 5.2* 5.3*   MG  --  1.10*     No results for input(s): \"PROBNP\", \"TROPHS\" in the last 72 hours.  No results for input(s): \"LABA1C\" in the last 72 hours.  Recent Labs     03/08/24 1905   AST 27   ALT 26   BILITOT <0.2   ALKPHOS 171*     No results for input(s): \"INR\", \"LACTA\", \"TSH\" in the last 72 hours.    Urine Cultures:   Lab Results   Component Value Date/Time    LABURIN 50,000 CFU/ml 03/06/2024 02:20 PM    LABURIN 25,000 CFU/ml 03/06/2024 02:20 PM     Blood Cultures:   Lab Results   Component Value Date/Time    BC No growth after 5 days of incubation. 09/05/2017 08:28 PM     Lab Results   Component Value Date/Time    BLOODCULT2 See additional report for complete BCID panel. 09/05/2017 10:51 PM    BLOODCULT2 POSITIVE for  Isolated one out of two bottles   09/05/2017 10:51 PM     Organism:   Lab Results   Component Value Date/Time    ORG Pseudomonas aeruginosa 03/06/2024 02:20 PM    ORG Enterococcus faecalis 03/06/2024 02:20 PM         ANGIE CANNON MD

## 2024-03-09 NOTE — PROGRESS NOTES
[place] [facility] [place] [facility].    Krissy Reza RN and SHIRA Kaiser performed complete skin assessment on transfer. Assessment WNL.    Upon transferring patient to ordered level of care, patient’s medications were gathered from the following locations and given to receiving nurse during bedside report:    Lock Box in room :     [x] Yes   [] No   Pyxis Bin:       [x] Yes   [] No      Pyxis Refrigerator:      [x] Yes   [] No   Tube System:       [x] Yes   [] No   LDA's documented:  [x] Yes   [] No          The following paperwork was transferred with patient:       12 hour chart check:       [x] Yes   [] No     Patient belongings:       [x] Yes   [] No      MD notified via Perfect Serve:   [x] Yes   [] No    Family notified of transfer:    [x] Yes   [] No

## 2024-03-09 NOTE — H&P
Hospital Medicine History & Physical      Date of Admission: 3/9/2024    Date of Service:  Pt seen/examined on 3/9/2024    [x]Admitted to Inpatient with expected LOS greater than two midnights due to medical therapy.  []Placed in Observation status.    Chief Admission Complaint: Weakness    Presenting Admission History:      44 y.o. female with past medical history of CVA, left upper extremity residual deficits, urinary retention with self-catheterization who was recently treated for multidrug-resistant urinary tract infection outpatient by PCP.  Upon follow-up with PCP, patient was sent to the ER because urine cultures came back growing Pseudomonas and Enterococcus faecalis.    Patient also reports a rash on the outer aspect of the left upper arm.  The rash is about 10 cm in length with blanching erythema.  Patient otherwise looks comfortable in bed, she denies chest pain, shortness of breath, fever or chills.    Assessment/Plan:    Multidrug-resistant urinary tract infection  Left upper extremity cellulitis  Type 2 diabetes  History of renal transplant  History of CVA  Hypertension  Hyperlipidemia  Depressive disorder    Plan  Multidrug-resistant urinary tract infection: Patient has been started on IV Zosyn in the ER, will continue same.  Follow urine cultures.  Consider infectious disease consultation pending culture results.    Left upper extremity cellulitis: Continue with IV Zosyn as above.    Type 2 diabetes: Continue basal insulin with Accu-Cheks and sliding scale coverage.    History of renal transplant: Stable, continue tacrolimus.    History of CVA: Stable continue aspirin and Plavix.    Hypertension: Continue Norvasc.    Hyperlipidemia: Continue Lipitor.    Depressive disorder: Continue antidepressants.    Discussed management and the need for Hospitalization of the patient w/ the Emergency Department Provider.      Physical Exam Performed:      BP 96/63   Pulse 79   Temp 98.1 °F (36.7 °C) (Axillary)

## 2024-03-10 LAB
ANION GAP SERPL CALCULATED.3IONS-SCNC: 16 MMOL/L (ref 3–16)
BACTERIA UR CULT: ABNORMAL
BACTERIA UR CULT: ABNORMAL
BASOPHILS # BLD: 0 K/UL (ref 0–0.2)
BASOPHILS NFR BLD: 0.3 %
BUN SERPL-MCNC: 27 MG/DL (ref 7–20)
CALCIUM SERPL-MCNC: 5.7 MG/DL (ref 8.3–10.6)
CHLORIDE SERPL-SCNC: 100 MMOL/L (ref 99–110)
CO2 SERPL-SCNC: 23 MMOL/L (ref 21–32)
CREAT SERPL-MCNC: 2.6 MG/DL (ref 0.6–1.1)
DEPRECATED RDW RBC AUTO: 16.1 % (ref 12.4–15.4)
EOSINOPHIL # BLD: 0.1 K/UL (ref 0–0.6)
EOSINOPHIL NFR BLD: 1.2 %
EST. AVERAGE GLUCOSE BLD GHB EST-MCNC: 145.6 MG/DL
GFR SERPLBLD CREATININE-BSD FMLA CKD-EPI: 23 ML/MIN/{1.73_M2}
GLUCOSE BLD-MCNC: 133 MG/DL (ref 70–99)
GLUCOSE BLD-MCNC: 141 MG/DL (ref 70–99)
GLUCOSE BLD-MCNC: 179 MG/DL (ref 70–99)
GLUCOSE BLD-MCNC: 192 MG/DL (ref 70–99)
GLUCOSE SERPL-MCNC: 193 MG/DL (ref 70–99)
HBA1C MFR BLD: 6.7 %
HCT VFR BLD AUTO: 23.1 % (ref 36–48)
HGB BLD-MCNC: 7.6 G/DL (ref 12–16)
LYMPHOCYTES # BLD: 0.9 K/UL (ref 1–5.1)
LYMPHOCYTES NFR BLD: 11.8 %
MAGNESIUM SERPL-MCNC: 1.5 MG/DL (ref 1.8–2.4)
MCH RBC QN AUTO: 29.6 PG (ref 26–34)
MCHC RBC AUTO-ENTMCNC: 32.9 G/DL (ref 31–36)
MCV RBC AUTO: 89.9 FL (ref 80–100)
MONOCYTES # BLD: 0.3 K/UL (ref 0–1.3)
MONOCYTES NFR BLD: 4.5 %
NEUTROPHILS # BLD: 6.1 K/UL (ref 1.7–7.7)
NEUTROPHILS NFR BLD: 82.2 %
ORGANISM: ABNORMAL
ORGANISM: ABNORMAL
PERFORMED ON: ABNORMAL
PLATELET # BLD AUTO: 96 K/UL (ref 135–450)
PLATELET BLD QL SMEAR: ABNORMAL
PMV BLD AUTO: 8.6 FL (ref 5–10.5)
POTASSIUM SERPL-SCNC: 3.9 MMOL/L (ref 3.5–5.1)
RBC # BLD AUTO: 2.57 M/UL (ref 4–5.2)
SLIDE REVIEW: ABNORMAL
SODIUM SERPL-SCNC: 139 MMOL/L (ref 136–145)
WBC # BLD AUTO: 7.4 K/UL (ref 4–11)

## 2024-03-10 PROCEDURE — 83735 ASSAY OF MAGNESIUM: CPT

## 2024-03-10 PROCEDURE — 6370000000 HC RX 637 (ALT 250 FOR IP): Performed by: FAMILY MEDICINE

## 2024-03-10 PROCEDURE — 1200000000 HC SEMI PRIVATE

## 2024-03-10 PROCEDURE — 99232 SBSQ HOSP IP/OBS MODERATE 35: CPT | Performed by: INTERNAL MEDICINE

## 2024-03-10 PROCEDURE — 51798 US URINE CAPACITY MEASURE: CPT

## 2024-03-10 PROCEDURE — 87449 NOS EACH ORGANISM AG IA: CPT

## 2024-03-10 PROCEDURE — 6370000000 HC RX 637 (ALT 250 FOR IP): Performed by: NURSE PRACTITIONER

## 2024-03-10 PROCEDURE — 51701 INSERT BLADDER CATHETER: CPT

## 2024-03-10 PROCEDURE — 36415 COLL VENOUS BLD VENIPUNCTURE: CPT

## 2024-03-10 PROCEDURE — 85025 COMPLETE CBC W/AUTO DIFF WBC: CPT

## 2024-03-10 PROCEDURE — 2580000003 HC RX 258: Performed by: NURSE PRACTITIONER

## 2024-03-10 PROCEDURE — 6360000002 HC RX W HCPCS: Performed by: INTERNAL MEDICINE

## 2024-03-10 PROCEDURE — 87324 CLOSTRIDIUM AG IA: CPT

## 2024-03-10 PROCEDURE — 6360000002 HC RX W HCPCS: Performed by: NURSE PRACTITIONER

## 2024-03-10 PROCEDURE — 2580000003 HC RX 258: Performed by: INTERNAL MEDICINE

## 2024-03-10 PROCEDURE — 80048 BASIC METABOLIC PNL TOTAL CA: CPT

## 2024-03-10 RX ORDER — SODIUM CHLORIDE 0.9 % (FLUSH) 0.9 %
5-40 SYRINGE (ML) INJECTION PRN
Status: DISCONTINUED | OUTPATIENT
Start: 2024-03-10 | End: 2024-03-12 | Stop reason: HOSPADM

## 2024-03-10 RX ORDER — LIDOCAINE HYDROCHLORIDE 10 MG/ML
5 INJECTION, SOLUTION INFILTRATION; PERINEURAL ONCE
Status: DISCONTINUED | OUTPATIENT
Start: 2024-03-10 | End: 2024-03-12 | Stop reason: HOSPADM

## 2024-03-10 RX ORDER — MAGNESIUM SULFATE 1 G/100ML
1000 INJECTION INTRAVENOUS ONCE
Status: COMPLETED | OUTPATIENT
Start: 2024-03-10 | End: 2024-03-10

## 2024-03-10 RX ORDER — SODIUM CHLORIDE 9 MG/ML
25 INJECTION, SOLUTION INTRAVENOUS PRN
Status: DISCONTINUED | OUTPATIENT
Start: 2024-03-10 | End: 2024-03-12 | Stop reason: HOSPADM

## 2024-03-10 RX ORDER — SODIUM CHLORIDE 0.9 % (FLUSH) 0.9 %
5-40 SYRINGE (ML) INJECTION EVERY 12 HOURS SCHEDULED
Status: DISCONTINUED | OUTPATIENT
Start: 2024-03-10 | End: 2024-03-12 | Stop reason: HOSPADM

## 2024-03-10 RX ORDER — SODIUM CHLORIDE 9 MG/ML
INJECTION, SOLUTION INTRAVENOUS CONTINUOUS
Status: DISCONTINUED | OUTPATIENT
Start: 2024-03-10 | End: 2024-03-12

## 2024-03-10 RX ADMIN — EZETIMIBE 10 MG: 10 TABLET ORAL at 08:19

## 2024-03-10 RX ADMIN — PIPERACILLIN AND TAZOBACTAM 3375 MG: 3; .375 INJECTION, POWDER, LYOPHILIZED, FOR SOLUTION INTRAVENOUS at 06:17

## 2024-03-10 RX ADMIN — HEPARIN SODIUM 5000 UNITS: 5000 INJECTION INTRAVENOUS; SUBCUTANEOUS at 06:19

## 2024-03-10 RX ADMIN — MAGNESIUM SULFATE HEPTAHYDRATE 1000 MG: 1 INJECTION, SOLUTION INTRAVENOUS at 14:29

## 2024-03-10 RX ADMIN — TACROLIMUS 1 MG: 1 CAPSULE ORAL at 20:01

## 2024-03-10 RX ADMIN — PANTOPRAZOLE SODIUM 40 MG: 40 TABLET, DELAYED RELEASE ORAL at 15:57

## 2024-03-10 RX ADMIN — TACROLIMUS 1 MG: 1 CAPSULE ORAL at 08:18

## 2024-03-10 RX ADMIN — FLUOXETINE HYDROCHLORIDE 20 MG: 20 CAPSULE ORAL at 08:19

## 2024-03-10 RX ADMIN — OLANZAPINE 7.5 MG: 5 TABLET, FILM COATED ORAL at 20:00

## 2024-03-10 RX ADMIN — INSULIN GLARGINE 22 UNITS: 100 INJECTION, SOLUTION SUBCUTANEOUS at 20:06

## 2024-03-10 RX ADMIN — Medication 2000 UNITS: at 08:19

## 2024-03-10 RX ADMIN — DEXTROSE AND SODIUM CHLORIDE: 5; 200 INJECTION, SOLUTION INTRAVENOUS at 06:18

## 2024-03-10 RX ADMIN — METHOCARBAMOL 500 MG: 500 TABLET ORAL at 08:46

## 2024-03-10 RX ADMIN — PANTOPRAZOLE SODIUM 40 MG: 40 TABLET, DELAYED RELEASE ORAL at 06:19

## 2024-03-10 RX ADMIN — ASPIRIN 81 MG: 81 TABLET, COATED ORAL at 08:19

## 2024-03-10 RX ADMIN — CLOPIDOGREL BISULFATE 75 MG: 75 TABLET ORAL at 08:19

## 2024-03-10 RX ADMIN — Medication 10 ML: at 08:16

## 2024-03-10 RX ADMIN — PREDNISONE 10 MG: 10 TABLET ORAL at 08:19

## 2024-03-10 RX ADMIN — SODIUM CHLORIDE: 9 INJECTION, SOLUTION INTRAVENOUS at 11:28

## 2024-03-10 RX ADMIN — Medication 10 ML: at 20:02

## 2024-03-10 RX ADMIN — ATORVASTATIN CALCIUM 40 MG: 40 TABLET, FILM COATED ORAL at 08:19

## 2024-03-10 RX ADMIN — TRAZODONE HYDROCHLORIDE 100 MG: 50 TABLET ORAL at 20:00

## 2024-03-10 RX ADMIN — PIPERACILLIN AND TAZOBACTAM 3375 MG: 3; .375 INJECTION, POWDER, LYOPHILIZED, FOR SOLUTION INTRAVENOUS at 15:56

## 2024-03-10 ASSESSMENT — PAIN SCALES - GENERAL
PAINLEVEL_OUTOF10: 0
PAINLEVEL_OUTOF10: 5

## 2024-03-10 NOTE — PLAN OF CARE
Problem: Discharge Planning  Goal: Discharge to home or other facility with appropriate resources  3/9/2024 2027 by Clark Hill, RN  Outcome: Progressing  Flowsheets (Taken 3/9/2024 2027)  Discharge to home or other facility with appropriate resources:   Identify barriers to discharge with patient and caregiver   Arrange for needed discharge resources and transportation as appropriate   Identify discharge learning needs (meds, wound care, etc)     Problem: Pain  Goal: Verbalizes/displays adequate comfort level or baseline comfort level  3/9/2024 2027 by Clark Hill, RN  Outcome: Progressing  Flowsheets (Taken 3/9/2024 2027)  Verbalizes/displays adequate comfort level or baseline comfort level:   Encourage patient to monitor pain and request assistance   Assess pain using appropriate pain scale   Administer analgesics based on type and severity of pain and evaluate response   Implement non-pharmacological measures as appropriate and evaluate response     Problem: Skin/Tissue Integrity  Goal: Absence of new skin breakdown  Description: 1.  Monitor for areas of redness and/or skin breakdown  2.  Assess vascular access sites hourly  3.  Every 4-6 hours minimum:  Change oxygen saturation probe site  4.  Every 4-6 hours:  If on nasal continuous positive airway pressure, respiratory therapy assess nares and determine need for appliance change or resting period.  Outcome: Progressing     Problem: Safety - Adult  Goal: Free from fall injury  3/9/2024 2027 by Clark Hill RN  Outcome: Progressing  Flowsheets (Taken 3/9/2024 1653 by Krissy Reza, RN)  Free From Fall Injury: Instruct family/caregiver on patient safety     Problem: Chronic Conditions and Co-morbidities  Goal: Patient's chronic conditions and co-morbidity symptoms are monitored and maintained or improved  Outcome: Progressing  Flowsheets (Taken 3/9/2024 2027)  Care Plan - Patient's Chronic Conditions and Co-Morbidity Symptoms are Monitored and

## 2024-03-10 NOTE — PROGRESS NOTES
Department of Internal Medicine  Nephrology Progress Note        SUBJECTIVE:    We are following this patient for A/CKD.  The patient was seen and examined; she feels well today with no CP, SOB, nausea or vomiting.    ROS: No fever or chills.  Social: No family at bedside.    Physical Exam:    VITALS:  /71   Pulse 79   Temp 97.7 °F (36.5 °C) (Oral)   Resp 16   Ht 1.575 m (5' 2\")   Wt 52.2 kg (115 lb 1.3 oz)   LMP  (LMP Unknown)   SpO2 99%   BMI 21.05 kg/m²     General appearance: Seems comfortable, no acute distress.  Neck: Trachea midline, thyroid normal.   Lungs:  Non labored breathing, CTA to anterior auscultation.  Heart:  S1S2 normal, rub or gallop. No peripheral edema.  Abdomen: Soft, non-tender, no organomegaly.   Skin: No lesions or rashes, warm to touch.     DATA:    CBC with Differential:    Lab Results   Component Value Date/Time    WBC 7.4 03/10/2024 06:08 AM    RBC 2.57 03/10/2024 06:08 AM    HGB 7.6 03/10/2024 06:08 AM    HCT 23.1 03/10/2024 06:08 AM    PLT 96 03/10/2024 06:08 AM    MCV 89.9 03/10/2024 06:08 AM    MCH 29.6 03/10/2024 06:08 AM    MCHC 32.9 03/10/2024 06:08 AM    RDW 16.1 03/10/2024 06:08 AM    SEGSPCT 85.9 05/17/2013 05:20 PM    BANDSPCT 1 02/25/2024 10:59 AM    METASPCT 2 02/25/2024 10:59 AM    LYMPHOPCT 11.8 03/10/2024 06:08 AM    PROMYELOPCT 1 02/25/2024 10:59 AM    MONOPCT 4.5 03/10/2024 06:08 AM    MYELOPCT 2 02/25/2024 10:59 AM    EOSPCT 1.0 08/04/2011 09:33 PM    BASOPCT 0.3 03/10/2024 06:08 AM    MONOSABS 0.3 03/10/2024 06:08 AM    LYMPHSABS 0.9 03/10/2024 06:08 AM    EOSABS 0.1 03/10/2024 06:08 AM    BASOSABS 0.0 03/10/2024 06:08 AM    DIFFTYPE Auto 05/17/2013 05:20 PM     BMP:    Lab Results   Component Value Date/Time     03/10/2024 06:08 AM    K 3.9 03/10/2024 06:08 AM    K 3.2 03/09/2024 08:41 AM     03/10/2024 06:08 AM    CO2 23 03/10/2024 06:08 AM    BUN 27 03/10/2024 06:08 AM    LABALBU 3.4 03/08/2024 07:05 PM    CREATININE 2.6 03/10/2024 06:08

## 2024-03-10 NOTE — PROGRESS NOTES
.4 Eyes Skin Assessment     The patient is being assess for  4 Eye Shift Assessment     I agree that 2 RN's have performed a thorough Head to Toe Skin Assessment on the patient. ALL assessment sites listed below have been assessed.      Areas assessed by both nurses: SHIRA Rey &   [x]   Head, Face, and Ears   [x]   Shoulders, Back, and Chest  [x]   Arms, Elbows, and Hands   [x]   Coccyx, Sacrum, and IschIum  [x]   Legs, Feet, and Heels        Does the Patient have Skin Breakdown?  No         Bobby Prevention initiated:  Yes   Wound Care Orders initiated:  NA      [facility] nurse consulted for Pressure Injury (Stage 3,4, Unstageable, DTI, NWPT, and Complex wounds), New and Established Ostomies:  NA      Nurse 1 eSignature: Electronically signed by Krissy Reza RN on 3/10/24 at 10:31 AM EDT    **SHARE this note so that the co-signing nurse is able to place an eSignature**    Nurse 2 eSignature: {Esignature:[id number]}

## 2024-03-10 NOTE — ACP (ADVANCE CARE PLANNING)
Advance Care Planning     General Advance Care Planning (ACP) Conversation    Date of Conversation: 3/10/2024  Conducted with: Patient with Decision Making Capacity    Healthcare Decision Maker:    Primary Decision Maker: Kelly Arguelles [facility] - [phone]  Click here to complete Healthcare Decision Makers including selection of the Healthcare Decision Maker Relationship (ie \"Primary\").   Today we documented Decision Maker(s) consistent with Legal Next of Kin hierarchy.    Content/Action Overview:  Has NO ACP documents-Information provided  Reviewed DNR/DNI and patient elects Full Code (Attempt Resuscitation)      Length of Voluntary ACP Conversation in minutes:  <16 minutes (Non-Billable)    Krysta New RN

## 2024-03-10 NOTE — PROGRESS NOTES
Hospital Medicine Progress Note      Date of Admission: 3/8/2024  Hospital Day: 3    Chief Admission Complaint:   Weakness     Subjective: She is lying in bed, she is in no distress.  Eating and drinking better today.  States she is feeling a little better today denies chest pain or shortness of breath    Presenting Admission History:       44 y.o. female with past medical history of CVA, with a residual left upper and lower extremity weakness, chronic urinary retention who has to do daily self-catheterization who was recently treated for multidrug-resistant urinary tract infection outpatient by PCP.  Upon follow-up with PCP, patient was sent to the ER because urine cultures came back growing Pseudomonas and Enterococcus faecalis.    Patient also reports a rash on the outer aspect of the left upper arm.  The rash is about 10 cm in length with blanching erythema.  Patient otherwise looks comfortable in bed, she denies chest pain, shortness of breath, fever or chills.    Assessment/Plan:      Current Principal Problem:  Acute cystitis with hematuria    Multidrug-resistant urinary tract infection: Patient has been started on IV Zosyn in the ER, will continue same.  Follow urine cultures.  Infectious disease has been consulted and their input is appreciated.  Will continue on IV Zosyn, follow-up on repeat cultures.     Left upper extremity cellulitis: Continue with IV Zosyn as above.     Type 2 diabetes: Continue basal insulin with Accu-Cheks and sliding scale coverage.     Thrombocytopenia: Have noted platelet count has been dropping.  No signs of bleeding or bruising noted.  May be secondary to infection.  But will hold subcutaneous heparin at this time and follow platelet count    History of renal transplant: Stable, continue tacrolimus and low-dose prednisone.  nephrology is following in consultation and input is noted and appreciated.     History of CVA: She does have residual left upper and lower extremity  Pseudomonas aeruginosa 03/08/2024 09:14 PM         ANGIE CANNON MD

## 2024-03-10 NOTE — PROGRESS NOTES
4 Eyes Skin Assessment     NAME:  Carolina Arguelles  YOB: 1980  MEDICAL RECORD NUMBER:  [id number]    The patient is being assessed for  Other Bobby score of 12    I agree that at least one RN has performed a thorough Head to Toe Skin Assessment on the patient. ALL assessment sites listed below have been assessed.      Areas assessed by both nurses: SHIRA Rodas/SHIRA Nugent    Head, Face, Ears, Shoulders, Back, Chest, Arms, Elbows, Hands, Sacrum. Buttock, Coccyx, Ischium, Legs. Feet and Heels, and Under Medical Devices         Does the Patient have a Wound? Peg tube in place, not in use.       Bobby Prevention initiated by RN: Yes  Wound Care Orders initiated by RN: No    Pressure Injury (Stage 3,4, Unstageable, DTI, NWPT, and Complex wounds) if present, place Wound referral order by RN under : No    New Ostomies, if present place, Ostomy referral order under : No     Nurse 1 eSignature: Electronically signed by Clark Hill RN on 3/9/24 at 9:12 PM EST    **SHARE this note so that the co-signing nurse can place an eSignature**    Nurse 2 eSignature: Electronically signed by Raffi Kennedy RN on 3/9/24 at 10:51 PM EST

## 2024-03-10 NOTE — CARE COORDINATION
Case Management Assessment  Initial Evaluation    Date/Time of Evaluation: 3/10/2024 1:02 PM  Assessment Completed by: Krysta New RN    If patient is discharged prior to next notation, then this note serves as note for discharge by case management.    Patient Name: Carolina Arguelles                   YOB: 1980  Diagnosis: Hypocalcemia [E83.51]  Cystitis [N30.90]  Acute cystitis with hematuria [N30.01]  Hypotension, unspecified hypotension type [I95.9]                   Date / Time: 3/8/2024  5:52 PM    Patient Admission Status: Inpatient   Readmission Risk (Low < 19, Mod (19-27), High > 27): Readmission Risk Score: 22.7    Current PCP: Radha Pompa APRN - CNP  PCP verified by CM? Yes    Chart Reviewed: Yes      History Provided by:    Patient Orientation: Alert and Oriented, Person, Place, Situation, Self    Patient Cognition: Alert    Hospitalization in the last 30 days (Readmission):  Yes    If yes, Readmission Assessment in CM Navigator will be completed.    Advance Directives:      Code Status: Full Code   Patient's Primary Decision Maker is: Legal Next of Kin    Primary Decision Maker: Kelly Arguelles - Parent - [phone]    Discharge Planning:    Patient lives with: Parent Type of Home: House  Primary Care Giver: Self  Patient Support Systems include: Parent   Current Financial resources: Medicare, Medicaid  Current community resources: None  Current services prior to admission: Other (Comment) (aide services 2x/week provided by [facility])            Current DME: Wheelchair            Type of Home Care services:  Aide Services    ADLS  Prior functional level: Assistance with the following:, Bathing, Dressing, Mobility, Shopping, Housework, Cooking, Toileting  Current functional level: Assistance with the following:, Bathing, Dressing, Toileting, Mobility, Shopping, Housework    PT AM-PAC: 10 /24  OT AM-PAC: 8 /24    Family can provide assistance at DC: Yes  Would you like Case

## 2024-03-10 NOTE — PLAN OF CARE
Problem: Safety - Adult  Goal: Free from fall injury  3/10/2024 1022 by Krissy Reza, RN  Outcome: Progressing  Flowsheets (Taken 3/9/2024 1653)  Free From Fall Injury: Instruct family/caregiver on patient safety

## 2024-03-10 NOTE — PROGRESS NOTES
Received patient on bed, alert and oriented x4. Denies pain nor dizziness at the time of assessment. Changed diaper, patient is clean and dry, mepilex in place. Repositioned every 2 hours to prevent skin breakdown.    Call bell within reach. Bed alarm on. Maintained a calm and comfortable environment. Shift assessment updated and documented.

## 2024-03-10 NOTE — PROGRESS NOTES
ID Follow-up NOTE    CC:   Sent from PCP for UTI tx   Antibiotics: Pip-tazo    Admit Date: 3/8/2024  Hospital Day: 3    Subjective:     Patient denies any fevers or chills, no dysuria.       Objective:     Patient Vitals for the past 8 hrs:   BP Temp Temp src Pulse Resp SpO2   03/10/24 1430 130/88 97.5 °F (36.4 °C) Oral 82 16 99 %     I/O last 3 completed shifts:  In: 967.1 [I.V.:845.4; IV Piggyback:121.8]  Out: 1400 [Urine:1400]  I/O this shift:  In: -   Out: 500 [Urine:500]    EXAM:  GENERAL: No apparent distress.    HEENT: Membranes moist, no oral lesion  NECK:  Supple, no lymphadenopathy  LUNGS: Clear b/l, no rales, no dullness  CARDIAC: RRR, no murmur appreciated  ABD:  + BS, soft / NT, PEG in place without induration at insertion, states has not been used   EXT:  No rash, no edema, no lesions  NEURO: Left arm weakness s/p CVA   PSYCH: Orientation, sensorium, mood normal  LINES:  Peripheral iv       Data Review:  Lab Results   Component Value Date    WBC 7.4 03/10/2024    HGB 7.6 (L) 03/10/2024    HCT 23.1 (L) 03/10/2024    MCV 89.9 03/10/2024    PLT 96 (L) 03/10/2024     Lab Results   Component Value Date    CREATININE 2.6 (H) 03/10/2024    BUN 27 (H) 03/10/2024     03/10/2024    K 3.9 03/10/2024     03/10/2024    CO2 23 03/10/2024       Hepatic Function Panel:   Lab Results   Component Value Date/Time    ALKPHOS 171 03/08/2024 07:05 PM    ALT 26 03/08/2024 07:05 PM    AST 27 03/08/2024 07:05 PM    PROT 6.3 03/08/2024 07:05 PM    PROT 8.4 08/12/2012 02:41 PM    BILITOT <0.2 03/08/2024 07:05 PM    BILIDIR 0.31 08/12/2012 02:41 PM    IBILI 0.51 08/12/2012 02:41 PM    LABALBU 3.4 03/08/2024 07:05 PM       MICRO:  Urine culture 3/6: 50,000 CFU Pseudomonas aeruginosa and 25,000 CFU Enterococcus faecalis.  Susceptibility                Pseudomonas aeruginosa Enterococcus faecalis       BACTERIAL SUSCEPTIBILITY PANEL BY GERALDO BACTERIAL SUSCEPTIBILITY PANEL BY GERALDO       ampicillin     8 mcg/mL Sensitive       diagnostic tests/interventions  Independent review of radiologic images  Microbiology cultures and other micro tests reviewed      Discussed with pt, primary team.    Jerrica Salazar MD

## 2024-03-11 LAB
ANION GAP SERPL CALCULATED.3IONS-SCNC: 16 MMOL/L (ref 3–16)
BACTERIA UR CULT: ABNORMAL
BUN SERPL-MCNC: 25 MG/DL (ref 7–20)
C DIFF TOX A+B STL QL IA: ABNORMAL
CALCIUM SERPL-MCNC: 6.8 MG/DL (ref 8.3–10.6)
CHLORIDE SERPL-SCNC: 103 MMOL/L (ref 99–110)
CO2 SERPL-SCNC: 22 MMOL/L (ref 21–32)
CREAT SERPL-MCNC: 2.6 MG/DL (ref 0.6–1.1)
DEPRECATED RDW RBC AUTO: 15.6 % (ref 12.4–15.4)
GFR SERPLBLD CREATININE-BSD FMLA CKD-EPI: 23 ML/MIN/{1.73_M2}
GLUCOSE BLD-MCNC: 169 MG/DL (ref 70–99)
GLUCOSE BLD-MCNC: 175 MG/DL (ref 70–99)
GLUCOSE BLD-MCNC: 180 MG/DL (ref 70–99)
GLUCOSE BLD-MCNC: 183 MG/DL (ref 70–99)
GLUCOSE BLD-MCNC: 207 MG/DL (ref 70–99)
GLUCOSE SERPL-MCNC: 157 MG/DL (ref 70–99)
HCT VFR BLD AUTO: 23.7 % (ref 36–48)
HGB BLD-MCNC: 7.8 G/DL (ref 12–16)
INR PPP: 1.11 (ref 0.84–1.16)
MCH RBC QN AUTO: 29.6 PG (ref 26–34)
MCHC RBC AUTO-ENTMCNC: 33 G/DL (ref 31–36)
MCV RBC AUTO: 89.6 FL (ref 80–100)
ORGANISM: ABNORMAL
PERFORMED ON: ABNORMAL
PLATELET # BLD AUTO: 114 K/UL (ref 135–450)
PMV BLD AUTO: 8.4 FL (ref 5–10.5)
POTASSIUM SERPL-SCNC: 3.7 MMOL/L (ref 3.5–5.1)
PROTHROMBIN TIME: 14.3 SEC (ref 11.5–14.8)
RBC # BLD AUTO: 2.64 M/UL (ref 4–5.2)
SODIUM SERPL-SCNC: 141 MMOL/L (ref 136–145)
WBC # BLD AUTO: 5.6 K/UL (ref 4–11)

## 2024-03-11 PROCEDURE — 36410 VNPNXR 3YR/> PHY/QHP DX/THER: CPT

## 2024-03-11 PROCEDURE — 85027 COMPLETE CBC AUTOMATED: CPT

## 2024-03-11 PROCEDURE — 80048 BASIC METABOLIC PNL TOTAL CA: CPT

## 2024-03-11 PROCEDURE — 6370000000 HC RX 637 (ALT 250 FOR IP): Performed by: NURSE PRACTITIONER

## 2024-03-11 PROCEDURE — 36415 COLL VENOUS BLD VENIPUNCTURE: CPT

## 2024-03-11 PROCEDURE — 2580000003 HC RX 258: Performed by: INTERNAL MEDICINE

## 2024-03-11 PROCEDURE — 6370000000 HC RX 637 (ALT 250 FOR IP): Performed by: FAMILY MEDICINE

## 2024-03-11 PROCEDURE — 51798 US URINE CAPACITY MEASURE: CPT

## 2024-03-11 PROCEDURE — 6360000002 HC RX W HCPCS: Performed by: NURSE PRACTITIONER

## 2024-03-11 PROCEDURE — 1200000000 HC SEMI PRIVATE

## 2024-03-11 PROCEDURE — C1751 CATH, INF, PER/CENT/MIDLINE: HCPCS

## 2024-03-11 PROCEDURE — 51701 INSERT BLADDER CATHETER: CPT

## 2024-03-11 PROCEDURE — 6370000000 HC RX 637 (ALT 250 FOR IP): Performed by: INTERNAL MEDICINE

## 2024-03-11 PROCEDURE — 2500000003 HC RX 250 WO HCPCS: Performed by: INTERNAL MEDICINE

## 2024-03-11 PROCEDURE — 85610 PROTHROMBIN TIME: CPT

## 2024-03-11 PROCEDURE — 2580000003 HC RX 258: Performed by: NURSE PRACTITIONER

## 2024-03-11 PROCEDURE — 05H933Z INSERTION OF INFUSION DEVICE INTO RIGHT BRACHIAL VEIN, PERCUTANEOUS APPROACH: ICD-10-PCS | Performed by: FAMILY MEDICINE

## 2024-03-11 PROCEDURE — 99232 SBSQ HOSP IP/OBS MODERATE 35: CPT | Performed by: INTERNAL MEDICINE

## 2024-03-11 RX ORDER — VANCOMYCIN HYDROCHLORIDE 50 MG/ML
250 KIT ORAL EVERY 6 HOURS SCHEDULED
Status: DISCONTINUED | OUTPATIENT
Start: 2024-03-11 | End: 2024-03-12 | Stop reason: HOSPADM

## 2024-03-11 RX ADMIN — METHOCARBAMOL 500 MG: 500 TABLET ORAL at 00:43

## 2024-03-11 RX ADMIN — PANTOPRAZOLE SODIUM 40 MG: 40 TABLET, DELAYED RELEASE ORAL at 06:07

## 2024-03-11 RX ADMIN — AMLODIPINE BESYLATE 5 MG: 5 TABLET ORAL at 09:58

## 2024-03-11 RX ADMIN — VANCOMYCIN HYDROCHLORIDE 250 MG: 250 POWDER, FOR SOLUTION ORAL at 13:11

## 2024-03-11 RX ADMIN — ATORVASTATIN CALCIUM 40 MG: 40 TABLET, FILM COATED ORAL at 09:58

## 2024-03-11 RX ADMIN — FLUOXETINE HYDROCHLORIDE 20 MG: 20 CAPSULE ORAL at 09:57

## 2024-03-11 RX ADMIN — VANCOMYCIN HYDROCHLORIDE 250 MG: 250 POWDER, FOR SOLUTION ORAL at 18:40

## 2024-03-11 RX ADMIN — PIPERACILLIN AND TAZOBACTAM 3375 MG: 3; .375 INJECTION, POWDER, LYOPHILIZED, FOR SOLUTION INTRAVENOUS at 10:17

## 2024-03-11 RX ADMIN — ASPIRIN 81 MG: 81 TABLET, COATED ORAL at 09:57

## 2024-03-11 RX ADMIN — PREDNISONE 10 MG: 10 TABLET ORAL at 09:57

## 2024-03-11 RX ADMIN — Medication 10 ML: at 20:43

## 2024-03-11 RX ADMIN — PIPERACILLIN AND TAZOBACTAM 3375 MG: 3; .375 INJECTION, POWDER, LYOPHILIZED, FOR SOLUTION INTRAVENOUS at 00:40

## 2024-03-11 RX ADMIN — PANTOPRAZOLE SODIUM 40 MG: 40 TABLET, DELAYED RELEASE ORAL at 18:40

## 2024-03-11 RX ADMIN — CLOPIDOGREL BISULFATE 75 MG: 75 TABLET ORAL at 09:58

## 2024-03-11 RX ADMIN — EZETIMIBE 10 MG: 10 TABLET ORAL at 09:58

## 2024-03-11 RX ADMIN — TACROLIMUS 1 MG: 1 CAPSULE ORAL at 10:18

## 2024-03-11 RX ADMIN — OLANZAPINE 7.5 MG: 5 TABLET, FILM COATED ORAL at 20:38

## 2024-03-11 RX ADMIN — SODIUM CHLORIDE: 9 INJECTION, SOLUTION INTRAVENOUS at 17:35

## 2024-03-11 RX ADMIN — PIPERACILLIN AND TAZOBACTAM 3375 MG: 3; .375 INJECTION, POWDER, LYOPHILIZED, FOR SOLUTION INTRAVENOUS at 18:41

## 2024-03-11 RX ADMIN — TACROLIMUS 1 MG: 1 CAPSULE ORAL at 20:38

## 2024-03-11 RX ADMIN — Medication 2000 UNITS: at 09:57

## 2024-03-11 RX ADMIN — Medication 10 ML: at 09:59

## 2024-03-11 RX ADMIN — INSULIN GLARGINE 22 UNITS: 100 INJECTION, SOLUTION SUBCUTANEOUS at 20:42

## 2024-03-11 RX ADMIN — Medication 10 ML: at 20:44

## 2024-03-11 RX ADMIN — TRAZODONE HYDROCHLORIDE 100 MG: 50 TABLET ORAL at 20:38

## 2024-03-11 ASSESSMENT — PAIN DESCRIPTION - LOCATION: LOCATION: FLANK

## 2024-03-11 ASSESSMENT — PAIN SCALES - GENERAL: PAINLEVEL_OUTOF10: 6

## 2024-03-11 ASSESSMENT — PAIN DESCRIPTION - ORIENTATION: ORIENTATION: LEFT

## 2024-03-11 ASSESSMENT — PAIN DESCRIPTION - DESCRIPTORS: DESCRIPTORS: ACHING

## 2024-03-11 NOTE — PROGRESS NOTES
Arrived to place Midline. Upon review of patient chart, no nephrology approval for midline placement at this time. Contacted Clark about nephrology approval. Will reach out to nephrology and call us for placement tomorrow.

## 2024-03-11 NOTE — PLAN OF CARE
Problem: Skin/Tissue Integrity  Goal: Absence of new skin breakdown  Description: 1.  Monitor for areas of redness and/or skin breakdown  2.  Assess vascular access sites hourly  3.  Every 4-6 hours minimum:  Change oxygen saturation probe site  4.  Every 4-6 hours:  If on nasal continuous positive airway pressure, respiratory therapy assess nares and determine need for appliance change or resting period.  Outcome: Progressing     Problem: Safety - Adult  Goal: Free from fall injury  Outcome: Progressing  Flowsheets (Taken 3/11/2024 7324)  Free From Fall Injury: Instruct family/caregiver on patient safety

## 2024-03-11 NOTE — PROGRESS NOTES
ID Follow-up NOTE    CC:   Sent from PCP for UTI tx   Antibiotics: Pip-tazo    Admit Date: 3/8/2024  Hospital Day: 4    Subjective:     Patient denies any fevers or chills, no dysuria.  Cdif testing came back positive, watery stools      Objective:     Patient Vitals for the past 8 hrs:   BP Temp Temp src Pulse Resp SpO2   03/11/24 0958 129/86 -- -- -- -- --   03/11/24 0840 113/78 97.5 °F (36.4 °C) Oral 81 16 99 %       I/O last 3 completed shifts:  In: 967.1 [I.V.:845.4; IV Piggyback:121.8]  Out: 1050 [Urine:1050]  I/O this shift:  In: 120 [P.O.:120]  Out: -     EXAM:  GENERAL: No apparent distress.    HEENT: Membranes moist, no oral lesion  NECK:  Supple, no lymphadenopathy  LUNGS: Clear b/l, no rales, no dullness  CARDIAC: RRR, no murmur appreciated  ABD:  + BS, soft / NT, PEG in place without induration at insertion, states has not been used   EXT:  No rash, no edema, no lesions  NEURO: Left arm weakness s/p CVA   PSYCH: Orientation, sensorium, mood normal  LINES:  Peripheral iv       Data Review:  Lab Results   Component Value Date    WBC 5.6 03/11/2024    HGB 7.8 (L) 03/11/2024    HCT 23.7 (L) 03/11/2024    MCV 89.6 03/11/2024     (L) 03/11/2024     Lab Results   Component Value Date    CREATININE 2.6 (H) 03/11/2024    BUN 25 (H) 03/11/2024     03/11/2024    K 3.7 03/11/2024     03/11/2024    CO2 22 03/11/2024       Hepatic Function Panel:   Lab Results   Component Value Date/Time    ALKPHOS 171 03/08/2024 07:05 PM    ALT 26 03/08/2024 07:05 PM    AST 27 03/08/2024 07:05 PM    PROT 6.3 03/08/2024 07:05 PM    PROT 8.4 08/12/2012 02:41 PM    BILITOT <0.2 03/08/2024 07:05 PM    BILIDIR 0.31 08/12/2012 02:41 PM    IBILI 0.51 08/12/2012 02:41 PM    LABALBU 3.4 03/08/2024 07:05 PM       MICRO:  Cdif toxin/ag 3/10: positive    Urine culture 3/8: >100,000 cfu pseudomonas aeruginosa   Susceptibility    Pseudomonas aeruginosa (1)    Antibiotic Interpretation Microscan  Method Status    cefepime  consult. We will sign off, please contact us for further questions or concerns.     Jerrica Salazar MD

## 2024-03-11 NOTE — PROGRESS NOTES
Pt assessment completed and charted. VSS. Pt a/o. Pt denies pain/nausea. Pt complete Q2T. Incontinent of stool. Q8 hr bladder scan/straight cath. Pt needs set up for meals but able to feed herself. Bed in lowest position and wheels locked. Call light within reach. Bedside table within reach. Non-skid socks in place. Pt denies any other needs at this time.  Pt calls out appropriately.

## 2024-03-11 NOTE — PROGRESS NOTES
Page sent to Dr. Dawn r/t authorization for Midline placement. Awaiting return call.     Dr. Dawn returned page. Ok to place Midline.   Electronically signed by Alicia Rivera RN on 3/11/2024 at 3:53 PM

## 2024-03-11 NOTE — PLAN OF CARE
Problem: Pain  Goal: Verbalizes/displays adequate comfort level or baseline comfort level  Outcome: Progressing  Flowsheets (Taken 3/9/2024 2027)  Verbalizes/displays adequate comfort level or baseline comfort level:   Encourage patient to monitor pain and request assistance   Assess pain using appropriate pain scale   Administer analgesics based on type and severity of pain and evaluate response   Implement non-pharmacological measures as appropriate and evaluate response     Problem: Skin/Tissue Integrity  Goal: Absence of new skin breakdown  Description: 1.  Monitor for areas of redness and/or skin breakdown  2.  Assess vascular access sites hourly  3.  Every 4-6 hours minimum:  Change oxygen saturation probe site  4.  Every 4-6 hours:  If on nasal continuous positive airway pressure, respiratory therapy assess nares and determine need for appliance change or resting period.  Outcome: Progressing     Problem: Safety - Adult  Goal: Free from fall injury  3/10/2024 2010 by Clark Hill, RN  Outcome: Progressing  Flowsheets (Taken 3/9/2024 1653 by Krissy Reza, RN)  Free From Fall Injury: Instruct family/caregiver on patient safety     Problem: Chronic Conditions and Co-morbidities  Goal: Patient's chronic conditions and co-morbidity symptoms are monitored and maintained or improved  Outcome: Progressing  Flowsheets (Taken 3/9/2024 2027)  Care Plan - Patient's Chronic Conditions and Co-Morbidity Symptoms are Monitored and Maintained or Improved:   Monitor and assess patient's chronic conditions and comorbid symptoms for stability, deterioration, or improvement   Collaborate with multidisciplinary team to address chronic and comorbid conditions and prevent exacerbation or deterioration   Update acute care plan with appropriate goals if chronic or comorbid symptoms are exacerbated and prevent overall improvement and discharge

## 2024-03-11 NOTE — PROGRESS NOTES
Perfect serve message sent to Dr. Dawn, \"Pt needs midline for 10 day of iv abx course. We need nephrology to sign off on placing line. Can you please add a note. Thanks. \", awaiting response.

## 2024-03-11 NOTE — CARE COORDINATION
Critical access hospital    Referral received from  to follow for home care services.     Per Kit CM active with another home care agency; [facility] will not follow    Plan midline placement    Iv antibiotics x14 days  [employer] notified to check benefits    Ananya Heller RN, BSN [phone]  [facility]   [phone] fax [phone]  [facility] [phone]  [facility] [phone]

## 2024-03-11 NOTE — CARE COORDINATION
Chart reviewed. Awaiting nephro ok with line placement. Will need IVATBX thru 3/18/24. [facility] will accept back at d/c. [facility] following for medication delivery. Krysta New RN

## 2024-03-11 NOTE — PROGRESS NOTES
[facility] Progress Note           Subjective/   44 y.o. year old female who we are seeing in consultation for MARCO A, Kidney transplant.     HPI:  Renal function slow to improve, non-oliguric.  Denies any shortness of breath, on RA.    ROS:  No fevers, intake reduced, +weak.    Objective/   GEN:  Chronically ill, /86   Pulse 81   Temp 97.5 °F (36.4 °C) (Oral)   Resp 16   Ht 1.575 m (5' 2\")   Wt 52.2 kg (115 lb 1.3 oz)   LMP  (LMP Unknown)   SpO2 99%   BMI 21.05 kg/m²   HEENT: non-icteric, no JVD  CV: S1, S2 without m/r/g; no LE edema  RESP: CTA B without w/r/r; breathing wnl  ABD: +bs, soft, nt, no hsm  SKIN: warm, no rashes    Data/  Recent Labs     03/09/24  0841 03/10/24  0608 03/11/24  0633   WBC 7.7 7.4 5.6   HGB 8.5* 7.6* 7.8*   HCT 25.7* 23.1* 23.7*   MCV 90.1 89.9 89.6   * 96* 114*     Recent Labs     03/09/24  0841 03/10/24  0608 03/11/24  0633   * 139 141   K 3.2* 3.9 3.7    100 103   CO2 26 23 22   GLUCOSE 65* 193* 157*   MG 1.10* 1.50*  --    BUN 29* 27* 25*   CREATININE 2.7* 2.6* 2.6*   LABGLOM 22* 23* 23*       Assessment/     - Acute kidney injury - pre-renal/ATN injury in setting of hypotension    - 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

## 2024-03-11 NOTE — PROGRESS NOTES
Arrived to place Midline with bedside RN Vy.  Pre-procedure and timeout done with RN, discussed limitations of placement and allergies. Cleaned with chloraprep, catheter trimmed and  guidewire inserted and advanced smoothly blood was free flowing and non-pulsatile from introducer.  Please use new IV tubing when connecting to the newly placed line.  Post procedure - reorganized pt table, placed pt in lowest position, with call light and educated on line care. Reported off to bedside RN.      Please call if you have any questions about the PICC or ML. The  will direct you to the PICC RN that is on call.      [phone]

## 2024-03-11 NOTE — DISCHARGE INSTR - COC
Continuity of Care Form    Patient Name: Carolina Arguelles   :  1980  MRN:  [id number]    Admit date:  3/8/2024  Discharge date:  24    Code Status Order: Full Code   Advance Directives:     Admitting Physician:  Vj Mckay MD  PCP: Radha Pompa APRN - CNP    Discharging Nurse: Alicia GARCIA RN  Discharging Hospital Unit/Room#: 0343/0343-01  Discharging Unit Phone Number: [phone]    Emergency Contact:   Extended Emergency Contact Information  Primary Emergency Contact: Kelly Arguelles  Address: [address] [place] [address] [facility]  Home Phone: [phone]  Relation: Parent    Past Surgical History:  Past Surgical History:   Procedure Laterality Date    DENTAL SURGERY      DIALYSIS FISTULA CREATION      KIDNEY TRANSPLANT      OTHER SURGICAL HISTORY Left 2014    Port placed left chest    OTHER SURGICAL HISTORY  2016    removal of port-a-cath and insertion of new port-a-cath    UPPER GASTROINTESTINAL ENDOSCOPY  2016    Esophagitis       Immunization History:   Immunization History   Administered Date(s) Administered    COVID-19, PFIZER GRAY top, DO NOT Dilute, (age 12 y+), IM, 30 mcg/0.3 mL 2022    COVID-19, PFIZER PURPLE top, DILUTE for use, (age 12 y+), 30mcg/0.3mL 2021, 10/19/2021    Influenza Virus Vaccine 10/05/2014, 2015    Influenza Whole 2010    Pneumococcal, PPSV23, PNEUMOVAX 23, (age 2y+), SC/IM, 0.5mL 2014       Active Problems:  Patient Active Problem List   Diagnosis Code    Hypertension I10    Hyperlipidemia E78.5    Nausea & vomiting R11.2    Hypokalemia E87.6    Vomiting R11.10    DM (diabetes mellitus) (HCC) E11.9    ESRD (end stage renal disease) (HCC) N18.6    Fluid overload E87.70    Vascular port complication T82.9XXA    Type 1 diabetes mellitus with hyperglycemia (HCC) E10.65    History of renal transplant Z94.0    Right atrial thrombus I51.3    Chronic anticoagulation Z79.01    MARCO A (acute kidney  Nutrition Therapy:   - Oral Diet:  General    Routes of Feeding: Oral  Liquids: Thin Liquids  Daily Fluid Restriction: no  Last Modified Barium Swallow with Video (Video Swallowing Test): not done    Treatments at the Time of Hospital Discharge:   Respiratory Treatments:   Oxygen Therapy:  is not on home oxygen therapy.  Ventilator:    - No ventilator support    Rehab Therapies: Physical Therapy and Occupational Therapy  Weight Bearing Status/Restrictions: No weight bearing restrictions  Other Medical Equipment (for information only, NOT a DME order):    Other Treatments:     Patient's personal belongings (please select all that are sent with patient):  None    RN SIGNATURE:  Electronically signed by Alicia Rivera RN on 3/12/24 at 4:45 PM EDT    CASE MANAGEMENT/SOCIAL WORK SECTION    Inpatient Status Date: 3/9/24    Readmission Risk Assessment Score:  Readmission Risk              Risk of Unplanned Readmission:  35           Discharging to Facility/ Agency   [facility]  [address] [place] [address]  [phone]   [facility] [phone]  / signature: Electronically signed by Krysta New RN on 3/11/24 at 1:33 PM EDT    PHYSICIAN SECTION    Prognosis: Good    Condition at Discharge: Stable    Rehab Potential (if transferring to Rehab): Good    Recommended Labs or Other Treatments After Discharge:   INFUSION ORDERS:  - Drug: IV Piperacillin/tazobactam 13.5g q24h around the clock as a continuous infusion daily   - Planned End date: 3/18/2024  - Diagnosis: Pseudomonas and enterococcus UTI in renal transplant   - Has received test dose in hospital  - Routine   - Check CBC w diff, CMP, Prograf trough level every Mon or Tue - FAX result to [phone] & [phone]  - Call with antibiotic / infusion issues, [phone]  - Call with any change in status, transfer in or out of a facility or to hospital; This ISSAC is only valid for current disposition -

## 2024-03-11 NOTE — PROGRESS NOTES
Minh of Vascular team went here last night for midline placement. However, he needs nephro clearance prior to placing one. Secured message sent to Dr. Haile Parker for clearance.

## 2024-03-11 NOTE — PROGRESS NOTES
4 Eyes Skin Assessment     NAME:  Carolina Arguelles  YOB: 1980  MEDICAL RECORD NUMBER:  [id number]    The patient is being assessed for  Other Bobby score of 12    I agree that at least one RN has performed a thorough Head to Toe Skin Assessment on the patient. ALL assessment sites listed below have been assessed.      Areas assessed by both nurses: SHIRA Rodas/SHIRA Desai    Head, Face, Ears, Shoulders, Back, Chest, Arms, Elbows, Hands, Sacrum. Buttock, Coccyx, Ischium, Legs. Feet and Heels, and Under Medical Devices         Does the Patient have a Wound?  Peg tube in place, not in use.        Bobby Prevention initiated by RN: Yes  Wound Care Orders initiated by RN: No    Pressure Injury (Stage 3,4, Unstageable, DTI, NWPT, and Complex wounds) if present, place Wound referral order by RN under : No    New Ostomies, if present place, Ostomy referral order under : No     Nurse 1 eSignature: Electronically signed by Clark Hill RN on 3/11/24 at 1:38 AM EDT    **SHARE this note so that the co-signing nurse can place an eSignature**    Nurse 2 eSignature: Electronically signed by Giselle Martin RN on 3/11/24 at 1:40 AM EDT

## 2024-03-11 NOTE — PROGRESS NOTES
Received patient on bed, alert and oriented x4. Denies pain nor dizziness at the time of assessment. Diaper is clean and dry. For straight catheterization every 8 hours. Repositioned every 2 hours to prevent skin breakdown.     Call bell within reach. Bed alarm on. Maintained a calm and comfortable environment. Shift assessment updated and documented.

## 2024-03-11 NOTE — PROGRESS NOTES
Hospital Medicine Progress Note      Date of Admission: 3/8/2024  Hospital Day: 4    Chief Admission Complaint:  weakness     Subjective:  no new complaints    Presenting Admission History:       44 y.o. female with past medical history of CVA, with a residual left upper and lower extremity weakness, chronic urinary retention who has to do daily self-catheterization who was recently treated for multidrug-resistant urinary tract infection outpatient by PCP.  Upon follow-up with PCP, patient was sent to the ER because urine cultures came back growing Pseudomonas and Enterococcus faecalis.    Patient also reports a rash on the outer aspect of the left upper arm.  The rash is about 10 cm in length with blanching erythema.  Patient otherwise looks comfortable in bed, she denies chest pain, shortness of breath, fever or chills.    Assessment/Plan:      Current Principal Problem:  Acute cystitis with hematuria    UTI  - urine culture with MDRO pseudomonas  - ID consulted  - continue zosyn. Will need IV abx on discharge    C diff  - ID consulted  - started on PO vanc. Will continue treatment one week beyond IV abx  - minimal diarrhea    CKD s/p renal transplant  - Nephrology consulted  - Cr around baseline  - continue IVF for now  - continue prograf and steroid    DMII  - well controlled  - continue lantus with SSI    H/O CVA  - continue ASA, plavix, statin    HTN  - well controlled  - continue norvasc    HLD  - continue statin    Thrombocytopenia  - likely due to acute infection  - improved today    Physical Exam Performed:      General appearance: She is lying in bed, looks weak and tired.  In no apparent distress  Respiratory: Bilateral breath sounds, no rales or rhonchi noted, normal respiratory effort.   Cardiovascular: S1, S2 regular rate and rhythm.  Abdomen: Positive bowel sounds soft, non-tender, non-distended.  PEG tube is in place site is clean  She informs me this was placed after her stroke and she has not  used it for several months as she is tolerating oral intake  Musculoskeletal:  No edema  Neurologic: She does have left sided residual weakness from CVA about a year ago.  Motor strength in left upper and lower extremities is 3/5.  Right side is 5/5  Psychiatric:  Alert and oriented    /86   Pulse 81   Temp 97.5 °F (36.4 °C) (Oral)   Resp 16   Ht 1.575 m (5' 2\")   Wt 52.2 kg (115 lb 1.3 oz)   LMP  (LMP Unknown)   SpO2 99%   BMI 21.05 kg/m²     Diet: ADULT DIET; Regular; 3 carb choices (45 gm/meal)  DVT Prophylaxis: [x]PPx LMWH  []SQ Heparin  []IPC/SCDs  []Eliquis  []Xarelto  []Coumadin  []Other -      Code status: Full Code  PT/OT Eval Status:   []NOT yet ordered  []Ordered and Pending   [x]Seen with Recommendations for:  []Home independently  []Home w/ assist  []HHC  [x]SNF  []Acute Rehab    Anticipated Discharge Day/Date:  tomorrow    Anticipated Discharge Location: []Home  [x]HHC  []SNF  []Acute Rehab  []ECF  []LTAC  []Hospice  []Other -      Consults:      IP CONSULT TO HOSPITALIST  IP CONSULT TO NEPHROLOGY  IP CONSULT TO NEPHROLOGY  IP CONSULT TO INFECTIOUS DISEASES      This patient has a high likelihood of being discharged tomorrow and is appropriate for A1 Discharge Unit in AM pending clinical course overnight: []Yes  [x]No    ------------------------------------------------------------------------------------------------------------------------------------------------------------------------    MDM      [x] High (any 2)    A. Problems (any 1)  [x] Acute/Chronic Illness/injury posing threat to life or bodily function:    [] Severe exacerbation of chronic illness:    ---------------------------------------------------------------------  B. Risk of Treatment (any 1)   [] Drugs/treatments that require intensive monitoring for toxicity include:    [] Change in code status:    [] Decision to escalate care:    [] Major surgery/procedure with associated risk factors:   polyethylene glycol, glucose, dextrose bolus **OR** dextrose bolus, glucagon (rDNA), dextrose, methocarbamol     Labs:  Personally reviewed and interpreted for clinical significance.     Recent Labs     03/09/24  0841 03/10/24  0608 03/11/24  0633   WBC 7.7 7.4 5.6   HGB 8.5* 7.6* 7.8*   HCT 25.7* 23.1* 23.7*   * 96* 114*     Recent Labs     03/09/24  0841 03/10/24  0608 03/11/24  0633   * 139 141   K 3.2* 3.9 3.7    100 103   CO2 26 23 22   BUN 29* 27* 25*   CREATININE 2.7* 2.6* 2.6*   CALCIUM 5.3* 5.7* 6.8*   MG 1.10* 1.50*  --      No results for input(s): \"PROBNP\", \"TROPHS\" in the last 72 hours.  Recent Labs     03/09/24  0841   LABA1C 6.7     Recent Labs     03/08/24  1905   AST 27   ALT 26   BILITOT <0.2   ALKPHOS 171*     Recent Labs     03/11/24  0911   INR 1.11       Urine Cultures:   Lab Results   Component Value Date/Time    LABURIN >100,000 CFU/ml 03/08/2024 09:14 PM     Blood Cultures:   Lab Results   Component Value Date/Time    BC No growth after 5 days of incubation. 09/05/2017 08:28 PM     Lab Results   Component Value Date/Time    BLOODCULT2 See additional report for complete BCID panel. 09/05/2017 10:51 PM    BLOODCULT2 POSITIVE for  Isolated one out of two bottles   09/05/2017 10:51 PM     Organism:   Lab Results   Component Value Date/Time    ORG Pseudomonas aeruginosa 03/08/2024 09:14 PM         Reuben De Leon MD

## 2024-03-12 VITALS
BODY MASS INDEX: 21.18 KG/M2 | HEIGHT: 62 IN | SYSTOLIC BLOOD PRESSURE: 155 MMHG | TEMPERATURE: 97 F | RESPIRATION RATE: 18 BRPM | OXYGEN SATURATION: 98 % | DIASTOLIC BLOOD PRESSURE: 94 MMHG | HEART RATE: 85 BPM | WEIGHT: 115.08 LBS

## 2024-03-12 LAB
ALBUMIN SERPL-MCNC: 2.7 G/DL (ref 3.4–5)
ANION GAP SERPL CALCULATED.3IONS-SCNC: 13 MMOL/L (ref 3–16)
BUN SERPL-MCNC: 22 MG/DL (ref 7–20)
CALCIUM SERPL-MCNC: 7.2 MG/DL (ref 8.3–10.6)
CHLORIDE SERPL-SCNC: 107 MMOL/L (ref 99–110)
CO2 SERPL-SCNC: 20 MMOL/L (ref 21–32)
CREAT SERPL-MCNC: 2.5 MG/DL (ref 0.6–1.1)
GFR SERPLBLD CREATININE-BSD FMLA CKD-EPI: 24 ML/MIN/{1.73_M2}
GLUCOSE BLD-MCNC: 137 MG/DL (ref 70–99)
GLUCOSE BLD-MCNC: 71 MG/DL (ref 70–99)
GLUCOSE BLD-MCNC: 90 MG/DL (ref 70–99)
GLUCOSE SERPL-MCNC: 97 MG/DL (ref 70–99)
MAGNESIUM SERPL-MCNC: 1.6 MG/DL (ref 1.8–2.4)
PERFORMED ON: ABNORMAL
PERFORMED ON: NORMAL
PERFORMED ON: NORMAL
PHOSPHATE SERPL-MCNC: 6.1 MG/DL (ref 2.5–4.9)
POTASSIUM SERPL-SCNC: 4.2 MMOL/L (ref 3.5–5.1)
SODIUM SERPL-SCNC: 140 MMOL/L (ref 136–145)
TACROLIMUS BLOOD: 11.1 NG/ML (ref 5–20)

## 2024-03-12 PROCEDURE — 51701 INSERT BLADDER CATHETER: CPT

## 2024-03-12 PROCEDURE — 6370000000 HC RX 637 (ALT 250 FOR IP): Performed by: NURSE PRACTITIONER

## 2024-03-12 PROCEDURE — 6370000000 HC RX 637 (ALT 250 FOR IP): Performed by: FAMILY MEDICINE

## 2024-03-12 PROCEDURE — 80069 RENAL FUNCTION PANEL: CPT

## 2024-03-12 PROCEDURE — 2580000003 HC RX 258: Performed by: INTERNAL MEDICINE

## 2024-03-12 PROCEDURE — 51798 US URINE CAPACITY MEASURE: CPT

## 2024-03-12 PROCEDURE — 80197 ASSAY OF TACROLIMUS: CPT

## 2024-03-12 PROCEDURE — 2500000003 HC RX 250 WO HCPCS: Performed by: INTERNAL MEDICINE

## 2024-03-12 PROCEDURE — 97535 SELF CARE MNGMENT TRAINING: CPT

## 2024-03-12 PROCEDURE — 6360000002 HC RX W HCPCS: Performed by: NURSE PRACTITIONER

## 2024-03-12 PROCEDURE — 6370000000 HC RX 637 (ALT 250 FOR IP): Performed by: INTERNAL MEDICINE

## 2024-03-12 PROCEDURE — 2580000003 HC RX 258: Performed by: NURSE PRACTITIONER

## 2024-03-12 PROCEDURE — 97530 THERAPEUTIC ACTIVITIES: CPT

## 2024-03-12 PROCEDURE — 83735 ASSAY OF MAGNESIUM: CPT

## 2024-03-12 PROCEDURE — 36415 COLL VENOUS BLD VENIPUNCTURE: CPT

## 2024-03-12 RX ORDER — VANCOMYCIN HYDROCHLORIDE 50 MG/ML
250 KIT ORAL EVERY 6 HOURS
Qty: 260 ML | Refills: 0 | Status: SHIPPED | OUTPATIENT
Start: 2024-03-12 | End: 2024-03-25

## 2024-03-12 RX ORDER — TACROLIMUS 0.5 MG/1
0.5 CAPSULE ORAL 2 TIMES DAILY
Status: DISCONTINUED | OUTPATIENT
Start: 2024-03-13 | End: 2024-03-12 | Stop reason: HOSPADM

## 2024-03-12 RX ORDER — TACROLIMUS 0.5 MG/1
0.5 CAPSULE ORAL 2 TIMES DAILY
Qty: 60 CAPSULE | Refills: 3 | Status: SHIPPED | OUTPATIENT
Start: 2024-03-12

## 2024-03-12 RX ADMIN — Medication 2000 UNITS: at 11:13

## 2024-03-12 RX ADMIN — VANCOMYCIN HYDROCHLORIDE 250 MG: 250 POWDER, FOR SOLUTION ORAL at 06:22

## 2024-03-12 RX ADMIN — AMLODIPINE BESYLATE 5 MG: 5 TABLET ORAL at 11:14

## 2024-03-12 RX ADMIN — CLOPIDOGREL BISULFATE 75 MG: 75 TABLET ORAL at 11:13

## 2024-03-12 RX ADMIN — Medication 10 ML: at 11:15

## 2024-03-12 RX ADMIN — PREDNISONE 10 MG: 10 TABLET ORAL at 11:13

## 2024-03-12 RX ADMIN — SODIUM CHLORIDE: 9 INJECTION, SOLUTION INTRAVENOUS at 11:10

## 2024-03-12 RX ADMIN — FLUOXETINE HYDROCHLORIDE 20 MG: 20 CAPSULE ORAL at 11:13

## 2024-03-12 RX ADMIN — PIPERACILLIN AND TAZOBACTAM 3375 MG: 3; .375 INJECTION, POWDER, LYOPHILIZED, FOR SOLUTION INTRAVENOUS at 02:46

## 2024-03-12 RX ADMIN — EZETIMIBE 10 MG: 10 TABLET ORAL at 11:12

## 2024-03-12 RX ADMIN — PIPERACILLIN AND TAZOBACTAM 3375 MG: 3; .375 INJECTION, POWDER, LYOPHILIZED, FOR SOLUTION INTRAVENOUS at 11:11

## 2024-03-12 RX ADMIN — ASPIRIN 81 MG: 81 TABLET, COATED ORAL at 11:13

## 2024-03-12 RX ADMIN — PANTOPRAZOLE SODIUM 40 MG: 40 TABLET, DELAYED RELEASE ORAL at 06:22

## 2024-03-12 RX ADMIN — VANCOMYCIN HYDROCHLORIDE 250 MG: 250 POWDER, FOR SOLUTION ORAL at 11:12

## 2024-03-12 RX ADMIN — ATORVASTATIN CALCIUM 40 MG: 40 TABLET, FILM COATED ORAL at 11:13

## 2024-03-12 RX ADMIN — VANCOMYCIN HYDROCHLORIDE 250 MG: 250 POWDER, FOR SOLUTION ORAL at 00:48

## 2024-03-12 RX ADMIN — TACROLIMUS 1 MG: 1 CAPSULE ORAL at 11:28

## 2024-03-12 NOTE — PROGRESS NOTES
[facility] Progress Note           Subjective/   44 y.o. year old female who we are seeing in consultation for MARCO A, Kidney transplant.     HPI:  Renal function slowly improving, non-oliguric.  Denies any shortness of breath, on RA.    ROS:  No fevers, intake adequate, diarrhea better, +weak.    Objective/   GEN:  Chronically ill, BP (!) 155/94   Pulse 85   Temp 97 °F (36.1 °C) (Oral)   Resp 18   Ht 1.575 m (5' 2\")   Wt 52.2 kg (115 lb 1.3 oz)   LMP  (LMP Unknown)   SpO2 98%   BMI 21.05 kg/m²   HEENT: non-icteric, no JVD  CV: S1, S2 without m/r/g; no LE edema  RESP: CTA B without w/r/r; breathing wnl  ABD: +bs, soft, nt, no hsm  SKIN: warm, no rashes    Data/  Recent Labs     03/10/24  0608 03/11/24  0633   WBC 7.4 5.6   HGB 7.6* 7.8*   HCT 23.1* 23.7*   MCV 89.9 89.6   PLT 96* 114*       Recent Labs     03/10/24  0608 03/11/24  0633 03/12/24  0621    141 140   K 3.9 3.7 4.2    103 107   CO2 23 22 20*   GLUCOSE 193* 157* 97   PHOS  --   --  6.1*   MG 1.50*  --  1.60*   BUN 27* 25* 22*   CREATININE 2.6* 2.6* 2.5*   LABGLOM 23* 23* 24*         Assessment/     - Acute kidney injury - pre-renal/ATN injury in setting of hypotension    - 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 with a baseline SCr 1.5 to 2    - Hypernatremia - resolved with hypotonic IV fluids     - Multi-drug resistant UTI: Currently on IV antibiotics per Infectious Disease    - C diff positive    - Anemia    - Hypomagnesemia/Hypokalemia - prn replacement      Plan/     - Can trial off fluids today  - Continue current immunosuppressive regimen consisting of Prograf and Prednisone  - Monitor prograf levels, last at 11.1 from 3/12, reduce prograf dosing back to 0.5 mg po bid which is what she was listed on from office  - Torsemide on hold  - Trend labs, bp's, weights, & urine output    Okay for discharge planning  Weekly labs with prograf levels on  [facility]    ____________________________________  Milad Dawn MD  [facility]  [email or web address]  Office: [phone]

## 2024-03-12 NOTE — PLAN OF CARE
Problem: Pain  Goal: Verbalizes/displays adequate comfort level or baseline comfort level  Outcome: Progressing  Flowsheets (Taken 3/11/2024 2203)  Verbalizes/displays adequate comfort level or baseline comfort level:   Encourage patient to monitor pain and request assistance   Assess pain using appropriate pain scale   Administer analgesics based on type and severity of pain and evaluate response   Implement non-pharmacological measures as appropriate and evaluate response     Problem: Skin/Tissue Integrity  Goal: Absence of new skin breakdown  Description: 1.  Monitor for areas of redness and/or skin breakdown  2.  Assess vascular access sites hourly  3.  Every 4-6 hours minimum:  Change oxygen saturation probe site  4.  Every 4-6 hours:  If on nasal continuous positive airway pressure, respiratory therapy assess nares and determine need for appliance change or resting period.  3/11/2024 2203 by Raffi Kennedy, RN  Outcome: Progressing     Problem: Safety - Adult  Goal: Free from fall injury  3/11/2024 2203 by Raffi Kennedy, RN  Outcome: Progressing

## 2024-03-12 NOTE — PROGRESS NOTES
Occupational Therapy  Facility/Department: [facility] C3 TELE/MED SURG/ONC  Daily Treatment Note  NAME: Carolina Arguelles  : 1980  MRN: [id number]    Date of Service: 3/12/2024    Discharge Recommendations:  Subacute/Skilled Nursing Facility, 24 hour supervision or assist, Home with nursing aide, Home with Home health OT  OT Equipment Recommendations  Equipment Needed: Yes  ADL Assistive Devices: Transfer Tub Bench;Toileting - Drop Arm Commode, Heavy Duty Drop Arm Commode;Hand-held Shower    Patient Diagnosis(es): The primary encounter diagnosis was Hypocalcemia. Diagnoses of Hypotension, unspecified hypotension type and Cystitis were also pertinent to this visit.     Assessment    Assessment: Co-tx collaboration this date to safely meet goals and will have better occupational performance outcomes with in a co-treatment than 1:1 session. Pt completing bed mobility min x1-2, total assist LB dressing / toileting and max x2 stand pivot EOB > chair via HHA. Once pt seated in chair pt reported sudden onset of dizziness/ lightheadedness though vitals stable (see vitals section for BP reading) and requesting to return to bed. Pt transferred back to bed total assist via STEDY for safety and positioned for comfort supine. OT recommend d/c home  + HHOT vs SNF pending pt family availability, cont acute OT.  Activity Tolerance: Patient limited by fatigue;Patient limited by endurance;Treatment limited secondary to decreased cognition  Discharge Recommendations: Subacute/Skilled Nursing Facility;24 hour supervision or assist;Home with nursing aide;Home with Home health OT  Equipment Needed: Yes      Plan   Occupational Therapy Plan  Times Per Week: 3-5x/week  Current Treatment Recommendations: Strengthening;Balance training;Functional mobility training;Endurance training;Patient/Caregiver education & training;Self-Care / ADL;Safety education & training;Cognitive reorientation;Neuromuscular re-education;Equipment evaluation,

## 2024-03-12 NOTE — PROGRESS NOTES
..4 Eyes Skin Assessment     The patient is being assess for  Low Bobby    I agree that 2 RN's have performed a thorough Head to Toe Skin Assessment on the patient. ALL assessment sites listed below have been assessed.       Areas assessed by both nurses: SHIRA Nugent/ SHIRA Ruiz  [x]   Head, Face, and Ears   [x]   Shoulders, Back, and Chest  [x]   Arms, Elbows, and Hands   [x]   Coccyx, Sacrum, and IschIum  [x]   Legs, Feet, and Heels        Does the Patient have Skin Breakdown?   Cellulitis/ redness left arm  , blanching redness at sacrum        Bobby Prevention initiated:  Yes   Wound Care Orders initiated:  No      WOC nurse consulted for Pressure Injury (Stage 3,4, Unstageable, DTI, NWPT, and Complex wounds), New and Established Ostomies:  No      Nurse 1 eSignature: Electronically signed by Raffi Kennedy RN on 3/12/24 at 5:39 AM EDT    **SHARE this note so that the co-signing nurse is able to place an eSignature**    Nurse 2 eSignature: Electronically signed by Shyla Smith RN on 3/12/24 at 6:48 AM EDT

## 2024-03-12 NOTE — PROGRESS NOTES
Pt assessment completed and charted. VSS. Pt a/o. Pt denies pain. Pt complete, Q2T. Pt incontinent of stool. Q8 Straight cath. Bed alarm in place. Bed in lowest position and wheels locked. Call light within reach. Bedside table within reach. Non-skid socks in place. Pt denies any other needs at this time.  Pt calls out appropriately.

## 2024-03-12 NOTE — CARE COORDINATION
CASE MANAGEMENT DISCHARGE SUMMARY      Discharge to: Home with [facility] and [facility]         IMM given: 3/12/24    New Durable Medical Equipment ordered/agency: none    Transportation:    Family/car:   Medical Transport explained to pt/family. Pt/family voice no agency preference.    Agency used:[facility]    time:4pm   Ambulance form completed: Yes    Confirmed discharge plan with:     Patient: yes     Family:  yes mom via phone     Facility/Agency, name:  [facility] faxed [phone]        RN, name: Alicia scanlon [facility]    Krysta New RN

## 2024-03-12 NOTE — PROGRESS NOTES
Bladder scan showed 390 ml. Straight cath per order using sterile technique. 425 ml clear, yellow urine out. Pt tolerated well.

## 2024-03-12 NOTE — CARE COORDINATION
Chart reviewed day 3. Midline placed yesterday. Likely to d/c today with [facility] thru 3/18. [facility] to follow for skilled HHC. [facility] for medication delivery. Krysta New RN

## 2024-03-12 NOTE — DISCHARGE SUMMARY
Hospital Medicine Discharge Summary    Patient: Carolina Arguelles   : 1980     Admit Date: 3/8/2024   Discharge Date:   24  Disposition:  []Home   [x]HHC  []SNF  []ECF  []Acute Rehab  []LTAC  []Hospice  Code status:  [x]Full  []DNR/CCA  []Limited (DNR/CCA with Do Not Intubate)  []DNRCC  Condition at Discharge: Stable  Primary Care Provider: Radha Pompa APRN - CNP    Admitting Provider: Vj Mckay MD  Discharge Provider: Reuben De Leon MD     Discharge Diagnoses:      Active Hospital Problems    Diagnosis     Hypertension [I10]      Priority: Medium    Hyperlipidemia [E78.5]      Priority: Low    Acute cystitis with hematuria [N30.01]     ESRD (end stage renal disease) (HCC) [N18.6]     DM (diabetes mellitus) (HCC) [E11.9]        Presenting Admission History:      44 y.o. female with past medical history of CVA, with a residual left upper and lower extremity weakness, chronic urinary retention who has to do daily self-catheterization who was recently treated for multidrug-resistant urinary tract infection outpatient by PCP.  Upon follow-up with PCP, patient was sent to the ER because urine cultures came back growing Pseudomonas and Enterococcus faecalis.    Patient also reports a rash on the outer aspect of the left upper arm.  The rash is about 10 cm in length with blanching erythema.  Patient otherwise looks comfortable in bed, she denies chest pain, shortness of breath, fever or chills.     Assessment/Plan:      UTI  - urine culture with MDRO pseudomonas  - ID consulted  - continue zosyn on discharge    C diff  - ID consulted  - started on PO vanc. Will continue treatment one week beyond IV abx  - minimal diarrhea    CKD s/p renal transplant  - Nephrology consulted  - Cr around baseline  - continue prograf and steroid    DMII  - well controlled  - resume home regimen    H/O CVA  - continue ASA, plavix, statin    HTN  - well controlled  - continue norvasc    HLD  - continue  23.1* 23.7*   PLT 96* 114*     Recent Labs     03/10/24  0608 03/11/24  0633 03/12/24  0621    141 140   K 3.9 3.7 4.2    103 107   CO2 23 22 20*   BUN 27* 25* 22*   CREATININE 2.6* 2.6* 2.5*   CALCIUM 5.7* 6.8* 7.2*   MG 1.50*  --  1.60*   PHOS  --   --  6.1*     No results for input(s): \"PROBNP\", \"TROPHS\" in the last 72 hours.  No results for input(s): \"LABA1C\" in the last 72 hours.  No results for input(s): \"AST\", \"ALT\", \"BILIDIR\", \"BILITOT\", \"ALKPHOS\" in the last 72 hours.  Recent Labs     03/11/24  0911   INR 1.11       Urine Cultures:   Lab Results   Component Value Date/Time    LABURIN >100,000 CFU/ml 03/08/2024 09:14 PM     Blood Cultures:   Lab Results   Component Value Date/Time    BC No growth after 5 days of incubation. 09/05/2017 08:28 PM     Lab Results   Component Value Date/Time    BLOODCULT2 See additional report for complete BCID panel. 09/05/2017 10:51 PM    BLOODCULT2 POSITIVE for  Isolated one out of two bottles   09/05/2017 10:51 PM     Organism:   Lab Results   Component Value Date/Time    ORG Pseudomonas aeruginosa 03/08/2024 09:14 PM       Signed:    Reuben De Leon MD

## 2024-03-12 NOTE — PROGRESS NOTES
Received patient in bed, alert oriented x4, VSS. Shift assessment done as charted. Medications administered per MAR.     -with PEG Tube in place dressing changed.  -complete q2 turn, straight catheterization q8hr done.  -tolerating PO intake well  -denies further needs at this time.    Nonskid footware on. Bed alarm on. Bed in low position, wheels locked, SR x2, call light and bedside table within reach.

## 2024-03-12 NOTE — PROGRESS NOTES
Physical Therapy  Facility/Department: [facility] C3 TELE/MED SURG/ONC  Daily Treatment Note  NAME: Carolina Arguelles  : 1980  MRN: [id number]    Date of Service: 3/12/2024    Discharge Recommendations:  Subacute/Skilled Nursing Facility   PT Equipment Recommendations  Equipment Needed: No    Patient Diagnosis(es): The primary encounter diagnosis was Hypocalcemia. Diagnoses of Hypotension, unspecified hypotension type and Cystitis were also pertinent to this visit.    Assessment   Assessment: Pt seen as cotx with OT to progress functional mobility and for safety. Pt required max A x2 for sit-stands and transfer to chair with hand held assist. Will continue to progress mobility as pt tolerates. Recommend SNF vs home with 24hr assist.  Activity Tolerance: Patient limited by fatigue;Patient limited by endurance;Treatment limited secondary to decreased cognition  Equipment Needed: No     Plan    Physical Therapy Plan  General Plan: 3-5 times per week  Current Treatment Recommendations: Strengthening;ROM;Balance training;Functional mobility training;Transfer training;ADL/Self-care training;Cognitive/Perceptual training;Endurance training;Wheelchair mobility training;Gait training;Neuromuscular re-education;Cognitive reorientation;Home exercise program;Safety education & training;Patient/Caregiver education & training;Equipment evaluation, education, & procurement;Positioning;Therapeutic activities;Co-Treatment     Restrictions  Restrictions/Precautions  Restrictions/Precautions: Fall Risk, Bed Alarm, Up as Tolerated  Required Braces or Orthoses?: No  Position Activity Restriction  Other position/activity restrictions: R IV, peg tube     Subjective    Subjective  Subjective: Pt agreeable to therapy.  Pain: c/o RUE PICC line discomfort -- did not rate  Orientation  Orientation Level: Oriented to place;Oriented to person;Oriented to situation;Appropriate for developmental age  Cognition  Overall Cognitive Status:  3/16/24  Short Term Goal 1: Pt will complete bed mobility with minimal assistance  Short Term Goal 2: Pt will demo sit to stand with minimal assistance  Short Term Goal 3: Pt will transfer from bed>chair with LRAD and minimal assistance  Additional Goals?: No  Long Term Goals  Time Frame for Long Term Goals : 3/23/24  Long Term Goal 1: Pt will complete bed mobility with mod I  Long Term Goal 2: Pt will complete sit to stand with CGA  Long Term Goal 3: Pt will complete bed>chair transfer with SBA  Additional Goals?: No  Patient Goals   Patient Goals : To go home    Education  Patient Education  Education Given To: Patient  Education Provided: Role of Therapy;Plan of Care;Transfer Training;Orientation  Education Method: Demonstration;Verbal  Barriers to Learning: Cognition  Education Outcome: Continued education needed    AM-PAC - Mobility    AM-PAC Basic Mobility - Inpatient   How much help is needed turning from your back to your side while in a flat bed without using bedrails?: A Little  How much help is needed moving from lying on your back to sitting on the side of a flat bed without using bedrails?: A Lot  How much help is needed moving to and from a bed to a chair?: Total  How much help is needed standing up from a chair using your arms?: A Lot  How much help is needed walking in hospital room?: Total  How much help is needed climbing 3-5 steps with a railing?: Total  AM-PAC Inpatient Mobility Raw Score : 10  AM-PAC Inpatient T-Scale Score : 32.29  Mobility Inpatient CMS 0-100% Score: 76.75  Mobility Inpatient CMS G-Code Modifier : CL         Therapy Time   Individual Concurrent Group Co-treatment   Time In       1114   Time Out       1203   Minutes       49   Timed Code Treatment Minutes: 49 Minutes       Sheri Esposito, [place] [id number]

## 2024-03-12 NOTE — PROGRESS NOTES
Pt a/o x4. VSS. All prescriptions, discharge and follow up instructions given to the pt and [facility]. The patient verbalizes understanding and denies questions.  All belongings collected and sent with the patient. The patient was discharged off the unit by [facility] in stable condition.

## 2024-03-13 ENCOUNTER — CLINICAL DOCUMENTATION (OUTPATIENT)
Dept: INFECTIOUS DISEASES | Age: 44
End: 2024-03-13

## 2024-03-13 ENCOUNTER — TELEPHONE (OUTPATIENT)
Dept: INFECTIOUS DISEASES | Age: 44
End: 2024-03-13

## 2024-03-13 ENCOUNTER — HOSPITAL ENCOUNTER (OUTPATIENT)
Age: 44
Setting detail: SPECIMEN
Discharge: HOME | End: 2024-03-13
Payer: COMMERCIAL

## 2024-03-13 DIAGNOSIS — N39.0 PSEUDOMONAS URINARY TRACT INFECTION: Primary | ICD-10-CM

## 2024-03-13 DIAGNOSIS — B96.5 PSEUDOMONAS URINARY TRACT INFECTION: Primary | ICD-10-CM

## 2024-03-13 LAB
ALBUMIN SERPL-MCNC: 3.3 G/DL (ref 3.4–5)
ALBUMIN/GLOB SERPL: 1.5 {RATIO} (ref 1.1–2.2)
ALP SERPL-CCNC: 151 U/L (ref 40–129)
ALT SERPL-CCNC: 22 U/L (ref 10–40)
ANION GAP SERPL CALCULATED.3IONS-SCNC: 14 MMOL/L (ref 3–16)
AST SERPL-CCNC: 19 U/L (ref 15–37)
BASOPHILS # BLD: 0 K/UL (ref 0–0.2)
BASOPHILS NFR BLD: 0 %
BILIRUB SERPL-MCNC: <0.2 MG/DL (ref 0–1)
BUN SERPL-MCNC: 19 MG/DL (ref 7–20)
CALCIUM SERPL-MCNC: 7.1 MG/DL (ref 8.3–10.6)
CHLORIDE SERPL-SCNC: 109 MMOL/L (ref 99–110)
CO2 SERPL-SCNC: 21 MMOL/L (ref 21–32)
CREAT SERPL-MCNC: 2.5 MG/DL (ref 0.6–1.1)
DEPRECATED RDW RBC AUTO: 15.5 % (ref 12.4–15.4)
EOSINOPHIL # BLD: 0 K/UL (ref 0–0.6)
EOSINOPHIL NFR BLD: 0 %
GFR SERPLBLD CREATININE-BSD FMLA CKD-EPI: 24 ML/MIN/{1.73_M2}
GLUCOSE SERPL-MCNC: 228 MG/DL (ref 70–99)
HCT VFR BLD AUTO: 26.7 % (ref 36–48)
HGB BLD-MCNC: 8.9 G/DL (ref 12–16)
LYMPHOCYTES # BLD: 1 K/UL (ref 1–5.1)
LYMPHOCYTES NFR BLD: 12 %
MCH RBC QN AUTO: 29.6 PG (ref 26–34)
MCHC RBC AUTO-ENTMCNC: 33.3 G/DL (ref 31–36)
MCV RBC AUTO: 89 FL (ref 80–100)
MONOCYTES # BLD: 0.1 K/UL (ref 0–1.3)
MONOCYTES NFR BLD: 1 %
NEUTROPHILS # BLD: 7 K/UL (ref 1.7–7.7)
NEUTROPHILS NFR BLD: 86 %
NEUTS BAND NFR BLD MANUAL: 1 % (ref 0–7)
PLATELET # BLD AUTO: 142 K/UL (ref 135–450)
PMV BLD AUTO: 7.9 FL (ref 5–10.5)
POTASSIUM SERPL-SCNC: 4.3 MMOL/L (ref 3.5–5.1)
PROT SERPL-MCNC: 5.5 G/DL (ref 6.4–8.2)
RBC # BLD AUTO: 3 M/UL (ref 4–5.2)
SODIUM SERPL-SCNC: 144 MMOL/L (ref 136–145)
WBC # BLD AUTO: 8.1 K/UL (ref 4–11)

## 2024-03-13 PROCEDURE — 80053 COMPREHEN METABOLIC PANEL: CPT

## 2024-03-13 PROCEDURE — 80197 ASSAY OF TACROLIMUS: CPT

## 2024-03-13 PROCEDURE — 85025 COMPLETE CBC W/AUTO DIFF WBC: CPT

## 2024-03-13 NOTE — TELEPHONE ENCOUNTER
Spoke with Luiz pharmacist at [facility] and RN at [facility] and verified all OPAT orders  IV Piperacillin/tazobactam 13.5g q24h ATC as a continuous infusion daily Planned End date: 3/18/2024 and CBC w diff, CMP, Prograf trough level every Monday or Tuesday.  Both v/u

## 2024-03-13 NOTE — PROGRESS NOTES
Dr. Salazar has placed a referral order for pharmacist to manage Outpatient Parental Antimicrobial Therapy (OPAT) pursuant the ID Collaborative Practice Agreement.     Pertinent PMH and HPI:  PMH CVA, DM, htn, failed renal transplant now with ESRD, urinary retention with self cath    Was at PCP with complaints of hypotension and was sent to hospital for treatment of UTI    Pertinent Objective Data:    Wt Readings from Last 1 Encounters:   03/09/24 52.2 kg (115 lb 1.3 oz)      BMI Readings from Last 1 Encounters:   03/09/24 21.05 kg/m²      Serum creatinine: 2.5 mg/dL (H) 03/12/24 0621  Estimated creatinine clearance: 23 mL/min (A)  Baseline = 2.0    Micro:   3/6 urine: 50,000 PsA, 25,000 EFaecalis  3/8 urine: >100,000 PsA  Pseudomonas aeruginosa (1)      Antibiotic Interpretation Microscan    cefepime Sensitive 8 mcg/mL   ciprofloxacin Sensitive <=0.25 mcg/mL   gentamicin Resistant >8 mcg/mL   levofloxacin Sensitive <=0.5 mcg/mL   meropenem Sensitive <=1 mcg/mL   piperacillin-tazobactam Sensitive <=8 mcg/mL   tobramycin Sensitive 4 mcg/mL     3/10 CDiff toxin positive     OPAT Orders:  Diagnosis  UTI + Cdiff   Antimicrobial Regimen and Projected Term Date IV pip/tazo 13.5g/24hr as CI- 3/18  PO vanc 250 mg four times daily- 3/25    (10 days)   Weekly Lab Monitoring CBCwDiff and CMP   Any additional imaging or data needed prior to term? None   Any follow up in ID clinic? None   OPAT Access/Additional LDA Midline    Disposition  [facility] / [facility]      Lab and medication orders have been placed.    Clinical Pharmacist will review patient weekly or as needed and make recommendations regarding the above therapy plan.     Thank you,  Jackie Cotton, PharmD, [facility]  Clinical Pharmacy Specialist- [facility] Infectious Disease  Office Phone: [phone] (also available on [employer])  Fax: [phone]    For Pharmacy Admin Tracking Only    Program: Medical Group  CPA in place: Yes  Time Spent (min): 15         Patient

## 2024-03-13 NOTE — PROGRESS NOTES
- pt.'s chart was reviewed after discharge to confirm Prograf dose at dicharge. Discussed with mother over the phone , she will make sure pt.gets 0.5mg bid .  Latisha Morgan/Boubacar. 3/13/24 10:55 AM EDT

## 2024-03-14 LAB — TACROLIMUS BLOOD: 11.9 NG/ML (ref 5–20)

## 2024-03-18 ENCOUNTER — HOSPITAL ENCOUNTER (OUTPATIENT)
Age: 44
Setting detail: SPECIMEN
Discharge: HOME | End: 2024-03-18
Payer: COMMERCIAL

## 2024-03-18 ENCOUNTER — TELEPHONE (OUTPATIENT)
Dept: INFECTIOUS DISEASES | Age: 44
End: 2024-03-18

## 2024-03-18 LAB
ALBUMIN SERPL-MCNC: 3.1 G/DL (ref 3.4–5)
ALBUMIN/GLOB SERPL: 1.5 {RATIO} (ref 1.1–2.2)
ALP SERPL-CCNC: 118 U/L (ref 40–129)
ALT SERPL-CCNC: 14 U/L (ref 10–40)
ANION GAP SERPL CALCULATED.3IONS-SCNC: 15 MMOL/L (ref 3–16)
AST SERPL-CCNC: 8 U/L (ref 15–37)
BASOPHILS # BLD: 0 K/UL (ref 0–0.2)
BASOPHILS NFR BLD: 0.2 %
BILIRUB SERPL-MCNC: <0.2 MG/DL (ref 0–1)
BUN SERPL-MCNC: 15 MG/DL (ref 7–20)
CALCIUM SERPL-MCNC: 6.7 MG/DL (ref 8.3–10.6)
CHLORIDE SERPL-SCNC: 108 MMOL/L (ref 99–110)
CO2 SERPL-SCNC: 20 MMOL/L (ref 21–32)
CREAT SERPL-MCNC: 2.5 MG/DL (ref 0.6–1.1)
DEPRECATED RDW RBC AUTO: 16.4 % (ref 12.4–15.4)
EOSINOPHIL # BLD: 0.1 K/UL (ref 0–0.6)
EOSINOPHIL NFR BLD: 0.5 %
GFR SERPLBLD CREATININE-BSD FMLA CKD-EPI: 24 ML/MIN/{1.73_M2}
GLUCOSE SERPL-MCNC: 182 MG/DL (ref 70–99)
HCT VFR BLD AUTO: 25.6 % (ref 36–48)
HGB BLD-MCNC: 8.6 G/DL (ref 12–16)
LYMPHOCYTES # BLD: 0.7 K/UL (ref 1–5.1)
LYMPHOCYTES NFR BLD: 7.5 %
MCH RBC QN AUTO: 29.9 PG (ref 26–34)
MCHC RBC AUTO-ENTMCNC: 33.6 G/DL (ref 31–36)
MCV RBC AUTO: 89 FL (ref 80–100)
MONOCYTES # BLD: 0.3 K/UL (ref 0–1.3)
MONOCYTES NFR BLD: 3.1 %
NEUTROPHILS # BLD: 8.6 K/UL (ref 1.7–7.7)
NEUTROPHILS NFR BLD: 88.7 %
PLATELET # BLD AUTO: 114 K/UL (ref 135–450)
PMV BLD AUTO: 7.9 FL (ref 5–10.5)
POTASSIUM SERPL-SCNC: 4.5 MMOL/L (ref 3.5–5.1)
PROT SERPL-MCNC: 5.2 G/DL (ref 6.4–8.2)
RBC # BLD AUTO: 2.88 M/UL (ref 4–5.2)
SODIUM SERPL-SCNC: 143 MMOL/L (ref 136–145)
WBC # BLD AUTO: 9.7 K/UL (ref 4–11)

## 2024-03-18 PROCEDURE — 85025 COMPLETE CBC W/AUTO DIFF WBC: CPT

## 2024-03-18 PROCEDURE — 80197 ASSAY OF TACROLIMUS: CPT

## 2024-03-18 PROCEDURE — 80053 COMPREHEN METABOLIC PANEL: CPT

## 2024-03-18 PROCEDURE — 36415 COLL VENOUS BLD VENIPUNCTURE: CPT

## 2024-03-18 NOTE — TELEPHONE ENCOUNTER
OPAT End  Call made to pharmacy  Spoke with Ceila at [facility] and advised ok to end IV abx and pull PICC as planned for today.  She v/u    Call made to HHA  LAVINIAM at [facility] and advised of above plan.  Office contact information provided.       Call made to patient  Spoke with patients mother and advised of above plan.  She v/u    Will f/u in 1-2 days to verify line removal  3/22/24 spoke with patients mother and verified PICC line was removed

## 2024-03-18 NOTE — TELEPHONE ENCOUNTER
OPAT Nurse Coordinator Weekly Update Note    Current OPAT plan:  IV Piperacillin/tazobactam 13.5g q24h ATC as a continuous infusion daily  - Planned End date: 3/18/2024    Diagnosis:   Pseudomonas and enterococcus UTI in renal transplant     Assessment:  Spoke with patients mother who states patient is doing well. She states she has been afebrile and denies pain.  Patient due to end OPAT today.      Follow up appts:  Bladder test per mother 3/25  Dr. Parks nephrologist 1 week.

## 2024-03-19 LAB — TACROLIMUS BLOOD: 3.9 NG/ML (ref 5–20)

## 2024-03-25 NOTE — PROGRESS NOTES
Physician Progress Note      PATIENT:               JENNIFER QUARLES  CSN #:                  [id number]  :                       1980  ADMIT DATE:       3/8/2024 5:52 PM  DISCH DATE:        3/12/2024 5:37 PM  RESPONDING  PROVIDER #:        Milad Dawn MD          QUERY TEXT:    Patient admitted with UTI.  Noted documentation of ATN in Nephrology notes.    In order to support the diagnosis of ATN, please include additional clinical   indicators in your documentation.? Or please document if the diagnosis of MARCO A   has been ruled out after further study.    The medical record reflects the following:  Risk Factors: UTI, history of kidney transplant, CDIFF, chronic allograft   nephropathy  Clinical Indicators: Creat 2.9-2.5.  Per Nephrology progress note \"cute kidney   injury - pre-renal/ATN injury in setting of hypotension\".  Treatment: IVF, Nephrology consult, I&O's, serial labs, supportive care    Thank you,  Options provided:  -- ATN evidenced by, Please document evidence as well as a numerical baseline   creatinine, if known.  -- ATN ruled out after study  -- Other - I will add my own diagnosis  -- Disagree - Not applicable / Not valid  -- Disagree - Clinically unable to determine / Unknown  -- Refer to Clinical Documentation Reviewer    PROVIDER RESPONSE TEXT:    This patient has ATN as evidenced by    Query created by: Anisa Bernal on 3/18/2024 3:52 PM      Electronically signed by:  Milad Dawn MD 3/25/2024 12:22 PM

## 2024-03-27 NOTE — PROGRESS NOTES
Physician Progress Note      PATIENT:               JENNIFER QUARLES  CSN #:                  [id number]  :                       1980  ADMIT DATE:       3/8/2024 5:52 PM  DISCH DATE:        3/12/2024 5:37 PM  RESPONDING  PROVIDER #:        Reuben De Leon MD          QUERY TEXT:    Pt admitted with UTI.  Pt noted to intermittent self cath. 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    Urine culture with MDRO pseudomonas.  Treatment: Zosyn, IVF, nephrology and ID consults, serial labs, supportive   care    Thank you,  Anisa Bernal RN BSN  Options provided:  -- UTI due to self catheterization  -- UTI not due to self catheterization  -- Other - I will add my own diagnosis  -- Disagree - Not applicable / Not valid  -- Disagree - Clinically unable to determine / Unknown  -- Refer to Clinical Documentation Reviewer    PROVIDER RESPONSE TEXT:    UTI is due to self catheterization.    Query created by: Anisa Bernal on 3/27/2024 11:15 AM      Electronically signed by:  Reuben De Leon MD 3/27/2024 3:22 PM

## 2024-04-22 ENCOUNTER — OFFICE VISIT (OUTPATIENT)
Age: 44
End: 2024-04-22

## 2024-04-22 VITALS
HEART RATE: 87 BPM | DIASTOLIC BLOOD PRESSURE: 86 MMHG | SYSTOLIC BLOOD PRESSURE: 136 MMHG | TEMPERATURE: 98.6 F | OXYGEN SATURATION: 93 %

## 2024-04-22 DIAGNOSIS — S29.011A INTERCOSTAL MUSCLE STRAIN, INITIAL ENCOUNTER: Primary | ICD-10-CM

## 2024-04-22 DIAGNOSIS — R07.81 RIB PAIN ON RIGHT SIDE: ICD-10-CM

## 2024-04-22 DIAGNOSIS — J18.9 PNEUMONIA OF BOTH LOWER LOBES DUE TO INFECTIOUS ORGANISM: Primary | ICD-10-CM

## 2024-04-22 PROBLEM — K31.84 GASTROPARESIS DUE TO DM (HCC): Status: ACTIVE | Noted: 2020-11-18

## 2024-04-22 PROBLEM — N25.0 RENAL OSTEODYSTROPHY: Status: ACTIVE | Noted: 2021-09-24

## 2024-04-22 PROBLEM — D50.9 IRON DEFICIENCY ANEMIA: Status: ACTIVE | Noted: 2020-01-22

## 2024-04-22 PROBLEM — I63.81 LACUNAR INFARCT, ACUTE (HCC): Status: ACTIVE | Noted: 2023-08-18

## 2024-04-22 PROBLEM — E83.39 HYPOPHOSPHATEMIA: Status: ACTIVE | Noted: 2020-02-03

## 2024-04-22 PROBLEM — T80.212A PORT OR RESERVOIR INFECTION: Status: ACTIVE | Noted: 2020-02-03

## 2024-04-22 PROBLEM — M72.0 DUPUYTREN CONTRACTURE: Status: ACTIVE | Noted: 2020-02-03

## 2024-04-22 PROBLEM — R94.31 PROLONGED QT INTERVAL: Status: ACTIVE | Noted: 2021-09-24

## 2024-04-22 PROBLEM — R55 POSTURAL DIZZINESS WITH NEAR SYNCOPE: Status: ACTIVE | Noted: 2019-03-26

## 2024-04-22 PROBLEM — N31.9 NEUROMUSCULAR DYSFUNCTION OF BLADDER, UNSPECIFIED: Status: ACTIVE | Noted: 2023-11-09

## 2024-04-22 PROBLEM — R42 POSTURAL DIZZINESS WITH NEAR SYNCOPE: Status: ACTIVE | Noted: 2019-03-26

## 2024-04-22 PROBLEM — M72.6 NECROTIZING FASCIITIS (HCC): Status: ACTIVE | Noted: 2023-11-09

## 2024-04-22 PROBLEM — I10 BENIGN ESSENTIAL HYPERTENSION: Status: ACTIVE | Noted: 2019-03-03

## 2024-04-22 PROBLEM — R13.12 DYSPHAGIA, OROPHARYNGEAL PHASE: Status: ACTIVE | Noted: 2023-12-31

## 2024-04-22 PROBLEM — E11.9 TYPE 2 DIABETES MELLITUS WITHOUT COMPLICATION (HCC): Status: ACTIVE | Noted: 2021-09-24

## 2024-04-22 PROBLEM — D69.6 THROMBOCYTOPENIA (HCC): Chronic | Status: ACTIVE | Noted: 2020-02-03

## 2024-04-22 PROBLEM — M62.81 MUSCLE WEAKNESS (GENERALIZED): Status: ACTIVE | Noted: 2023-11-10

## 2024-04-22 PROBLEM — R33.9 RETENTION OF URINE: Status: ACTIVE | Noted: 2024-02-08

## 2024-04-22 PROBLEM — E11.43 GASTROPARESIS DUE TO DM (HCC): Status: ACTIVE | Noted: 2020-11-18

## 2024-04-22 PROBLEM — E55.9 VITAMIN D DEFICIENCY: Status: ACTIVE | Noted: 2020-02-03

## 2024-04-22 PROBLEM — E46 MALNUTRITION (HCC): Chronic | Status: ACTIVE | Noted: 2020-02-03

## 2024-04-22 PROBLEM — R79.89 ABNORMAL LIVER FUNCTION TESTS: Status: ACTIVE | Noted: 2018-12-04

## 2024-04-22 PROBLEM — I51.89 RIGHT ATRIAL MASS: Status: ACTIVE | Noted: 2019-11-22

## 2024-04-22 PROBLEM — D63.1 ANEMIA DUE TO CHRONIC KIDNEY DISEASE: Status: ACTIVE | Noted: 2020-01-22

## 2024-04-22 PROBLEM — R80.9 PROTEINURIA: Status: ACTIVE | Noted: 2020-02-03

## 2024-04-22 PROBLEM — R26.81 UNSTEADINESS ON FEET: Status: ACTIVE | Noted: 2023-11-10

## 2024-04-22 PROBLEM — D72.819 LEUKOPENIA: Status: ACTIVE | Noted: 2020-02-03

## 2024-04-22 PROBLEM — D84.821 IMMUNODEFICIENCY DUE TO DRUGS (CODE) (HCC): Status: ACTIVE | Noted: 2023-03-18

## 2024-04-22 PROBLEM — N18.9 ANEMIA DUE TO CHRONIC KIDNEY DISEASE: Status: ACTIVE | Noted: 2020-01-22

## 2024-04-22 PROBLEM — I51.3 INTRACARDIAC THROMBUS: Chronic | Status: ACTIVE | Noted: 2020-02-03

## 2024-04-22 PROBLEM — K21.9 GASTROESOPHAGEAL REFLUX DISEASE: Status: ACTIVE | Noted: 2020-02-03

## 2024-04-22 PROBLEM — F33.1 MODERATE RECURRENT MAJOR DEPRESSION (HCC): Status: ACTIVE | Noted: 2021-09-24

## 2024-04-22 PROBLEM — I69.30 UNSPECIFIED SEQUELAE OF CEREBRAL INFARCTION: Status: ACTIVE | Noted: 2023-11-09

## 2024-04-22 PROBLEM — K92.1 MELENA: Status: ACTIVE | Noted: 2023-11-28

## 2024-04-22 PROBLEM — I42.9 IDIOPATHIC CARDIOMYOPATHY (HCC): Chronic | Status: ACTIVE | Noted: 2020-02-03

## 2024-04-22 PROBLEM — R78.81 BACTEREMIA DUE TO ESCHERICHIA COLI: Status: ACTIVE | Noted: 2020-01-22

## 2024-04-22 PROBLEM — E83.42 HYPOMAGNESEMIA: Status: ACTIVE | Noted: 2020-02-03

## 2024-04-22 PROBLEM — E88.09 HYPOALBUMINEMIA: Status: ACTIVE | Noted: 2020-02-03

## 2024-04-22 PROBLEM — B96.20 BACTEREMIA DUE TO ESCHERICHIA COLI: Status: ACTIVE | Noted: 2020-01-22

## 2024-04-22 PROBLEM — E87.20 METABOLIC ACIDOSIS: Status: ACTIVE | Noted: 2020-02-03

## 2024-04-22 PROBLEM — K80.10 CALCULUS OF GALLBLADDER WITH CHRONIC CHOLECYSTITIS WITHOUT OBSTRUCTION: Chronic | Status: ACTIVE | Noted: 2018-12-04

## 2024-04-22 RX ORDER — DOXYCYCLINE HYCLATE 100 MG
100 TABLET ORAL 2 TIMES DAILY
Qty: 20 TABLET | Refills: 0 | Status: SHIPPED | OUTPATIENT
Start: 2024-04-22 | End: 2024-05-02

## 2024-04-22 RX ORDER — TRAMADOL HYDROCHLORIDE 50 MG/1
50 TABLET ORAL EVERY 8 HOURS PRN
Qty: 15 TABLET | Refills: 0 | Status: SHIPPED | OUTPATIENT
Start: 2024-04-22 | End: 2024-04-27

## 2024-04-22 NOTE — PATIENT INSTRUCTIONS
Currently taking muscle relaxer and tylenol and prednisone for chronic illness.  Initial xray: negative for rib fracture will call with radiologist results if they are different from the initial results   Follow up with your pcp in 7 days if symptoms persist or if symptoms worsen.

## 2024-05-12 ENCOUNTER — HOSPITAL ENCOUNTER (EMERGENCY)
Age: 44
Discharge: HOME | End: 2024-05-12
Payer: COMMERCIAL

## 2024-05-12 VITALS
SYSTOLIC BLOOD PRESSURE: 100 MMHG | RESPIRATION RATE: 18 BRPM | HEART RATE: 96 BPM | HEIGHT: 61 IN | BODY MASS INDEX: 21.74 KG/M2 | TEMPERATURE: 98.5 F | OXYGEN SATURATION: 99 % | DIASTOLIC BLOOD PRESSURE: 68 MMHG

## 2024-05-12 DIAGNOSIS — N30.00 ACUTE CYSTITIS WITHOUT HEMATURIA: ICD-10-CM

## 2024-05-12 DIAGNOSIS — L30.9 DERMATITIS: Primary | ICD-10-CM

## 2024-05-12 LAB
BACTERIA URNS QL MICRO: ABNORMAL /HPF
BILIRUB UR QL STRIP.AUTO: NEGATIVE
CLARITY UR: ABNORMAL
COLOR UR: YELLOW
EPI CELLS #/AREA URNS HPF: ABNORMAL /HPF (ref 0–5)
GLUCOSE UR STRIP.AUTO-MCNC: NEGATIVE MG/DL
HGB UR QL STRIP.AUTO: ABNORMAL
HYALINE CASTS #/AREA URNS LPF: ABNORMAL /LPF (ref 0–2)
KETONES UR STRIP.AUTO-MCNC: NEGATIVE MG/DL
LEUKOCYTE ESTERASE UR QL STRIP.AUTO: ABNORMAL
NITRITE UR QL STRIP.AUTO: POSITIVE
PH UR STRIP.AUTO: 6 [PH] (ref 5–8)
PROT UR STRIP.AUTO-MCNC: ABNORMAL MG/DL
RBC #/AREA URNS HPF: ABNORMAL /HPF (ref 0–4)
SP GR UR STRIP.AUTO: 1.01 (ref 1–1.03)
UA COMPLETE W REFLEX CULTURE PNL UR: YES
UA DIPSTICK W REFLEX MICRO PNL UR: YES
URN SPEC COLLECT METH UR: ABNORMAL
UROBILINOGEN UR STRIP-ACNC: 0.2 E.U./DL
WBC #/AREA URNS HPF: >100 /HPF (ref 0–5)

## 2024-05-12 PROCEDURE — 99283 EMERGENCY DEPT VISIT LOW MDM: CPT

## 2024-05-12 PROCEDURE — 6370000000 HC RX 637 (ALT 250 FOR IP): Performed by: NURSE PRACTITIONER

## 2024-05-12 PROCEDURE — 87086 URINE CULTURE/COLONY COUNT: CPT

## 2024-05-12 PROCEDURE — 81001 URINALYSIS AUTO W/SCOPE: CPT

## 2024-05-12 PROCEDURE — 87186 SC STD MICRODIL/AGAR DIL: CPT

## 2024-05-12 PROCEDURE — 87088 URINE BACTERIA CULTURE: CPT

## 2024-05-12 PROCEDURE — 51701 INSERT BLADDER CATHETER: CPT

## 2024-05-12 RX ORDER — CEFDINIR 125 MG/5ML
300 POWDER, FOR SUSPENSION ORAL EVERY 12 HOURS SCHEDULED
Status: DISCONTINUED | OUTPATIENT
Start: 2024-05-12 | End: 2024-05-12 | Stop reason: HOSPADM

## 2024-05-12 RX ORDER — NYSTATIN 100000 [USP'U]/G
POWDER TOPICAL
Qty: 30 G | Refills: 3 | Status: SHIPPED | OUTPATIENT
Start: 2024-05-12

## 2024-05-12 RX ORDER — CEFDINIR 250 MG/5ML
300 POWDER, FOR SUSPENSION ORAL 2 TIMES DAILY
Qty: 120 ML | Refills: 0 | Status: SHIPPED | OUTPATIENT
Start: 2024-05-12 | End: 2024-05-22

## 2024-05-12 RX ADMIN — CEFDINIR 300 MG: 125 POWDER, FOR SUSPENSION ORAL at 18:51

## 2024-05-12 ASSESSMENT — LIFESTYLE VARIABLES
HOW MANY STANDARD DRINKS CONTAINING ALCOHOL DO YOU HAVE ON A TYPICAL DAY: PATIENT DOES NOT DRINK
HOW OFTEN DO YOU HAVE A DRINK CONTAINING ALCOHOL: NEVER

## 2024-05-12 ASSESSMENT — PAIN DESCRIPTION - LOCATION: LOCATION: BUTTOCKS

## 2024-05-12 ASSESSMENT — PAIN SCALES - GENERAL: PAINLEVEL_OUTOF10: 5

## 2024-05-12 ASSESSMENT — PAIN - FUNCTIONAL ASSESSMENT: PAIN_FUNCTIONAL_ASSESSMENT: 0-10

## 2024-05-12 NOTE — ED PROVIDER NOTES
[facility] ED  EMERGENCY DEPARTMENT ENCOUNTER        Pt Name: Carolina Arguelles  MRN: [id number]  Birthdate 1980  Date of evaluation: 5/12/2024  Provider: NIRAV Doherty CNP  PCP: Radha Pompa APRN - CNP  Note Started: 6:16 PM EDT 5/12/24      SOFÍA. I have evaluated this patient.        CHIEF COMPLAINT       Chief Complaint   Patient presents with    Rash     Rash that started 2 days ago. Patient's family showed us a video of rash that appears very excoriated with green.        HISTORY OF PRESENT ILLNESS: 1 or more Elements     History From:     Limitations to history : None    Social Determinants Significantly Affecting Health : None    Chief Complaint: Rash     Carolina Arguelles is a 44 y.o. female who presents to the emergency department today with symptoms of rash in the diaper area.  The patient is a 44-year-old female with a history of stroke, renal sufficiency, renal transplant.  Patient has been having issues with incontinence of urine and stool for some time now.  She has left-sided deficits from a CVA in the past.  Patient family had reported that she is now has a rash around her diaper area.  Was asking for some help and treatment for her diaper rash.  Also just wanted her checked for UTI as she has a history of urinary tract infections.  Otherwise patient's been acting appropriately.  No particular complaints from the patient.  Denies any abdominal pain.  No urinary symptoms.  Has a neurogenic bladder history and is straight cath at baseline.  Denies any other acute concerns.  No fever no vomiting.  No blood in stool.    Nursing Notes were all reviewed and agreed with or any disagreements were addressed in the HPI.    REVIEW OF SYSTEMS :      Review of Systems   Skin:  Positive for rash.       Positives and Pertinent negatives as per HPI.     SURGICAL HISTORY     Past Surgical History:   Procedure Laterality Date    DENTAL SURGERY      DIALYSIS FISTULA CREATION      KIDNEY

## 2024-05-14 LAB
BACTERIA UR CULT: ABNORMAL
ORGANISM: ABNORMAL

## 2024-08-03 ENCOUNTER — HOSPITAL ENCOUNTER (INPATIENT)
Age: 44
LOS: 17 days | Discharge: NURSING, REHAB OR LONG-TERM CARE | DRG: 853 | End: 2024-08-20
Attending: STUDENT IN AN ORGANIZED HEALTH CARE EDUCATION/TRAINING PROGRAM | Admitting: INTERNAL MEDICINE
Payer: COMMERCIAL

## 2024-08-03 ENCOUNTER — APPOINTMENT (OUTPATIENT)
Dept: GENERAL RADIOLOGY | Age: 44
DRG: 853 | End: 2024-08-03
Payer: COMMERCIAL

## 2024-08-03 ENCOUNTER — APPOINTMENT (OUTPATIENT)
Dept: CT IMAGING | Age: 44
DRG: 853 | End: 2024-08-03
Payer: COMMERCIAL

## 2024-08-03 DIAGNOSIS — N17.9 AKI (ACUTE KIDNEY INJURY) (HCC): Primary | ICD-10-CM

## 2024-08-03 DIAGNOSIS — L89.94: ICD-10-CM

## 2024-08-03 DIAGNOSIS — R93.89 ABNORMAL CT SCAN: ICD-10-CM

## 2024-08-03 DIAGNOSIS — A41.9 SEPSIS, DUE TO UNSPECIFIED ORGANISM, UNSPECIFIED WHETHER ACUTE ORGAN DYSFUNCTION PRESENT (HCC): ICD-10-CM

## 2024-08-03 LAB
ALBUMIN SERPL-MCNC: 2.7 G/DL (ref 3.4–5)
ALBUMIN/GLOB SERPL: 0.7 {RATIO} (ref 1.1–2.2)
ALP SERPL-CCNC: 135 U/L (ref 40–129)
ALT SERPL-CCNC: 19 U/L (ref 10–40)
ANION GAP SERPL CALCULATED.3IONS-SCNC: 14 MMOL/L (ref 3–16)
AST SERPL-CCNC: 16 U/L (ref 15–37)
B-HCG SERPL EIA 3RD IS-ACNC: <5 MIU/ML
BASOPHILS # BLD: 0 K/UL (ref 0–0.2)
BASOPHILS NFR BLD: 0.1 %
BILIRUB SERPL-MCNC: <0.2 MG/DL (ref 0–1)
BUN SERPL-MCNC: 112 MG/DL (ref 7–20)
CALCIUM SERPL-MCNC: 11.2 MG/DL (ref 8.3–10.6)
CHLORIDE SERPL-SCNC: 93 MMOL/L (ref 99–110)
CK SERPL-CCNC: 50 U/L (ref 26–192)
CO2 SERPL-SCNC: 23 MMOL/L (ref 21–32)
CREAT SERPL-MCNC: 3.9 MG/DL (ref 0.6–1.1)
DEPRECATED RDW RBC AUTO: 14.5 % (ref 12.4–15.4)
EOSINOPHIL # BLD: 0.1 K/UL (ref 0–0.6)
EOSINOPHIL NFR BLD: 0.3 %
GFR SERPLBLD CREATININE-BSD FMLA CKD-EPI: 14 ML/MIN/{1.73_M2}
GLUCOSE BLD-MCNC: 301 MG/DL (ref 70–99)
GLUCOSE SERPL-MCNC: 207 MG/DL (ref 70–99)
HCT VFR BLD AUTO: 31.5 % (ref 36–48)
HGB BLD-MCNC: 10.1 G/DL (ref 12–16)
LACTATE BLDV-SCNC: 1.4 MMOL/L (ref 0.4–1.9)
LACTATE BLDV-SCNC: 1.5 MMOL/L (ref 0.4–1.9)
LACTATE BLDV-SCNC: 2.2 MMOL/L (ref 0.4–2)
LACTATE BLDV-SCNC: 2.4 MMOL/L (ref 0.4–2)
LYMPHOCYTES # BLD: 0.9 K/UL (ref 1–5.1)
LYMPHOCYTES NFR BLD: 4.5 %
MCH RBC QN AUTO: 28.8 PG (ref 26–34)
MCHC RBC AUTO-ENTMCNC: 32.1 G/DL (ref 31–36)
MCV RBC AUTO: 89.6 FL (ref 80–100)
MONOCYTES # BLD: 1.2 K/UL (ref 0–1.3)
MONOCYTES NFR BLD: 5.6 %
NEUTROPHILS # BLD: 18.6 K/UL (ref 1.7–7.7)
NEUTROPHILS NFR BLD: 89.5 %
PERFORMED ON: ABNORMAL
PLATELET # BLD AUTO: 178 K/UL (ref 135–450)
PMV BLD AUTO: 8.6 FL (ref 5–10.5)
POTASSIUM SERPL-SCNC: 4.1 MMOL/L (ref 3.5–5.1)
PROT SERPL-MCNC: 6.8 G/DL (ref 6.4–8.2)
RBC # BLD AUTO: 3.51 M/UL (ref 4–5.2)
SODIUM SERPL-SCNC: 130 MMOL/L (ref 136–145)
WBC # BLD AUTO: 20.8 K/UL (ref 4–11)

## 2024-08-03 PROCEDURE — 74176 CT ABD & PELVIS W/O CONTRAST: CPT

## 2024-08-03 PROCEDURE — 83036 HEMOGLOBIN GLYCOSYLATED A1C: CPT

## 2024-08-03 PROCEDURE — 83605 ASSAY OF LACTIC ACID: CPT

## 2024-08-03 PROCEDURE — 82550 ASSAY OF CK (CPK): CPT

## 2024-08-03 PROCEDURE — 84702 CHORIONIC GONADOTROPIN TEST: CPT

## 2024-08-03 PROCEDURE — 87040 BLOOD CULTURE FOR BACTERIA: CPT

## 2024-08-03 PROCEDURE — 6370000000 HC RX 637 (ALT 250 FOR IP): Performed by: INTERNAL MEDICINE

## 2024-08-03 PROCEDURE — 96366 THER/PROPH/DIAG IV INF ADDON: CPT

## 2024-08-03 PROCEDURE — 99285 EMERGENCY DEPT VISIT HI MDM: CPT

## 2024-08-03 PROCEDURE — 2060000000 HC ICU INTERMEDIATE R&B

## 2024-08-03 PROCEDURE — 51701 INSERT BLADDER CATHETER: CPT

## 2024-08-03 PROCEDURE — 74019 RADEX ABDOMEN 2 VIEWS: CPT

## 2024-08-03 PROCEDURE — 96365 THER/PROPH/DIAG IV INF INIT: CPT

## 2024-08-03 PROCEDURE — 6370000000 HC RX 637 (ALT 250 FOR IP): Performed by: NURSE PRACTITIONER

## 2024-08-03 PROCEDURE — 85025 COMPLETE CBC W/AUTO DIFF WBC: CPT

## 2024-08-03 PROCEDURE — 2580000003 HC RX 258: Performed by: NURSE PRACTITIONER

## 2024-08-03 PROCEDURE — 6360000002 HC RX W HCPCS: Performed by: NURSE PRACTITIONER

## 2024-08-03 PROCEDURE — 2580000003 HC RX 258: Performed by: STUDENT IN AN ORGANIZED HEALTH CARE EDUCATION/TRAINING PROGRAM

## 2024-08-03 PROCEDURE — 36415 COLL VENOUS BLD VENIPUNCTURE: CPT

## 2024-08-03 PROCEDURE — 2580000003 HC RX 258: Performed by: INTERNAL MEDICINE

## 2024-08-03 PROCEDURE — 96361 HYDRATE IV INFUSION ADD-ON: CPT

## 2024-08-03 PROCEDURE — 80053 COMPREHEN METABOLIC PANEL: CPT

## 2024-08-03 PROCEDURE — 6360000002 HC RX W HCPCS: Performed by: INTERNAL MEDICINE

## 2024-08-03 PROCEDURE — 6360000002 HC RX W HCPCS: Performed by: STUDENT IN AN ORGANIZED HEALTH CARE EDUCATION/TRAINING PROGRAM

## 2024-08-03 RX ORDER — MORPHINE SULFATE 2 MG/ML
1 INJECTION, SOLUTION INTRAMUSCULAR; INTRAVENOUS EVERY 8 HOURS PRN
Status: DISCONTINUED | OUTPATIENT
Start: 2024-08-03 | End: 2024-08-07

## 2024-08-03 RX ORDER — ATORVASTATIN CALCIUM 40 MG/1
40 TABLET, FILM COATED ORAL DAILY
Status: DISCONTINUED | OUTPATIENT
Start: 2024-08-03 | End: 2024-08-20 | Stop reason: HOSPADM

## 2024-08-03 RX ORDER — LEVOTHYROXINE SODIUM 137 UG/1
137 TABLET ORAL DAILY
Status: ON HOLD | COMMUNITY
End: 2024-08-20 | Stop reason: HOSPADM

## 2024-08-03 RX ORDER — HEPARIN SODIUM 5000 [USP'U]/ML
5000 INJECTION, SOLUTION INTRAVENOUS; SUBCUTANEOUS EVERY 8 HOURS SCHEDULED
Status: DISCONTINUED | OUTPATIENT
Start: 2024-08-04 | End: 2024-08-19

## 2024-08-03 RX ORDER — SODIUM CHLORIDE, SODIUM LACTATE, POTASSIUM CHLORIDE, CALCIUM CHLORIDE 600; 310; 30; 20 MG/100ML; MG/100ML; MG/100ML; MG/100ML
INJECTION, SOLUTION INTRAVENOUS CONTINUOUS
Status: ACTIVE | OUTPATIENT
Start: 2024-08-03 | End: 2024-08-04

## 2024-08-03 RX ORDER — MIDODRINE HYDROCHLORIDE 5 MG/1
10 TABLET ORAL 3 TIMES DAILY
Status: ON HOLD | COMMUNITY
End: 2024-08-20 | Stop reason: HOSPADM

## 2024-08-03 RX ORDER — FLUOXETINE HYDROCHLORIDE 20 MG/1
20 CAPSULE ORAL DAILY
Status: DISCONTINUED | OUTPATIENT
Start: 2024-08-03 | End: 2024-08-04

## 2024-08-03 RX ORDER — SODIUM CHLORIDE 0.9 % (FLUSH) 0.9 %
5-40 SYRINGE (ML) INJECTION PRN
Status: DISCONTINUED | OUTPATIENT
Start: 2024-08-03 | End: 2024-08-16

## 2024-08-03 RX ORDER — BISACODYL 5 MG/1
5 TABLET, DELAYED RELEASE ORAL DAILY PRN
Status: DISCONTINUED | OUTPATIENT
Start: 2024-08-03 | End: 2024-08-20 | Stop reason: HOSPADM

## 2024-08-03 RX ORDER — PANTOPRAZOLE SODIUM 40 MG/1
40 TABLET, DELAYED RELEASE ORAL
Status: DISCONTINUED | OUTPATIENT
Start: 2024-08-04 | End: 2024-08-20 | Stop reason: HOSPADM

## 2024-08-03 RX ORDER — MIDODRINE HYDROCHLORIDE 5 MG/1
5 TABLET ORAL
Status: DISCONTINUED | OUTPATIENT
Start: 2024-08-03 | End: 2024-08-18

## 2024-08-03 RX ORDER — SODIUM CHLORIDE 0.9 % (FLUSH) 0.9 %
5-40 SYRINGE (ML) INJECTION EVERY 12 HOURS SCHEDULED
Status: DISCONTINUED | OUTPATIENT
Start: 2024-08-03 | End: 2024-08-16

## 2024-08-03 RX ORDER — 0.9 % SODIUM CHLORIDE 0.9 %
1000 INTRAVENOUS SOLUTION INTRAVENOUS ONCE
Status: COMPLETED | OUTPATIENT
Start: 2024-08-03 | End: 2024-08-03

## 2024-08-03 RX ORDER — INSULIN LISPRO 100 [IU]/ML
0-4 INJECTION, SOLUTION INTRAVENOUS; SUBCUTANEOUS NIGHTLY
Status: DISCONTINUED | OUTPATIENT
Start: 2024-08-03 | End: 2024-08-20 | Stop reason: HOSPADM

## 2024-08-03 RX ORDER — GLUCAGON 1 MG/ML
1 KIT INJECTION PRN
Status: DISCONTINUED | OUTPATIENT
Start: 2024-08-03 | End: 2024-08-20 | Stop reason: HOSPADM

## 2024-08-03 RX ORDER — ASPIRIN 81 MG/1
81 TABLET ORAL DAILY
Status: DISCONTINUED | OUTPATIENT
Start: 2024-08-03 | End: 2024-08-16

## 2024-08-03 RX ORDER — DOCUSATE SODIUM 100 MG/1
100 CAPSULE, LIQUID FILLED ORAL 2 TIMES DAILY
Status: DISCONTINUED | OUTPATIENT
Start: 2024-08-03 | End: 2024-08-04

## 2024-08-03 RX ORDER — TRAZODONE HYDROCHLORIDE 50 MG/1
100 TABLET ORAL NIGHTLY
Status: DISCONTINUED | OUTPATIENT
Start: 2024-08-03 | End: 2024-08-20 | Stop reason: HOSPADM

## 2024-08-03 RX ORDER — INSULIN LISPRO 100 [IU]/ML
0-8 INJECTION, SOLUTION INTRAVENOUS; SUBCUTANEOUS
Status: DISCONTINUED | OUTPATIENT
Start: 2024-08-03 | End: 2024-08-20 | Stop reason: HOSPADM

## 2024-08-03 RX ORDER — CLOPIDOGREL BISULFATE 75 MG/1
75 TABLET ORAL DAILY
Status: DISCONTINUED | OUTPATIENT
Start: 2024-08-03 | End: 2024-08-19

## 2024-08-03 RX ORDER — SODIUM CHLORIDE 9 MG/ML
INJECTION, SOLUTION INTRAVENOUS PRN
Status: DISCONTINUED | OUTPATIENT
Start: 2024-08-03 | End: 2024-08-16

## 2024-08-03 RX ORDER — INSULIN GLARGINE 100 [IU]/ML
7 INJECTION, SOLUTION SUBCUTANEOUS NIGHTLY
Status: DISCONTINUED | OUTPATIENT
Start: 2024-08-03 | End: 2024-08-09

## 2024-08-03 RX ORDER — DEXTROSE MONOHYDRATE 100 MG/ML
INJECTION, SOLUTION INTRAVENOUS CONTINUOUS PRN
Status: DISCONTINUED | OUTPATIENT
Start: 2024-08-03 | End: 2024-08-20 | Stop reason: HOSPADM

## 2024-08-03 RX ORDER — FUROSEMIDE 20 MG/1
40 TABLET ORAL DAILY
Status: ON HOLD | COMMUNITY
End: 2024-08-20 | Stop reason: HOSPADM

## 2024-08-03 RX ADMIN — ATORVASTATIN CALCIUM 40 MG: 40 TABLET, FILM COATED ORAL at 20:40

## 2024-08-03 RX ADMIN — SODIUM CHLORIDE 1000 ML: 9 INJECTION, SOLUTION INTRAVENOUS at 13:43

## 2024-08-03 RX ADMIN — TRAZODONE HYDROCHLORIDE 100 MG: 50 TABLET ORAL at 20:39

## 2024-08-03 RX ADMIN — SODIUM CHLORIDE, POTASSIUM CHLORIDE, SODIUM LACTATE AND CALCIUM CHLORIDE: 600; 310; 30; 20 INJECTION, SOLUTION INTRAVENOUS at 20:51

## 2024-08-03 RX ADMIN — INSULIN GLARGINE 7 UNITS: 100 INJECTION, SOLUTION SUBCUTANEOUS at 20:40

## 2024-08-03 RX ADMIN — FLUOXETINE HYDROCHLORIDE 20 MG: 20 CAPSULE ORAL at 20:40

## 2024-08-03 RX ADMIN — DOCUSATE SODIUM 100 MG: 100 CAPSULE, LIQUID FILLED ORAL at 22:11

## 2024-08-03 RX ADMIN — CLOPIDOGREL BISULFATE 75 MG: 75 TABLET ORAL at 20:39

## 2024-08-03 RX ADMIN — MORPHINE SULFATE 1 MG: 2 INJECTION, SOLUTION INTRAMUSCULAR; INTRAVENOUS at 20:41

## 2024-08-03 RX ADMIN — INSULIN LISPRO 4 UNITS: 100 INJECTION, SOLUTION INTRAVENOUS; SUBCUTANEOUS at 20:40

## 2024-08-03 RX ADMIN — PIPERACILLIN AND TAZOBACTAM 4500 MG: 4; .5 INJECTION, POWDER, LYOPHILIZED, FOR SOLUTION INTRAVENOUS at 18:09

## 2024-08-03 RX ADMIN — VANCOMYCIN HYDROCHLORIDE 1500 MG: 10 INJECTION, POWDER, LYOPHILIZED, FOR SOLUTION INTRAVENOUS at 15:05

## 2024-08-03 RX ADMIN — SODIUM CHLORIDE 1000 ML: 9 INJECTION, SOLUTION INTRAVENOUS at 15:06

## 2024-08-03 RX ADMIN — SODIUM CHLORIDE, PRESERVATIVE FREE 10 ML: 5 INJECTION INTRAVENOUS at 20:42

## 2024-08-03 RX ADMIN — MIDODRINE HYDROCHLORIDE 5 MG: 5 TABLET ORAL at 20:40

## 2024-08-03 ASSESSMENT — PAIN SCALES - GENERAL
PAINLEVEL_OUTOF10: 10
PAINLEVEL_OUTOF10: 1
PAINLEVEL_OUTOF10: 1
PAINLEVEL_OUTOF10: 10
PAINLEVEL_OUTOF10: 5
PAINLEVEL_OUTOF10: 10
PAINLEVEL_OUTOF10: 1

## 2024-08-03 ASSESSMENT — PAIN DESCRIPTION - ORIENTATION: ORIENTATION: LEFT

## 2024-08-03 ASSESSMENT — PAIN DESCRIPTION - LOCATION: LOCATION: LEG

## 2024-08-03 ASSESSMENT — PAIN - FUNCTIONAL ASSESSMENT: PAIN_FUNCTIONAL_ASSESSMENT: 0-10

## 2024-08-03 NOTE — ED PROVIDER NOTES
Emergency Department Attending Provider Note  Location: [facility]  ED  8/3/2024     Patient Identification  Carolina Arguelles is a 44 y.o. female      Carolina Arguelles was evaluated in the Emergency Department for wound check. Although initial history and physical exam information was obtained by Camilo GASTELUM (who also dictated a record of this visit), I personally saw the patient and performed a substantive portion of the visit including all aspects of the medical decision making.    Patient seen and evaluated.  Relevant records reviewed.  Patient is a 45 y/o F with pmhx of CHF, hypotension, ESRD on dialysis, DM2 who presents with concern for infected low back wound. Patient is wheelchair bound. She is followed by wound care. They saw her yesterday and were concerned for infection given drainage. Wound has been getting packed.  Eyes any fevers.    Patient presented hypotensive but vitals otherwise normal.  On exam wound appears infected with erythema.     Chronic medical conditions reviewed  Social determinants reviewed    Diagnostic studies reviewed and interpreted.   CBC with a leukocytosis to 20.8, normocytic anemia with a hemoglobin of 10.1, hematocrit of 31.5, normal platelets  CMP with hyponatremia to 130, hyperglycemic to 207, low chloride of 93, elevated BUN to 112, creatinine of 3.9 and GFR 14, elevated alk phos 135 normal ALT and AST, normal bilirubin  Elevated lactic acid 2.4  X-ray of the abdomen with retained colonic stool greatest in the rectosigmoid colon consistent with constipation  CT abdomen pelvis without contrast showing a 4 x 5 x 3 cm gas and fluid collection within the left gluteal region just inferior to the ischial tuberosity corresponding to known decubitus ulcer. Soft tissue gas extending medially into gluteal soft tissues as well as anteriorly and medially into perineum.  Right lower quadrant renal transplant demonstrating mild hydroureteronephrosis extending to the level of

## 2024-08-03 NOTE — ED NOTES
Patient Name: Carolina Arguelles  :  1980  44 y.o.  MRN:  [id number]  Preferred Name    ED Room #:    Family/Caregiver Present no  Restraints no  Sitter no  Sepsis Risk Score      Situation  Code Status: Prior No additional code details.    Allergies: Latex, Acetaminophen, Hydrocodone, and Hydrocodone-acetaminophen  Weight: Patient Vitals for the past 96 hrs (Last 3 readings):   Weight   24 1214 56.7 kg (125 lb)     Arrived from: home  Chief Complaint:   Chief Complaint   Patient presents with    Wound Check     Parents reports patient has a bed sore on her left buttock. Patient has been see at wound care where they debrided the wound and packed the wound. Home health nurse thought her wound looked infected.      Hospital Problem/Diagnosis:  Principal Problem:    Sepsis (HCC)  Resolved Problems:    * No resolved hospital problems. *    Imaging:   CT ABDOMEN PELVIS WO CONTRAST Additional Contrast? None   Preliminary Result   1. 4.5 x 3.0 cm gas and fluid collection within the left gluteal region just   inferior to the ischial tuberosity likely corresponding to known decubitus   ulcer.  However there is soft tissue gas extending medially into the gluteal   soft tissues as well as anteriorly and medially into the perineum.   Necrotizing fasciitis cannot be excluded.   2. Right lower quadrant renal transplant demonstrating mild   hydroureteronephrosis extending to the level of the bladder without obvious   stone.  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  Recommend correlation with any recent   instrumentation.   3. Large amount of stool within the rectum which is dilated measuring up to 9   cm.   Critical results were called by Dr. Nelda Morgan MD to Camilo Jesus CNP   on 8/3/2024 at 16:59.         XR ABDOMEN (2 VIEWS)   Final Result   Retained colonic stool,

## 2024-08-04 ENCOUNTER — ANESTHESIA (OUTPATIENT)
Dept: OPERATING ROOM | Age: 44
End: 2024-08-04
Payer: COMMERCIAL

## 2024-08-04 ENCOUNTER — ANESTHESIA EVENT (OUTPATIENT)
Dept: OPERATING ROOM | Age: 44
End: 2024-08-04
Payer: COMMERCIAL

## 2024-08-04 PROBLEM — Z86.19 HISTORY OF CLOSTRIDIOIDES DIFFICILE INFECTION: Status: ACTIVE | Noted: 2024-08-04

## 2024-08-04 PROBLEM — N18.9 ACUTE KIDNEY INJURY SUPERIMPOSED ON CKD (HCC): Status: ACTIVE | Noted: 2021-07-11

## 2024-08-04 PROBLEM — D84.9 IMMUNOCOMPROMISED (HCC): Status: ACTIVE | Noted: 2024-08-04

## 2024-08-04 PROBLEM — L89.324 DECUBITUS ULCER OF ISCHIAL AREA, LEFT, STAGE IV (HCC): Status: ACTIVE | Noted: 2024-08-04

## 2024-08-04 PROBLEM — L89.94: Status: ACTIVE | Noted: 2024-08-03

## 2024-08-04 LAB
ANION GAP SERPL CALCULATED.3IONS-SCNC: 11 MMOL/L (ref 3–16)
BACTERIA URNS QL MICRO: ABNORMAL /HPF
BILIRUB UR QL STRIP.AUTO: NEGATIVE
BUN SERPL-MCNC: 102 MG/DL (ref 7–20)
CALCIUM SERPL-MCNC: 9.8 MG/DL (ref 8.3–10.6)
CHLORIDE SERPL-SCNC: 99 MMOL/L (ref 99–110)
CLARITY UR: CLEAR
CO2 SERPL-SCNC: 20 MMOL/L (ref 21–32)
COLOR UR: YELLOW
CREAT SERPL-MCNC: 3.4 MG/DL (ref 0.6–1.1)
CRP SERPL-MCNC: 213.7 MG/L (ref 0–5.1)
DEPRECATED RDW RBC AUTO: 14 % (ref 12.4–15.4)
EPI CELLS #/AREA URNS HPF: ABNORMAL /HPF (ref 0–5)
ERYTHROCYTE [SEDIMENTATION RATE] IN BLOOD BY WESTERGREN METHOD: 32 MM/HR (ref 0–20)
EST. AVERAGE GLUCOSE BLD GHB EST-MCNC: 154.2 MG/DL
GFR SERPLBLD CREATININE-BSD FMLA CKD-EPI: 17 ML/MIN/{1.73_M2}
GLUCOSE BLD-MCNC: 236 MG/DL (ref 70–99)
GLUCOSE BLD-MCNC: 60 MG/DL (ref 70–99)
GLUCOSE BLD-MCNC: 78 MG/DL (ref 70–99)
GLUCOSE BLD-MCNC: 83 MG/DL (ref 70–99)
GLUCOSE BLD-MCNC: 93 MG/DL (ref 70–99)
GLUCOSE BLD-MCNC: 97 MG/DL (ref 70–99)
GLUCOSE SERPL-MCNC: 120 MG/DL (ref 70–99)
GLUCOSE UR STRIP.AUTO-MCNC: NEGATIVE MG/DL
HBA1C MFR BLD: 7 %
HCT VFR BLD AUTO: 28.1 % (ref 36–48)
HGB BLD-MCNC: 8.8 G/DL (ref 12–16)
HGB UR QL STRIP.AUTO: NEGATIVE
HYALINE CASTS #/AREA URNS LPF: ABNORMAL /LPF (ref 0–2)
KETONES UR STRIP.AUTO-MCNC: NEGATIVE MG/DL
LEUKOCYTE ESTERASE UR QL STRIP.AUTO: NEGATIVE
MCH RBC QN AUTO: 28.3 PG (ref 26–34)
MCHC RBC AUTO-ENTMCNC: 31.4 G/DL (ref 31–36)
MCV RBC AUTO: 90.1 FL (ref 80–100)
NITRITE UR QL STRIP.AUTO: NEGATIVE
PERFORMED ON: ABNORMAL
PERFORMED ON: ABNORMAL
PERFORMED ON: NORMAL
PH UR STRIP.AUTO: 6 [PH] (ref 5–8)
PLATELET # BLD AUTO: 162 K/UL (ref 135–450)
PMV BLD AUTO: 8.2 FL (ref 5–10.5)
POTASSIUM SERPL-SCNC: 3.9 MMOL/L (ref 3.5–5.1)
PROT UR STRIP.AUTO-MCNC: NEGATIVE MG/DL
RBC # BLD AUTO: 3.12 M/UL (ref 4–5.2)
RBC #/AREA URNS HPF: ABNORMAL /HPF (ref 0–4)
SODIUM SERPL-SCNC: 130 MMOL/L (ref 136–145)
SP GR UR STRIP.AUTO: 1.01 (ref 1–1.03)
UA DIPSTICK W REFLEX MICRO PNL UR: ABNORMAL
URN SPEC COLLECT METH UR: ABNORMAL
UROBILINOGEN UR STRIP-ACNC: 0.2 E.U./DL
VANCOMYCIN SERPL-MCNC: 34.9 UG/ML
WBC # BLD AUTO: 25.6 K/UL (ref 4–11)
WBC #/AREA URNS HPF: ABNORMAL /HPF (ref 0–5)

## 2024-08-04 PROCEDURE — 36415 COLL VENOUS BLD VENIPUNCTURE: CPT

## 2024-08-04 PROCEDURE — 6360000002 HC RX W HCPCS: Performed by: INTERNAL MEDICINE

## 2024-08-04 PROCEDURE — 6370000000 HC RX 637 (ALT 250 FOR IP): Performed by: SURGERY

## 2024-08-04 PROCEDURE — 11046 DBRDMT MUSC&/FSCA EA ADDL: CPT | Performed by: SURGERY

## 2024-08-04 PROCEDURE — 7100000000 HC PACU RECOVERY - FIRST 15 MIN: Performed by: SURGERY

## 2024-08-04 PROCEDURE — 2580000003 HC RX 258: Performed by: INTERNAL MEDICINE

## 2024-08-04 PROCEDURE — 2580000003 HC RX 258: Performed by: SURGERY

## 2024-08-04 PROCEDURE — 6360000002 HC RX W HCPCS: Performed by: ANESTHESIOLOGY

## 2024-08-04 PROCEDURE — 85652 RBC SED RATE AUTOMATED: CPT

## 2024-08-04 PROCEDURE — 1200000000 HC SEMI PRIVATE

## 2024-08-04 PROCEDURE — 87205 SMEAR GRAM STAIN: CPT

## 2024-08-04 PROCEDURE — 6370000000 HC RX 637 (ALT 250 FOR IP): Performed by: INTERNAL MEDICINE

## 2024-08-04 PROCEDURE — 87641 MR-STAPH DNA AMP PROBE: CPT

## 2024-08-04 PROCEDURE — 2580000003 HC RX 258: Performed by: ANESTHESIOLOGY

## 2024-08-04 PROCEDURE — 2709999900 HC NON-CHARGEABLE SUPPLY: Performed by: SURGERY

## 2024-08-04 PROCEDURE — 87077 CULTURE AEROBIC IDENTIFY: CPT

## 2024-08-04 PROCEDURE — 85027 COMPLETE CBC AUTOMATED: CPT

## 2024-08-04 PROCEDURE — 81001 URINALYSIS AUTO W/SCOPE: CPT

## 2024-08-04 PROCEDURE — 2580000003 HC RX 258: Performed by: NURSE PRACTITIONER

## 2024-08-04 PROCEDURE — 51798 US URINE CAPACITY MEASURE: CPT

## 2024-08-04 PROCEDURE — 2060000000 HC ICU INTERMEDIATE R&B

## 2024-08-04 PROCEDURE — 6360000002 HC RX W HCPCS: Performed by: SURGERY

## 2024-08-04 PROCEDURE — 87070 CULTURE OTHR SPECIMN AEROBIC: CPT

## 2024-08-04 PROCEDURE — 7100000001 HC PACU RECOVERY - ADDTL 15 MIN: Performed by: SURGERY

## 2024-08-04 PROCEDURE — 87076 CULTURE ANAEROBE IDENT EACH: CPT

## 2024-08-04 PROCEDURE — 2500000003 HC RX 250 WO HCPCS: Performed by: ANESTHESIOLOGY

## 2024-08-04 PROCEDURE — 87186 SC STD MICRODIL/AGAR DIL: CPT

## 2024-08-04 PROCEDURE — 3700000000 HC ANESTHESIA ATTENDED CARE: Performed by: SURGERY

## 2024-08-04 PROCEDURE — 99223 1ST HOSP IP/OBS HIGH 75: CPT | Performed by: INTERNAL MEDICINE

## 2024-08-04 PROCEDURE — 80048 BASIC METABOLIC PNL TOTAL CA: CPT

## 2024-08-04 PROCEDURE — 11043 DBRDMT MUSC&/FSCA 1ST 20/<: CPT | Performed by: SURGERY

## 2024-08-04 PROCEDURE — 80202 ASSAY OF VANCOMYCIN: CPT

## 2024-08-04 PROCEDURE — 87086 URINE CULTURE/COLONY COUNT: CPT

## 2024-08-04 PROCEDURE — 86140 C-REACTIVE PROTEIN: CPT

## 2024-08-04 PROCEDURE — A4217 STERILE WATER/SALINE, 500 ML: HCPCS | Performed by: SURGERY

## 2024-08-04 PROCEDURE — 3700000001 HC ADD 15 MINUTES (ANESTHESIA): Performed by: SURGERY

## 2024-08-04 PROCEDURE — 3600000004 HC SURGERY LEVEL 4 BASE: Performed by: SURGERY

## 2024-08-04 PROCEDURE — 87075 CULTR BACTERIA EXCEPT BLOOD: CPT

## 2024-08-04 PROCEDURE — 0KBP0ZZ EXCISION OF LEFT HIP MUSCLE, OPEN APPROACH: ICD-10-PCS | Performed by: SURGERY

## 2024-08-04 PROCEDURE — 99222 1ST HOSP IP/OBS MODERATE 55: CPT | Performed by: SURGERY

## 2024-08-04 PROCEDURE — 3600000014 HC SURGERY LEVEL 4 ADDTL 15MIN: Performed by: SURGERY

## 2024-08-04 RX ORDER — LACTOBACILLUS RHAMNOSUS GG 10B CELL
1 CAPSULE ORAL 2 TIMES DAILY WITH MEALS
Status: DISCONTINUED | OUTPATIENT
Start: 2024-08-04 | End: 2024-08-20 | Stop reason: HOSPADM

## 2024-08-04 RX ORDER — SODIUM CHLORIDE 0.9 % (FLUSH) 0.9 %
5-40 SYRINGE (ML) INJECTION EVERY 12 HOURS SCHEDULED
Status: DISCONTINUED | OUTPATIENT
Start: 2024-08-04 | End: 2024-08-04 | Stop reason: HOSPADM

## 2024-08-04 RX ORDER — SODIUM CHLORIDE, SODIUM LACTATE, POTASSIUM CHLORIDE, CALCIUM CHLORIDE 600; 310; 30; 20 MG/100ML; MG/100ML; MG/100ML; MG/100ML
INJECTION, SOLUTION INTRAVENOUS CONTINUOUS PRN
Status: DISCONTINUED | OUTPATIENT
Start: 2024-08-04 | End: 2024-08-04 | Stop reason: SDUPTHER

## 2024-08-04 RX ORDER — LINEZOLID 2 MG/ML
600 INJECTION, SOLUTION INTRAVENOUS EVERY 12 HOURS
Status: DISCONTINUED | OUTPATIENT
Start: 2024-08-04 | End: 2024-08-07

## 2024-08-04 RX ORDER — OXYCODONE HYDROCHLORIDE 5 MG/1
10 TABLET ORAL PRN
Status: DISCONTINUED | OUTPATIENT
Start: 2024-08-04 | End: 2024-08-04 | Stop reason: HOSPADM

## 2024-08-04 RX ORDER — PREDNISONE 10 MG/1
10 TABLET ORAL DAILY
Status: DISCONTINUED | OUTPATIENT
Start: 2024-08-04 | End: 2024-08-19

## 2024-08-04 RX ORDER — PHENYLEPHRINE HCL IN 0.9% NACL 1 MG/10 ML
SYRINGE (ML) INTRAVENOUS PRN
Status: DISCONTINUED | OUTPATIENT
Start: 2024-08-04 | End: 2024-08-04 | Stop reason: SDUPTHER

## 2024-08-04 RX ORDER — LABETALOL HYDROCHLORIDE 5 MG/ML
10 INJECTION, SOLUTION INTRAVENOUS
Status: DISCONTINUED | OUTPATIENT
Start: 2024-08-04 | End: 2024-08-04 | Stop reason: HOSPADM

## 2024-08-04 RX ORDER — LIDOCAINE HYDROCHLORIDE 20 MG/ML
INJECTION, SOLUTION INFILTRATION; PERINEURAL PRN
Status: DISCONTINUED | OUTPATIENT
Start: 2024-08-04 | End: 2024-08-04 | Stop reason: SDUPTHER

## 2024-08-04 RX ORDER — MORPHINE SULFATE 2 MG/ML
2 INJECTION, SOLUTION INTRAMUSCULAR; INTRAVENOUS EVERY 5 MIN PRN
Status: DISCONTINUED | OUTPATIENT
Start: 2024-08-04 | End: 2024-08-04 | Stop reason: HOSPADM

## 2024-08-04 RX ORDER — SODIUM CHLORIDE, SODIUM LACTATE, POTASSIUM CHLORIDE, CALCIUM CHLORIDE 600; 310; 30; 20 MG/100ML; MG/100ML; MG/100ML; MG/100ML
INJECTION, SOLUTION INTRAVENOUS CONTINUOUS
Status: DISCONTINUED | OUTPATIENT
Start: 2024-08-04 | End: 2024-08-04

## 2024-08-04 RX ORDER — DIPHENHYDRAMINE HYDROCHLORIDE 50 MG/ML
12.5 INJECTION INTRAMUSCULAR; INTRAVENOUS
Status: DISCONTINUED | OUTPATIENT
Start: 2024-08-04 | End: 2024-08-04 | Stop reason: HOSPADM

## 2024-08-04 RX ORDER — SODIUM CHLORIDE 9 MG/ML
INJECTION, SOLUTION INTRAVENOUS PRN
Status: DISCONTINUED | OUTPATIENT
Start: 2024-08-04 | End: 2024-08-04 | Stop reason: HOSPADM

## 2024-08-04 RX ORDER — POLYETHYLENE GLYCOL 3350 17 G/17G
17 POWDER, FOR SOLUTION ORAL 2 TIMES DAILY
Status: DISCONTINUED | OUTPATIENT
Start: 2024-08-05 | End: 2024-08-19

## 2024-08-04 RX ORDER — SENNA AND DOCUSATE SODIUM 50; 8.6 MG/1; MG/1
2 TABLET, FILM COATED ORAL 2 TIMES DAILY
Status: DISCONTINUED | OUTPATIENT
Start: 2024-08-04 | End: 2024-08-19

## 2024-08-04 RX ORDER — CLINDAMYCIN PHOSPHATE 600 MG/50ML
600 INJECTION, SOLUTION INTRAVENOUS EVERY 8 HOURS
Status: DISCONTINUED | OUTPATIENT
Start: 2024-08-04 | End: 2024-08-05

## 2024-08-04 RX ORDER — SODIUM CHLORIDE 0.9 % (FLUSH) 0.9 %
5-40 SYRINGE (ML) INJECTION PRN
Status: DISCONTINUED | OUTPATIENT
Start: 2024-08-04 | End: 2024-08-04 | Stop reason: HOSPADM

## 2024-08-04 RX ORDER — ROCURONIUM BROMIDE 10 MG/ML
INJECTION, SOLUTION INTRAVENOUS PRN
Status: DISCONTINUED | OUTPATIENT
Start: 2024-08-04 | End: 2024-08-04 | Stop reason: SDUPTHER

## 2024-08-04 RX ORDER — VANCOMYCIN HYDROCHLORIDE 250 MG/1
250 CAPSULE ORAL 2 TIMES DAILY
Status: DISCONTINUED | OUTPATIENT
Start: 2024-08-04 | End: 2024-08-15

## 2024-08-04 RX ORDER — ONDANSETRON 2 MG/ML
4 INJECTION INTRAMUSCULAR; INTRAVENOUS
Status: DISCONTINUED | OUTPATIENT
Start: 2024-08-04 | End: 2024-08-04 | Stop reason: HOSPADM

## 2024-08-04 RX ORDER — OXYCODONE HYDROCHLORIDE 5 MG/1
5 TABLET ORAL PRN
Status: DISCONTINUED | OUTPATIENT
Start: 2024-08-04 | End: 2024-08-04 | Stop reason: HOSPADM

## 2024-08-04 RX ORDER — TACROLIMUS 1 MG/1
1 CAPSULE ORAL 2 TIMES DAILY
Status: DISCONTINUED | OUTPATIENT
Start: 2024-08-04 | End: 2024-08-18

## 2024-08-04 RX ORDER — FLUOXETINE 10 MG/1
10 CAPSULE ORAL DAILY
Status: DISCONTINUED | OUTPATIENT
Start: 2024-08-05 | End: 2024-08-20 | Stop reason: HOSPADM

## 2024-08-04 RX ORDER — MORPHINE SULFATE 2 MG/ML
1 INJECTION, SOLUTION INTRAMUSCULAR; INTRAVENOUS EVERY 5 MIN PRN
Status: DISCONTINUED | OUTPATIENT
Start: 2024-08-04 | End: 2024-08-04 | Stop reason: HOSPADM

## 2024-08-04 RX ORDER — NALOXONE HYDROCHLORIDE 0.4 MG/ML
INJECTION, SOLUTION INTRAMUSCULAR; INTRAVENOUS; SUBCUTANEOUS PRN
Status: DISCONTINUED | OUTPATIENT
Start: 2024-08-04 | End: 2024-08-04 | Stop reason: HOSPADM

## 2024-08-04 RX ORDER — MAGNESIUM HYDROXIDE 1200 MG/15ML
LIQUID ORAL CONTINUOUS PRN
Status: COMPLETED | OUTPATIENT
Start: 2024-08-04 | End: 2024-08-04

## 2024-08-04 RX ORDER — BUPIVACAINE HYDROCHLORIDE 5 MG/ML
INJECTION, SOLUTION EPIDURAL; INTRACAUDAL PRN
Status: DISCONTINUED | OUTPATIENT
Start: 2024-08-04 | End: 2024-08-04 | Stop reason: ALTCHOICE

## 2024-08-04 RX ORDER — SODIUM CHLORIDE 9 MG/ML
INJECTION, SOLUTION INTRAVENOUS CONTINUOUS
Status: DISCONTINUED | OUTPATIENT
Start: 2024-08-04 | End: 2024-08-07

## 2024-08-04 RX ORDER — PROPOFOL 10 MG/ML
INJECTION, EMULSION INTRAVENOUS PRN
Status: DISCONTINUED | OUTPATIENT
Start: 2024-08-04 | End: 2024-08-04 | Stop reason: SDUPTHER

## 2024-08-04 RX ORDER — ONDANSETRON 2 MG/ML
INJECTION INTRAMUSCULAR; INTRAVENOUS PRN
Status: DISCONTINUED | OUTPATIENT
Start: 2024-08-04 | End: 2024-08-04 | Stop reason: SDUPTHER

## 2024-08-04 RX ADMIN — MORPHINE SULFATE 1 MG: 2 INJECTION, SOLUTION INTRAMUSCULAR; INTRAVENOUS at 23:59

## 2024-08-04 RX ADMIN — LINEZOLID 600 MG: 600 INJECTION, SOLUTION INTRAVENOUS at 20:14

## 2024-08-04 RX ADMIN — CLINDAMYCIN PHOSPHATE 600 MG: 600 INJECTION, SOLUTION INTRAVENOUS at 20:25

## 2024-08-04 RX ADMIN — VANCOMYCIN HYDROCHLORIDE 250 MG: 250 CAPSULE ORAL at 20:05

## 2024-08-04 RX ADMIN — CLINDAMYCIN PHOSPHATE 600 MG: 600 INJECTION, SOLUTION INTRAVENOUS at 10:25

## 2024-08-04 RX ADMIN — HEPARIN SODIUM 5000 UNITS: 5000 INJECTION INTRAVENOUS; SUBCUTANEOUS at 05:26

## 2024-08-04 RX ADMIN — PIPERACILLIN AND TAZOBACTAM 3375 MG: 3; .375 INJECTION, POWDER, LYOPHILIZED, FOR SOLUTION INTRAVENOUS at 06:05

## 2024-08-04 RX ADMIN — Medication 100 MCG: at 15:28

## 2024-08-04 RX ADMIN — HEPARIN SODIUM 5000 UNITS: 5000 INJECTION INTRAVENOUS; SUBCUTANEOUS at 20:05

## 2024-08-04 RX ADMIN — SODIUM CHLORIDE, PRESERVATIVE FREE 10 ML: 5 INJECTION INTRAVENOUS at 20:11

## 2024-08-04 RX ADMIN — PROPOFOL 160 MG: 10 INJECTION, EMULSION INTRAVENOUS at 15:04

## 2024-08-04 RX ADMIN — TACROLIMUS 1 MG: 1 CAPSULE ORAL at 21:47

## 2024-08-04 RX ADMIN — SENNOSIDES AND DOCUSATE SODIUM 2 TABLET: 50; 8.6 TABLET ORAL at 20:05

## 2024-08-04 RX ADMIN — ONDANSETRON 4 MG: 2 INJECTION INTRAMUSCULAR; INTRAVENOUS at 15:18

## 2024-08-04 RX ADMIN — MEROPENEM 500 MG: 500 INJECTION, POWDER, FOR SOLUTION INTRAVENOUS at 21:26

## 2024-08-04 RX ADMIN — Medication 100 MCG: at 15:15

## 2024-08-04 RX ADMIN — MIDODRINE HYDROCHLORIDE 5 MG: 5 TABLET ORAL at 18:10

## 2024-08-04 RX ADMIN — LIDOCAINE HYDROCHLORIDE 5 ML: 20 INJECTION, SOLUTION INFILTRATION; PERINEURAL at 15:04

## 2024-08-04 RX ADMIN — PANTOPRAZOLE SODIUM 40 MG: 40 TABLET, DELAYED RELEASE ORAL at 05:26

## 2024-08-04 RX ADMIN — SUGAMMADEX 200 MG: 100 INJECTION, SOLUTION INTRAVENOUS at 15:52

## 2024-08-04 RX ADMIN — SODIUM CHLORIDE: 9 INJECTION, SOLUTION INTRAVENOUS at 22:42

## 2024-08-04 RX ADMIN — Medication 100 MCG: at 15:48

## 2024-08-04 RX ADMIN — Medication 1 CAPSULE: at 18:10

## 2024-08-04 RX ADMIN — Medication 100 MCG: at 15:11

## 2024-08-04 RX ADMIN — Medication 100 MCG: at 15:13

## 2024-08-04 RX ADMIN — SODIUM CHLORIDE, SODIUM LACTATE, POTASSIUM CHLORIDE, AND CALCIUM CHLORIDE: .6; .31; .03; .02 INJECTION, SOLUTION INTRAVENOUS at 15:04

## 2024-08-04 RX ADMIN — PREDNISONE 10 MG: 10 TABLET ORAL at 18:10

## 2024-08-04 RX ADMIN — ROCURONIUM BROMIDE 50 MG: 50 INJECTION, SOLUTION INTRAVENOUS at 15:04

## 2024-08-04 RX ADMIN — SODIUM CHLORIDE: 9 INJECTION, SOLUTION INTRAVENOUS at 21:25

## 2024-08-04 ASSESSMENT — PAIN SCALES - GENERAL
PAINLEVEL_OUTOF10: 0
PAINLEVEL_OUTOF10: 0
PAINLEVEL_OUTOF10: 7
PAINLEVEL_OUTOF10: 0

## 2024-08-04 ASSESSMENT — PAIN DESCRIPTION - LOCATION: LOCATION: COCCYX

## 2024-08-04 NOTE — ANESTHESIA PRE PROCEDURE
08/03/2024 01:19 PM    ALKPHOS 135 08/03/2024 01:19 PM    AST 16 08/03/2024 01:19 PM    ALT 19 08/03/2024 01:19 PM       POC Tests:   Recent Labs     08/04/24  1107   POCGLU 97       Coags:   Lab Results   Component Value Date/Time    PROTIME 14.3 03/11/2024 09:11 AM    INR 1.11 03/11/2024 09:11 AM    APTT 35.0 10/09/2015 04:55 AM       HCG (If Applicable):   Lab Results   Component Value Date    PREGTESTUR Negative 08/10/2023    PREGSERUM Negative 04/08/2023    HCGQUANT <5.0 08/03/2024        ABGs: No results found for: \"PHART\", \"PO2ART\", \"FIK5OEE\", \"LZI4JZF\", \"BEART\", \"C9XMERQI\"     Type & Screen (If Applicable):  No results found for: \"LABABO\"    Drug/Infectious Status (If Applicable):  Lab Results   Component Value Date/Time    HEPCAB Non-Reactive (Negative) 10/18/2010 01:06 PM       COVID-19 Screening (If Applicable): No results found for: \"COVID19\"        Anesthesia Evaluation     no history of anesthetic complications:   Airway: Mallampati: II  TM distance: >3 FB   Neck ROM: limited  Mouth opening: > = 3 FB   Dental:    (+) upper dentures and lower dentures      Pulmonary:                              Cardiovascular:    (+) hypertension:, hyperlipidemia               ROS comment: Long QT     Neuro/Psych:   (+) CVA:, neuromuscular disease (wheelchair bound):, psychiatric history:            GI/Hepatic/Renal:   (+) GERD: well controlled, renal disease: ESRD         ROS comment: gastroparesis.   Endo/Other:    (+) DiabetesType I DM, hypothyroidism::..                 Abdominal:             Vascular:          Other Findings:         Anesthesia Plan      general     ASA 4     (Pt and Mom agrees to risks, benefits and alternatives of GETA. Aware of her increased risk of aspiration. Questions answered.  Willing to proceed with plan.)  Induction: intravenous and rapid sequence.      Anesthetic plan and risks discussed with patient.                    CYNTHIA HUNT MD   8/4/2024

## 2024-08-04 NOTE — ED PROVIDER NOTES
EMERGENCY DEPARTMENT ENCOUNTER        Pt Name: Carolina Arguelles  MRN: [id number]  Birthdate 1980  Date of evaluation: 8/3/2024  Provider: NIRAV Motley CNP  PCP: Radha Pompa APRN - CNP         I have seen and evaluated this patient with my supervising physician Dr. Pierre      CHIEF COMPLAINT       Chief Complaint   Patient presents with    Wound Check     Parents reports patient has a bed sore on her left buttock. Patient has been see at wound care where they debrided the wound and packed the wound. Home health nurse thought her wound looked infected.        HISTORY OF PRESENT ILLNESS: 1 or more Elements     History From: the patient and family  Limitations to history : None    Carolina Arguelles is a 44 y.o. female who presents to the ER today with complaints of a wound check.  Patient has a chronic bedsore to her left butt cheek, patient has been seen in wound care for this they have debrided the wound and packed it, home health nurse was concerned about infection today.  Patient arrived was pretty hypotensive, family says that she has a history of chronic hypotension, she is on midodrine 3 times a day.  She is here for further evaluation    Nursing Notes were all reviewed and agreed with or any disagreements were addressed in the HPI.    REVIEW OF SYSTEMS :      Review of Systems    Positives and Pertinent negatives as per HPI.     SURGICAL HISTORY     Past Surgical History:   Procedure Laterality Date    DENTAL SURGERY      DIALYSIS FISTULA CREATION      KIDNEY TRANSPLANT      OTHER SURGICAL HISTORY Left 9/29/2014    Port placed left chest    OTHER SURGICAL HISTORY  04/11/2016    removal of port-a-cath and insertion of new port-a-cath    UPPER GASTROINTESTINAL ENDOSCOPY  04-    Esophagitis       CURRENTMEDICATIONS       Current Discharge Medication List        CONTINUE these medications which have NOT CHANGED    Details   levothyroxine (SYNTHROID) 137 MCG tablet Take 1 tablet by  Yes

## 2024-08-05 PROBLEM — N17.9 AKI (ACUTE KIDNEY INJURY) (HCC): Status: ACTIVE | Noted: 2024-08-05

## 2024-08-05 PROBLEM — D72.829 LEUKOCYTOSIS: Status: ACTIVE | Noted: 2024-08-05

## 2024-08-05 LAB
ALBUMIN SERPL-MCNC: 2.3 G/DL (ref 3.4–5)
ALP SERPL-CCNC: 125 U/L (ref 40–129)
ALT SERPL-CCNC: 14 U/L (ref 10–40)
ANION GAP SERPL CALCULATED.3IONS-SCNC: 11 MMOL/L (ref 3–16)
AST SERPL-CCNC: 6 U/L (ref 15–37)
BACTERIA UR CULT: NORMAL
BASOPHILS # BLD: 0.1 K/UL (ref 0–0.2)
BASOPHILS NFR BLD: 0.3 %
BILIRUB DIRECT SERPL-MCNC: <0.2 MG/DL (ref 0–0.3)
BILIRUB INDIRECT SERPL-MCNC: ABNORMAL MG/DL (ref 0–1)
BILIRUB SERPL-MCNC: 0.3 MG/DL (ref 0–1)
BUN SERPL-MCNC: 89 MG/DL (ref 7–20)
CALCIUM SERPL-MCNC: 9 MG/DL (ref 8.3–10.6)
CHLORIDE SERPL-SCNC: 100 MMOL/L (ref 99–110)
CO2 SERPL-SCNC: 23 MMOL/L (ref 21–32)
CREAT SERPL-MCNC: 3.2 MG/DL (ref 0.6–1.1)
DEPRECATED RDW RBC AUTO: 14.2 % (ref 12.4–15.4)
EOSINOPHIL # BLD: 0 K/UL (ref 0–0.6)
EOSINOPHIL NFR BLD: 0.1 %
GFR SERPLBLD CREATININE-BSD FMLA CKD-EPI: 18 ML/MIN/{1.73_M2}
GLUCOSE BLD-MCNC: 180 MG/DL (ref 70–99)
GLUCOSE BLD-MCNC: 186 MG/DL (ref 70–99)
GLUCOSE BLD-MCNC: 190 MG/DL (ref 70–99)
GLUCOSE BLD-MCNC: 205 MG/DL (ref 70–99)
GLUCOSE BLD-MCNC: 217 MG/DL (ref 70–99)
GLUCOSE SERPL-MCNC: 204 MG/DL (ref 70–99)
HCT VFR BLD AUTO: 28.1 % (ref 36–48)
HGB BLD-MCNC: 8.9 G/DL (ref 12–16)
LYMPHOCYTES # BLD: 0.2 K/UL (ref 1–5.1)
LYMPHOCYTES NFR BLD: 1 %
MAGNESIUM SERPL-MCNC: 1.5 MG/DL (ref 1.8–2.4)
MCH RBC QN AUTO: 28.1 PG (ref 26–34)
MCHC RBC AUTO-ENTMCNC: 31.7 G/DL (ref 31–36)
MCV RBC AUTO: 88.6 FL (ref 80–100)
MONOCYTES # BLD: 1 K/UL (ref 0–1.3)
MONOCYTES NFR BLD: 4 %
MRSA DNA SPEC QL NAA+PROBE: NORMAL
NEUTROPHILS # BLD: 23.3 K/UL (ref 1.7–7.7)
NEUTROPHILS NFR BLD: 94.6 %
PERFORMED ON: ABNORMAL
PHOSPHATE SERPL-MCNC: 2.5 MG/DL (ref 2.5–4.9)
PLATELET # BLD AUTO: 142 K/UL (ref 135–450)
PMV BLD AUTO: 8 FL (ref 5–10.5)
POTASSIUM SERPL-SCNC: 3.8 MMOL/L (ref 3.5–5.1)
PROT SERPL-MCNC: 5.7 G/DL (ref 6.4–8.2)
RBC # BLD AUTO: 3.17 M/UL (ref 4–5.2)
SODIUM SERPL-SCNC: 134 MMOL/L (ref 136–145)
TACROLIMUS BLOOD: 3.5 NG/ML (ref 5–20)
WBC # BLD AUTO: 24.6 K/UL (ref 4–11)

## 2024-08-05 PROCEDURE — 6370000000 HC RX 637 (ALT 250 FOR IP): Performed by: SURGERY

## 2024-08-05 PROCEDURE — APPNB45 APP NON BILLABLE 31-45 MINUTES: Performed by: CLINICAL NURSE SPECIALIST

## 2024-08-05 PROCEDURE — 99231 SBSQ HOSP IP/OBS SF/LOW 25: CPT | Performed by: SURGERY

## 2024-08-05 PROCEDURE — 80197 ASSAY OF TACROLIMUS: CPT

## 2024-08-05 PROCEDURE — 2580000003 HC RX 258: Performed by: NURSE PRACTITIONER

## 2024-08-05 PROCEDURE — 6360000002 HC RX W HCPCS: Performed by: SURGERY

## 2024-08-05 PROCEDURE — 83735 ASSAY OF MAGNESIUM: CPT

## 2024-08-05 PROCEDURE — 51701 INSERT BLADDER CATHETER: CPT

## 2024-08-05 PROCEDURE — 80076 HEPATIC FUNCTION PANEL: CPT

## 2024-08-05 PROCEDURE — 84100 ASSAY OF PHOSPHORUS: CPT

## 2024-08-05 PROCEDURE — 2580000003 HC RX 258: Performed by: INTERNAL MEDICINE

## 2024-08-05 PROCEDURE — 85025 COMPLETE CBC W/AUTO DIFF WBC: CPT

## 2024-08-05 PROCEDURE — 6370000000 HC RX 637 (ALT 250 FOR IP): Performed by: INTERNAL MEDICINE

## 2024-08-05 PROCEDURE — 6360000002 HC RX W HCPCS: Performed by: INTERNAL MEDICINE

## 2024-08-05 PROCEDURE — 36415 COLL VENOUS BLD VENIPUNCTURE: CPT

## 2024-08-05 PROCEDURE — 2580000003 HC RX 258: Performed by: SURGERY

## 2024-08-05 PROCEDURE — 80048 BASIC METABOLIC PNL TOTAL CA: CPT

## 2024-08-05 PROCEDURE — 1200000000 HC SEMI PRIVATE

## 2024-08-05 PROCEDURE — 99232 SBSQ HOSP IP/OBS MODERATE 35: CPT | Performed by: INTERNAL MEDICINE

## 2024-08-05 RX ORDER — MAGNESIUM SULFATE IN WATER 40 MG/ML
2000 INJECTION, SOLUTION INTRAVENOUS ONCE
Status: COMPLETED | OUTPATIENT
Start: 2024-08-05 | End: 2024-08-05

## 2024-08-05 RX ORDER — SODIUM CHLOR/HYPOCHLOROUS ACID 0.033 %
SOLUTION, IRRIGATION IRRIGATION DAILY
Status: DISCONTINUED | OUTPATIENT
Start: 2024-08-06 | End: 2024-08-20 | Stop reason: HOSPADM

## 2024-08-05 RX ORDER — HYDROCODONE BITARTRATE AND ACETAMINOPHEN 5; 325 MG/1; MG/1
1 TABLET ORAL EVERY 6 HOURS PRN
Status: DISCONTINUED | OUTPATIENT
Start: 2024-08-05 | End: 2024-08-05

## 2024-08-05 RX ADMIN — CLINDAMYCIN PHOSPHATE 600 MG: 600 INJECTION, SOLUTION INTRAVENOUS at 12:48

## 2024-08-05 RX ADMIN — ATORVASTATIN CALCIUM 40 MG: 40 TABLET, FILM COATED ORAL at 09:14

## 2024-08-05 RX ADMIN — LINEZOLID 600 MG: 600 INJECTION, SOLUTION INTRAVENOUS at 21:09

## 2024-08-05 RX ADMIN — TACROLIMUS 1 MG: 1 CAPSULE ORAL at 21:19

## 2024-08-05 RX ADMIN — Medication 1 CAPSULE: at 16:24

## 2024-08-05 RX ADMIN — VANCOMYCIN HYDROCHLORIDE 250 MG: 250 CAPSULE ORAL at 10:55

## 2024-08-05 RX ADMIN — TACROLIMUS 1 MG: 1 CAPSULE ORAL at 09:25

## 2024-08-05 RX ADMIN — MEROPENEM 500 MG: 500 INJECTION, POWDER, FOR SOLUTION INTRAVENOUS at 13:40

## 2024-08-05 RX ADMIN — Medication 1 CAPSULE: at 09:14

## 2024-08-05 RX ADMIN — LINEZOLID 600 MG: 600 INJECTION, SOLUTION INTRAVENOUS at 09:21

## 2024-08-05 RX ADMIN — MIDODRINE HYDROCHLORIDE 5 MG: 5 TABLET ORAL at 12:46

## 2024-08-05 RX ADMIN — HEPARIN SODIUM 5000 UNITS: 5000 INJECTION INTRAVENOUS; SUBCUTANEOUS at 12:46

## 2024-08-05 RX ADMIN — PANTOPRAZOLE SODIUM 40 MG: 40 TABLET, DELAYED RELEASE ORAL at 09:25

## 2024-08-05 RX ADMIN — HEPARIN SODIUM 5000 UNITS: 5000 INJECTION INTRAVENOUS; SUBCUTANEOUS at 05:26

## 2024-08-05 RX ADMIN — MIDODRINE HYDROCHLORIDE 5 MG: 5 TABLET ORAL at 09:14

## 2024-08-05 RX ADMIN — MEROPENEM 500 MG: 500 INJECTION, POWDER, FOR SOLUTION INTRAVENOUS at 22:24

## 2024-08-05 RX ADMIN — VANCOMYCIN HYDROCHLORIDE 250 MG: 250 CAPSULE ORAL at 20:59

## 2024-08-05 RX ADMIN — SODIUM CHLORIDE: 9 INJECTION, SOLUTION INTRAVENOUS at 09:00

## 2024-08-05 RX ADMIN — SODIUM CHLORIDE, PRESERVATIVE FREE 10 ML: 5 INJECTION INTRAVENOUS at 21:18

## 2024-08-05 RX ADMIN — CLINDAMYCIN PHOSPHATE 600 MG: 600 INJECTION, SOLUTION INTRAVENOUS at 04:11

## 2024-08-05 RX ADMIN — PANTOPRAZOLE SODIUM 40 MG: 40 TABLET, DELAYED RELEASE ORAL at 16:24

## 2024-08-05 RX ADMIN — SODIUM CHLORIDE: 9 INJECTION, SOLUTION INTRAVENOUS at 18:16

## 2024-08-05 RX ADMIN — MEROPENEM 500 MG: 500 INJECTION, POWDER, FOR SOLUTION INTRAVENOUS at 05:26

## 2024-08-05 RX ADMIN — PREDNISONE 10 MG: 10 TABLET ORAL at 09:14

## 2024-08-05 RX ADMIN — POLYETHYLENE GLYCOL 3350 17 G: 17 POWDER, FOR SOLUTION ORAL at 21:20

## 2024-08-05 RX ADMIN — SENNOSIDES AND DOCUSATE SODIUM 2 TABLET: 50; 8.6 TABLET ORAL at 09:14

## 2024-08-05 RX ADMIN — ASPIRIN 81 MG: 81 TABLET, COATED ORAL at 09:14

## 2024-08-05 RX ADMIN — FLUOXETINE 10 MG: 10 CAPSULE ORAL at 09:14

## 2024-08-05 RX ADMIN — MIDODRINE HYDROCHLORIDE 5 MG: 5 TABLET ORAL at 18:14

## 2024-08-05 RX ADMIN — HEPARIN SODIUM 5000 UNITS: 5000 INJECTION INTRAVENOUS; SUBCUTANEOUS at 21:00

## 2024-08-05 RX ADMIN — MAGNESIUM SULFATE HEPTAHYDRATE 2000 MG: 40 INJECTION, SOLUTION INTRAVENOUS at 17:50

## 2024-08-05 RX ADMIN — SENNOSIDES AND DOCUSATE SODIUM 2 TABLET: 50; 8.6 TABLET ORAL at 20:59

## 2024-08-05 ASSESSMENT — PAIN SCALES - GENERAL
PAINLEVEL_OUTOF10: 1
PAINLEVEL_OUTOF10: 1
PAINLEVEL_OUTOF10: 0

## 2024-08-06 ENCOUNTER — ANESTHESIA (OUTPATIENT)
Dept: OPERATING ROOM | Age: 44
End: 2024-08-06
Payer: COMMERCIAL

## 2024-08-06 ENCOUNTER — ANESTHESIA EVENT (OUTPATIENT)
Dept: OPERATING ROOM | Age: 44
End: 2024-08-06
Payer: COMMERCIAL

## 2024-08-06 LAB
ALBUMIN SERPL-MCNC: 2 G/DL (ref 3.4–5)
ANION GAP SERPL CALCULATED.3IONS-SCNC: 14 MMOL/L (ref 3–16)
BASOPHILS # BLD: 0 K/UL (ref 0–0.2)
BASOPHILS NFR BLD: 0.2 %
BUN SERPL-MCNC: 70 MG/DL (ref 7–20)
CALCIUM SERPL-MCNC: 7.7 MG/DL (ref 8.3–10.6)
CHLORIDE SERPL-SCNC: 103 MMOL/L (ref 99–110)
CO2 SERPL-SCNC: 18 MMOL/L (ref 21–32)
CREAT SERPL-MCNC: 2.7 MG/DL (ref 0.6–1.1)
DEPRECATED RDW RBC AUTO: 14.2 % (ref 12.4–15.4)
EOSINOPHIL # BLD: 0 K/UL (ref 0–0.6)
EOSINOPHIL NFR BLD: 0.1 %
GFR SERPLBLD CREATININE-BSD FMLA CKD-EPI: 22 ML/MIN/{1.73_M2}
GLUCOSE BLD-MCNC: 105 MG/DL (ref 70–99)
GLUCOSE BLD-MCNC: 112 MG/DL (ref 70–99)
GLUCOSE BLD-MCNC: 135 MG/DL (ref 70–99)
GLUCOSE BLD-MCNC: 140 MG/DL (ref 70–99)
GLUCOSE BLD-MCNC: 160 MG/DL (ref 70–99)
GLUCOSE BLD-MCNC: 200 MG/DL (ref 70–99)
GLUCOSE SERPL-MCNC: 167 MG/DL (ref 70–99)
HCG SERPL QL: NEGATIVE
HCT VFR BLD AUTO: 28.5 % (ref 36–48)
HGB BLD-MCNC: 9 G/DL (ref 12–16)
LYMPHOCYTES # BLD: 0.5 K/UL (ref 1–5.1)
LYMPHOCYTES NFR BLD: 2.5 %
MAGNESIUM SERPL-MCNC: 1.9 MG/DL (ref 1.8–2.4)
MCH RBC QN AUTO: 27.9 PG (ref 26–34)
MCHC RBC AUTO-ENTMCNC: 31.5 G/DL (ref 31–36)
MCV RBC AUTO: 88.6 FL (ref 80–100)
MONOCYTES # BLD: 0.6 K/UL (ref 0–1.3)
MONOCYTES NFR BLD: 3 %
NEUTROPHILS # BLD: 17.5 K/UL (ref 1.7–7.7)
NEUTROPHILS NFR BLD: 94.2 %
PERFORMED ON: ABNORMAL
PHOSPHATE SERPL-MCNC: 2.9 MG/DL (ref 2.5–4.9)
PLATELET # BLD AUTO: 80 K/UL (ref 135–450)
PLATELET BLD QL SMEAR: ABNORMAL
PMV BLD AUTO: 8.4 FL (ref 5–10.5)
POTASSIUM SERPL-SCNC: 3.7 MMOL/L (ref 3.5–5.1)
RBC # BLD AUTO: 3.21 M/UL (ref 4–5.2)
RBC MORPH BLD: NORMAL
REASON FOR REJECTION: NORMAL
REJECTED TEST: NORMAL
SLIDE REVIEW: ABNORMAL
SODIUM SERPL-SCNC: 135 MMOL/L (ref 136–145)
WBC # BLD AUTO: 18.5 K/UL (ref 4–11)

## 2024-08-06 PROCEDURE — 2580000003 HC RX 258: Performed by: NURSE PRACTITIONER

## 2024-08-06 PROCEDURE — P9045 ALBUMIN (HUMAN), 5%, 250 ML: HCPCS | Performed by: ANESTHESIOLOGY

## 2024-08-06 PROCEDURE — 6370000000 HC RX 637 (ALT 250 FOR IP): Performed by: SURGERY

## 2024-08-06 PROCEDURE — 7100000001 HC PACU RECOVERY - ADDTL 15 MIN: Performed by: SURGERY

## 2024-08-06 PROCEDURE — 3600000003 HC SURGERY LEVEL 3 BASE: Performed by: SURGERY

## 2024-08-06 PROCEDURE — 6370000000 HC RX 637 (ALT 250 FOR IP): Performed by: INTERNAL MEDICINE

## 2024-08-06 PROCEDURE — 80069 RENAL FUNCTION PANEL: CPT

## 2024-08-06 PROCEDURE — 11046 DBRDMT MUSC&/FSCA EA ADDL: CPT | Performed by: SURGERY

## 2024-08-06 PROCEDURE — 2500000003 HC RX 250 WO HCPCS

## 2024-08-06 PROCEDURE — 2700000000 HC OXYGEN THERAPY PER DAY

## 2024-08-06 PROCEDURE — 83735 ASSAY OF MAGNESIUM: CPT

## 2024-08-06 PROCEDURE — 7100000000 HC PACU RECOVERY - FIRST 15 MIN: Performed by: SURGERY

## 2024-08-06 PROCEDURE — 6360000002 HC RX W HCPCS: Performed by: INTERNAL MEDICINE

## 2024-08-06 PROCEDURE — 85025 COMPLETE CBC W/AUTO DIFF WBC: CPT

## 2024-08-06 PROCEDURE — 6360000002 HC RX W HCPCS: Performed by: SURGERY

## 2024-08-06 PROCEDURE — 6360000002 HC RX W HCPCS: Performed by: ANESTHESIOLOGY

## 2024-08-06 PROCEDURE — 2580000003 HC RX 258: Performed by: SURGERY

## 2024-08-06 PROCEDURE — A4217 STERILE WATER/SALINE, 500 ML: HCPCS | Performed by: SURGERY

## 2024-08-06 PROCEDURE — 6360000002 HC RX W HCPCS

## 2024-08-06 PROCEDURE — 2580000003 HC RX 258: Performed by: INTERNAL MEDICINE

## 2024-08-06 PROCEDURE — 3700000000 HC ANESTHESIA ATTENDED CARE: Performed by: SURGERY

## 2024-08-06 PROCEDURE — 11043 DBRDMT MUSC&/FSCA 1ST 20/<: CPT | Performed by: SURGERY

## 2024-08-06 PROCEDURE — 3600000013 HC SURGERY LEVEL 3 ADDTL 15MIN: Performed by: SURGERY

## 2024-08-06 PROCEDURE — 84703 CHORIONIC GONADOTROPIN ASSAY: CPT

## 2024-08-06 PROCEDURE — 0KBP0ZZ EXCISION OF LEFT HIP MUSCLE, OPEN APPROACH: ICD-10-PCS | Performed by: SURGERY

## 2024-08-06 PROCEDURE — 3700000001 HC ADD 15 MINUTES (ANESTHESIA): Performed by: SURGERY

## 2024-08-06 PROCEDURE — 99232 SBSQ HOSP IP/OBS MODERATE 35: CPT | Performed by: INTERNAL MEDICINE

## 2024-08-06 PROCEDURE — 51701 INSERT BLADDER CATHETER: CPT

## 2024-08-06 PROCEDURE — 36415 COLL VENOUS BLD VENIPUNCTURE: CPT

## 2024-08-06 PROCEDURE — 2580000003 HC RX 258

## 2024-08-06 PROCEDURE — 2709999900 HC NON-CHARGEABLE SUPPLY: Performed by: SURGERY

## 2024-08-06 PROCEDURE — 1200000000 HC SEMI PRIVATE

## 2024-08-06 RX ORDER — OXYCODONE HYDROCHLORIDE 5 MG/1
5 TABLET ORAL
Status: DISCONTINUED | OUTPATIENT
Start: 2024-08-06 | End: 2024-08-06 | Stop reason: HOSPADM

## 2024-08-06 RX ORDER — DIPHENHYDRAMINE HYDROCHLORIDE 50 MG/ML
12.5 INJECTION INTRAMUSCULAR; INTRAVENOUS
Status: DISCONTINUED | OUTPATIENT
Start: 2024-08-06 | End: 2024-08-06 | Stop reason: HOSPADM

## 2024-08-06 RX ORDER — LIDOCAINE HYDROCHLORIDE 20 MG/ML
INJECTION, SOLUTION INFILTRATION; PERINEURAL PRN
Status: DISCONTINUED | OUTPATIENT
Start: 2024-08-06 | End: 2024-08-06 | Stop reason: SDUPTHER

## 2024-08-06 RX ORDER — SODIUM CHLORIDE 0.9 % (FLUSH) 0.9 %
5-40 SYRINGE (ML) INJECTION EVERY 12 HOURS SCHEDULED
Status: DISCONTINUED | OUTPATIENT
Start: 2024-08-06 | End: 2024-08-06 | Stop reason: HOSPADM

## 2024-08-06 RX ORDER — GLYCOPYRROLATE 0.2 MG/ML
INJECTION INTRAMUSCULAR; INTRAVENOUS PRN
Status: DISCONTINUED | OUTPATIENT
Start: 2024-08-06 | End: 2024-08-06 | Stop reason: SDUPTHER

## 2024-08-06 RX ORDER — ALBUMIN, HUMAN INJ 5% 5 %
12.5 SOLUTION INTRAVENOUS ONCE
Status: COMPLETED | OUTPATIENT
Start: 2024-08-06 | End: 2024-08-06

## 2024-08-06 RX ORDER — SODIUM CHLORIDE 9 MG/ML
INJECTION, SOLUTION INTRAVENOUS PRN
Status: DISCONTINUED | OUTPATIENT
Start: 2024-08-06 | End: 2024-08-06 | Stop reason: HOSPADM

## 2024-08-06 RX ORDER — ONDANSETRON 2 MG/ML
INJECTION INTRAMUSCULAR; INTRAVENOUS PRN
Status: DISCONTINUED | OUTPATIENT
Start: 2024-08-06 | End: 2024-08-06 | Stop reason: SDUPTHER

## 2024-08-06 RX ORDER — ROCURONIUM BROMIDE 10 MG/ML
INJECTION, SOLUTION INTRAVENOUS PRN
Status: DISCONTINUED | OUTPATIENT
Start: 2024-08-06 | End: 2024-08-06 | Stop reason: SDUPTHER

## 2024-08-06 RX ORDER — MEPERIDINE HYDROCHLORIDE 50 MG/ML
12.5 INJECTION INTRAMUSCULAR; INTRAVENOUS; SUBCUTANEOUS EVERY 5 MIN PRN
Status: DISCONTINUED | OUTPATIENT
Start: 2024-08-06 | End: 2024-08-06 | Stop reason: HOSPADM

## 2024-08-06 RX ORDER — PROPOFOL 10 MG/ML
INJECTION, EMULSION INTRAVENOUS PRN
Status: DISCONTINUED | OUTPATIENT
Start: 2024-08-06 | End: 2024-08-06 | Stop reason: SDUPTHER

## 2024-08-06 RX ORDER — PHENYLEPHRINE HCL IN 0.9% NACL 1 MG/10 ML
SYRINGE (ML) INTRAVENOUS PRN
Status: DISCONTINUED | OUTPATIENT
Start: 2024-08-06 | End: 2024-08-06 | Stop reason: SDUPTHER

## 2024-08-06 RX ORDER — LABETALOL HYDROCHLORIDE 5 MG/ML
10 INJECTION, SOLUTION INTRAVENOUS
Status: DISCONTINUED | OUTPATIENT
Start: 2024-08-06 | End: 2024-08-06 | Stop reason: HOSPADM

## 2024-08-06 RX ORDER — MAGNESIUM HYDROXIDE 1200 MG/15ML
LIQUID ORAL CONTINUOUS PRN
Status: COMPLETED | OUTPATIENT
Start: 2024-08-06 | End: 2024-08-06

## 2024-08-06 RX ORDER — PROCHLORPERAZINE EDISYLATE 5 MG/ML
5 INJECTION INTRAMUSCULAR; INTRAVENOUS
Status: DISCONTINUED | OUTPATIENT
Start: 2024-08-06 | End: 2024-08-06 | Stop reason: HOSPADM

## 2024-08-06 RX ORDER — SODIUM CHLORIDE 0.9 % (FLUSH) 0.9 %
5-40 SYRINGE (ML) INJECTION PRN
Status: DISCONTINUED | OUTPATIENT
Start: 2024-08-06 | End: 2024-08-06 | Stop reason: HOSPADM

## 2024-08-06 RX ORDER — LORAZEPAM 2 MG/ML
0.5 INJECTION INTRAMUSCULAR
Status: DISCONTINUED | OUTPATIENT
Start: 2024-08-06 | End: 2024-08-06 | Stop reason: HOSPADM

## 2024-08-06 RX ORDER — FENTANYL CITRATE 50 UG/ML
INJECTION, SOLUTION INTRAMUSCULAR; INTRAVENOUS PRN
Status: DISCONTINUED | OUTPATIENT
Start: 2024-08-06 | End: 2024-08-06 | Stop reason: SDUPTHER

## 2024-08-06 RX ORDER — ONDANSETRON 2 MG/ML
4 INJECTION INTRAMUSCULAR; INTRAVENOUS
Status: DISCONTINUED | OUTPATIENT
Start: 2024-08-06 | End: 2024-08-06 | Stop reason: HOSPADM

## 2024-08-06 RX ORDER — NALOXONE HYDROCHLORIDE 0.4 MG/ML
INJECTION, SOLUTION INTRAMUSCULAR; INTRAVENOUS; SUBCUTANEOUS PRN
Status: DISCONTINUED | OUTPATIENT
Start: 2024-08-06 | End: 2024-08-06 | Stop reason: HOSPADM

## 2024-08-06 RX ADMIN — ATORVASTATIN CALCIUM 40 MG: 40 TABLET, FILM COATED ORAL at 09:02

## 2024-08-06 RX ADMIN — ALBUMIN (HUMAN) 12.5 G: 12.5 INJECTION, SOLUTION INTRAVENOUS at 19:19

## 2024-08-06 RX ADMIN — INSULIN GLARGINE 7 UNITS: 100 INJECTION, SOLUTION SUBCUTANEOUS at 01:19

## 2024-08-06 RX ADMIN — Medication 100 MCG: at 17:46

## 2024-08-06 RX ADMIN — HEPARIN SODIUM 5000 UNITS: 5000 INJECTION INTRAVENOUS; SUBCUTANEOUS at 05:32

## 2024-08-06 RX ADMIN — MEROPENEM 500 MG: 500 INJECTION, POWDER, FOR SOLUTION INTRAVENOUS at 05:34

## 2024-08-06 RX ADMIN — SODIUM CHLORIDE: 9 INJECTION, SOLUTION INTRAVENOUS at 20:45

## 2024-08-06 RX ADMIN — MORPHINE SULFATE 1 MG: 2 INJECTION, SOLUTION INTRAMUSCULAR; INTRAVENOUS at 05:41

## 2024-08-06 RX ADMIN — LIDOCAINE HYDROCHLORIDE 50 MG: 20 INJECTION, SOLUTION INFILTRATION; PERINEURAL at 17:26

## 2024-08-06 RX ADMIN — MEROPENEM 500 MG: 500 INJECTION, POWDER, FOR SOLUTION INTRAVENOUS at 22:15

## 2024-08-06 RX ADMIN — GLYCOPYRROLATE 0.2 MG: 0.2 INJECTION, SOLUTION INTRAMUSCULAR; INTRAVENOUS at 18:17

## 2024-08-06 RX ADMIN — ONDANSETRON 4 MG: 2 INJECTION INTRAMUSCULAR; INTRAVENOUS at 17:31

## 2024-08-06 RX ADMIN — PANTOPRAZOLE SODIUM 40 MG: 40 TABLET, DELAYED RELEASE ORAL at 05:32

## 2024-08-06 RX ADMIN — SENNOSIDES AND DOCUSATE SODIUM 2 TABLET: 50; 8.6 TABLET ORAL at 20:48

## 2024-08-06 RX ADMIN — SODIUM CHLORIDE: 9 INJECTION, SOLUTION INTRAVENOUS at 04:56

## 2024-08-06 RX ADMIN — DEXMEDETOMIDINE HYDROCHLORIDE 4 MCG: 100 INJECTION, SOLUTION INTRAVENOUS at 18:24

## 2024-08-06 RX ADMIN — PROPOFOL 50 MG: 10 INJECTION, EMULSION INTRAVENOUS at 18:25

## 2024-08-06 RX ADMIN — Medication: at 11:10

## 2024-08-06 RX ADMIN — DEXMEDETOMIDINE HYDROCHLORIDE 4 MCG: 100 INJECTION, SOLUTION INTRAVENOUS at 18:22

## 2024-08-06 RX ADMIN — ROCURONIUM BROMIDE 40 MG: 50 INJECTION, SOLUTION INTRAVENOUS at 17:26

## 2024-08-06 RX ADMIN — HEPARIN SODIUM 5000 UNITS: 5000 INJECTION INTRAVENOUS; SUBCUTANEOUS at 20:47

## 2024-08-06 RX ADMIN — Medication 200 MCG: at 18:24

## 2024-08-06 RX ADMIN — TACROLIMUS 1 MG: 1 CAPSULE ORAL at 22:11

## 2024-08-06 RX ADMIN — LINEZOLID 600 MG: 600 INJECTION, SOLUTION INTRAVENOUS at 09:11

## 2024-08-06 RX ADMIN — MEROPENEM 500 MG: 500 INJECTION, POWDER, FOR SOLUTION INTRAVENOUS at 14:05

## 2024-08-06 RX ADMIN — POLYETHYLENE GLYCOL 3350 17 G: 17 POWDER, FOR SOLUTION ORAL at 20:48

## 2024-08-06 RX ADMIN — Medication 200 MCG: at 17:34

## 2024-08-06 RX ADMIN — SODIUM CHLORIDE, PRESERVATIVE FREE 10 ML: 5 INJECTION INTRAVENOUS at 20:45

## 2024-08-06 RX ADMIN — FLUOXETINE 10 MG: 10 CAPSULE ORAL at 09:02

## 2024-08-06 RX ADMIN — Medication 1 CAPSULE: at 09:02

## 2024-08-06 RX ADMIN — FENTANYL CITRATE 50 MCG: 50 INJECTION, SOLUTION INTRAMUSCULAR; INTRAVENOUS at 17:26

## 2024-08-06 RX ADMIN — INSULIN GLARGINE 7 UNITS: 100 INJECTION, SOLUTION SUBCUTANEOUS at 20:46

## 2024-08-06 RX ADMIN — MORPHINE SULFATE 1 MG: 2 INJECTION, SOLUTION INTRAMUSCULAR; INTRAVENOUS at 13:59

## 2024-08-06 RX ADMIN — SUGAMMADEX 200 MG: 100 INJECTION, SOLUTION INTRAVENOUS at 18:23

## 2024-08-06 RX ADMIN — LINEZOLID 600 MG: 600 INJECTION, SOLUTION INTRAVENOUS at 20:50

## 2024-08-06 RX ADMIN — VANCOMYCIN HYDROCHLORIDE 250 MG: 250 CAPSULE ORAL at 20:48

## 2024-08-06 RX ADMIN — MIDODRINE HYDROCHLORIDE 5 MG: 5 TABLET ORAL at 12:13

## 2024-08-06 RX ADMIN — Medication 100 MCG: at 17:32

## 2024-08-06 RX ADMIN — PROPOFOL 100 MG: 10 INJECTION, EMULSION INTRAVENOUS at 17:26

## 2024-08-06 RX ADMIN — HEPARIN SODIUM 5000 UNITS: 5000 INJECTION INTRAVENOUS; SUBCUTANEOUS at 14:05

## 2024-08-06 RX ADMIN — Medication 100 MCG: at 18:14

## 2024-08-06 RX ADMIN — Medication 100 MCG: at 18:07

## 2024-08-06 ASSESSMENT — PAIN DESCRIPTION - LOCATION
LOCATION: BUTTOCKS
LOCATION: BUTTOCKS

## 2024-08-06 ASSESSMENT — PAIN DESCRIPTION - DESCRIPTORS
DESCRIPTORS: ACHING
DESCRIPTORS: OTHER (COMMENT)

## 2024-08-06 ASSESSMENT — PAIN SCALES - GENERAL
PAINLEVEL_OUTOF10: 10
PAINLEVEL_OUTOF10: 0
PAINLEVEL_OUTOF10: 0
PAINLEVEL_OUTOF10: 7
PAINLEVEL_OUTOF10: 0

## 2024-08-06 ASSESSMENT — PAIN DESCRIPTION - ORIENTATION: ORIENTATION: LEFT

## 2024-08-06 ASSESSMENT — PAIN - FUNCTIONAL ASSESSMENT: PAIN_FUNCTIONAL_ASSESSMENT: NONE - DENIES PAIN

## 2024-08-06 NOTE — DISCHARGE INSTRUCTIONS
UROLOGY  - Start intermittent straight catheterization at home every 4 hours to ensure adequate bladder emptying and prevent infections     Follow up with PCP within 1-2 weeks.  Wound care.  Watch for diarrhea resolution now that she isn't on the laxatives anymore.

## 2024-08-06 NOTE — DISCHARGE INSTR - COC
Continuity of Care Form    Patient Name: Carolina Arguelles   :  1980  MRN:  [id number]    Admit date:  8/3/2024  Discharge date:  2024    Code Status Order: Full Code   Advance Directives:     Admitting Physician:  Ehsan Shabbir, MD  PCP: Radha Pompa APRN - CNP    Discharging Nurse: Michelle Contreras RN  Discharging Hospital Unit/Room#: 0202/0202-02  Discharging Unit Phone Number: [phone]    Emergency Contact:   Extended Emergency Contact Information  Primary Emergency Contact: Kelly Arguelles  Address: [address] [place] [address] [facility]  Home Phone: [phone]  Mobile Phone: [phone]  Relation: Parent  Secondary Emergency Contact: BlaneMaximo  Home Phone: [phone]  Mobile Phone: [phone]  Relation: Parent   needed? No    Past Surgical History:  Past Surgical History:   Procedure Laterality Date    DENTAL SURGERY      DIALYSIS FISTULA CREATION      GASTROSTOMY TUBE PLACEMENT  10/16/2023    KIDNEY TRANSPLANT  2016    OTHER SURGICAL HISTORY Left 2014    Port placed left chest    OTHER SURGICAL HISTORY  2016    removal of port-a-cath and insertion of new port-a-cath    PRESSURE ULCER DEBRIDEMENT Left 2024    EXCISIONAL DEBRIDEMENT, LEFT ISCHIAL DECUBITUS ULCER, PEG TUBE REMOVAL performed by Prieto Elias MD at [facility] OR    UPPER GASTROINTESTINAL ENDOSCOPY  2016    Esophagitis       Immunization History:   Immunization History   Administered Date(s) Administered    COVID-19, PFIZER GRAY top, DO NOT Dilute, (age 12 y+), IM, 30 mcg/0.3 mL 2022    COVID-19, PFIZER PURPLE top, DILUTE for use, (age 12 y+), 30mcg/0.3mL 2021, 10/19/2021    Influenza Virus Vaccine 10/05/2014, 2015    Influenza Whole 2010    Pneumococcal, PPSV23, PNEUMOVAX 23, (age 2y+), SC/IM, 0.5mL 2014       Active Problems:  Patient Active Problem List   Diagnosis Code    Essential hypertension I10    Hyperlipidemia E78.5

## 2024-08-06 NOTE — CARE COORDINATION
Case Management Assessment  Initial Evaluation    Date/Time of Evaluation: 8/6/2024 10:47 AM  Assessment Completed by: Idalmis Gamble RN    If patient is discharged prior to next notation, then this note serves as note for discharge by case management.    Patient Name: Carolina Arguelles                   YOB: 1980  Diagnosis: Sepsis (HCC) [A41.9]                   Date / Time: 8/3/2024 12:09 PM    Patient Admission Status: Inpatient   Readmission Risk (Low < 19, Mod (19-27), High > 27): Readmission Risk Score: 21.9    Current PCP: Radha Pompa APRN - CNP  PCP verified by CM? Yes    Chart Reviewed: Yes      History Provided by: Child/Family  Patient Orientation:      Patient Cognition: Long Term Memory Deficit    Hospitalization in the last 30 days (Readmission):  No    If yes, Readmission Assessment in CM Navigator will be completed.    Advance Directives:      Code Status: Full Code   Patient's Primary Decision Maker is: Legal Next of Kin    Primary Decision Maker: Kelly Arguelles  - Parent - [phone]    Discharge Planning:    Patient lives with: Family Members Type of Home: House  Primary Care Giver: Family  Patient Support Systems include: Parent, Family Members   Current Financial resources: Medicaid, Medicare  Current community resources: ECF/Home Care  Current services prior to admission: Durable Medical Equipment, Home Care            Current DME: Walker, Wheelchair            Type of Home Care services:  Aide Services, OT, PT, Nursing Services    ADLS  Prior functional level: Assistance with the following:, Bathing, Dressing, Toileting  Current functional level: Assistance with the following:, Bathing, Dressing, Toileting    PT AM-PAC:   /24  OT AM-PAC:   /24    Family can provide assistance at DC: Yes  Would you like Case Management to discuss the discharge plan with any other family members/significant others, and if so, who? Yes (mother)  Plans to Return to Present Housing: Unknown at

## 2024-08-06 NOTE — ANESTHESIA PRE PROCEDURE
04/22/24 136/86       NPO Status:                                                                                 BMI:   Wt Readings from Last 3 Encounters:   03/09/24 52.2 kg (115 lb 1.3 oz)   02/21/24 54.4 kg (120 lb)   08/10/23 61.2 kg (135 lb)     Body mass index is 23.62 kg/m².    CBC:   Lab Results   Component Value Date/Time    WBC 18.5 08/06/2024 07:45 AM    RBC 3.21 08/06/2024 07:45 AM    HGB 9.0 08/06/2024 07:45 AM    HCT 28.5 08/06/2024 07:45 AM    MCV 88.6 08/06/2024 07:45 AM    RDW 14.2 08/06/2024 07:45 AM    PLT 80 08/06/2024 07:45 AM       CMP:   Lab Results   Component Value Date/Time     08/06/2024 05:14 AM    K 3.7 08/06/2024 05:14 AM    K 3.9 08/04/2024 04:34 AM     08/06/2024 05:14 AM    CO2 18 08/06/2024 05:14 AM    BUN 70 08/06/2024 05:14 AM    CREATININE 2.7 08/06/2024 05:14 AM    GFRAA 38 07/11/2021 08:24 AM    GFRAA >60 05/17/2013 05:20 PM    AGRATIO 0.7 08/03/2024 01:19 PM    LABGLOM 22 08/06/2024 05:14 AM    LABGLOM 24 03/18/2024 01:20 PM    GLUCOSE 167 08/06/2024 05:14 AM    CALCIUM 7.7 08/06/2024 05:14 AM    BILITOT 0.3 08/05/2024 04:56 AM    ALKPHOS 125 08/05/2024 04:56 AM    AST 6 08/05/2024 04:56 AM    ALT 14 08/05/2024 04:56 AM       POC Tests:   Recent Labs     08/06/24  1554   POCGLU 140*       Coags:   Lab Results   Component Value Date/Time    PROTIME 14.3 03/11/2024 09:11 AM    INR 1.11 03/11/2024 09:11 AM    APTT 35.0 10/09/2015 04:55 AM       HCG (If Applicable):   Lab Results   Component Value Date    PREGTESTUR Negative 08/10/2023    PREGSERUM Negative 04/08/2023    HCGQUANT <5.0 08/03/2024        ABGs: No results found for: \"PHART\", \"PO2ART\", \"BPF2PVA\", \"AXJ2HQA\", \"BEART\", \"S1KEDHQP\"     Type & Screen (If Applicable):  No results found for: \"LABABO\"    Drug/Infectious Status (If Applicable):  Lab Results   Component Value Date/Time    HEPCAB Non-Reactive (Negative) 10/18/2010 01:06 PM       COVID-19 Screening (If Applicable): No results found for:

## 2024-08-06 NOTE — ANESTHESIA POSTPROCEDURE EVALUATION
Department of Anesthesiology  Postprocedure Note    Patient: Carolina Arguelles  MRN: [id number]  YOB: 1980  Date of evaluation: 8/6/2024    Procedure Summary       Date: 08/06/24 Room / Location: [address]    Anesthesia Start: 1720 Anesthesia Stop: 1841    Procedure: DECUBITUS ULCER DEBRIDEMENT REPAIR Diagnosis:       Pressure ulcer with necrosis of soft tissues through to underlying muscle, tendon, or bone (HCC)      (Pressure ulcer with necrosis of soft tissues through to underlying muscle, tendon, or bone (HCC) [L89.94])    Surgeons: Prieto Elias MD Responsible Provider: Prabhjot March MD    Anesthesia Type: general ASA Status: 4            Anesthesia Type: No value filed.    Pat Phase I: Pat Score: 6    Pat Phase II:      Anesthesia Post Evaluation    Patient location during evaluation: PACU  Patient participation: complete - patient participated  Level of consciousness: awake and alert  Airway patency: patent  Nausea & Vomiting: no vomiting and no nausea  Cardiovascular status: hemodynamically stable and blood pressure returned to baseline  Respiratory status: acceptable  Hydration status: euvolemic  Comments: --------------------            08/06/24 1940     --------------------   BP:     (!) 95/57    Pulse:      87       Resp:       19       Temp:                SpO2:      91%      --------------------    Pain management: adequate    No notable events documented.

## 2024-08-06 NOTE — ACP (ADVANCE CARE PLANNING)
Advance Care Planning     Advance Care Planning Activator (Inpatient)  Conversation Note      Date of ACP Conversation: 8/6/2024     Conversation Conducted with: patient's mother     ACP Activator: Idalmis Gamble RN      Health Care Decision Maker:     Current Designated Health Care Decision Maker:     Primary Decision Maker: Kelly Arguelles - [facility] - [phone]    Care Preferences    Ventilation:  \"If you were in your present state of health and suddenly became very ill and were unable to breathe on your own, what would your preference be about the use of a ventilator (breathing machine) if it were available to you?\"          Resuscitation  \"CPR works best to restart the heart when there is a sudden event, like a heart attack, in someone who is otherwise healthy. Unfortunately, CPR does not typically restart the heart for people who have serious health conditions or who are very sick.\"    \"In the event your heart stopped as a result of an underlying serious health condition, would you want attempts to be made to restart your heart (answer \"yes\" for attempt to resuscitate) or would you prefer a natural death (answer \"no\" for do not attempt to resuscitate)?\" yes       [] Yes   [] No   Educated Patient / Decision Maker regarding differences between Advance Directives and portable DNR orders.    Length of ACP Conversation in minutes:  5    Conversation Outcomes:  ACP discussion completed    Follow-up plan:    [] Schedule follow-up conversation to continue planning  [x] Referred individual to Provider for additional questions/concerns   [] Advised patient/agent/surrogate to review completed ACP document and update if needed with changes in condition, patient preferences or care setting

## 2024-08-07 LAB
ALBUMIN SERPL-MCNC: 2.3 G/DL (ref 3.4–5)
ANION GAP SERPL CALCULATED.3IONS-SCNC: 15 MMOL/L (ref 3–16)
BACTERIA BLD CULT ORG #2: NORMAL
BACTERIA BLD CULT: NORMAL
BASOPHILS # BLD: 0 K/UL (ref 0–0.2)
BASOPHILS NFR BLD: 0 %
BUN SERPL-MCNC: 54 MG/DL (ref 7–20)
CALCIUM SERPL-MCNC: 7.5 MG/DL (ref 8.3–10.6)
CHLORIDE SERPL-SCNC: 109 MMOL/L (ref 99–110)
CO2 SERPL-SCNC: 15 MMOL/L (ref 21–32)
CREAT SERPL-MCNC: 2.4 MG/DL (ref 0.6–1.1)
DEPRECATED RDW RBC AUTO: 14.2 % (ref 12.4–15.4)
EOSINOPHIL # BLD: 0 K/UL (ref 0–0.6)
EOSINOPHIL NFR BLD: 0.2 %
GFR SERPLBLD CREATININE-BSD FMLA CKD-EPI: 25 ML/MIN/{1.73_M2}
GLUCOSE BLD-MCNC: 101 MG/DL (ref 70–99)
GLUCOSE BLD-MCNC: 116 MG/DL (ref 70–99)
GLUCOSE BLD-MCNC: 132 MG/DL (ref 70–99)
GLUCOSE BLD-MCNC: 48 MG/DL (ref 70–99)
GLUCOSE BLD-MCNC: 59 MG/DL (ref 70–99)
GLUCOSE BLD-MCNC: 68 MG/DL (ref 70–99)
GLUCOSE BLD-MCNC: 91 MG/DL (ref 70–99)
GLUCOSE SERPL-MCNC: 61 MG/DL (ref 70–99)
HCT VFR BLD AUTO: 27.6 % (ref 36–48)
HGB BLD-MCNC: 8.5 G/DL (ref 12–16)
LYMPHOCYTES # BLD: 0.5 K/UL (ref 1–5.1)
LYMPHOCYTES NFR BLD: 2.3 %
MAGNESIUM SERPL-MCNC: 1.8 MG/DL (ref 1.8–2.4)
MCH RBC QN AUTO: 27.8 PG (ref 26–34)
MCHC RBC AUTO-ENTMCNC: 30.7 G/DL (ref 31–36)
MCV RBC AUTO: 90.6 FL (ref 80–100)
MONOCYTES # BLD: 1 K/UL (ref 0–1.3)
MONOCYTES NFR BLD: 4.5 %
NEUTROPHILS # BLD: 19.8 K/UL (ref 1.7–7.7)
NEUTROPHILS NFR BLD: 93 %
PERFORMED ON: ABNORMAL
PERFORMED ON: NORMAL
PHOSPHATE SERPL-MCNC: 1.7 MG/DL (ref 2.5–4.9)
PLATELET # BLD AUTO: 124 K/UL (ref 135–450)
PMV BLD AUTO: 8.2 FL (ref 5–10.5)
POTASSIUM SERPL-SCNC: 2.9 MMOL/L (ref 3.5–5.1)
RBC # BLD AUTO: 3.05 M/UL (ref 4–5.2)
SODIUM SERPL-SCNC: 139 MMOL/L (ref 136–145)
WBC # BLD AUTO: 21.3 K/UL (ref 4–11)

## 2024-08-07 PROCEDURE — 6360000002 HC RX W HCPCS: Performed by: STUDENT IN AN ORGANIZED HEALTH CARE EDUCATION/TRAINING PROGRAM

## 2024-08-07 PROCEDURE — 2580000003 HC RX 258: Performed by: INTERNAL MEDICINE

## 2024-08-07 PROCEDURE — 2700000000 HC OXYGEN THERAPY PER DAY

## 2024-08-07 PROCEDURE — 94761 N-INVAS EAR/PLS OXIMETRY MLT: CPT

## 2024-08-07 PROCEDURE — 6370000000 HC RX 637 (ALT 250 FOR IP): Performed by: STUDENT IN AN ORGANIZED HEALTH CARE EDUCATION/TRAINING PROGRAM

## 2024-08-07 PROCEDURE — 99024 POSTOP FOLLOW-UP VISIT: CPT | Performed by: SURGERY

## 2024-08-07 PROCEDURE — 2580000003 HC RX 258: Performed by: STUDENT IN AN ORGANIZED HEALTH CARE EDUCATION/TRAINING PROGRAM

## 2024-08-07 PROCEDURE — 51798 US URINE CAPACITY MEASURE: CPT

## 2024-08-07 PROCEDURE — 51701 INSERT BLADDER CATHETER: CPT

## 2024-08-07 PROCEDURE — 2500000003 HC RX 250 WO HCPCS: Performed by: INTERNAL MEDICINE

## 2024-08-07 PROCEDURE — 97606 NEG PRS WND THER DME>50 SQCM: CPT

## 2024-08-07 PROCEDURE — 36415 COLL VENOUS BLD VENIPUNCTURE: CPT

## 2024-08-07 PROCEDURE — APPNB45 APP NON BILLABLE 31-45 MINUTES: Performed by: CLINICAL NURSE SPECIALIST

## 2024-08-07 PROCEDURE — 83735 ASSAY OF MAGNESIUM: CPT

## 2024-08-07 PROCEDURE — 6360000002 HC RX W HCPCS: Performed by: INTERNAL MEDICINE

## 2024-08-07 PROCEDURE — 80069 RENAL FUNCTION PANEL: CPT

## 2024-08-07 PROCEDURE — 1200000000 HC SEMI PRIVATE

## 2024-08-07 PROCEDURE — 85025 COMPLETE CBC W/AUTO DIFF WBC: CPT

## 2024-08-07 PROCEDURE — 2060000000 HC ICU INTERMEDIATE R&B

## 2024-08-07 RX ORDER — MORPHINE SULFATE 2 MG/ML
1 INJECTION, SOLUTION INTRAMUSCULAR; INTRAVENOUS
Status: DISCONTINUED | OUTPATIENT
Start: 2024-08-07 | End: 2024-08-09

## 2024-08-07 RX ORDER — POTASSIUM CHLORIDE 20 MEQ/1
40 TABLET, EXTENDED RELEASE ORAL ONCE
Status: DISCONTINUED | OUTPATIENT
Start: 2024-08-07 | End: 2024-08-16

## 2024-08-07 RX ORDER — DEXTROSE, SODIUM CHLORIDE, SODIUM LACTATE, POTASSIUM CHLORIDE, AND CALCIUM CHLORIDE 5; .6; .31; .03; .02 G/100ML; G/100ML; G/100ML; G/100ML; G/100ML
INJECTION, SOLUTION INTRAVENOUS CONTINUOUS
Status: DISCONTINUED | OUTPATIENT
Start: 2024-08-07 | End: 2024-08-08

## 2024-08-07 RX ORDER — MORPHINE SULFATE 2 MG/ML
2 INJECTION, SOLUTION INTRAMUSCULAR; INTRAVENOUS
Status: DISCONTINUED | OUTPATIENT
Start: 2024-08-07 | End: 2024-08-09

## 2024-08-07 RX ADMIN — HEPARIN SODIUM 5000 UNITS: 5000 INJECTION INTRAVENOUS; SUBCUTANEOUS at 15:31

## 2024-08-07 RX ADMIN — PIPERACILLIN AND TAZOBACTAM 3375 MG: 3; .375 INJECTION, POWDER, LYOPHILIZED, FOR SOLUTION INTRAVENOUS at 15:58

## 2024-08-07 RX ADMIN — Medication: at 12:00

## 2024-08-07 RX ADMIN — POTASSIUM PHOSPHATE, MONOBASIC AND POTASSIUM PHOSPHATE, DIBASIC 20 MMOL: 224; 236 INJECTION, SOLUTION, CONCENTRATE INTRAVENOUS at 11:14

## 2024-08-07 RX ADMIN — POLYETHYLENE GLYCOL 3350 17 G: 17 POWDER, FOR SOLUTION ORAL at 21:38

## 2024-08-07 RX ADMIN — MEROPENEM 500 MG: 500 INJECTION, POWDER, FOR SOLUTION INTRAVENOUS at 05:47

## 2024-08-07 RX ADMIN — DEXTROSE MONOHYDRATE 125 ML: 100 INJECTION, SOLUTION INTRAVENOUS at 10:22

## 2024-08-07 RX ADMIN — HEPARIN SODIUM 5000 UNITS: 5000 INJECTION INTRAVENOUS; SUBCUTANEOUS at 21:44

## 2024-08-07 RX ADMIN — SODIUM CHLORIDE: 9 INJECTION, SOLUTION INTRAVENOUS at 11:12

## 2024-08-07 RX ADMIN — DEXTROSE MONOHYDRATE 125 ML: 100 INJECTION, SOLUTION INTRAVENOUS at 16:08

## 2024-08-07 RX ADMIN — PANTOPRAZOLE SODIUM 40 MG: 40 TABLET, DELAYED RELEASE ORAL at 15:31

## 2024-08-07 RX ADMIN — POTASSIUM PHOSPHATE, MONOBASIC AND POTASSIUM PHOSPHATE, DIBASIC 30 MMOL: 224; 236 INJECTION, SOLUTION, CONCENTRATE INTRAVENOUS at 15:56

## 2024-08-07 RX ADMIN — MIDODRINE HYDROCHLORIDE 5 MG: 5 TABLET ORAL at 17:50

## 2024-08-07 RX ADMIN — LINEZOLID 600 MG: 600 INJECTION, SOLUTION INTRAVENOUS at 10:52

## 2024-08-07 RX ADMIN — VANCOMYCIN HYDROCHLORIDE 250 MG: 250 CAPSULE ORAL at 21:36

## 2024-08-07 RX ADMIN — SODIUM CHLORIDE, PRESERVATIVE FREE 10 ML: 5 INJECTION INTRAVENOUS at 21:51

## 2024-08-07 RX ADMIN — SODIUM CHLORIDE, SODIUM LACTATE, POTASSIUM CHLORIDE, CALCIUM CHLORIDE AND DEXTROSE MONOHYDRATE: 5; 600; 310; 30; 20 INJECTION, SOLUTION INTRAVENOUS at 17:26

## 2024-08-07 RX ADMIN — HEPARIN SODIUM 5000 UNITS: 5000 INJECTION INTRAVENOUS; SUBCUTANEOUS at 05:34

## 2024-08-07 RX ADMIN — TACROLIMUS 1 MG: 1 CAPSULE ORAL at 22:48

## 2024-08-07 RX ADMIN — MIDODRINE HYDROCHLORIDE 5 MG: 5 TABLET ORAL at 15:31

## 2024-08-07 NOTE — ANESTHESIA POSTPROCEDURE EVALUATION
Department of Anesthesiology  Postprocedure Note    Patient: Carolina Arguelles  MRN: [id number]  YOB: 1980  Date of evaluation: 8/7/2024    Procedure Summary       Date: 08/04/24 Room / Location: [address]    Anesthesia Start: 1455 Anesthesia Stop: 1613    Procedure: EXCISIONAL DEBRIDEMENT, LEFT ISCHIAL DECUBITUS ULCER, PEG TUBE REMOVAL (Left: Buttocks) Diagnosis:       Pressure ulcer with necrosis of soft tissues through to underlying muscle, tendon, or bone (HCC)      (Pressure ulcer with necrosis of soft tissues through to underlying muscle, tendon, or bone (HCC) [L89.94])    Surgeons: Prieto Elias MD Responsible Provider: Abdulaziz Winn MD    Anesthesia Type: general ASA Status: 4            Anesthesia Type: No value filed.    Pat Phase I: Pat Score: 6    Pat Phase II:      Anesthesia Post Evaluation    Patient location during evaluation: PACU  Patient participation: complete - patient participated  Level of consciousness: awake and alert  Airway patency: patent  Nausea & Vomiting: no nausea and no vomiting  Cardiovascular status: blood pressure returned to baseline  Respiratory status: acceptable  Hydration status: euvolemic  Comments: VSS on transfer to phase 2 recovery.  No anesthetic complications. Late entry  Pain management: adequate    No notable events documented.

## 2024-08-08 LAB
ALBUMIN SERPL-MCNC: 2.2 G/DL (ref 3.4–5)
ANION GAP SERPL CALCULATED.3IONS-SCNC: 13 MMOL/L (ref 3–16)
ANION GAP SERPL CALCULATED.3IONS-SCNC: 15 MMOL/L (ref 3–16)
BACTERIA SPEC AEROBE CULT: ABNORMAL
BACTERIA SPEC AEROBE CULT: ABNORMAL
BACTERIA SPEC ANAEROBE CULT: ABNORMAL
BASOPHILS # BLD: 0 K/UL (ref 0–0.2)
BASOPHILS # BLD: 0 K/UL (ref 0–0.2)
BASOPHILS NFR BLD: 0.1 %
BASOPHILS NFR BLD: 0.2 %
BUN SERPL-MCNC: 43 MG/DL (ref 7–20)
BUN SERPL-MCNC: 45 MG/DL (ref 7–20)
CALCIUM SERPL-MCNC: 6.4 MG/DL (ref 8.3–10.6)
CALCIUM SERPL-MCNC: 6.5 MG/DL (ref 8.3–10.6)
CHLORIDE SERPL-SCNC: 109 MMOL/L (ref 99–110)
CHLORIDE SERPL-SCNC: 112 MMOL/L (ref 99–110)
CO2 SERPL-SCNC: 15 MMOL/L (ref 21–32)
CO2 SERPL-SCNC: 16 MMOL/L (ref 21–32)
CREAT SERPL-MCNC: 2.3 MG/DL (ref 0.6–1.1)
CREAT SERPL-MCNC: 2.3 MG/DL (ref 0.6–1.1)
DEPRECATED RDW RBC AUTO: 14.2 % (ref 12.4–15.4)
DEPRECATED RDW RBC AUTO: 14.5 % (ref 12.4–15.4)
EOSINOPHIL # BLD: 0.1 K/UL (ref 0–0.6)
EOSINOPHIL # BLD: 0.1 K/UL (ref 0–0.6)
EOSINOPHIL NFR BLD: 0.3 %
EOSINOPHIL NFR BLD: 0.4 %
GFR SERPLBLD CREATININE-BSD FMLA CKD-EPI: 26 ML/MIN/{1.73_M2}
GFR SERPLBLD CREATININE-BSD FMLA CKD-EPI: 26 ML/MIN/{1.73_M2}
GLUCOSE BLD-MCNC: 109 MG/DL (ref 70–99)
GLUCOSE BLD-MCNC: 129 MG/DL (ref 70–99)
GLUCOSE BLD-MCNC: 157 MG/DL (ref 70–99)
GLUCOSE BLD-MCNC: 177 MG/DL (ref 70–99)
GLUCOSE BLD-MCNC: 178 MG/DL (ref 70–99)
GLUCOSE BLD-MCNC: 238 MG/DL (ref 70–99)
GLUCOSE BLD-MCNC: 60 MG/DL (ref 70–99)
GLUCOSE BLD-MCNC: 68 MG/DL (ref 70–99)
GLUCOSE SERPL-MCNC: 129 MG/DL (ref 70–99)
GLUCOSE SERPL-MCNC: 131 MG/DL (ref 70–99)
GRAM STN SPEC: ABNORMAL
HCT VFR BLD AUTO: 24.9 % (ref 36–48)
HCT VFR BLD AUTO: 27 % (ref 36–48)
HGB BLD-MCNC: 7.9 G/DL (ref 12–16)
HGB BLD-MCNC: 8.3 G/DL (ref 12–16)
LYMPHOCYTES # BLD: 0.7 K/UL (ref 1–5.1)
LYMPHOCYTES # BLD: 0.7 K/UL (ref 1–5.1)
LYMPHOCYTES NFR BLD: 4.4 %
LYMPHOCYTES NFR BLD: 4.4 %
MAGNESIUM SERPL-MCNC: 1.4 MG/DL (ref 1.8–2.4)
MAGNESIUM SERPL-MCNC: 1.5 MG/DL (ref 1.8–2.4)
MCH RBC QN AUTO: 27.6 PG (ref 26–34)
MCH RBC QN AUTO: 27.8 PG (ref 26–34)
MCHC RBC AUTO-ENTMCNC: 30.8 G/DL (ref 31–36)
MCHC RBC AUTO-ENTMCNC: 31.7 G/DL (ref 31–36)
MCV RBC AUTO: 87.7 FL (ref 80–100)
MCV RBC AUTO: 89.7 FL (ref 80–100)
MONOCYTES # BLD: 0.8 K/UL (ref 0–1.3)
MONOCYTES # BLD: 0.9 K/UL (ref 0–1.3)
MONOCYTES NFR BLD: 5.2 %
MONOCYTES NFR BLD: 5.3 %
NEUTROPHILS # BLD: 14.6 K/UL (ref 1.7–7.7)
NEUTROPHILS # BLD: 15.1 K/UL (ref 1.7–7.7)
NEUTROPHILS NFR BLD: 89.8 %
NEUTROPHILS NFR BLD: 89.9 %
ORGANISM: ABNORMAL
ORGANISM: ABNORMAL
PERFORMED ON: ABNORMAL
PHOSPHATE SERPL-MCNC: 5.2 MG/DL (ref 2.5–4.9)
PHOSPHATE SERPL-MCNC: 5.2 MG/DL (ref 2.5–4.9)
PLATELET # BLD AUTO: 122 K/UL (ref 135–450)
PLATELET # BLD AUTO: 95 K/UL (ref 135–450)
PLATELET BLD QL SMEAR: ABNORMAL
PMV BLD AUTO: 7.9 FL (ref 5–10.5)
PMV BLD AUTO: 8.6 FL (ref 5–10.5)
POTASSIUM SERPL-SCNC: 4 MMOL/L (ref 3.5–5.1)
POTASSIUM SERPL-SCNC: 4.1 MMOL/L (ref 3.5–5.1)
RBC # BLD AUTO: 2.84 M/UL (ref 4–5.2)
RBC # BLD AUTO: 3.01 M/UL (ref 4–5.2)
SLIDE REVIEW: ABNORMAL
SODIUM SERPL-SCNC: 138 MMOL/L (ref 136–145)
SODIUM SERPL-SCNC: 142 MMOL/L (ref 136–145)
WBC # BLD AUTO: 16.2 K/UL (ref 4–11)
WBC # BLD AUTO: 16.8 K/UL (ref 4–11)

## 2024-08-08 PROCEDURE — 6370000000 HC RX 637 (ALT 250 FOR IP): Performed by: STUDENT IN AN ORGANIZED HEALTH CARE EDUCATION/TRAINING PROGRAM

## 2024-08-08 PROCEDURE — 83735 ASSAY OF MAGNESIUM: CPT

## 2024-08-08 PROCEDURE — 51701 INSERT BLADDER CATHETER: CPT

## 2024-08-08 PROCEDURE — 2580000003 HC RX 258: Performed by: INTERNAL MEDICINE

## 2024-08-08 PROCEDURE — 80069 RENAL FUNCTION PANEL: CPT

## 2024-08-08 PROCEDURE — 2580000003 HC RX 258: Performed by: STUDENT IN AN ORGANIZED HEALTH CARE EDUCATION/TRAINING PROGRAM

## 2024-08-08 PROCEDURE — 94761 N-INVAS EAR/PLS OXIMETRY MLT: CPT

## 2024-08-08 PROCEDURE — 6360000002 HC RX W HCPCS: Performed by: STUDENT IN AN ORGANIZED HEALTH CARE EDUCATION/TRAINING PROGRAM

## 2024-08-08 PROCEDURE — 2060000000 HC ICU INTERMEDIATE R&B

## 2024-08-08 PROCEDURE — 2700000000 HC OXYGEN THERAPY PER DAY

## 2024-08-08 PROCEDURE — 99232 SBSQ HOSP IP/OBS MODERATE 35: CPT | Performed by: INTERNAL MEDICINE

## 2024-08-08 PROCEDURE — 36415 COLL VENOUS BLD VENIPUNCTURE: CPT

## 2024-08-08 PROCEDURE — 6360000002 HC RX W HCPCS: Performed by: INTERNAL MEDICINE

## 2024-08-08 PROCEDURE — 2580000003 HC RX 258: Performed by: NURSE PRACTITIONER

## 2024-08-08 PROCEDURE — 85025 COMPLETE CBC W/AUTO DIFF WBC: CPT

## 2024-08-08 PROCEDURE — 84100 ASSAY OF PHOSPHORUS: CPT

## 2024-08-08 PROCEDURE — 2500000003 HC RX 250 WO HCPCS: Performed by: INTERNAL MEDICINE

## 2024-08-08 PROCEDURE — 99024 POSTOP FOLLOW-UP VISIT: CPT | Performed by: SURGERY

## 2024-08-08 RX ORDER — MAGNESIUM SULFATE IN WATER 40 MG/ML
2000 INJECTION, SOLUTION INTRAVENOUS ONCE
Status: COMPLETED | OUTPATIENT
Start: 2024-08-08 | End: 2024-08-08

## 2024-08-08 RX ADMIN — VANCOMYCIN HYDROCHLORIDE 250 MG: 250 CAPSULE ORAL at 21:20

## 2024-08-08 RX ADMIN — PIPERACILLIN AND TAZOBACTAM 3375 MG: 3; .375 INJECTION, POWDER, LYOPHILIZED, FOR SOLUTION INTRAVENOUS at 16:57

## 2024-08-08 RX ADMIN — SODIUM BICARBONATE: 84 INJECTION, SOLUTION INTRAVENOUS at 15:50

## 2024-08-08 RX ADMIN — PIPERACILLIN AND TAZOBACTAM 3375 MG: 3; .375 INJECTION, POWDER, LYOPHILIZED, FOR SOLUTION INTRAVENOUS at 08:36

## 2024-08-08 RX ADMIN — HEPARIN SODIUM 5000 UNITS: 5000 INJECTION INTRAVENOUS; SUBCUTANEOUS at 14:22

## 2024-08-08 RX ADMIN — SODIUM CHLORIDE, PRESERVATIVE FREE 10 ML: 5 INJECTION INTRAVENOUS at 20:00

## 2024-08-08 RX ADMIN — TACROLIMUS 1 MG: 1 CAPSULE ORAL at 21:20

## 2024-08-08 RX ADMIN — SODIUM CHLORIDE, SODIUM LACTATE, POTASSIUM CHLORIDE, CALCIUM CHLORIDE AND DEXTROSE MONOHYDRATE: 5; 600; 310; 30; 20 INJECTION, SOLUTION INTRAVENOUS at 03:08

## 2024-08-08 RX ADMIN — MAGNESIUM SULFATE HEPTAHYDRATE 2000 MG: 40 INJECTION, SOLUTION INTRAVENOUS at 10:50

## 2024-08-08 RX ADMIN — MIDODRINE HYDROCHLORIDE 5 MG: 5 TABLET ORAL at 17:00

## 2024-08-08 RX ADMIN — PANTOPRAZOLE SODIUM 40 MG: 40 TABLET, DELAYED RELEASE ORAL at 16:58

## 2024-08-08 RX ADMIN — HEPARIN SODIUM 5000 UNITS: 5000 INJECTION INTRAVENOUS; SUBCUTANEOUS at 22:24

## 2024-08-08 RX ADMIN — DEXTROSE MONOHYDRATE 125 ML: 100 INJECTION, SOLUTION INTRAVENOUS at 02:03

## 2024-08-08 RX ADMIN — PIPERACILLIN AND TAZOBACTAM 3375 MG: 3; .375 INJECTION, POWDER, LYOPHILIZED, FOR SOLUTION INTRAVENOUS at 00:06

## 2024-08-08 RX ADMIN — DEXTROSE MONOHYDRATE 125 ML: 100 INJECTION, SOLUTION INTRAVENOUS at 20:08

## 2024-08-08 RX ADMIN — INSULIN LISPRO 2 UNITS: 100 INJECTION, SOLUTION INTRAVENOUS; SUBCUTANEOUS at 16:58

## 2024-08-08 RX ADMIN — HEPARIN SODIUM 5000 UNITS: 5000 INJECTION INTRAVENOUS; SUBCUTANEOUS at 06:21

## 2024-08-08 NOTE — CARE COORDINATION
Spoke with RN who states patient will be here for a couple more days for IV antibiotics for sepsis.   She is active with [facility].  Placed call to Maria M with [facility] to update her on the above.  She states they are able to take her back at discharge and to please call her to notify when discharged at [phone].

## 2024-08-09 LAB
ALBUMIN SERPL-MCNC: 1.5 G/DL (ref 3.4–5)
ANION GAP SERPL CALCULATED.3IONS-SCNC: 12 MMOL/L (ref 3–16)
BASOPHILS # BLD: 0 K/UL (ref 0–0.2)
BASOPHILS NFR BLD: 0.2 %
BUN SERPL-MCNC: 36 MG/DL (ref 7–20)
CALCIUM SERPL-MCNC: 6 MG/DL (ref 8.3–10.6)
CHLORIDE SERPL-SCNC: 108 MMOL/L (ref 99–110)
CO2 SERPL-SCNC: 15 MMOL/L (ref 21–32)
CREAT SERPL-MCNC: 2.3 MG/DL (ref 0.6–1.1)
DEPRECATED RDW RBC AUTO: 14.8 % (ref 12.4–15.4)
EOSINOPHIL # BLD: 0.1 K/UL (ref 0–0.6)
EOSINOPHIL NFR BLD: 0.6 %
GFR SERPLBLD CREATININE-BSD FMLA CKD-EPI: 26 ML/MIN/{1.73_M2}
GLUCOSE BLD-MCNC: 181 MG/DL (ref 70–99)
GLUCOSE BLD-MCNC: 186 MG/DL (ref 70–99)
GLUCOSE BLD-MCNC: 213 MG/DL (ref 70–99)
GLUCOSE BLD-MCNC: 238 MG/DL (ref 70–99)
GLUCOSE BLD-MCNC: 244 MG/DL (ref 70–99)
GLUCOSE SERPL-MCNC: 189 MG/DL (ref 70–99)
HCT VFR BLD AUTO: 26 % (ref 36–48)
HGB BLD-MCNC: 8 G/DL (ref 12–16)
LYMPHOCYTES # BLD: 1.2 K/UL (ref 1–5.1)
LYMPHOCYTES NFR BLD: 6.6 %
MAGNESIUM SERPL-MCNC: 1.7 MG/DL (ref 1.8–2.4)
MCH RBC QN AUTO: 28.1 PG (ref 26–34)
MCHC RBC AUTO-ENTMCNC: 30.8 G/DL (ref 31–36)
MCV RBC AUTO: 91.3 FL (ref 80–100)
MONOCYTES # BLD: 0.8 K/UL (ref 0–1.3)
MONOCYTES NFR BLD: 4.3 %
NEUTROPHILS # BLD: 16.3 K/UL (ref 1.7–7.7)
NEUTROPHILS NFR BLD: 88.3 %
PERFORMED ON: ABNORMAL
PHOSPHATE SERPL-MCNC: 3.7 MG/DL (ref 2.5–4.9)
PLATELET # BLD AUTO: 96 K/UL (ref 135–450)
PMV BLD AUTO: 8.5 FL (ref 5–10.5)
POTASSIUM SERPL-SCNC: 4.1 MMOL/L (ref 3.5–5.1)
RBC # BLD AUTO: 2.85 M/UL (ref 4–5.2)
SODIUM SERPL-SCNC: 135 MMOL/L (ref 136–145)
WBC # BLD AUTO: 18.5 K/UL (ref 4–11)

## 2024-08-09 PROCEDURE — 2580000003 HC RX 258: Performed by: INTERNAL MEDICINE

## 2024-08-09 PROCEDURE — 6370000000 HC RX 637 (ALT 250 FOR IP): Performed by: STUDENT IN AN ORGANIZED HEALTH CARE EDUCATION/TRAINING PROGRAM

## 2024-08-09 PROCEDURE — 99232 SBSQ HOSP IP/OBS MODERATE 35: CPT | Performed by: INTERNAL MEDICINE

## 2024-08-09 PROCEDURE — 6360000002 HC RX W HCPCS: Performed by: STUDENT IN AN ORGANIZED HEALTH CARE EDUCATION/TRAINING PROGRAM

## 2024-08-09 PROCEDURE — 2700000000 HC OXYGEN THERAPY PER DAY

## 2024-08-09 PROCEDURE — 99024 POSTOP FOLLOW-UP VISIT: CPT | Performed by: SURGERY

## 2024-08-09 PROCEDURE — 2060000000 HC ICU INTERMEDIATE R&B

## 2024-08-09 PROCEDURE — 2580000003 HC RX 258: Performed by: STUDENT IN AN ORGANIZED HEALTH CARE EDUCATION/TRAINING PROGRAM

## 2024-08-09 PROCEDURE — 97606 NEG PRS WND THER DME>50 SQCM: CPT

## 2024-08-09 PROCEDURE — 83735 ASSAY OF MAGNESIUM: CPT

## 2024-08-09 PROCEDURE — 94761 N-INVAS EAR/PLS OXIMETRY MLT: CPT

## 2024-08-09 PROCEDURE — 36415 COLL VENOUS BLD VENIPUNCTURE: CPT

## 2024-08-09 PROCEDURE — 80069 RENAL FUNCTION PANEL: CPT

## 2024-08-09 PROCEDURE — 6360000002 HC RX W HCPCS: Performed by: INTERNAL MEDICINE

## 2024-08-09 PROCEDURE — 51701 INSERT BLADDER CATHETER: CPT

## 2024-08-09 PROCEDURE — 85025 COMPLETE CBC W/AUTO DIFF WBC: CPT

## 2024-08-09 PROCEDURE — 2500000003 HC RX 250 WO HCPCS: Performed by: INTERNAL MEDICINE

## 2024-08-09 RX ORDER — MORPHINE SULFATE 2 MG/ML
1 INJECTION, SOLUTION INTRAMUSCULAR; INTRAVENOUS EVERY 8 HOURS PRN
Status: DISCONTINUED | OUTPATIENT
Start: 2024-08-09 | End: 2024-08-14

## 2024-08-09 RX ORDER — ACETAMINOPHEN 325 MG/1
650 TABLET ORAL EVERY 4 HOURS PRN
Status: DISCONTINUED | OUTPATIENT
Start: 2024-08-09 | End: 2024-08-20 | Stop reason: HOSPADM

## 2024-08-09 RX ADMIN — HEPARIN SODIUM 5000 UNITS: 5000 INJECTION INTRAVENOUS; SUBCUTANEOUS at 20:41

## 2024-08-09 RX ADMIN — PANTOPRAZOLE SODIUM 40 MG: 40 TABLET, DELAYED RELEASE ORAL at 07:16

## 2024-08-09 RX ADMIN — TACROLIMUS 1 MG: 1 CAPSULE ORAL at 10:04

## 2024-08-09 RX ADMIN — TACROLIMUS 1 MG: 1 CAPSULE ORAL at 20:41

## 2024-08-09 RX ADMIN — INSULIN LISPRO 2 UNITS: 100 INJECTION, SOLUTION INTRAVENOUS; SUBCUTANEOUS at 12:39

## 2024-08-09 RX ADMIN — PIPERACILLIN AND TAZOBACTAM 3375 MG: 3; .375 INJECTION, POWDER, LYOPHILIZED, FOR SOLUTION INTRAVENOUS at 09:55

## 2024-08-09 RX ADMIN — SODIUM BICARBONATE: 84 INJECTION, SOLUTION INTRAVENOUS at 07:57

## 2024-08-09 RX ADMIN — PIPERACILLIN AND TAZOBACTAM 3375 MG: 3; .375 INJECTION, POWDER, LYOPHILIZED, FOR SOLUTION INTRAVENOUS at 16:56

## 2024-08-09 RX ADMIN — MIDODRINE HYDROCHLORIDE 5 MG: 5 TABLET ORAL at 16:54

## 2024-08-09 RX ADMIN — HEPARIN SODIUM 5000 UNITS: 5000 INJECTION INTRAVENOUS; SUBCUTANEOUS at 07:16

## 2024-08-09 RX ADMIN — MIDODRINE HYDROCHLORIDE 5 MG: 5 TABLET ORAL at 12:39

## 2024-08-09 RX ADMIN — SODIUM CHLORIDE, PRESERVATIVE FREE 10 ML: 5 INJECTION INTRAVENOUS at 20:40

## 2024-08-09 RX ADMIN — Medication 1 CAPSULE: at 16:54

## 2024-08-09 RX ADMIN — ASPIRIN 81 MG: 81 TABLET, COATED ORAL at 09:55

## 2024-08-09 RX ADMIN — FLUOXETINE 10 MG: 10 CAPSULE ORAL at 09:56

## 2024-08-09 RX ADMIN — MIDODRINE HYDROCHLORIDE 5 MG: 5 TABLET ORAL at 09:55

## 2024-08-09 RX ADMIN — SENNOSIDES AND DOCUSATE SODIUM 2 TABLET: 50; 8.6 TABLET ORAL at 20:41

## 2024-08-09 RX ADMIN — POLYETHYLENE GLYCOL 3350 17 G: 17 POWDER, FOR SOLUTION ORAL at 20:41

## 2024-08-09 RX ADMIN — VANCOMYCIN HYDROCHLORIDE 250 MG: 250 CAPSULE ORAL at 20:41

## 2024-08-09 RX ADMIN — HEPARIN SODIUM 5000 UNITS: 5000 INJECTION INTRAVENOUS; SUBCUTANEOUS at 12:40

## 2024-08-09 RX ADMIN — VANCOMYCIN HYDROCHLORIDE 250 MG: 250 CAPSULE ORAL at 12:39

## 2024-08-09 RX ADMIN — PANTOPRAZOLE SODIUM 40 MG: 40 TABLET, DELAYED RELEASE ORAL at 16:54

## 2024-08-09 RX ADMIN — SODIUM CHLORIDE: 9 INJECTION, SOLUTION INTRAVENOUS at 09:52

## 2024-08-09 RX ADMIN — ATORVASTATIN CALCIUM 40 MG: 40 TABLET, FILM COATED ORAL at 09:56

## 2024-08-09 RX ADMIN — PIPERACILLIN AND TAZOBACTAM 3375 MG: 3; .375 INJECTION, POWDER, LYOPHILIZED, FOR SOLUTION INTRAVENOUS at 00:20

## 2024-08-09 RX ADMIN — SODIUM CHLORIDE, PRESERVATIVE FREE 10 ML: 5 INJECTION INTRAVENOUS at 07:58

## 2024-08-09 RX ADMIN — Medication 1 CAPSULE: at 09:55

## 2024-08-09 RX ADMIN — PREDNISONE 10 MG: 10 TABLET ORAL at 09:56

## 2024-08-09 RX ADMIN — MORPHINE SULFATE 1 MG: 2 INJECTION, SOLUTION INTRAMUSCULAR; INTRAVENOUS at 08:01

## 2024-08-09 RX ADMIN — MORPHINE SULFATE 1 MG: 2 INJECTION, SOLUTION INTRAMUSCULAR; INTRAVENOUS at 20:40

## 2024-08-09 ASSESSMENT — PAIN DESCRIPTION - ORIENTATION
ORIENTATION: LEFT
ORIENTATION: RIGHT

## 2024-08-09 ASSESSMENT — PAIN DESCRIPTION - LOCATION
LOCATION: HIP
LOCATION: BUTTOCKS

## 2024-08-09 ASSESSMENT — PAIN DESCRIPTION - DESCRIPTORS: DESCRIPTORS: THROBBING

## 2024-08-09 ASSESSMENT — PAIN SCALES - GENERAL
PAINLEVEL_OUTOF10: 10
PAINLEVEL_OUTOF10: 7

## 2024-08-09 NOTE — CARE COORDINATION
Spoke with wound care RN who states patient will need a wound vac at discharge.  She has filled out her portion of the [facility] wound vac order form.   Placed calls to patient's mother and to Maria M with [facility] to update them on the above.  They verbalized understanding and Maria M states they are able to manage wound care/vac at home.   Placed call to Madyson with [facility] to notify her.  Wound vac order faxed to her at [phone].  She states she will start working on it now to get it approved through patient's insurance.        1213 addendum:  Received call from patient's mother. She states that, per patient MD, they are going to need a hospital bed with an air loss mattress.   Placed call to Sea with [facility] who states they are able to supply and he is working on it at this time.  He will call patient's mother to coordinate delivery details.

## 2024-08-10 LAB
ALBUMIN SERPL-MCNC: 2 G/DL (ref 3.4–5)
ALBUMIN/GLOB SERPL: 0.6 {RATIO} (ref 1.1–2.2)
ALP SERPL-CCNC: 105 U/L (ref 40–129)
ALT SERPL-CCNC: 10 U/L (ref 10–40)
ANION GAP SERPL CALCULATED.3IONS-SCNC: 14 MMOL/L (ref 3–16)
AST SERPL-CCNC: 12 U/L (ref 15–37)
BASOPHILS # BLD: 0 K/UL (ref 0–0.2)
BASOPHILS NFR BLD: 0.1 %
BILIRUB SERPL-MCNC: 0.3 MG/DL (ref 0–1)
BUN SERPL-MCNC: 33 MG/DL (ref 7–20)
CALCIUM SERPL-MCNC: 6.2 MG/DL (ref 8.3–10.6)
CHLORIDE SERPL-SCNC: 101 MMOL/L (ref 99–110)
CO2 SERPL-SCNC: 24 MMOL/L (ref 21–32)
CREAT SERPL-MCNC: 2.2 MG/DL (ref 0.6–1.1)
CRP SERPL-MCNC: 169.6 MG/L (ref 0–5.1)
DEPRECATED RDW RBC AUTO: 14.5 % (ref 12.4–15.4)
EOSINOPHIL # BLD: 0 K/UL (ref 0–0.6)
EOSINOPHIL NFR BLD: 0.2 %
GFR SERPLBLD CREATININE-BSD FMLA CKD-EPI: 28 ML/MIN/{1.73_M2}
GLUCOSE BLD-MCNC: 119 MG/DL (ref 70–99)
GLUCOSE BLD-MCNC: 148 MG/DL (ref 70–99)
GLUCOSE BLD-MCNC: 215 MG/DL (ref 70–99)
GLUCOSE BLD-MCNC: 248 MG/DL (ref 70–99)
GLUCOSE SERPL-MCNC: 262 MG/DL (ref 70–99)
HCT VFR BLD AUTO: 28 % (ref 36–48)
HGB BLD-MCNC: 8.9 G/DL (ref 12–16)
LYMPHOCYTES # BLD: 0.8 K/UL (ref 1–5.1)
LYMPHOCYTES NFR BLD: 4.2 %
MAGNESIUM SERPL-MCNC: 1.6 MG/DL (ref 1.8–2.4)
MCH RBC QN AUTO: 27.8 PG (ref 26–34)
MCHC RBC AUTO-ENTMCNC: 31.9 G/DL (ref 31–36)
MCV RBC AUTO: 87.1 FL (ref 80–100)
MONOCYTES # BLD: 0.6 K/UL (ref 0–1.3)
MONOCYTES NFR BLD: 3 %
NEUTROPHILS # BLD: 18.4 K/UL (ref 1.7–7.7)
NEUTROPHILS NFR BLD: 92.5 %
PERFORMED ON: ABNORMAL
PHOSPHATE SERPL-MCNC: 3.5 MG/DL (ref 2.5–4.9)
PLATELET # BLD AUTO: 101 K/UL (ref 135–450)
PMV BLD AUTO: 8.5 FL (ref 5–10.5)
POTASSIUM SERPL-SCNC: 3.5 MMOL/L (ref 3.5–5.1)
PROT SERPL-MCNC: 5.1 G/DL (ref 6.4–8.2)
RBC # BLD AUTO: 3.22 M/UL (ref 4–5.2)
SODIUM SERPL-SCNC: 139 MMOL/L (ref 136–145)
WBC # BLD AUTO: 19.9 K/UL (ref 4–11)

## 2024-08-10 PROCEDURE — 51701 INSERT BLADDER CATHETER: CPT

## 2024-08-10 PROCEDURE — 6370000000 HC RX 637 (ALT 250 FOR IP): Performed by: STUDENT IN AN ORGANIZED HEALTH CARE EDUCATION/TRAINING PROGRAM

## 2024-08-10 PROCEDURE — 2060000000 HC ICU INTERMEDIATE R&B

## 2024-08-10 PROCEDURE — 97162 PT EVAL MOD COMPLEX 30 MIN: CPT

## 2024-08-10 PROCEDURE — C1751 CATH, INF, PER/CENT/MIDLINE: HCPCS

## 2024-08-10 PROCEDURE — 36569 INSJ PICC 5 YR+ W/O IMAGING: CPT

## 2024-08-10 PROCEDURE — 76937 US GUIDE VASCULAR ACCESS: CPT

## 2024-08-10 PROCEDURE — 05H933Z INSERTION OF INFUSION DEVICE INTO RIGHT BRACHIAL VEIN, PERCUTANEOUS APPROACH: ICD-10-PCS | Performed by: INTERNAL MEDICINE

## 2024-08-10 PROCEDURE — 82575 CREATININE CLEARANCE TEST: CPT

## 2024-08-10 PROCEDURE — 6360000002 HC RX W HCPCS: Performed by: STUDENT IN AN ORGANIZED HEALTH CARE EDUCATION/TRAINING PROGRAM

## 2024-08-10 PROCEDURE — 97166 OT EVAL MOD COMPLEX 45 MIN: CPT

## 2024-08-10 PROCEDURE — 97530 THERAPEUTIC ACTIVITIES: CPT

## 2024-08-10 PROCEDURE — 86140 C-REACTIVE PROTEIN: CPT

## 2024-08-10 PROCEDURE — 94761 N-INVAS EAR/PLS OXIMETRY MLT: CPT

## 2024-08-10 PROCEDURE — 36415 COLL VENOUS BLD VENIPUNCTURE: CPT

## 2024-08-10 PROCEDURE — 2580000003 HC RX 258: Performed by: STUDENT IN AN ORGANIZED HEALTH CARE EDUCATION/TRAINING PROGRAM

## 2024-08-10 PROCEDURE — 80053 COMPREHEN METABOLIC PANEL: CPT

## 2024-08-10 PROCEDURE — 6360000002 HC RX W HCPCS: Performed by: INTERNAL MEDICINE

## 2024-08-10 PROCEDURE — 83735 ASSAY OF MAGNESIUM: CPT

## 2024-08-10 PROCEDURE — 84100 ASSAY OF PHOSPHORUS: CPT

## 2024-08-10 PROCEDURE — 2500000003 HC RX 250 WO HCPCS: Performed by: INTERNAL MEDICINE

## 2024-08-10 PROCEDURE — 2580000003 HC RX 258: Performed by: INTERNAL MEDICINE

## 2024-08-10 PROCEDURE — 2700000000 HC OXYGEN THERAPY PER DAY

## 2024-08-10 PROCEDURE — 85025 COMPLETE CBC W/AUTO DIFF WBC: CPT

## 2024-08-10 RX ORDER — OXYCODONE HYDROCHLORIDE 5 MG/1
5 TABLET ORAL EVERY 4 HOURS PRN
Status: DISCONTINUED | OUTPATIENT
Start: 2024-08-10 | End: 2024-08-19

## 2024-08-10 RX ORDER — SODIUM CHLORIDE 0.9 % (FLUSH) 0.9 %
5-40 SYRINGE (ML) INJECTION PRN
Status: DISCONTINUED | OUTPATIENT
Start: 2024-08-10 | End: 2024-08-10

## 2024-08-10 RX ORDER — SODIUM CHLORIDE 9 MG/ML
INJECTION, SOLUTION INTRAVENOUS PRN
Status: DISCONTINUED | OUTPATIENT
Start: 2024-08-10 | End: 2024-08-10

## 2024-08-10 RX ORDER — SODIUM CHLORIDE 0.9 % (FLUSH) 0.9 %
5-40 SYRINGE (ML) INJECTION EVERY 12 HOURS SCHEDULED
Status: DISCONTINUED | OUTPATIENT
Start: 2024-08-10 | End: 2024-08-10

## 2024-08-10 RX ORDER — OXYCODONE HYDROCHLORIDE 5 MG/1
10 TABLET ORAL EVERY 4 HOURS PRN
Status: DISCONTINUED | OUTPATIENT
Start: 2024-08-10 | End: 2024-08-19

## 2024-08-10 RX ADMIN — Medication: at 08:10

## 2024-08-10 RX ADMIN — INSULIN LISPRO 2 UNITS: 100 INJECTION, SOLUTION INTRAVENOUS; SUBCUTANEOUS at 11:49

## 2024-08-10 RX ADMIN — MIDODRINE HYDROCHLORIDE 5 MG: 5 TABLET ORAL at 11:50

## 2024-08-10 RX ADMIN — PIPERACILLIN AND TAZOBACTAM 3375 MG: 3; .375 INJECTION, POWDER, LYOPHILIZED, FOR SOLUTION INTRAVENOUS at 18:47

## 2024-08-10 RX ADMIN — Medication 1 CAPSULE: at 08:01

## 2024-08-10 RX ADMIN — SENNOSIDES AND DOCUSATE SODIUM 2 TABLET: 50; 8.6 TABLET ORAL at 08:01

## 2024-08-10 RX ADMIN — SODIUM CHLORIDE, PRESERVATIVE FREE 10 ML: 5 INJECTION INTRAVENOUS at 21:51

## 2024-08-10 RX ADMIN — VANCOMYCIN HYDROCHLORIDE 250 MG: 250 CAPSULE ORAL at 07:58

## 2024-08-10 RX ADMIN — OXYCODONE HYDROCHLORIDE 5 MG: 5 TABLET ORAL at 13:26

## 2024-08-10 RX ADMIN — INSULIN LISPRO 2 UNITS: 100 INJECTION, SOLUTION INTRAVENOUS; SUBCUTANEOUS at 08:03

## 2024-08-10 RX ADMIN — OXYCODONE HYDROCHLORIDE 10 MG: 5 TABLET ORAL at 18:36

## 2024-08-10 RX ADMIN — PANTOPRAZOLE SODIUM 40 MG: 40 TABLET, DELAYED RELEASE ORAL at 05:59

## 2024-08-10 RX ADMIN — PANTOPRAZOLE SODIUM 40 MG: 40 TABLET, DELAYED RELEASE ORAL at 18:35

## 2024-08-10 RX ADMIN — HEPARIN SODIUM 5000 UNITS: 5000 INJECTION INTRAVENOUS; SUBCUTANEOUS at 21:50

## 2024-08-10 RX ADMIN — SODIUM CHLORIDE, PRESERVATIVE FREE 10 ML: 5 INJECTION INTRAVENOUS at 08:03

## 2024-08-10 RX ADMIN — TACROLIMUS 1 MG: 1 CAPSULE ORAL at 08:09

## 2024-08-10 RX ADMIN — MIDODRINE HYDROCHLORIDE 5 MG: 5 TABLET ORAL at 07:57

## 2024-08-10 RX ADMIN — FLUOXETINE 10 MG: 10 CAPSULE ORAL at 08:01

## 2024-08-10 RX ADMIN — MORPHINE SULFATE 1 MG: 2 INJECTION, SOLUTION INTRAMUSCULAR; INTRAVENOUS at 04:43

## 2024-08-10 RX ADMIN — POLYETHYLENE GLYCOL 3350 17 G: 17 POWDER, FOR SOLUTION ORAL at 07:50

## 2024-08-10 RX ADMIN — Medication 1 CAPSULE: at 18:36

## 2024-08-10 RX ADMIN — PIPERACILLIN AND TAZOBACTAM 3375 MG: 3; .375 INJECTION, POWDER, LYOPHILIZED, FOR SOLUTION INTRAVENOUS at 00:05

## 2024-08-10 RX ADMIN — SODIUM BICARBONATE: 84 INJECTION, SOLUTION INTRAVENOUS at 00:19

## 2024-08-10 RX ADMIN — HEPARIN SODIUM 5000 UNITS: 5000 INJECTION INTRAVENOUS; SUBCUTANEOUS at 13:27

## 2024-08-10 RX ADMIN — POLYETHYLENE GLYCOL 3350 17 G: 17 POWDER, FOR SOLUTION ORAL at 21:50

## 2024-08-10 RX ADMIN — HEPARIN SODIUM 5000 UNITS: 5000 INJECTION INTRAVENOUS; SUBCUTANEOUS at 04:43

## 2024-08-10 RX ADMIN — MIDODRINE HYDROCHLORIDE 5 MG: 5 TABLET ORAL at 18:35

## 2024-08-10 RX ADMIN — PREDNISONE 10 MG: 10 TABLET ORAL at 08:02

## 2024-08-10 RX ADMIN — OXYCODONE HYDROCHLORIDE 10 MG: 5 TABLET ORAL at 21:51

## 2024-08-10 RX ADMIN — ASPIRIN 81 MG: 81 TABLET, COATED ORAL at 08:01

## 2024-08-10 RX ADMIN — TACROLIMUS 1 MG: 1 CAPSULE ORAL at 21:51

## 2024-08-10 RX ADMIN — ATORVASTATIN CALCIUM 40 MG: 40 TABLET, FILM COATED ORAL at 08:06

## 2024-08-10 ASSESSMENT — PAIN SCALES - GENERAL
PAINLEVEL_OUTOF10: 10
PAINLEVEL_OUTOF10: 7
PAINLEVEL_OUTOF10: 0
PAINLEVEL_OUTOF10: 7
PAINLEVEL_OUTOF10: 7

## 2024-08-10 ASSESSMENT — PAIN DESCRIPTION - ORIENTATION
ORIENTATION: LEFT
ORIENTATION: RIGHT

## 2024-08-10 ASSESSMENT — PAIN DESCRIPTION - LOCATION
LOCATION: LEG

## 2024-08-11 LAB
ALBUMIN SERPL-MCNC: 2 G/DL (ref 3.4–5)
ANION GAP SERPL CALCULATED.3IONS-SCNC: 9 MMOL/L (ref 3–16)
BASOPHILS # BLD: 0 K/UL (ref 0–0.2)
BASOPHILS NFR BLD: 0.3 %
BUN SERPL-MCNC: 45 MG/DL (ref 7–20)
CALCIUM SERPL-MCNC: 6.9 MG/DL (ref 8.3–10.6)
CHLORIDE SERPL-SCNC: 100 MMOL/L (ref 99–110)
CO2 SERPL-SCNC: 27 MMOL/L (ref 21–32)
CREAT SERPL-MCNC: 2.6 MG/DL (ref 0.6–1.1)
DEPRECATED RDW RBC AUTO: 14.7 % (ref 12.4–15.4)
EOSINOPHIL # BLD: 0.1 K/UL (ref 0–0.6)
EOSINOPHIL NFR BLD: 0.7 %
GFR SERPLBLD CREATININE-BSD FMLA CKD-EPI: 23 ML/MIN/{1.73_M2}
GLUCOSE BLD-MCNC: 121 MG/DL (ref 70–99)
GLUCOSE BLD-MCNC: 144 MG/DL (ref 70–99)
GLUCOSE BLD-MCNC: 85 MG/DL (ref 70–99)
GLUCOSE BLD-MCNC: 88 MG/DL (ref 70–99)
GLUCOSE BLD-MCNC: 91 MG/DL (ref 70–99)
GLUCOSE SERPL-MCNC: 88 MG/DL (ref 70–99)
HCT VFR BLD AUTO: 25.1 % (ref 36–48)
HGB BLD-MCNC: 7.9 G/DL (ref 12–16)
LYMPHOCYTES # BLD: 1.2 K/UL (ref 1–5.1)
LYMPHOCYTES NFR BLD: 8.7 %
MCH RBC QN AUTO: 27.6 PG (ref 26–34)
MCHC RBC AUTO-ENTMCNC: 31.4 G/DL (ref 31–36)
MCV RBC AUTO: 87.9 FL (ref 80–100)
MONOCYTES # BLD: 0.7 K/UL (ref 0–1.3)
MONOCYTES NFR BLD: 4.9 %
NEUTROPHILS # BLD: 11.3 K/UL (ref 1.7–7.7)
NEUTROPHILS NFR BLD: 85.4 %
PERFORMED ON: ABNORMAL
PERFORMED ON: ABNORMAL
PERFORMED ON: NORMAL
PHOSPHATE SERPL-MCNC: 4 MG/DL (ref 2.5–4.9)
PLATELET # BLD AUTO: 88 K/UL (ref 135–450)
PMV BLD AUTO: 8.2 FL (ref 5–10.5)
POTASSIUM SERPL-SCNC: 3.5 MMOL/L (ref 3.5–5.1)
RBC # BLD AUTO: 2.85 M/UL (ref 4–5.2)
SODIUM SERPL-SCNC: 136 MMOL/L (ref 136–145)
WBC # BLD AUTO: 13.3 K/UL (ref 4–11)

## 2024-08-11 PROCEDURE — 6370000000 HC RX 637 (ALT 250 FOR IP): Performed by: STUDENT IN AN ORGANIZED HEALTH CARE EDUCATION/TRAINING PROGRAM

## 2024-08-11 PROCEDURE — 51798 US URINE CAPACITY MEASURE: CPT

## 2024-08-11 PROCEDURE — 2580000003 HC RX 258: Performed by: INTERNAL MEDICINE

## 2024-08-11 PROCEDURE — 2060000000 HC ICU INTERMEDIATE R&B

## 2024-08-11 PROCEDURE — 87449 NOS EACH ORGANISM AG IA: CPT

## 2024-08-11 PROCEDURE — 80069 RENAL FUNCTION PANEL: CPT

## 2024-08-11 PROCEDURE — 2580000003 HC RX 258: Performed by: STUDENT IN AN ORGANIZED HEALTH CARE EDUCATION/TRAINING PROGRAM

## 2024-08-11 PROCEDURE — 2700000000 HC OXYGEN THERAPY PER DAY

## 2024-08-11 PROCEDURE — 85025 COMPLETE CBC W/AUTO DIFF WBC: CPT

## 2024-08-11 PROCEDURE — 6360000002 HC RX W HCPCS: Performed by: INTERNAL MEDICINE

## 2024-08-11 PROCEDURE — 94761 N-INVAS EAR/PLS OXIMETRY MLT: CPT

## 2024-08-11 PROCEDURE — 87040 BLOOD CULTURE FOR BACTERIA: CPT

## 2024-08-11 PROCEDURE — 6360000002 HC RX W HCPCS: Performed by: STUDENT IN AN ORGANIZED HEALTH CARE EDUCATION/TRAINING PROGRAM

## 2024-08-11 PROCEDURE — 51701 INSERT BLADDER CATHETER: CPT

## 2024-08-11 RX ORDER — SODIUM CHLORIDE 0.9 % (FLUSH) 0.9 %
5-40 SYRINGE (ML) INJECTION EVERY 12 HOURS SCHEDULED
Status: DISCONTINUED | OUTPATIENT
Start: 2024-08-11 | End: 2024-08-16

## 2024-08-11 RX ORDER — DEXTROSE, SODIUM CHLORIDE, SODIUM LACTATE, POTASSIUM CHLORIDE, AND CALCIUM CHLORIDE 5; .6; .31; .03; .02 G/100ML; G/100ML; G/100ML; G/100ML; G/100ML
INJECTION, SOLUTION INTRAVENOUS CONTINUOUS
Status: DISCONTINUED | OUTPATIENT
Start: 2024-08-11 | End: 2024-08-12

## 2024-08-11 RX ORDER — SODIUM CHLORIDE 0.9 % (FLUSH) 0.9 %
5-40 SYRINGE (ML) INJECTION PRN
Status: DISCONTINUED | OUTPATIENT
Start: 2024-08-11 | End: 2024-08-16

## 2024-08-11 RX ORDER — SODIUM CHLORIDE 9 MG/ML
INJECTION, SOLUTION INTRAVENOUS PRN
Status: DISCONTINUED | OUTPATIENT
Start: 2024-08-11 | End: 2024-08-20 | Stop reason: HOSPADM

## 2024-08-11 RX ADMIN — SODIUM CHLORIDE, PRESERVATIVE FREE 10 ML: 5 INJECTION INTRAVENOUS at 08:19

## 2024-08-11 RX ADMIN — HEPARIN SODIUM 5000 UNITS: 5000 INJECTION INTRAVENOUS; SUBCUTANEOUS at 05:44

## 2024-08-11 RX ADMIN — PIPERACILLIN AND TAZOBACTAM 3375 MG: 3; .375 INJECTION, POWDER, LYOPHILIZED, FOR SOLUTION INTRAVENOUS at 08:39

## 2024-08-11 RX ADMIN — ACETAMINOPHEN 650 MG: 325 TABLET ORAL at 11:32

## 2024-08-11 RX ADMIN — HEPARIN SODIUM 5000 UNITS: 5000 INJECTION INTRAVENOUS; SUBCUTANEOUS at 14:52

## 2024-08-11 RX ADMIN — SODIUM CHLORIDE, SODIUM LACTATE, POTASSIUM CHLORIDE, CALCIUM CHLORIDE AND DEXTROSE MONOHYDRATE: 5; 600; 310; 30; 20 INJECTION, SOLUTION INTRAVENOUS at 16:07

## 2024-08-11 RX ADMIN — VANCOMYCIN HYDROCHLORIDE 250 MG: 250 CAPSULE ORAL at 22:04

## 2024-08-11 RX ADMIN — TACROLIMUS 1 MG: 1 CAPSULE ORAL at 08:25

## 2024-08-11 RX ADMIN — TACROLIMUS 1 MG: 1 CAPSULE ORAL at 22:04

## 2024-08-11 RX ADMIN — HEPARIN SODIUM 5000 UNITS: 5000 INJECTION INTRAVENOUS; SUBCUTANEOUS at 22:04

## 2024-08-11 RX ADMIN — PANTOPRAZOLE SODIUM 40 MG: 40 TABLET, DELAYED RELEASE ORAL at 05:44

## 2024-08-11 RX ADMIN — OXYCODONE HYDROCHLORIDE 5 MG: 5 TABLET ORAL at 16:18

## 2024-08-11 RX ADMIN — SODIUM CHLORIDE, PRESERVATIVE FREE 10 ML: 5 INJECTION INTRAVENOUS at 22:16

## 2024-08-11 RX ADMIN — MIDODRINE HYDROCHLORIDE 5 MG: 5 TABLET ORAL at 11:33

## 2024-08-11 RX ADMIN — MIDODRINE HYDROCHLORIDE 5 MG: 5 TABLET ORAL at 08:18

## 2024-08-11 RX ADMIN — MIDODRINE HYDROCHLORIDE 5 MG: 5 TABLET ORAL at 16:05

## 2024-08-11 RX ADMIN — OXYCODONE HYDROCHLORIDE 10 MG: 5 TABLET ORAL at 23:58

## 2024-08-11 RX ADMIN — PIPERACILLIN AND TAZOBACTAM 3375 MG: 3; .375 INJECTION, POWDER, LYOPHILIZED, FOR SOLUTION INTRAVENOUS at 16:21

## 2024-08-11 RX ADMIN — PIPERACILLIN AND TAZOBACTAM 3375 MG: 3; .375 INJECTION, POWDER, LYOPHILIZED, FOR SOLUTION INTRAVENOUS at 00:49

## 2024-08-11 RX ADMIN — Medication 10 ML: at 22:17

## 2024-08-11 ASSESSMENT — PAIN DESCRIPTION - DESCRIPTORS
DESCRIPTORS: SHARP;ACHING
DESCRIPTORS: SHARP
DESCRIPTORS: ACHING;STABBING

## 2024-08-11 ASSESSMENT — PAIN SCALES - GENERAL
PAINLEVEL_OUTOF10: 5
PAINLEVEL_OUTOF10: 7
PAINLEVEL_OUTOF10: 5

## 2024-08-11 ASSESSMENT — PAIN DESCRIPTION - ORIENTATION
ORIENTATION: RIGHT;LEFT
ORIENTATION: RIGHT;LEFT
ORIENTATION: MID

## 2024-08-11 ASSESSMENT — PAIN DESCRIPTION - LOCATION
LOCATION: LEG
LOCATION: LEG;BUTTOCKS
LOCATION: BACK;LEG

## 2024-08-11 ASSESSMENT — PAIN SCALES - WONG BAKER: WONGBAKER_NUMERICALRESPONSE: NO HURT

## 2024-08-12 ENCOUNTER — APPOINTMENT (OUTPATIENT)
Dept: GENERAL RADIOLOGY | Age: 44
DRG: 853 | End: 2024-08-12
Payer: COMMERCIAL

## 2024-08-12 LAB
ALBUMIN SERPL-MCNC: 1.7 G/DL (ref 3.4–5)
ANION GAP SERPL CALCULATED.3IONS-SCNC: 14 MMOL/L (ref 3–16)
BASOPHILS # BLD: 0 K/UL (ref 0–0.2)
BASOPHILS NFR BLD: 0.3 %
BUN SERPL-MCNC: 45 MG/DL (ref 7–20)
CALCIUM SERPL-MCNC: 7.3 MG/DL (ref 8.3–10.6)
CHLORIDE SERPL-SCNC: 102 MMOL/L (ref 99–110)
CO2 SERPL-SCNC: 21 MMOL/L (ref 21–32)
COLLECT DURATION TIME UR: 24 HOURS
COLLECT DURATION TIME UR: 24 HR
CREAT 24H UR-MRATE: 0.1 G/24HR (ref 0.6–1.5)
CREAT CL 24H UR+SERPL-VRATE: 3 ML/MIN (ref 88–128)
CREAT SERPL-MCNC: 2.6 MG/DL (ref 0.6–1.2)
CREAT SERPL-MCNC: 2.8 MG/DL (ref 0.6–1.1)
DEPRECATED RDW RBC AUTO: 14.5 % (ref 12.4–15.4)
EOSINOPHIL # BLD: 0.1 K/UL (ref 0–0.6)
EOSINOPHIL NFR BLD: 1 %
GFR SERPLBLD CREATININE-BSD FMLA CKD-EPI: 21 ML/MIN/{1.73_M2}
GLUCOSE BLD-MCNC: 159 MG/DL (ref 70–99)
GLUCOSE BLD-MCNC: 167 MG/DL (ref 70–99)
GLUCOSE BLD-MCNC: 174 MG/DL (ref 70–99)
GLUCOSE BLD-MCNC: 208 MG/DL (ref 70–99)
GLUCOSE SERPL-MCNC: 197 MG/DL (ref 70–99)
HCT VFR BLD AUTO: 24.4 % (ref 36–48)
HGB BLD-MCNC: 7.6 G/DL (ref 12–16)
LYMPHOCYTES # BLD: 0.9 K/UL (ref 1–5.1)
LYMPHOCYTES NFR BLD: 8.1 %
MAGNESIUM SERPL-MCNC: 1.4 MG/DL (ref 1.8–2.4)
MCH RBC QN AUTO: 27.5 PG (ref 26–34)
MCHC RBC AUTO-ENTMCNC: 31.1 G/DL (ref 31–36)
MCV RBC AUTO: 88.3 FL (ref 80–100)
MONOCYTES # BLD: 0.4 K/UL (ref 0–1.3)
MONOCYTES NFR BLD: 3.6 %
NEUTROPHILS # BLD: 10.2 K/UL (ref 1.7–7.7)
NEUTROPHILS NFR BLD: 87 %
PERFORMED ON: ABNORMAL
PHOSPHATE SERPL-MCNC: 4.6 MG/DL (ref 2.5–4.9)
PLATELET # BLD AUTO: 58 K/UL (ref 135–450)
PMV BLD AUTO: 8.9 FL (ref 5–10.5)
POTASSIUM SERPL-SCNC: 3.4 MMOL/L (ref 3.5–5.1)
POTASSIUM SERPL-SCNC: 3.4 MMOL/L (ref 3.5–5.1)
RBC # BLD AUTO: 2.76 M/UL (ref 4–5.2)
SODIUM SERPL-SCNC: 137 MMOL/L (ref 136–145)
SPECIMEN VOL 24H UR: 100 ML
WBC # BLD AUTO: 11.7 K/UL (ref 4–11)

## 2024-08-12 PROCEDURE — 85025 COMPLETE CBC W/AUTO DIFF WBC: CPT

## 2024-08-12 PROCEDURE — 84439 ASSAY OF FREE THYROXINE: CPT

## 2024-08-12 PROCEDURE — 6360000002 HC RX W HCPCS: Performed by: STUDENT IN AN ORGANIZED HEALTH CARE EDUCATION/TRAINING PROGRAM

## 2024-08-12 PROCEDURE — 2060000000 HC ICU INTERMEDIATE R&B

## 2024-08-12 PROCEDURE — 6370000000 HC RX 637 (ALT 250 FOR IP): Performed by: STUDENT IN AN ORGANIZED HEALTH CARE EDUCATION/TRAINING PROGRAM

## 2024-08-12 PROCEDURE — 71045 X-RAY EXAM CHEST 1 VIEW: CPT

## 2024-08-12 PROCEDURE — 80069 RENAL FUNCTION PANEL: CPT

## 2024-08-12 PROCEDURE — APPNB60 APP NON BILLABLE TIME 46-60 MINS: Performed by: CLINICAL NURSE SPECIALIST

## 2024-08-12 PROCEDURE — 36415 COLL VENOUS BLD VENIPUNCTURE: CPT

## 2024-08-12 PROCEDURE — 99232 SBSQ HOSP IP/OBS MODERATE 35: CPT | Performed by: INTERNAL MEDICINE

## 2024-08-12 PROCEDURE — 6360000002 HC RX W HCPCS: Performed by: INTERNAL MEDICINE

## 2024-08-12 PROCEDURE — 2580000003 HC RX 258: Performed by: INTERNAL MEDICINE

## 2024-08-12 PROCEDURE — 94761 N-INVAS EAR/PLS OXIMETRY MLT: CPT

## 2024-08-12 PROCEDURE — 83735 ASSAY OF MAGNESIUM: CPT

## 2024-08-12 PROCEDURE — 99231 SBSQ HOSP IP/OBS SF/LOW 25: CPT | Performed by: SURGERY

## 2024-08-12 PROCEDURE — 2580000003 HC RX 258: Performed by: STUDENT IN AN ORGANIZED HEALTH CARE EDUCATION/TRAINING PROGRAM

## 2024-08-12 PROCEDURE — 51798 US URINE CAPACITY MEASURE: CPT

## 2024-08-12 PROCEDURE — 51701 INSERT BLADDER CATHETER: CPT

## 2024-08-12 PROCEDURE — 84443 ASSAY THYROID STIM HORMONE: CPT

## 2024-08-12 PROCEDURE — 2700000000 HC OXYGEN THERAPY PER DAY

## 2024-08-12 RX ORDER — MAGNESIUM SULFATE IN WATER 40 MG/ML
4000 INJECTION, SOLUTION INTRAVENOUS ONCE
Status: COMPLETED | OUTPATIENT
Start: 2024-08-12 | End: 2024-08-12

## 2024-08-12 RX ORDER — POTASSIUM CHLORIDE 20 MEQ/1
40 TABLET, EXTENDED RELEASE ORAL ONCE
Status: COMPLETED | OUTPATIENT
Start: 2024-08-12 | End: 2024-08-12

## 2024-08-12 RX ADMIN — HEPARIN SODIUM 5000 UNITS: 5000 INJECTION INTRAVENOUS; SUBCUTANEOUS at 14:12

## 2024-08-12 RX ADMIN — FLUOXETINE 10 MG: 10 CAPSULE ORAL at 10:39

## 2024-08-12 RX ADMIN — HEPARIN SODIUM 5000 UNITS: 5000 INJECTION INTRAVENOUS; SUBCUTANEOUS at 06:19

## 2024-08-12 RX ADMIN — PIPERACILLIN AND TAZOBACTAM 3375 MG: 3; .375 INJECTION, POWDER, LYOPHILIZED, FOR SOLUTION INTRAVENOUS at 16:29

## 2024-08-12 RX ADMIN — SENNOSIDES AND DOCUSATE SODIUM 2 TABLET: 50; 8.6 TABLET ORAL at 21:27

## 2024-08-12 RX ADMIN — Medication 10 ML: at 10:40

## 2024-08-12 RX ADMIN — SODIUM CHLORIDE, SODIUM LACTATE, POTASSIUM CHLORIDE, CALCIUM CHLORIDE AND DEXTROSE MONOHYDRATE: 5; 600; 310; 30; 20 INJECTION, SOLUTION INTRAVENOUS at 02:14

## 2024-08-12 RX ADMIN — SENNOSIDES AND DOCUSATE SODIUM 2 TABLET: 50; 8.6 TABLET ORAL at 10:39

## 2024-08-12 RX ADMIN — OXYCODONE HYDROCHLORIDE 5 MG: 5 TABLET ORAL at 21:32

## 2024-08-12 RX ADMIN — PANTOPRAZOLE SODIUM 40 MG: 40 TABLET, DELAYED RELEASE ORAL at 10:39

## 2024-08-12 RX ADMIN — POTASSIUM CHLORIDE 40 MEQ: 1500 TABLET, EXTENDED RELEASE ORAL at 10:38

## 2024-08-12 RX ADMIN — POLYETHYLENE GLYCOL 3350 17 G: 17 POWDER, FOR SOLUTION ORAL at 10:40

## 2024-08-12 RX ADMIN — TACROLIMUS 1 MG: 1 CAPSULE ORAL at 21:29

## 2024-08-12 RX ADMIN — ASPIRIN 81 MG: 81 TABLET, COATED ORAL at 10:39

## 2024-08-12 RX ADMIN — MAGNESIUM SULFATE HEPTAHYDRATE 4000 MG: 40 INJECTION, SOLUTION INTRAVENOUS at 14:27

## 2024-08-12 RX ADMIN — OXYCODONE HYDROCHLORIDE 5 MG: 5 TABLET ORAL at 09:03

## 2024-08-12 RX ADMIN — MIDODRINE HYDROCHLORIDE 5 MG: 5 TABLET ORAL at 14:12

## 2024-08-12 RX ADMIN — Medication: at 10:41

## 2024-08-12 RX ADMIN — ATORVASTATIN CALCIUM 40 MG: 40 TABLET, FILM COATED ORAL at 10:39

## 2024-08-12 RX ADMIN — VANCOMYCIN HYDROCHLORIDE 250 MG: 250 CAPSULE ORAL at 21:27

## 2024-08-12 RX ADMIN — Medication 1 CAPSULE: at 10:39

## 2024-08-12 RX ADMIN — PIPERACILLIN AND TAZOBACTAM 3375 MG: 3; .375 INJECTION, POWDER, LYOPHILIZED, FOR SOLUTION INTRAVENOUS at 00:04

## 2024-08-12 RX ADMIN — SODIUM CHLORIDE, PRESERVATIVE FREE 10 ML: 5 INJECTION INTRAVENOUS at 21:29

## 2024-08-12 RX ADMIN — POLYETHYLENE GLYCOL 3350 17 G: 17 POWDER, FOR SOLUTION ORAL at 21:28

## 2024-08-12 RX ADMIN — MIDODRINE HYDROCHLORIDE 5 MG: 5 TABLET ORAL at 10:39

## 2024-08-12 RX ADMIN — TACROLIMUS 1 MG: 1 CAPSULE ORAL at 10:39

## 2024-08-12 RX ADMIN — SODIUM CHLORIDE, PRESERVATIVE FREE 10 ML: 5 INJECTION INTRAVENOUS at 10:41

## 2024-08-12 RX ADMIN — PIPERACILLIN AND TAZOBACTAM 3375 MG: 3; .375 INJECTION, POWDER, LYOPHILIZED, FOR SOLUTION INTRAVENOUS at 10:45

## 2024-08-12 RX ADMIN — PREDNISONE 10 MG: 10 TABLET ORAL at 10:39

## 2024-08-12 RX ADMIN — HEPARIN SODIUM 5000 UNITS: 5000 INJECTION INTRAVENOUS; SUBCUTANEOUS at 21:33

## 2024-08-12 ASSESSMENT — PAIN DESCRIPTION - ORIENTATION: ORIENTATION: LEFT

## 2024-08-12 ASSESSMENT — PAIN DESCRIPTION - LOCATION: LOCATION: BUTTOCKS

## 2024-08-12 ASSESSMENT — PAIN SCALES - WONG BAKER: WONGBAKER_NUMERICALRESPONSE: HURTS A LITTLE BIT

## 2024-08-12 ASSESSMENT — PAIN DESCRIPTION - PAIN TYPE: TYPE: ACUTE PAIN;SURGICAL PAIN

## 2024-08-12 ASSESSMENT — PAIN SCALES - GENERAL
PAINLEVEL_OUTOF10: 6
PAINLEVEL_OUTOF10: 4
PAINLEVEL_OUTOF10: 5
PAINLEVEL_OUTOF10: 4
PAINLEVEL_OUTOF10: 4
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
## 2024-08-13 PROBLEM — E44.0 MODERATE MALNUTRITION (HCC): Status: ACTIVE | Noted: 2024-08-13

## 2024-08-13 LAB
25(OH)D3 SERPL-MCNC: 24.7 NG/ML
ANION GAP SERPL CALCULATED.3IONS-SCNC: 11 MMOL/L (ref 3–16)
BASOPHILS # BLD: 0 K/UL (ref 0–0.2)
BASOPHILS NFR BLD: 0.3 %
BUN SERPL-MCNC: 42 MG/DL (ref 7–20)
CALCIUM SERPL-MCNC: 7.7 MG/DL (ref 8.3–10.6)
CHLORIDE SERPL-SCNC: 101 MMOL/L (ref 99–110)
CO2 SERPL-SCNC: 25 MMOL/L (ref 21–32)
CREAT SERPL-MCNC: 3.1 MG/DL (ref 0.6–1.1)
DEPRECATED RDW RBC AUTO: 14.3 % (ref 12.4–15.4)
EOSINOPHIL # BLD: 0.1 K/UL (ref 0–0.6)
EOSINOPHIL NFR BLD: 0.8 %
FERRITIN SERPL IA-MCNC: 397 NG/ML (ref 15–150)
GFR SERPLBLD CREATININE-BSD FMLA CKD-EPI: 18 ML/MIN/{1.73_M2}
GLUCOSE BLD-MCNC: 113 MG/DL (ref 70–99)
GLUCOSE BLD-MCNC: 145 MG/DL (ref 70–99)
GLUCOSE BLD-MCNC: 159 MG/DL (ref 70–99)
GLUCOSE BLD-MCNC: 82 MG/DL (ref 70–99)
GLUCOSE SERPL-MCNC: 111 MG/DL (ref 70–99)
HCT VFR BLD AUTO: 22.4 % (ref 36–48)
HGB BLD-MCNC: 7.1 G/DL (ref 12–16)
IRON SATN MFR SERPL: 35 % (ref 15–50)
IRON SERPL-MCNC: 26 UG/DL (ref 37–145)
LYMPHOCYTES # BLD: 0.8 K/UL (ref 1–5.1)
LYMPHOCYTES NFR BLD: 6.7 %
MCH RBC QN AUTO: 27.4 PG (ref 26–34)
MCHC RBC AUTO-ENTMCNC: 31.6 G/DL (ref 31–36)
MCV RBC AUTO: 86.8 FL (ref 80–100)
MONOCYTES # BLD: 0.7 K/UL (ref 0–1.3)
MONOCYTES NFR BLD: 5.9 %
NEUTROPHILS # BLD: 10.1 K/UL (ref 1.7–7.7)
NEUTROPHILS NFR BLD: 86.3 %
PERFORMED ON: ABNORMAL
PERFORMED ON: NORMAL
PLATELET # BLD AUTO: 70 K/UL (ref 135–450)
PMV BLD AUTO: 9.2 FL (ref 5–10.5)
POTASSIUM SERPL-SCNC: 3.7 MMOL/L (ref 3.5–5.1)
PTH-INTACT SERPL-MCNC: 106.4 PG/ML (ref 14–72)
RBC # BLD AUTO: 2.58 M/UL (ref 4–5.2)
SODIUM SERPL-SCNC: 137 MMOL/L (ref 136–145)
T4 FREE SERPL-MCNC: 0.5 NG/DL (ref 0.9–1.8)
TIBC SERPL-MCNC: 74 UG/DL (ref 260–445)
TSH SERPL DL<=0.005 MIU/L-ACNC: 12.1 UIU/ML (ref 0.27–4.2)
WBC # BLD AUTO: 11.7 K/UL (ref 4–11)

## 2024-08-13 PROCEDURE — 2060000000 HC ICU INTERMEDIATE R&B

## 2024-08-13 PROCEDURE — 83970 ASSAY OF PARATHORMONE: CPT

## 2024-08-13 PROCEDURE — 6370000000 HC RX 637 (ALT 250 FOR IP): Performed by: STUDENT IN AN ORGANIZED HEALTH CARE EDUCATION/TRAINING PROGRAM

## 2024-08-13 PROCEDURE — 2580000003 HC RX 258: Performed by: STUDENT IN AN ORGANIZED HEALTH CARE EDUCATION/TRAINING PROGRAM

## 2024-08-13 PROCEDURE — 6360000002 HC RX W HCPCS: Performed by: INTERNAL MEDICINE

## 2024-08-13 PROCEDURE — 82570 ASSAY OF URINE CREATININE: CPT

## 2024-08-13 PROCEDURE — 94761 N-INVAS EAR/PLS OXIMETRY MLT: CPT

## 2024-08-13 PROCEDURE — 51702 INSERT TEMP BLADDER CATH: CPT

## 2024-08-13 PROCEDURE — 83550 IRON BINDING TEST: CPT

## 2024-08-13 PROCEDURE — 6360000002 HC RX W HCPCS: Performed by: STUDENT IN AN ORGANIZED HEALTH CARE EDUCATION/TRAINING PROGRAM

## 2024-08-13 PROCEDURE — 2580000003 HC RX 258: Performed by: INTERNAL MEDICINE

## 2024-08-13 PROCEDURE — 82728 ASSAY OF FERRITIN: CPT

## 2024-08-13 PROCEDURE — 82306 VITAMIN D 25 HYDROXY: CPT

## 2024-08-13 PROCEDURE — 51798 US URINE CAPACITY MEASURE: CPT

## 2024-08-13 PROCEDURE — P9047 ALBUMIN (HUMAN), 25%, 50ML: HCPCS | Performed by: INTERNAL MEDICINE

## 2024-08-13 PROCEDURE — 51701 INSERT BLADDER CATHETER: CPT

## 2024-08-13 PROCEDURE — 80048 BASIC METABOLIC PNL TOTAL CA: CPT

## 2024-08-13 PROCEDURE — 99231 SBSQ HOSP IP/OBS SF/LOW 25: CPT | Performed by: SURGERY

## 2024-08-13 PROCEDURE — 83540 ASSAY OF IRON: CPT

## 2024-08-13 PROCEDURE — 85025 COMPLETE CBC W/AUTO DIFF WBC: CPT

## 2024-08-13 PROCEDURE — 2700000000 HC OXYGEN THERAPY PER DAY

## 2024-08-13 PROCEDURE — 36415 COLL VENOUS BLD VENIPUNCTURE: CPT

## 2024-08-13 RX ORDER — ALBUMIN (HUMAN) 12.5 G/50ML
25 SOLUTION INTRAVENOUS EVERY 8 HOURS
Status: DISCONTINUED | OUTPATIENT
Start: 2024-08-13 | End: 2024-08-20 | Stop reason: HOSPADM

## 2024-08-13 RX ORDER — LEVOTHYROXINE SODIUM 0.05 MG/1
50 TABLET ORAL DAILY
Status: DISCONTINUED | OUTPATIENT
Start: 2024-08-13 | End: 2024-08-14

## 2024-08-13 RX ORDER — FUROSEMIDE 10 MG/ML
80 INJECTION INTRAMUSCULAR; INTRAVENOUS EVERY 8 HOURS
Status: DISCONTINUED | OUTPATIENT
Start: 2024-08-13 | End: 2024-08-20 | Stop reason: HOSPADM

## 2024-08-13 RX ADMIN — ALBUMIN (HUMAN) 25 G: 0.25 INJECTION, SOLUTION INTRAVENOUS at 21:09

## 2024-08-13 RX ADMIN — FLUOXETINE 10 MG: 10 CAPSULE ORAL at 09:15

## 2024-08-13 RX ADMIN — SODIUM CHLORIDE: 9 INJECTION, SOLUTION INTRAVENOUS at 09:03

## 2024-08-13 RX ADMIN — SODIUM CHLORIDE 200 ML: 9 INJECTION, SOLUTION INTRAVENOUS at 22:46

## 2024-08-13 RX ADMIN — OXYCODONE HYDROCHLORIDE 10 MG: 5 TABLET ORAL at 09:30

## 2024-08-13 RX ADMIN — SENNOSIDES AND DOCUSATE SODIUM 2 TABLET: 50; 8.6 TABLET ORAL at 09:14

## 2024-08-13 RX ADMIN — Medication: at 09:38

## 2024-08-13 RX ADMIN — ALBUMIN (HUMAN) 25 G: 0.25 INJECTION, SOLUTION INTRAVENOUS at 13:26

## 2024-08-13 RX ADMIN — HEPARIN SODIUM 5000 UNITS: 5000 INJECTION INTRAVENOUS; SUBCUTANEOUS at 06:48

## 2024-08-13 RX ADMIN — FUROSEMIDE 80 MG: 10 INJECTION, SOLUTION INTRAMUSCULAR; INTRAVENOUS at 15:13

## 2024-08-13 RX ADMIN — SODIUM CHLORIDE 200 ML: 9 INJECTION, SOLUTION INTRAVENOUS at 00:07

## 2024-08-13 RX ADMIN — TACROLIMUS 1 MG: 1 CAPSULE ORAL at 21:21

## 2024-08-13 RX ADMIN — SENNOSIDES AND DOCUSATE SODIUM 2 TABLET: 50; 8.6 TABLET ORAL at 21:11

## 2024-08-13 RX ADMIN — Medication 10 ML: at 00:03

## 2024-08-13 RX ADMIN — HEPARIN SODIUM 5000 UNITS: 5000 INJECTION INTRAVENOUS; SUBCUTANEOUS at 15:14

## 2024-08-13 RX ADMIN — VANCOMYCIN HYDROCHLORIDE 250 MG: 250 CAPSULE ORAL at 09:39

## 2024-08-13 RX ADMIN — Medication 10 ML: at 09:10

## 2024-08-13 RX ADMIN — TACROLIMUS 1 MG: 1 CAPSULE ORAL at 09:09

## 2024-08-13 RX ADMIN — VANCOMYCIN HYDROCHLORIDE 250 MG: 250 CAPSULE ORAL at 21:10

## 2024-08-13 RX ADMIN — Medication 10 ML: at 20:59

## 2024-08-13 RX ADMIN — ATORVASTATIN CALCIUM 40 MG: 40 TABLET, FILM COATED ORAL at 09:14

## 2024-08-13 RX ADMIN — MIDODRINE HYDROCHLORIDE 5 MG: 5 TABLET ORAL at 09:09

## 2024-08-13 RX ADMIN — PIPERACILLIN AND TAZOBACTAM 3375 MG: 3; .375 INJECTION, POWDER, LYOPHILIZED, FOR SOLUTION INTRAVENOUS at 22:50

## 2024-08-13 RX ADMIN — HEPARIN SODIUM 5000 UNITS: 5000 INJECTION INTRAVENOUS; SUBCUTANEOUS at 21:43

## 2024-08-13 RX ADMIN — MIDODRINE HYDROCHLORIDE 5 MG: 5 TABLET ORAL at 17:48

## 2024-08-13 RX ADMIN — PANTOPRAZOLE SODIUM 40 MG: 40 TABLET, DELAYED RELEASE ORAL at 09:09

## 2024-08-13 RX ADMIN — PREDNISONE 10 MG: 10 TABLET ORAL at 09:10

## 2024-08-13 RX ADMIN — PIPERACILLIN AND TAZOBACTAM 3375 MG: 3; .375 INJECTION, POWDER, LYOPHILIZED, FOR SOLUTION INTRAVENOUS at 00:09

## 2024-08-13 RX ADMIN — Medication 1 CAPSULE: at 17:48

## 2024-08-13 RX ADMIN — ASPIRIN 81 MG: 81 TABLET, COATED ORAL at 09:09

## 2024-08-13 RX ADMIN — MIDODRINE HYDROCHLORIDE 5 MG: 5 TABLET ORAL at 11:54

## 2024-08-13 RX ADMIN — FUROSEMIDE 80 MG: 10 INJECTION, SOLUTION INTRAMUSCULAR; INTRAVENOUS at 21:00

## 2024-08-13 ASSESSMENT — PAIN SCALES - GENERAL
PAINLEVEL_OUTOF10: 2
PAINLEVEL_OUTOF10: 8
PAINLEVEL_OUTOF10: 0
PAINLEVEL_OUTOF10: 3

## 2024-08-13 NOTE — CARE COORDINATION
Received call from Simin with [facility] who states patient meets criteria for LTAC level of care.  This was shared with attending MD, GI MD and RN.  If they wish to move forward with LTAC, will discuss with patient and mother and patient would require a precert.

## 2024-08-13 NOTE — ACP (ADVANCE CARE PLANNING)
Advance Care Planning     Palliative Team Advance Care Planning (ACP) Conversation    Date of Conversation: 08/13/24    Individuals present for the conversation: Patient and mom. Kelly     ACP documents on file prior to discussion:  -None    Previously completed document/s not on file:  Kelly shared that pt had previously executed a HCPOA naming her step-mom, Nitza Arguelles, as her healthcare proxy.  Unable to find in Care Everywhere.  document was completed previously but it is not available for review. This writer requested a copy of the document for patient's chart.     Healthcare Decision Maker:    Primary Decision Maker: Kelly Arguelles J - Parent - [phone]     Resuscitation Status:   Code Status: Full Code Kelly verb'd pt is \"too young not to do everything, she needs to keep the full code.\"    Documentation Completed:  -Other Pt seems to count on her mom for help in healthcare decision making.  However, pt only repeats \"I'm w/ mom\" when it comes to decision making; pt didn't verb her desire to complete a new HCPOA.    Palliative Care Initial Note  Palliative Care Admit date:  8/13/24  Reason for c/s:  [facility]    Advance Directive:  Kelly was clear to say pt was able to complete a HCPOA while in [facility] \"after her stroke.\"  Pt designated her step-mom, Nitza Arguelles, as her healthcare proxy a number of years ago.   Have explained to mom the [place] order of decision making and our need to speak w/ Nitza as pts proxy.    Writer spoke w/ Nitza to explain pts current medical condition per provider input and the option/need for PEG and, potentially, HD.   Writer explained our need to view her as pts healthcare decision maker and inquired as to her wish to continue in that role or recuse herself?   After additional discussion, Nitza expressed her wish to recuse herself from the proxy role, making the parents pts NOK.   Pt isn't currently able to complete a new HCPOA nor does she seem appropriate for her own healthcare decision

## 2024-08-14 LAB
(1,3)-BETA-D-GLUCAN (FUNGITELL) INTERPRETATION: POSITIVE
1,3 BETA GLUCAN SER-MCNC: 150 PG/ML
ABO + RH BLD: NORMAL
ALBUMIN SERPL-MCNC: 3.4 G/DL (ref 3.4–5)
ANION GAP SERPL CALCULATED.3IONS-SCNC: 18 MMOL/L (ref 3–16)
ANION GAP SERPL CALCULATED.3IONS-SCNC: 23 MMOL/L (ref 3–16)
BASOPHILS # BLD: 0 K/UL (ref 0–0.2)
BASOPHILS NFR BLD: 0.4 %
BLD GP AB SCN SERPL QL: NORMAL
BUN SERPL-MCNC: 37 MG/DL (ref 7–20)
BUN SERPL-MCNC: 39 MG/DL (ref 7–20)
CALCIUM SERPL-MCNC: 8.4 MG/DL (ref 8.3–10.6)
CALCIUM SERPL-MCNC: 8.5 MG/DL (ref 8.3–10.6)
CHLORIDE SERPL-SCNC: 101 MMOL/L (ref 99–110)
CHLORIDE SERPL-SCNC: 98 MMOL/L (ref 99–110)
CO2 SERPL-SCNC: 16 MMOL/L (ref 21–32)
CO2 SERPL-SCNC: 21 MMOL/L (ref 21–32)
CREAT SERPL-MCNC: 3.2 MG/DL (ref 0.6–1.1)
CREAT SERPL-MCNC: 3.3 MG/DL (ref 0.6–1.1)
DEPRECATED RDW RBC AUTO: 13.9 % (ref 12.4–15.4)
EOSINOPHIL # BLD: 0.1 K/UL (ref 0–0.6)
EOSINOPHIL NFR BLD: 0.6 %
GFR SERPLBLD CREATININE-BSD FMLA CKD-EPI: 17 ML/MIN/{1.73_M2}
GFR SERPLBLD CREATININE-BSD FMLA CKD-EPI: 18 ML/MIN/{1.73_M2}
GLUCOSE BLD-MCNC: 114 MG/DL (ref 70–99)
GLUCOSE BLD-MCNC: 124 MG/DL (ref 70–99)
GLUCOSE BLD-MCNC: 134 MG/DL (ref 70–99)
GLUCOSE SERPL-MCNC: 106 MG/DL (ref 70–99)
GLUCOSE SERPL-MCNC: 128 MG/DL (ref 70–99)
HCT VFR BLD AUTO: 19.3 % (ref 36–48)
HGB BLD-MCNC: 6.3 G/DL (ref 12–16)
LYMPHOCYTES # BLD: 0.6 K/UL (ref 1–5.1)
LYMPHOCYTES NFR BLD: 6.2 %
MAGNESIUM SERPL-MCNC: 2.2 MG/DL (ref 1.8–2.4)
MCH RBC QN AUTO: 28.4 PG (ref 26–34)
MCHC RBC AUTO-ENTMCNC: 32.7 G/DL (ref 31–36)
MCV RBC AUTO: 86.9 FL (ref 80–100)
MONOCYTES # BLD: 0.4 K/UL (ref 0–1.3)
MONOCYTES NFR BLD: 4.1 %
NEUTROPHILS # BLD: 8.6 K/UL (ref 1.7–7.7)
NEUTROPHILS NFR BLD: 88.7 %
PERFORMED ON: ABNORMAL
PHOSPHATE SERPL-MCNC: 5.1 MG/DL (ref 2.5–4.9)
PHOSPHATE SERPL-MCNC: 5.3 MG/DL (ref 2.5–4.9)
PLATELET # BLD AUTO: 67 K/UL (ref 135–450)
PMV BLD AUTO: 8.7 FL (ref 5–10.5)
POTASSIUM SERPL-SCNC: 3.3 MMOL/L (ref 3.5–5.1)
POTASSIUM SERPL-SCNC: 3.3 MMOL/L (ref 3.5–5.1)
POTASSIUM SERPL-SCNC: 4.3 MMOL/L (ref 3.5–5.1)
RBC # BLD AUTO: 2.22 M/UL (ref 4–5.2)
REASON FOR REJECTION: NORMAL
REJECTED TEST: NORMAL
SODIUM SERPL-SCNC: 137 MMOL/L (ref 136–145)
SODIUM SERPL-SCNC: 140 MMOL/L (ref 136–145)
WBC # BLD AUTO: 9.7 K/UL (ref 4–11)

## 2024-08-14 PROCEDURE — 2700000000 HC OXYGEN THERAPY PER DAY

## 2024-08-14 PROCEDURE — 36430 TRANSFUSION BLD/BLD COMPNT: CPT

## 2024-08-14 PROCEDURE — 83735 ASSAY OF MAGNESIUM: CPT

## 2024-08-14 PROCEDURE — 99291 CRITICAL CARE FIRST HOUR: CPT | Performed by: STUDENT IN AN ORGANIZED HEALTH CARE EDUCATION/TRAINING PROGRAM

## 2024-08-14 PROCEDURE — P9016 RBC LEUKOCYTES REDUCED: HCPCS

## 2024-08-14 PROCEDURE — 6360000002 HC RX W HCPCS: Performed by: INTERNAL MEDICINE

## 2024-08-14 PROCEDURE — 86922 COMPATIBILITY TEST ANTIGLOB: CPT

## 2024-08-14 PROCEDURE — 86901 BLOOD TYPING SEROLOGIC RH(D): CPT

## 2024-08-14 PROCEDURE — 84481 FREE ASSAY (FT-3): CPT

## 2024-08-14 PROCEDURE — 82610 CYSTATIN C: CPT

## 2024-08-14 PROCEDURE — 2580000003 HC RX 258: Performed by: STUDENT IN AN ORGANIZED HEALTH CARE EDUCATION/TRAINING PROGRAM

## 2024-08-14 PROCEDURE — 99232 SBSQ HOSP IP/OBS MODERATE 35: CPT | Performed by: INTERNAL MEDICINE

## 2024-08-14 PROCEDURE — 84100 ASSAY OF PHOSPHORUS: CPT

## 2024-08-14 PROCEDURE — 80197 ASSAY OF TACROLIMUS: CPT

## 2024-08-14 PROCEDURE — P9047 ALBUMIN (HUMAN), 25%, 50ML: HCPCS | Performed by: INTERNAL MEDICINE

## 2024-08-14 PROCEDURE — 6360000002 HC RX W HCPCS: Performed by: NURSE PRACTITIONER

## 2024-08-14 PROCEDURE — 6360000002 HC RX W HCPCS: Performed by: STUDENT IN AN ORGANIZED HEALTH CARE EDUCATION/TRAINING PROGRAM

## 2024-08-14 PROCEDURE — 6370000000 HC RX 637 (ALT 250 FOR IP): Performed by: STUDENT IN AN ORGANIZED HEALTH CARE EDUCATION/TRAINING PROGRAM

## 2024-08-14 PROCEDURE — 86900 BLOOD TYPING SEROLOGIC ABO: CPT

## 2024-08-14 PROCEDURE — 85025 COMPLETE CBC W/AUTO DIFF WBC: CPT

## 2024-08-14 PROCEDURE — 86850 RBC ANTIBODY SCREEN: CPT

## 2024-08-14 PROCEDURE — 36415 COLL VENOUS BLD VENIPUNCTURE: CPT

## 2024-08-14 PROCEDURE — 86902 BLOOD TYPE ANTIGEN DONOR EA: CPT

## 2024-08-14 PROCEDURE — 6370000000 HC RX 637 (ALT 250 FOR IP): Performed by: INTERNAL MEDICINE

## 2024-08-14 PROCEDURE — 84443 ASSAY THYROID STIM HORMONE: CPT

## 2024-08-14 PROCEDURE — 82533 TOTAL CORTISOL: CPT

## 2024-08-14 PROCEDURE — 2000000000 HC ICU R&B

## 2024-08-14 PROCEDURE — 99024 POSTOP FOLLOW-UP VISIT: CPT | Performed by: SURGERY

## 2024-08-14 PROCEDURE — 84439 ASSAY OF FREE THYROXINE: CPT

## 2024-08-14 PROCEDURE — 94761 N-INVAS EAR/PLS OXIMETRY MLT: CPT

## 2024-08-14 PROCEDURE — 80069 RENAL FUNCTION PANEL: CPT

## 2024-08-14 PROCEDURE — 30233N1 TRANSFUSION OF NONAUTOLOGOUS RED BLOOD CELLS INTO PERIPHERAL VEIN, PERCUTANEOUS APPROACH: ICD-10-PCS | Performed by: STUDENT IN AN ORGANIZED HEALTH CARE EDUCATION/TRAINING PROGRAM

## 2024-08-14 RX ORDER — PROCHLORPERAZINE EDISYLATE 5 MG/ML
10 INJECTION INTRAMUSCULAR; INTRAVENOUS EVERY 6 HOURS PRN
Status: DISCONTINUED | OUTPATIENT
Start: 2024-08-14 | End: 2024-08-20 | Stop reason: HOSPADM

## 2024-08-14 RX ORDER — MORPHINE SULFATE 2 MG/ML
2 INJECTION, SOLUTION INTRAMUSCULAR; INTRAVENOUS
Status: DISCONTINUED | OUTPATIENT
Start: 2024-08-14 | End: 2024-08-19

## 2024-08-14 RX ORDER — QUETIAPINE FUMARATE 25 MG/1
25 TABLET, FILM COATED ORAL
Status: COMPLETED | OUTPATIENT
Start: 2024-08-14 | End: 2024-08-14

## 2024-08-14 RX ORDER — SODIUM CHLORIDE 9 MG/ML
INJECTION, SOLUTION INTRAVENOUS PRN
Status: DISCONTINUED | OUTPATIENT
Start: 2024-08-14 | End: 2024-08-16

## 2024-08-14 RX ORDER — LIOTHYRONINE SODIUM 10 UG/ML
20 INJECTION, SOLUTION INTRAVENOUS ONCE
Status: DISCONTINUED | OUTPATIENT
Start: 2024-08-14 | End: 2024-08-14

## 2024-08-14 RX ORDER — LIOTHYRONINE SODIUM 10 UG/ML
5 INJECTION, SOLUTION INTRAVENOUS EVERY 8 HOURS
Status: DISCONTINUED | OUTPATIENT
Start: 2024-08-14 | End: 2024-08-14

## 2024-08-14 RX ORDER — LEVOTHYROXINE SODIUM ANHYDROUS 100 UG/5ML
50 INJECTION, POWDER, LYOPHILIZED, FOR SOLUTION INTRAVENOUS DAILY
Status: DISCONTINUED | OUTPATIENT
Start: 2024-08-15 | End: 2024-08-15

## 2024-08-14 RX ORDER — POTASSIUM CHLORIDE 7.45 MG/ML
10 INJECTION INTRAVENOUS
Status: COMPLETED | OUTPATIENT
Start: 2024-08-14 | End: 2024-08-14

## 2024-08-14 RX ORDER — LIOTHYRONINE SODIUM 5 UG/1
5 TABLET ORAL EVERY 8 HOURS
Status: DISCONTINUED | OUTPATIENT
Start: 2024-08-14 | End: 2024-08-15

## 2024-08-14 RX ORDER — LEVOTHYROXINE SODIUM ANHYDROUS 100 UG/5ML
300 INJECTION, POWDER, LYOPHILIZED, FOR SOLUTION INTRAVENOUS ONCE
Status: COMPLETED | OUTPATIENT
Start: 2024-08-14 | End: 2024-08-14

## 2024-08-14 RX ADMIN — HEPARIN SODIUM 5000 UNITS: 5000 INJECTION INTRAVENOUS; SUBCUTANEOUS at 21:08

## 2024-08-14 RX ADMIN — HYDROCORTISONE SODIUM SUCCINATE 100 MG: 100 INJECTION, POWDER, FOR SOLUTION INTRAMUSCULAR; INTRAVENOUS at 11:16

## 2024-08-14 RX ADMIN — HYDROCORTISONE SODIUM SUCCINATE 50 MG: 100 INJECTION, POWDER, FOR SOLUTION INTRAMUSCULAR; INTRAVENOUS at 16:46

## 2024-08-14 RX ADMIN — LIOTHYRONINE SODIUM 5 MCG: 5 TABLET ORAL at 14:07

## 2024-08-14 RX ADMIN — QUETIAPINE FUMARATE 25 MG: 25 TABLET ORAL at 23:39

## 2024-08-14 RX ADMIN — Medication 10 ML: at 20:11

## 2024-08-14 RX ADMIN — SODIUM CHLORIDE, PRESERVATIVE FREE 10 ML: 5 INJECTION INTRAVENOUS at 20:11

## 2024-08-14 RX ADMIN — FUROSEMIDE 80 MG: 10 INJECTION, SOLUTION INTRAMUSCULAR; INTRAVENOUS at 20:10

## 2024-08-14 RX ADMIN — OXYCODONE HYDROCHLORIDE 10 MG: 5 TABLET ORAL at 04:46

## 2024-08-14 RX ADMIN — PANTOPRAZOLE SODIUM 40 MG: 40 TABLET, DELAYED RELEASE ORAL at 06:30

## 2024-08-14 RX ADMIN — ALBUMIN (HUMAN) 25 G: 0.25 INJECTION, SOLUTION INTRAVENOUS at 20:56

## 2024-08-14 RX ADMIN — HEPARIN SODIUM 5000 UNITS: 5000 INJECTION INTRAVENOUS; SUBCUTANEOUS at 05:28

## 2024-08-14 RX ADMIN — SODIUM CHLORIDE: 9 INJECTION, SOLUTION INTRAVENOUS at 10:03

## 2024-08-14 RX ADMIN — POTASSIUM CHLORIDE 10 MEQ: 7.46 INJECTION, SOLUTION INTRAVENOUS at 18:35

## 2024-08-14 RX ADMIN — LEVOTHYROXINE SODIUM ANHYDROUS 300 MCG: 100 INJECTION, POWDER, LYOPHILIZED, FOR SOLUTION INTRAVENOUS at 11:08

## 2024-08-14 RX ADMIN — Medication 10 ML: at 10:15

## 2024-08-14 RX ADMIN — POTASSIUM CHLORIDE 10 MEQ: 7.46 INJECTION, SOLUTION INTRAVENOUS at 19:47

## 2024-08-14 RX ADMIN — LEVOTHYROXINE SODIUM 50 MCG: 0.05 TABLET ORAL at 06:29

## 2024-08-14 RX ADMIN — ALBUMIN (HUMAN) 25 G: 0.25 INJECTION, SOLUTION INTRAVENOUS at 11:37

## 2024-08-14 RX ADMIN — PIPERACILLIN AND TAZOBACTAM 3375 MG: 3; .375 INJECTION, POWDER, LYOPHILIZED, FOR SOLUTION INTRAVENOUS at 21:24

## 2024-08-14 RX ADMIN — MORPHINE SULFATE 1 MG: 2 INJECTION, SOLUTION INTRAMUSCULAR; INTRAVENOUS at 11:59

## 2024-08-14 RX ADMIN — MORPHINE SULFATE 2 MG: 2 INJECTION, SOLUTION INTRAMUSCULAR; INTRAVENOUS at 18:02

## 2024-08-14 RX ADMIN — FUROSEMIDE 80 MG: 10 INJECTION, SOLUTION INTRAMUSCULAR; INTRAVENOUS at 04:49

## 2024-08-14 RX ADMIN — MORPHINE SULFATE 2 MG: 2 INJECTION, SOLUTION INTRAMUSCULAR; INTRAVENOUS at 21:08

## 2024-08-14 RX ADMIN — ALBUMIN (HUMAN) 25 G: 0.25 INJECTION, SOLUTION INTRAVENOUS at 05:28

## 2024-08-14 RX ADMIN — PROCHLORPERAZINE EDISYLATE 10 MG: 5 INJECTION INTRAMUSCULAR; INTRAVENOUS at 16:46

## 2024-08-14 RX ADMIN — MUPIROCIN: 20 OINTMENT TOPICAL at 20:10

## 2024-08-14 RX ADMIN — PIPERACILLIN AND TAZOBACTAM 3375 MG: 3; .375 INJECTION, POWDER, LYOPHILIZED, FOR SOLUTION INTRAVENOUS at 12:00

## 2024-08-14 RX ADMIN — FUROSEMIDE 80 MG: 10 INJECTION, SOLUTION INTRAMUSCULAR; INTRAVENOUS at 11:35

## 2024-08-14 RX ADMIN — Medication: at 12:01

## 2024-08-14 ASSESSMENT — PAIN SCALES - WONG BAKER: WONGBAKER_NUMERICALRESPONSE: HURTS A LITTLE BIT

## 2024-08-14 ASSESSMENT — PAIN DESCRIPTION - LOCATION
LOCATION: HIP
LOCATION: BUTTOCKS
LOCATION: HIP
LOCATION: HIP

## 2024-08-14 ASSESSMENT — PAIN DESCRIPTION - DESCRIPTORS: DESCRIPTORS: THROBBING

## 2024-08-14 ASSESSMENT — PAIN SCALES - GENERAL
PAINLEVEL_OUTOF10: 10
PAINLEVEL_OUTOF10: 10
PAINLEVEL_OUTOF10: 8
PAINLEVEL_OUTOF10: 5

## 2024-08-14 ASSESSMENT — PAIN DESCRIPTION - ORIENTATION
ORIENTATION: LEFT

## 2024-08-14 NOTE — CARE COORDINATION
CM update note     PD #11  Transfer to ICU d/t persistent hypothermia and mental status change - pos myxedema coma.   Followed by Hospital Medicine, ID, Pulmonology and Nephrology.  [facility] following - will need pre cert.  Patient has hx of kidney transplant - nephro following for CKD in kidney transplant.   GOC discussion around potential of needing DH and PEG.    Will continue to follow.      Madyson Obrien RN

## 2024-08-15 ENCOUNTER — APPOINTMENT (OUTPATIENT)
Dept: GENERAL RADIOLOGY | Age: 44
DRG: 853 | End: 2024-08-15
Payer: COMMERCIAL

## 2024-08-15 ENCOUNTER — ANESTHESIA EVENT (OUTPATIENT)
Dept: OPERATING ROOM | Age: 44
End: 2024-08-15
Payer: COMMERCIAL

## 2024-08-15 ENCOUNTER — ANESTHESIA (OUTPATIENT)
Dept: OPERATING ROOM | Age: 44
End: 2024-08-15
Payer: COMMERCIAL

## 2024-08-15 LAB
ALBUMIN SERPL-MCNC: 3.8 G/DL (ref 3.4–5)
ANION GAP SERPL CALCULATED.3IONS-SCNC: 22 MMOL/L (ref 3–16)
BACTERIA BLD CULT ORG #2: NORMAL
BACTERIA BLD CULT: NORMAL
BASOPHILS # BLD: 0 K/UL (ref 0–0.2)
BASOPHILS NFR BLD: 0.1 %
BUN SERPL-MCNC: 38 MG/DL (ref 7–20)
CALCIUM SERPL-MCNC: 8.8 MG/DL (ref 8.3–10.6)
CHLORIDE SERPL-SCNC: 98 MMOL/L (ref 99–110)
CO2 SERPL-SCNC: 17 MMOL/L (ref 21–32)
CORTIS SERPL-MCNC: 164 UG/DL
CREAT 24H UR-MRATE: 0.2 G/24HR (ref 0.6–1.5)
CREAT SERPL-MCNC: 3.4 MG/DL (ref 0.6–1.1)
DEPRECATED RDW RBC AUTO: 14.4 % (ref 12.4–15.4)
EOSINOPHIL # BLD: 0 K/UL (ref 0–0.6)
EOSINOPHIL NFR BLD: 0 %
GFR SERPLBLD CREATININE-BSD FMLA CKD-EPI: 17 ML/MIN/{1.73_M2}
GLUCOSE BLD-MCNC: 131 MG/DL (ref 70–99)
GLUCOSE BLD-MCNC: 149 MG/DL (ref 70–99)
GLUCOSE BLD-MCNC: 150 MG/DL (ref 70–99)
GLUCOSE SERPL-MCNC: 135 MG/DL (ref 70–99)
HCG SERPL QL: NEGATIVE
HCT VFR BLD AUTO: 26.1 % (ref 36–48)
HCT VFR BLD AUTO: 29.5 % (ref 36–48)
HGB BLD-MCNC: 8.5 G/DL (ref 12–16)
HGB BLD-MCNC: 9.3 G/DL (ref 12–16)
INR PPP: 2.24 (ref 0.85–1.15)
LYMPHOCYTES # BLD: 0.3 K/UL (ref 1–5.1)
LYMPHOCYTES NFR BLD: 3 %
MAGNESIUM SERPL-MCNC: 2.3 MG/DL (ref 1.8–2.4)
MCH RBC QN AUTO: 29.1 PG (ref 26–34)
MCHC RBC AUTO-ENTMCNC: 32.6 G/DL (ref 31–36)
MCV RBC AUTO: 89.2 FL (ref 80–100)
MONOCYTES # BLD: 0.1 K/UL (ref 0–1.3)
MONOCYTES NFR BLD: 1.1 %
NEUTROPHILS # BLD: 8.2 K/UL (ref 1.7–7.7)
NEUTROPHILS NFR BLD: 95.8 %
PERFORMED ON: ABNORMAL
PHOSPHATE SERPL-MCNC: 5.8 MG/DL (ref 2.5–4.9)
PLATELET # BLD AUTO: 64 K/UL (ref 135–450)
PMV BLD AUTO: 8.5 FL (ref 5–10.5)
POTASSIUM SERPL-SCNC: 4.2 MMOL/L (ref 3.5–5.1)
PROTHROMBIN TIME: 24.8 SEC (ref 11.9–14.9)
RBC # BLD AUTO: 2.93 M/UL (ref 4–5.2)
SODIUM SERPL-SCNC: 137 MMOL/L (ref 136–145)
SPECIMEN VOL 24H UR: 800 ML
T3FREE SERPL-MCNC: 0.9 PG/ML (ref 2.3–4.2)
T4 FREE SERPL-MCNC: 4.2 NG/DL (ref 0.9–1.8)
TACROLIMUS BLOOD: 12.1 NG/ML (ref 5–20)
TSH SERPL DL<=0.005 MIU/L-ACNC: 4.98 UIU/ML (ref 0.27–4.2)
WBC # BLD AUTO: 8.5 K/UL (ref 4–11)

## 2024-08-15 PROCEDURE — 6360000002 HC RX W HCPCS: Performed by: STUDENT IN AN ORGANIZED HEALTH CARE EDUCATION/TRAINING PROGRAM

## 2024-08-15 PROCEDURE — 36415 COLL VENOUS BLD VENIPUNCTURE: CPT

## 2024-08-15 PROCEDURE — 2580000003 HC RX 258: Performed by: STUDENT IN AN ORGANIZED HEALTH CARE EDUCATION/TRAINING PROGRAM

## 2024-08-15 PROCEDURE — 97597 DBRDMT OPN WND 1ST 20 CM/<: CPT | Performed by: SURGERY

## 2024-08-15 PROCEDURE — 6360000002 HC RX W HCPCS: Performed by: INTERNAL MEDICINE

## 2024-08-15 PROCEDURE — 2000000000 HC ICU R&B

## 2024-08-15 PROCEDURE — 3600000014 HC SURGERY LEVEL 4 ADDTL 15MIN: Performed by: SURGERY

## 2024-08-15 PROCEDURE — 3609013300 HC EGD TUBE PLACEMENT: Performed by: INTERNAL MEDICINE

## 2024-08-15 PROCEDURE — 2580000003 HC RX 258: Performed by: SURGERY

## 2024-08-15 PROCEDURE — 84703 CHORIONIC GONADOTROPIN ASSAY: CPT

## 2024-08-15 PROCEDURE — 6360000002 HC RX W HCPCS: Performed by: NURSE PRACTITIONER

## 2024-08-15 PROCEDURE — A4217 STERILE WATER/SALINE, 500 ML: HCPCS | Performed by: SURGERY

## 2024-08-15 PROCEDURE — 85025 COMPLETE CBC W/AUTO DIFF WBC: CPT

## 2024-08-15 PROCEDURE — 6370000000 HC RX 637 (ALT 250 FOR IP): Performed by: STUDENT IN AN ORGANIZED HEALTH CARE EDUCATION/TRAINING PROGRAM

## 2024-08-15 PROCEDURE — 2580000003 HC RX 258

## 2024-08-15 PROCEDURE — 71045 X-RAY EXAM CHEST 1 VIEW: CPT

## 2024-08-15 PROCEDURE — C1894 INTRO/SHEATH, NON-LASER: HCPCS | Performed by: INTERNAL MEDICINE

## 2024-08-15 PROCEDURE — 83735 ASSAY OF MAGNESIUM: CPT

## 2024-08-15 PROCEDURE — 3700000000 HC ANESTHESIA ATTENDED CARE: Performed by: SURGERY

## 2024-08-15 PROCEDURE — 85014 HEMATOCRIT: CPT

## 2024-08-15 PROCEDURE — 2500000003 HC RX 250 WO HCPCS: Performed by: STUDENT IN AN ORGANIZED HEALTH CARE EDUCATION/TRAINING PROGRAM

## 2024-08-15 PROCEDURE — 2709999900 HC NON-CHARGEABLE SUPPLY: Performed by: INTERNAL MEDICINE

## 2024-08-15 PROCEDURE — 85018 HEMOGLOBIN: CPT

## 2024-08-15 PROCEDURE — 0KBP0ZZ EXCISION OF LEFT HIP MUSCLE, OPEN APPROACH: ICD-10-PCS | Performed by: SURGERY

## 2024-08-15 PROCEDURE — 3E0G76Z INTRODUCTION OF NUTRITIONAL SUBSTANCE INTO UPPER GI, VIA NATURAL OR ARTIFICIAL OPENING: ICD-10-PCS | Performed by: OPHTHALMOLOGY

## 2024-08-15 PROCEDURE — 2700000000 HC OXYGEN THERAPY PER DAY

## 2024-08-15 PROCEDURE — 99291 CRITICAL CARE FIRST HOUR: CPT | Performed by: STUDENT IN AN ORGANIZED HEALTH CARE EDUCATION/TRAINING PROGRAM

## 2024-08-15 PROCEDURE — 80069 RENAL FUNCTION PANEL: CPT

## 2024-08-15 PROCEDURE — 3700000001 HC ADD 15 MINUTES (ANESTHESIA): Performed by: SURGERY

## 2024-08-15 PROCEDURE — 82610 CYSTATIN C: CPT

## 2024-08-15 PROCEDURE — 97598 DBRDMT OPN WND ADDL 20CM/<: CPT | Performed by: SURGERY

## 2024-08-15 PROCEDURE — 94761 N-INVAS EAR/PLS OXIMETRY MLT: CPT

## 2024-08-15 PROCEDURE — 0DH63UZ INSERTION OF FEEDING DEVICE INTO STOMACH, PERCUTANEOUS APPROACH: ICD-10-PCS | Performed by: OPHTHALMOLOGY

## 2024-08-15 PROCEDURE — 3600000004 HC SURGERY LEVEL 4 BASE: Performed by: SURGERY

## 2024-08-15 PROCEDURE — 85610 PROTHROMBIN TIME: CPT

## 2024-08-15 PROCEDURE — 6360000002 HC RX W HCPCS

## 2024-08-15 PROCEDURE — 2500000003 HC RX 250 WO HCPCS

## 2024-08-15 PROCEDURE — 2709999900 HC NON-CHARGEABLE SUPPLY: Performed by: SURGERY

## 2024-08-15 PROCEDURE — P9047 ALBUMIN (HUMAN), 25%, 50ML: HCPCS | Performed by: INTERNAL MEDICINE

## 2024-08-15 RX ORDER — LIDOCAINE HYDROCHLORIDE 20 MG/ML
INJECTION, SOLUTION EPIDURAL; INFILTRATION; INTRACAUDAL; PERINEURAL PRN
Status: DISCONTINUED | OUTPATIENT
Start: 2024-08-15 | End: 2024-08-15 | Stop reason: SDUPTHER

## 2024-08-15 RX ORDER — SODIUM CHLORIDE 0.9 % (FLUSH) 0.9 %
5-40 SYRINGE (ML) INJECTION PRN
Status: DISCONTINUED | OUTPATIENT
Start: 2024-08-15 | End: 2024-08-20 | Stop reason: HOSPADM

## 2024-08-15 RX ORDER — SODIUM CHLORIDE 9 MG/ML
INJECTION, SOLUTION INTRAVENOUS PRN
Status: DISCONTINUED | OUTPATIENT
Start: 2024-08-15 | End: 2024-08-16

## 2024-08-15 RX ORDER — NALOXONE HYDROCHLORIDE 0.4 MG/ML
INJECTION, SOLUTION INTRAMUSCULAR; INTRAVENOUS; SUBCUTANEOUS PRN
Status: DISCONTINUED | OUTPATIENT
Start: 2024-08-15 | End: 2024-08-20 | Stop reason: HOSPADM

## 2024-08-15 RX ORDER — ONDANSETRON 2 MG/ML
4 INJECTION INTRAMUSCULAR; INTRAVENOUS ONCE
Status: DISCONTINUED | OUTPATIENT
Start: 2024-08-15 | End: 2024-08-20 | Stop reason: HOSPADM

## 2024-08-15 RX ORDER — PHENYLEPHRINE HCL IN 0.9% NACL 1 MG/10 ML
SYRINGE (ML) INTRAVENOUS PRN
Status: DISCONTINUED | OUTPATIENT
Start: 2024-08-15 | End: 2024-08-15 | Stop reason: SDUPTHER

## 2024-08-15 RX ORDER — GLYCOPYRROLATE 0.2 MG/ML
INJECTION INTRAMUSCULAR; INTRAVENOUS PRN
Status: DISCONTINUED | OUTPATIENT
Start: 2024-08-15 | End: 2024-08-15 | Stop reason: SDUPTHER

## 2024-08-15 RX ORDER — DIPHENHYDRAMINE HYDROCHLORIDE 50 MG/ML
6.25 INJECTION INTRAMUSCULAR; INTRAVENOUS
Status: DISCONTINUED | OUTPATIENT
Start: 2024-08-15 | End: 2024-08-16

## 2024-08-15 RX ORDER — SODIUM CHLORIDE 9 MG/ML
INJECTION, SOLUTION INTRAVENOUS CONTINUOUS PRN
Status: DISCONTINUED | OUTPATIENT
Start: 2024-08-15 | End: 2024-08-15 | Stop reason: SDUPTHER

## 2024-08-15 RX ORDER — OXYCODONE HYDROCHLORIDE 5 MG/1
10 TABLET ORAL PRN
Status: DISCONTINUED | OUTPATIENT
Start: 2024-08-15 | End: 2024-08-16

## 2024-08-15 RX ORDER — LABETALOL HYDROCHLORIDE 5 MG/ML
10 INJECTION, SOLUTION INTRAVENOUS EVERY 4 HOURS PRN
Status: DISCONTINUED | OUTPATIENT
Start: 2024-08-15 | End: 2024-08-20 | Stop reason: HOSPADM

## 2024-08-15 RX ORDER — FENTANYL CITRATE 50 UG/ML
INJECTION, SOLUTION INTRAMUSCULAR; INTRAVENOUS PRN
Status: DISCONTINUED | OUTPATIENT
Start: 2024-08-15 | End: 2024-08-15 | Stop reason: SDUPTHER

## 2024-08-15 RX ORDER — MAGNESIUM HYDROXIDE 1200 MG/15ML
LIQUID ORAL CONTINUOUS PRN
Status: COMPLETED | OUTPATIENT
Start: 2024-08-15 | End: 2024-08-15

## 2024-08-15 RX ORDER — VANCOMYCIN HYDROCHLORIDE 50 MG/ML
250 KIT ORAL EVERY 12 HOURS SCHEDULED
Status: DISCONTINUED | OUTPATIENT
Start: 2024-08-15 | End: 2024-08-19

## 2024-08-15 RX ORDER — SODIUM CHLORIDE 0.9 % (FLUSH) 0.9 %
5-40 SYRINGE (ML) INJECTION EVERY 12 HOURS SCHEDULED
Status: DISCONTINUED | OUTPATIENT
Start: 2024-08-15 | End: 2024-08-20 | Stop reason: HOSPADM

## 2024-08-15 RX ORDER — MIDAZOLAM HYDROCHLORIDE 1 MG/ML
2 INJECTION INTRAMUSCULAR; INTRAVENOUS
Status: DISCONTINUED | OUTPATIENT
Start: 2024-08-15 | End: 2024-08-16

## 2024-08-15 RX ORDER — OXYCODONE HYDROCHLORIDE 5 MG/1
5 TABLET ORAL PRN
Status: DISCONTINUED | OUTPATIENT
Start: 2024-08-15 | End: 2024-08-16

## 2024-08-15 RX ORDER — CHOLECALCIFEROL (VITAMIN D3) 125 MCG
6 CAPSULE ORAL DAILY
Status: DISCONTINUED | OUTPATIENT
Start: 2024-08-15 | End: 2024-08-20 | Stop reason: HOSPADM

## 2024-08-15 RX ORDER — LABETALOL HYDROCHLORIDE 5 MG/ML
10 INJECTION, SOLUTION INTRAVENOUS ONCE
Status: COMPLETED | OUTPATIENT
Start: 2024-08-15 | End: 2024-08-15

## 2024-08-15 RX ORDER — ROCURONIUM BROMIDE 10 MG/ML
INJECTION, SOLUTION INTRAVENOUS PRN
Status: DISCONTINUED | OUTPATIENT
Start: 2024-08-15 | End: 2024-08-15 | Stop reason: SDUPTHER

## 2024-08-15 RX ORDER — PROPOFOL 10 MG/ML
INJECTION, EMULSION INTRAVENOUS PRN
Status: DISCONTINUED | OUTPATIENT
Start: 2024-08-15 | End: 2024-08-15 | Stop reason: SDUPTHER

## 2024-08-15 RX ADMIN — MORPHINE SULFATE 2 MG: 2 INJECTION, SOLUTION INTRAMUSCULAR; INTRAVENOUS at 09:25

## 2024-08-15 RX ADMIN — FENTANYL CITRATE 50 MCG: 50 INJECTION, SOLUTION INTRAMUSCULAR; INTRAVENOUS at 13:27

## 2024-08-15 RX ADMIN — HYDROCORTISONE SODIUM SUCCINATE 50 MG: 100 INJECTION, POWDER, FOR SOLUTION INTRAMUSCULAR; INTRAVENOUS at 04:22

## 2024-08-15 RX ADMIN — GLYCOPYRROLATE 0.2 MG: 0.2 INJECTION, SOLUTION INTRAMUSCULAR; INTRAVENOUS at 13:36

## 2024-08-15 RX ADMIN — MORPHINE SULFATE 2 MG: 2 INJECTION, SOLUTION INTRAMUSCULAR; INTRAVENOUS at 18:12

## 2024-08-15 RX ADMIN — ASPIRIN 81 MG: 81 TABLET, COATED ORAL at 09:24

## 2024-08-15 RX ADMIN — Medication 10 ML: at 09:25

## 2024-08-15 RX ADMIN — Medication 100 MCG: at 13:59

## 2024-08-15 RX ADMIN — HYDROCORTISONE SODIUM SUCCINATE 50 MG: 100 INJECTION, POWDER, FOR SOLUTION INTRAMUSCULAR; INTRAVENOUS at 00:05

## 2024-08-15 RX ADMIN — TACROLIMUS 1 MG: 1 CAPSULE ORAL at 21:05

## 2024-08-15 RX ADMIN — LABETALOL HYDROCHLORIDE 10 MG: 5 INJECTION INTRAVENOUS at 04:22

## 2024-08-15 RX ADMIN — Medication: at 09:26

## 2024-08-15 RX ADMIN — ROCURONIUM BROMIDE 50 MG: 50 INJECTION, SOLUTION INTRAVENOUS at 13:27

## 2024-08-15 RX ADMIN — LEVOTHYROXINE SODIUM ANHYDROUS 50 MCG: 100 INJECTION, POWDER, LYOPHILIZED, FOR SOLUTION INTRAVENOUS at 06:53

## 2024-08-15 RX ADMIN — MORPHINE SULFATE 2 MG: 2 INJECTION, SOLUTION INTRAMUSCULAR; INTRAVENOUS at 00:04

## 2024-08-15 RX ADMIN — FENTANYL CITRATE 50 MCG: 50 INJECTION, SOLUTION INTRAMUSCULAR; INTRAVENOUS at 13:36

## 2024-08-15 RX ADMIN — LABETALOL HYDROCHLORIDE 10 MG: 5 INJECTION INTRAVENOUS at 00:54

## 2024-08-15 RX ADMIN — ALBUMIN (HUMAN) 25 G: 0.25 INJECTION, SOLUTION INTRAVENOUS at 04:32

## 2024-08-15 RX ADMIN — TACROLIMUS 1 MG: 1 CAPSULE ORAL at 09:33

## 2024-08-15 RX ADMIN — HYDROCORTISONE SODIUM SUCCINATE 50 MG: 100 INJECTION, POWDER, FOR SOLUTION INTRAMUSCULAR; INTRAVENOUS at 15:54

## 2024-08-15 RX ADMIN — PIPERACILLIN AND TAZOBACTAM 3375 MG: 3; .375 INJECTION, POWDER, LYOPHILIZED, FOR SOLUTION INTRAVENOUS at 09:29

## 2024-08-15 RX ADMIN — MUPIROCIN: 20 OINTMENT TOPICAL at 09:26

## 2024-08-15 RX ADMIN — Medication 250 MG: at 22:26

## 2024-08-15 RX ADMIN — ATORVASTATIN CALCIUM 40 MG: 40 TABLET, FILM COATED ORAL at 09:24

## 2024-08-15 RX ADMIN — FUROSEMIDE 80 MG: 10 INJECTION, SOLUTION INTRAMUSCULAR; INTRAVENOUS at 04:22

## 2024-08-15 RX ADMIN — MORPHINE SULFATE 2 MG: 2 INJECTION, SOLUTION INTRAMUSCULAR; INTRAVENOUS at 23:52

## 2024-08-15 RX ADMIN — SODIUM CHLORIDE: 9 INJECTION, SOLUTION INTRAVENOUS at 13:19

## 2024-08-15 RX ADMIN — PIPERACILLIN AND TAZOBACTAM 3375 MG: 3; .375 INJECTION, POWDER, LYOPHILIZED, FOR SOLUTION INTRAVENOUS at 20:21

## 2024-08-15 RX ADMIN — LIDOCAINE HYDROCHLORIDE 100 MG: 20 INJECTION, SOLUTION EPIDURAL; INFILTRATION; INTRACAUDAL at 13:27

## 2024-08-15 RX ADMIN — SUGAMMADEX 200 MG: 100 INJECTION, SOLUTION INTRAVENOUS at 14:47

## 2024-08-15 RX ADMIN — Medication 1 CAPSULE: at 09:24

## 2024-08-15 RX ADMIN — PROPOFOL 150 MG: 10 INJECTION, EMULSION INTRAVENOUS at 13:27

## 2024-08-15 RX ADMIN — MIDODRINE HYDROCHLORIDE 5 MG: 5 TABLET ORAL at 09:24

## 2024-08-15 RX ADMIN — ROCURONIUM BROMIDE 20 MG: 50 INJECTION, SOLUTION INTRAVENOUS at 14:18

## 2024-08-15 RX ADMIN — HEPARIN SODIUM 5000 UNITS: 5000 INJECTION INTRAVENOUS; SUBCUTANEOUS at 04:22

## 2024-08-15 RX ADMIN — FLUOXETINE 10 MG: 10 CAPSULE ORAL at 09:33

## 2024-08-15 RX ADMIN — VANCOMYCIN HYDROCHLORIDE 250 MG: 250 CAPSULE ORAL at 09:24

## 2024-08-15 RX ADMIN — MUPIROCIN: 20 OINTMENT TOPICAL at 21:23

## 2024-08-15 ASSESSMENT — PAIN DESCRIPTION - LOCATION
LOCATION: BACK
LOCATION: BACK
LOCATION: HIP
LOCATION: HIP

## 2024-08-15 ASSESSMENT — PAIN SCALES - GENERAL
PAINLEVEL_OUTOF10: 9
PAINLEVEL_OUTOF10: 10
PAINLEVEL_OUTOF10: 5
PAINLEVEL_OUTOF10: 10
PAINLEVEL_OUTOF10: 5

## 2024-08-15 ASSESSMENT — PAIN DESCRIPTION - ORIENTATION
ORIENTATION: LEFT
ORIENTATION: LEFT
ORIENTATION: MID

## 2024-08-15 ASSESSMENT — PAIN SCALES - WONG BAKER: WONGBAKER_NUMERICALRESPONSE: HURTS EVEN MORE

## 2024-08-15 ASSESSMENT — PAIN DESCRIPTION - DESCRIPTORS
DESCRIPTORS: THROBBING

## 2024-08-15 NOTE — BRIEF OP NOTE
Brief Postoperative Note      Patient: Carolina Arguelles  YOB: 1980  MRN: [id number]    Date of Procedure: 8/6/2024    Pre-Op Diagnosis Codes:     * Pressure ulcer with necrosis of soft tissues through to underlying muscle, tendon, or bone (HCC) [L89.94]    Post-Op Diagnosis: Same       Procedure(s):  DECUBITUS ULCER DEBRIDEMENT REPAIR    Surgeon(s):  Prieto Elias MD    Assistant:  Surgical Assistant: Ray Gordon Jennifer  Resident: Amor Portillo MD    Anesthesia: General    Estimated Blood Loss (mL): less than 50     Complications: None    Specimens:   * No specimens in log *    Implants:  * No implants in log *      Drains:   [REMOVED] Gastrostomy/Enterostomy/Jejunostomy Tube Percutaneous Endoscopic Gastrostomy (PEG) LLQ (Removed)       Findings:  Infection Present At Time Of Surgery (PATOS) (choose all levels that have infection present):  - Deep Infection (muscle/fascia) present as evidenced by fluid consistent with infection  Other Findings: Excisional debridement of left ischial to the level of the fascia, final dimensions 15 x 5.5cm, 3.5cm deep, dressed with diluted betadine-soaked kerlix gauze    Electronically signed by Amor Portillo MD on 8/6/2024 at 6:26 PM  
Brief Postoperative Note      Patient: Carolina Arguelles  YOB: 1980  MRN: [id number]    Date of Procedure: 8/6/2024    Pre-Op Diagnosis Codes:     * Pressure ulcer with necrosis of soft tissues through to underlying muscle, tendon, or bone ([facility]) [L89.94]    Post-Op Diagnosis: Same       Procedure(s):  DECUBITUS ULCER DEBRIDEMENT REPAIR    Surgeon(s):  Emir Elias MD    Assistant:  Surgical Assistant: Margaret Gordon; Erin Eisenberg    Anesthesia: General    Estimated Blood Loss (mL): less than 50     Complications: None    Specimens:   * No specimens in log *    Implants:  * No implants in log *      Drains:   [REMOVED] Gastrostomy/Enterostomy/Jejunostomy Tube Percutaneous Endoscopic Gastrostomy (PEG) LLQ (Removed)       Findings:  Infection Present At Time Of Surgery (PATOS) (choose all levels that have infection present):  - Deep Infection (muscle/fascia) present as evidenced by purulent fluid and phlegmon  Other Findings: prior surgical wound, left ischium/perineum, measured 2.5x9cm at beginning  Sharp debridement of more skin, subq fat, muscle and deep fascia, final wound dimension 5.5x15cm, 3.5cm deepest area    Job#: [id number]    Electronically signed by EMIR ELIAS MD on 8/7/2024 at 7:05 AM  
Brief Postoperative Note      Patient: Carolina Arguelles  YOB: 1980  MRN: [id number]    Date of Procedure: 8/4/2024    Pre-Op Diagnosis Codes:     * Pressure ulcer with necrosis of soft tissues through to underlying muscle, tendon, or bone ([facility]) [L89.94]    Post-Op Diagnosis: Same       Procedure(s):  EXCISIONAL DEBRIDEMENT, LEFT ISCHIAL DECUBITUS ULCER, PEG TUBE REMOVAL    Surgeon(s):  Prieto Elias MD    Assistant:  Surgical Assistant: Jorge Doll    Anesthesia: General    Estimated Blood Loss (mL): less than 50     Complications: None    Specimens:   ID Type Source Tests Collected by Time Destination   1 : LEFT ISCHIAL DECUBITUS WOUND CULTURE Specimen Buttock CULTURE, ANAEROBIC AND AEROBIC Prieto Elias MD 8/4/2024 1526        Implants:  * No implants in log *      Drains:   Gastrostomy/Enterostomy/Jejunostomy Tube Percutaneous Endoscopic Gastrostomy (PEG) LLQ (Active)       Findings:  Infection Present At Time Of Surgery (PATOS) (choose all levels that have infection present):  - Deep Infection (muscle/fascia) present as evidenced by pus and phlegmon  Other Findings: L ischial wound - initial skin opening = 1.4x2.5cm  w/ underlying necrotic, devitalized tissue mixed with pus and edema              Sharp excisional debridement of skin, subq fat, muscle and fascia, with ultimate wound dimensions of 2.5x9x3.5cm, packed w/ Iodine-soaked gauze    Job#: [id number]    Electronically signed by PRIETO ELIAS MD on 8/6/2024 at 11:07 AM  
management of the critically ill patient in a critical care setting 08/15/24 0801   Site Assessment Red 08/15/24 0801   Urine Color Yellow 08/15/24 0801   Urine Appearance Clear 08/15/24 0801   Collection Container Standard 08/15/24 0801   Securement Method Securing device (Describe) 08/15/24 0801   Catheter Care  Perineal wipes 08/15/24 0801   Catheter Best Practices  Catheter secured to thigh;Drainage tube clipped to bed;Tamper seal intact;Bag below bladder;Bag not on floor;Lack of dependent loop in tubing;Drainage bag less than half full 08/15/24 0801   Output (mL) 240 mL 08/15/24 0801       [REMOVED] Gastrostomy/Enterostomy/Jejunostomy Tube Percutaneous Endoscopic Gastrostomy (PEG) LLQ (Removed)       [REMOVED] Urinary Catheter 08/13/24 (Removed)   $ Urethral catheter insertion $ Not inserted for procedure 08/13/24 1512   Catheter Indications Urinary retention (acute or chronic), continuous bladder irrigation or bladder outlet obstruction;Need for fluid volume management of the critically ill patient in a critical care setting 08/14/24 1500   Site Assessment [place] 08/14/24 1500   Urine Color Yellow 08/14/24 1500   Urine Appearance Clear 08/14/24 1500   Urine Odor Malodorous 08/14/24 1000   Collection Container Standard 08/14/24 1500   Securement Method Securing device (Describe) 08/14/24 1500   Catheter Care  Perineal wipes 08/14/24 1201   Catheter Best Practices  Drainage tube clipped to bed;Catheter secured to thigh;Tamper seal intact;Bag below bladder;Bag not on floor;Lack of dependent loop in tubing;Drainage bag less than half full 08/14/24 1500   Status Draining;Patent 08/14/24 1500   Output (mL) 175 mL 08/14/24 1500       Findings:  Infection Present At Time Of Surgery (PATOS) (choose all levels that have infection present):  No infection present  Other Findings: Minimal necrotic tissue to the level of the muscle present medially within the wound, sharply excised; new wound measurements 14 x 5.5cm, 3cm

## 2024-08-15 NOTE — OR NURSING
Received critical lab value - Cortisol 164.0 - Patient currently in OR - made Anesthesiologist, OR RN, and ICU RN aware of lab value

## 2024-08-15 NOTE — ANESTHESIA POSTPROCEDURE EVALUATION
Department of Anesthesiology  Postprocedure Note    Patient: Carolina Arguelles  MRN: [id number]  YOB: 1980  Date of evaluation: 8/15/2024    Procedure Summary       Date: 08/15/24 Room / Location: [address]    Anesthesia Start: 1319 Anesthesia Stop: 1508    Procedure: DECUBITUS ULCER DEBRIDEMENT REPAIR                    **LATEX SENSITIVE** (Buttocks) Diagnosis:       Pressure ulcer with necrosis of soft tissues through to underlying muscle, tendon, or bone (HCC)      (Pressure ulcer with necrosis of soft tissues through to underlying muscle, tendon, or bone (HCC) [L89.94])    Surgeons: Prieto Elias MD Responsible Provider: Juve Solano MD    Anesthesia Type: general ASA Status: 4            Anesthesia Type: No value filed.    Pat Phase I: Pat Score: 6    Pat Phase II:      Anesthesia Post Evaluation    Comments: Anes Post-op Note    Name:    Carolina Arguelles  MRN:      [id number]    Patient Vitals in the past 12 hrs:  08/15/24 1115, BP:(!) 140/93, Temp:98.5 °F (36.9 °C), Temp src:Temporal, Pulse:88, Resp:16, SpO2:92 %  08/15/24 0900, BP:137/85, Temp:97.2 °F (36.2 °C), Temp src:Bladder, Pulse:86, Resp:17  08/15/24 0800, BP:(!) 124/94, Temp:(!) 96.7 °F (35.9 °C), Temp src:Bladder, Pulse:70, Resp:(!) 8, SpO2:96 %  08/15/24 0700, BP:136/69, Pulse:83, Resp:18, SpO2:(!) 79 %  08/15/24 0600, BP:(!) 155/78, Pulse:71, Resp:10, SpO2:100 %  08/15/24 0500, BP:(!) 154/87, Pulse:70, Resp:10, SpO2:100 %  08/15/24 0422, BP:(!) 178/93, Weight:61.3 kg (135 lb 2.3 oz)  08/15/24 0400, BP:(!) 162/135, Temp:(!) 96.1 °F (35.6 °C), Temp src:Bladder, Pulse:79, Resp:18, SpO2:(!) 89 %     LABS:    CBC  Lab Results       Component                Value               Date/Time                  WBC                      8.5                 08/15/2024 04:29 AM        HGB                      8.5 (L)             08/15/2024 04:29 AM        HCT                      26.1 (L)

## 2024-08-15 NOTE — OP NOTE
[address] [address]                            OPERATIVE REPORT      PATIENT NAME: JENNIFER QUARLES               : 1980  MED REC NO: [id number]                      ROOM: 0202  ACCOUNT NO: [id number]                       ADMIT DATE: 2024  PROVIDER: Prieto Elias MD      DATE OF PROCEDURE:  2024    SURGEON:  Prieto Elias MD    PREOPERATIVE DIAGNOSIS:  Persistent left ischial soft tissue infection.    POSTOPERATIVE DIAGNOSIS:  Persistent left ischial soft tissue infection.    PROCEDURE PERFORMED:  Repeat left ischial wound debridement.    ASSISTANT:  Kalin.    ANESTHESIA:  General.    ESTIMATED BLOOD LOSS:  Less than 50 mL.    SPECIMEN:  None.    COUNTS:  Sponge and needle counts correct.    INDICATIONS:  The patient is a 44-year-old female, who is 2 days status post debridement of a complex left ischial pressure wound with soft tissue necrosis and infection.  There were still areas of nonviable tissue at the first dressing change after the surgery, so repeat debridement has been planned.    FINDINGS:    1. Deep infection of the muscle and fascia present as evidenced by purulent fluid and phlegmon.  2. Initial prior wound measures 2.5 x 9 cm with a 3.5 cm depth.  3. Sharp debridement of more skin, subcutaneous fat, muscle and deep fascia with a final wound dimensions of 5.5 x 15 cm and still at 3.5 cm depth.    DESCRIPTION OF PROCEDURE:  After informed consent from the patient's family was obtained, the patient was taken to the operating room, placed in supine position.  General anesthesia was administered without difficulty.  The patient was now placed in the high lithotomy fashion to expose the wound on the left ischial region.  Brief sterile prep was applied.  We could see persistent devitalized necrotic tissue under the medial posterior and lateral aspects of the wound.  We first began by incising a 
                [address] [address]                            OPERATIVE REPORT      PATIENT NAME: JENNIFER QUARLES               : 1980  MED REC NO: [id number]                      ROOM: 0202  ACCOUNT NO: [id number]                       ADMIT DATE: 2024  PROVIDER: Prieto Elias MD      DATE OF PROCEDURE:  2024    SURGEON:  Prieto Elias MD    PREOPERATIVE DIAGNOSIS:  Decubitus wound with soft tissue infection of the left perineum and ischial region.    POSTOPERATIVE DIAGNOSIS:  Decubitus wound with soft tissue infection of the left perineum and ischial region.    PROCEDURES PERFORMED:  Excisional debridement, left ischial decubitus wound, and PEG tube removal.    ANESTHESIA:  General.    ESTIMATED BLOOD LOSS:  Less than 50 mL.    SPECIMEN:  Left ischial wound tissue and fluid for culture and histology.    COUNTS:  Sponge and needle counts correct.    INDICATIONS:  The patient is a 44-year-old female, who is confined to a wheelchair, who presents with a persistently worsening area of pain and drainage around a small opening in the left ischial region of her perineum.  Imaging suggested there was underlying soft tissue infection with gas and therefore, debridement has been recommended.    FINDINGS:    1. Deep infection of the muscle and fascia present as evidenced by pus and phlegmon.  2. Left ischial wound, initial skin opening equaling 1.4 x 2.5 cm, with underlying necrotic and devitalized tissue mixed with pus and edema.  3. Sharp excisional debridement of skin, subcutaneous fat, muscle, and fascia with ultimate wound dimensions of 2.5 x 9 x 3.5 cm, packed with iodine-soaked gauze.    DESCRIPTION OF PROCEDURE:  After informed consent from the patient and the family was obtained, the patient was taken to the operating room and placed in the supine position.  General anesthesia was administered without difficulty.  The 
    Endoscopy Note    Patient: Carolina Arguelles  : 1980  CSN: [id number]    Procedure: Esophagogastroduodenoscopy with placement of a percutaneous endoscopic gastrostomy tube    Date:  8/15/2024     Surgeon:  Vic Harrison MD     Referring Physician:  Radha Pompa APRN - CNP    Preoperative Diagnosis:  Malnutrition, unspecified type (HCC) [E46]    Postoperative Diagnosis:  * No post-op diagnosis entered *    Anesthesia:  MAC    EBL: minimal to none.    Indications: This is a 44 y.o. year old female who presents today with  malnutrition and poor p.o. intake who needs a replacement of a PEG that apparently was removed last week. .    Description of Procedure:  Informed consent was obtained from the patient after explanation of indications, benefits and possible risks and complications of the procedure.  The patient was then taken to the endoscopy suite, placed in the left lateral decubitus position and the above IV sedation was administrered.    The Olympus video endoscope was passed through the hypopharynx into the esophagus. The scope was advanced all the way to the second portion of the duodenum. The scope was slowly withdrawn and mucosa was carefully evaluated including a retroflex view of the proximal stomach.  Findings and interventions are described below.  The patient was decompressed and the scope was then withdrawn.    Gastric or Duodenal ulcer present: No    The patient tolerated the procedure well and was taken to the post anesthesia care unit in good condition.  There were no immediate complications.      Impression:  -  1.  Irregular Z-line otherwise normal-appearing esophagus.  This was not biopsied.  2.  Old PEG site identified in the distal body of the stomach along the greater curvature.  3.  Moderate amount of retained food in the stomach.  4.  Normal duodenum.  5.  The patient then had the previous PEG tube site on the anterior abdominal wall prepped and draped in sterile fashion.  The 
of the critically ill patient in a critical care setting 08/15/24 0801   Site Assessment Red 08/15/24 0801   Urine Color Yellow 08/15/24 0801   Urine Appearance Clear 08/15/24 0801   Collection Container Standard 08/15/24 0801   Securement Method Securing device (Describe) 08/15/24 0801   Catheter Care  Perineal wipes 08/15/24 0801   Catheter Best Practices  Catheter secured to thigh;Drainage tube clipped to bed;Tamper seal intact;Bag below bladder;Bag not on floor;Lack of dependent loop in tubing;Drainage bag less than half full 08/15/24 0801   Output (mL) 240 mL 08/15/24 0801       [REMOVED] Gastrostomy/Enterostomy/Jejunostomy Tube Percutaneous Endoscopic Gastrostomy (PEG) LLQ (Removed)       [REMOVED] Urinary Catheter 08/13/24 (Removed)   $ Urethral catheter insertion $ Not inserted for procedure 08/13/24 1512   Catheter Indications Urinary retention (acute or chronic), continuous bladder irrigation or bladder outlet obstruction;Need for fluid volume management of the critically ill patient in a critical care setting 08/14/24 1500   Site Assessment [place] 08/14/24 1500   Urine Color Yellow 08/14/24 1500   Urine Appearance Clear 08/14/24 1500   Urine Odor Malodorous 08/14/24 1000   Collection Container Standard 08/14/24 1500   Securement Method Securing device (Describe) 08/14/24 1500   Catheter Care  Perineal wipes 08/14/24 1201   Catheter Best Practices  Drainage tube clipped to bed;Catheter secured to thigh;Tamper seal intact;Bag below bladder;Bag not on floor;Lack of dependent loop in tubing;Drainage bag less than half full 08/14/24 1500   Status Draining;Patent 08/14/24 1500   Output (mL) 175 mL 08/14/24 1500       Findings:  Infection Present At Time Of Surgery (PATOS) (choose all levels that have infection present):  No infection present  Other Findings: Minimal necrotic tissue to the level of the muscle present medially within the wound, sharply excised; new wound measurements 14 x 5.5cm, 3cm deep

## 2024-08-15 NOTE — ANESTHESIA PRE PROCEDURE
Pulmonary:                              Cardiovascular:    (+) hypertension:                  Neuro/Psych:   (+) psychiatric history:            GI/Hepatic/Renal:   (+) GERD:          Endo/Other:    (+) Diabetes.                 Abdominal:             Vascular:          Other Findings:       Anesthesia Plan      general     ASA 4     (Medications & allergies reviewed  All available lab & EKG data reviewed)  Induction: intravenous.      Anesthetic plan and risks discussed with healthcare power of .      Plan discussed with CRNA.                CODY GUPTA MD   8/15/2024

## 2024-08-15 NOTE — H&P
V2.0  History and Physical      Name:  Carolina Arguelles /Age/Sex: 1980  (44 y.o. female)   MRN & CSN:  [id number] & [id number] Encounter Date/Time: 8/3/2024 5:35 PM EDT   Location:   PCP: Radha Pompa APRN - CNP       Hospital Day: 1    Assessment and Plan:   Carolina Arguelles is a 44 y.o. female with a pmh of diabetes mellitus, chronic left gluteal wound who presents with Sepsis (Formerly [facility])    Hospital Problems             Last Modified POA    * (Principal) Sepsis (Formerly [facility]) 8/3/2024 Yes       Plan:  Infected left gluteal wound with concern of necrotizing fasciitis per imaging.  Surgery has been consulted from ER, will start broad-spectrum antibiotics.  Serial lactic acid, may need ID evaluation     Sepsis due to above    Hypotension acute on chronic.      Lactic acidosis due to above 2.4--> 2.2    Severe constipation. Miralax    End-stage renal disease on hemodialysis. 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  Unsure of chronicity.  Will consult urology    Chronic conditions include:end-stage renal disease on hemodialysis, chronic hypotension on midodrine, congestive heart failure, type 2 diabetes mellitus    Keep patient n.p.o. until general surgery evaluation.    Prophylaxis Lovenox  Full code    Disposition:       ECF in 3 to 5 days      History from:     patient    History of Present Illness:     Chief Complaint: Pain and chronic gluteal wound    Carolina Arguelles is a 44 y.o. female with pmh of end-stage renal disease on hemodialysis, chronic hypotension on midodrine, congestive heart failure, type 2 diabetes mellitus presented with worsening wound. It is left gluteal chronic wound for which she follows with wound clinic.  Now reports of increased pain and discharge.  Patient is a poor historian and only able to provide limited history.    In the ER, patient was hypotensive 88/58, heart rate 68, sodium 130, lactic acid 2.4, WBC 20.8, hemoglobin 10.1, CT abdomen 
30 g 3    tacrolimus (PROGRAF) 0.5 MG capsule Take 1 capsule by mouth 2 times daily 60 capsule 3    amLODIPine (NORVASC) 5 MG tablet Take 1 tablet by mouth daily (Patient not taking: Reported on 8/3/2024) 30 tablet 3    aspirin 81 MG EC tablet Take 1 tablet by mouth daily 30 tablet 3    calcium acetate (PHOSLO) 667 MG CAPS capsule Take 1 capsule by mouth 3 times daily (with meals) 180 capsule 1    pantoprazole (PROTONIX) 40 MG tablet Take 1 tablet by mouth 2 times daily (before meals) 30 tablet 3    atorvastatin (LIPITOR) 40 MG tablet Take 1 tablet by mouth daily (Patient not taking: Reported on 8/3/2024)      clopidogrel (PLAVIX) 75 MG tablet Take 1 tablet by mouth daily      FLUoxetine (PROZAC) 20 MG capsule Take 1 capsule by mouth daily      methocarbamol (ROBAXIN) 500 MG tablet Take 1 tablet by mouth every 6 hours as needed (spasms)      OLANZapine (ZYPREXA) 7.5 MG tablet Take 1 tablet by mouth nightly (Patient not taking: Reported on 8/3/2024)      predniSONE (DELTASONE) 10 MG tablet Take 1 tablet by mouth daily      SITagliptin (JANUVIA) 25 MG tablet Take 1 tablet by mouth 2 times daily (Patient not taking: Reported on 8/3/2024)      traZODone (DESYREL) 100 MG tablet Take 1 tablet by mouth nightly      ondansetron (ZOFRAN) 4 MG tablet Take 1 tablet by mouth every 8 hours as needed for Nausea 20 tablet 0    insulin detemir (LEVEMIR) 100 UNIT/ML injection vial Inject 22 Units into the skin nightly      nystatin (MYCOSTATIN) 431330 UNIT/ML suspension Take 5 mLs by mouth 3 times daily (Patient not taking: Reported on 8/3/2024)      folic acid (FOLVITE) 1 MG tablet Take 1 tablet by mouth daily      calcium carbonate (TUMS) 500 MG chewable tablet Take 1 tablet by mouth 4 times daily      docusate sodium (COLACE) 100 MG capsule Take 1 capsule by mouth 2 times daily      ondansetron (ZOFRAN) 4 MG tablet Take 1 tablet by mouth every 8 hours as needed for Nausea 10 tablet 0    insulin NPH (NOVOLIN N) 100 UNIT/ML

## 2024-08-15 NOTE — CARE COORDINATION
CM update note   PD # 12   Followed by Hospital Medicine, ID,GI, Pulmonary and Nephrology.  Moved to ICU yesterday d/t increased lethargy and O2 requirement.  Confused and calling out today. PEG placement scheduled for today.  [facility] LTACH following.      Madyson Obrien RN

## 2024-08-16 LAB
GLUCOSE BLD-MCNC: 105 MG/DL (ref 70–99)
GLUCOSE BLD-MCNC: 111 MG/DL (ref 70–99)
GLUCOSE BLD-MCNC: 112 MG/DL (ref 70–99)
GLUCOSE BLD-MCNC: 138 MG/DL (ref 70–99)
PERFORMED ON: ABNORMAL

## 2024-08-16 PROCEDURE — 6360000002 HC RX W HCPCS: Performed by: STUDENT IN AN ORGANIZED HEALTH CARE EDUCATION/TRAINING PROGRAM

## 2024-08-16 PROCEDURE — 6370000000 HC RX 637 (ALT 250 FOR IP): Performed by: STUDENT IN AN ORGANIZED HEALTH CARE EDUCATION/TRAINING PROGRAM

## 2024-08-16 PROCEDURE — 1200000000 HC SEMI PRIVATE

## 2024-08-16 PROCEDURE — 99233 SBSQ HOSP IP/OBS HIGH 50: CPT | Performed by: STUDENT IN AN ORGANIZED HEALTH CARE EDUCATION/TRAINING PROGRAM

## 2024-08-16 PROCEDURE — 2580000003 HC RX 258: Performed by: STUDENT IN AN ORGANIZED HEALTH CARE EDUCATION/TRAINING PROGRAM

## 2024-08-16 PROCEDURE — 99024 POSTOP FOLLOW-UP VISIT: CPT | Performed by: SURGERY

## 2024-08-16 PROCEDURE — 6370000000 HC RX 637 (ALT 250 FOR IP): Performed by: NURSE PRACTITIONER

## 2024-08-16 PROCEDURE — 97606 NEG PRS WND THER DME>50 SQCM: CPT

## 2024-08-16 PROCEDURE — 99232 SBSQ HOSP IP/OBS MODERATE 35: CPT | Performed by: INTERNAL MEDICINE

## 2024-08-16 PROCEDURE — 82610 CYSTATIN C: CPT

## 2024-08-16 PROCEDURE — 2580000003 HC RX 258: Performed by: ANESTHESIOLOGY

## 2024-08-16 RX ORDER — ASPIRIN 81 MG/1
81 TABLET, CHEWABLE ORAL DAILY
Status: DISCONTINUED | OUTPATIENT
Start: 2024-08-16 | End: 2024-08-20 | Stop reason: HOSPADM

## 2024-08-16 RX ADMIN — SENNOSIDES AND DOCUSATE SODIUM 2 TABLET: 50; 8.6 TABLET ORAL at 20:34

## 2024-08-16 RX ADMIN — Medication 250 MG: at 21:31

## 2024-08-16 RX ADMIN — HEPARIN SODIUM 5000 UNITS: 5000 INJECTION INTRAVENOUS; SUBCUTANEOUS at 14:33

## 2024-08-16 RX ADMIN — PANTOPRAZOLE SODIUM 40 MG: 40 TABLET, DELAYED RELEASE ORAL at 16:26

## 2024-08-16 RX ADMIN — PIPERACILLIN AND TAZOBACTAM 3375 MG: 3; .375 INJECTION, POWDER, LYOPHILIZED, FOR SOLUTION INTRAVENOUS at 20:34

## 2024-08-16 RX ADMIN — OXYCODONE HYDROCHLORIDE 10 MG: 5 TABLET ORAL at 20:35

## 2024-08-16 RX ADMIN — Medication 6 MG: at 09:52

## 2024-08-16 RX ADMIN — FLUOXETINE 10 MG: 10 CAPSULE ORAL at 09:42

## 2024-08-16 RX ADMIN — SENNOSIDES AND DOCUSATE SODIUM 2 TABLET: 50; 8.6 TABLET ORAL at 09:47

## 2024-08-16 RX ADMIN — TACROLIMUS 1 MG: 1 CAPSULE ORAL at 21:30

## 2024-08-16 RX ADMIN — HYDROCORTISONE SODIUM SUCCINATE 50 MG: 100 INJECTION, POWDER, FOR SOLUTION INTRAMUSCULAR; INTRAVENOUS at 09:34

## 2024-08-16 RX ADMIN — PIPERACILLIN AND TAZOBACTAM 3375 MG: 3; .375 INJECTION, POWDER, LYOPHILIZED, FOR SOLUTION INTRAVENOUS at 09:41

## 2024-08-16 RX ADMIN — LEVOTHYROXINE SODIUM 75 MCG: 0.03 TABLET ORAL at 09:45

## 2024-08-16 RX ADMIN — Medication 1 CAPSULE: at 09:54

## 2024-08-16 RX ADMIN — SODIUM CHLORIDE, PRESERVATIVE FREE 10 ML: 5 INJECTION INTRAVENOUS at 20:36

## 2024-08-16 RX ADMIN — HEPARIN SODIUM 5000 UNITS: 5000 INJECTION INTRAVENOUS; SUBCUTANEOUS at 20:35

## 2024-08-16 RX ADMIN — POLYETHYLENE GLYCOL 3350 17 G: 17 POWDER, FOR SOLUTION ORAL at 09:57

## 2024-08-16 RX ADMIN — ASPIRIN 81 MG: 81 TABLET, CHEWABLE ORAL at 09:41

## 2024-08-16 RX ADMIN — TACROLIMUS 1 MG: 1 CAPSULE ORAL at 09:43

## 2024-08-16 RX ADMIN — Medication 1 CAPSULE: at 17:40

## 2024-08-16 RX ADMIN — POLYETHYLENE GLYCOL 3350 17 G: 17 POWDER, FOR SOLUTION ORAL at 20:35

## 2024-08-16 RX ADMIN — MIDODRINE HYDROCHLORIDE 5 MG: 5 TABLET ORAL at 09:50

## 2024-08-16 ASSESSMENT — PAIN SCALES - WONG BAKER: WONGBAKER_NUMERICALRESPONSE: NO HURT

## 2024-08-16 ASSESSMENT — PAIN SCALES - GENERAL
PAINLEVEL_OUTOF10: 2
PAINLEVEL_OUTOF10: 0
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
## 2024-08-16 NOTE — CARE COORDINATION
Chart review day 13- from home with mother and [facility] for aide, nursing, and therapy. Wound vac; peg tube placed 8/15. referral to [facility] previously made and they are following. [facility] plans to reach out to family regarding eventual transfer to there facility when medically stable. CM will continue to follow for DCP needs.     CM spoke with Simin at [facility]. Pt's motherKelly has agreed admission at [facility]. Simin will start precert today.

## 2024-08-17 LAB
ALBUMIN SERPL-MCNC: 3.2 G/DL (ref 3.4–5)
ANION GAP SERPL CALCULATED.3IONS-SCNC: 20 MMOL/L (ref 3–16)
BUN SERPL-MCNC: 46 MG/DL (ref 7–20)
CALCIUM SERPL-MCNC: 8.3 MG/DL (ref 8.3–10.6)
CHLORIDE SERPL-SCNC: 102 MMOL/L (ref 99–110)
CO2 SERPL-SCNC: 17 MMOL/L (ref 21–32)
CREAT SERPL-MCNC: 3.6 MG/DL (ref 0.6–1.1)
DEPRECATED RDW RBC AUTO: 14.5 % (ref 12.4–15.4)
GFR SERPLBLD CREATININE-BSD FMLA CKD-EPI: 15 ML/MIN/{1.73_M2}
GLUCOSE BLD-MCNC: 176 MG/DL (ref 70–99)
GLUCOSE BLD-MCNC: 179 MG/DL (ref 70–99)
GLUCOSE BLD-MCNC: 194 MG/DL (ref 70–99)
GLUCOSE BLD-MCNC: 238 MG/DL (ref 70–99)
GLUCOSE BLD-MCNC: 248 MG/DL (ref 70–99)
GLUCOSE SERPL-MCNC: 170 MG/DL (ref 70–99)
HCT VFR BLD AUTO: 24.4 % (ref 36–48)
HGB BLD-MCNC: 8 G/DL (ref 12–16)
MAGNESIUM SERPL-MCNC: 2.1 MG/DL (ref 1.8–2.4)
MCH RBC QN AUTO: 29.4 PG (ref 26–34)
MCHC RBC AUTO-ENTMCNC: 32.6 G/DL (ref 31–36)
MCV RBC AUTO: 90.1 FL (ref 80–100)
PERFORMED ON: ABNORMAL
PHOSPHATE SERPL-MCNC: 5.3 MG/DL (ref 2.5–4.9)
PLATELET # BLD AUTO: 86 K/UL (ref 135–450)
PMV BLD AUTO: 8.7 FL (ref 5–10.5)
POTASSIUM SERPL-SCNC: 3.3 MMOL/L (ref 3.5–5.1)
RBC # BLD AUTO: 2.71 M/UL (ref 4–5.2)
SODIUM SERPL-SCNC: 139 MMOL/L (ref 136–145)
TACROLIMUS BLOOD: 9.7 NG/ML (ref 5–20)
WBC # BLD AUTO: 10.6 K/UL (ref 4–11)

## 2024-08-17 PROCEDURE — 6360000002 HC RX W HCPCS: Performed by: STUDENT IN AN ORGANIZED HEALTH CARE EDUCATION/TRAINING PROGRAM

## 2024-08-17 PROCEDURE — 83735 ASSAY OF MAGNESIUM: CPT

## 2024-08-17 PROCEDURE — 6370000000 HC RX 637 (ALT 250 FOR IP): Performed by: STUDENT IN AN ORGANIZED HEALTH CARE EDUCATION/TRAINING PROGRAM

## 2024-08-17 PROCEDURE — 6370000000 HC RX 637 (ALT 250 FOR IP): Performed by: INTERNAL MEDICINE

## 2024-08-17 PROCEDURE — 2580000003 HC RX 258: Performed by: ANESTHESIOLOGY

## 2024-08-17 PROCEDURE — 2580000003 HC RX 258: Performed by: STUDENT IN AN ORGANIZED HEALTH CARE EDUCATION/TRAINING PROGRAM

## 2024-08-17 PROCEDURE — 2700000000 HC OXYGEN THERAPY PER DAY

## 2024-08-17 PROCEDURE — 1200000000 HC SEMI PRIVATE

## 2024-08-17 PROCEDURE — 36415 COLL VENOUS BLD VENIPUNCTURE: CPT

## 2024-08-17 PROCEDURE — 80069 RENAL FUNCTION PANEL: CPT

## 2024-08-17 PROCEDURE — 6370000000 HC RX 637 (ALT 250 FOR IP): Performed by: NURSE PRACTITIONER

## 2024-08-17 PROCEDURE — 80197 ASSAY OF TACROLIMUS: CPT

## 2024-08-17 PROCEDURE — 85027 COMPLETE CBC AUTOMATED: CPT

## 2024-08-17 PROCEDURE — 94761 N-INVAS EAR/PLS OXIMETRY MLT: CPT

## 2024-08-17 RX ORDER — SODIUM BICARBONATE 650 MG/1
650 TABLET ORAL 2 TIMES DAILY
Status: DISCONTINUED | OUTPATIENT
Start: 2024-08-17 | End: 2024-08-18

## 2024-08-17 RX ADMIN — HEPARIN SODIUM 5000 UNITS: 5000 INJECTION INTRAVENOUS; SUBCUTANEOUS at 13:55

## 2024-08-17 RX ADMIN — MORPHINE SULFATE 2 MG: 2 INJECTION, SOLUTION INTRAMUSCULAR; INTRAVENOUS at 01:43

## 2024-08-17 RX ADMIN — SODIUM CHLORIDE, PRESERVATIVE FREE 10 ML: 5 INJECTION INTRAVENOUS at 20:31

## 2024-08-17 RX ADMIN — ATORVASTATIN CALCIUM 40 MG: 40 TABLET, FILM COATED ORAL at 09:59

## 2024-08-17 RX ADMIN — OXYCODONE HYDROCHLORIDE 10 MG: 5 TABLET ORAL at 20:25

## 2024-08-17 RX ADMIN — ASPIRIN 81 MG: 81 TABLET, CHEWABLE ORAL at 09:59

## 2024-08-17 RX ADMIN — LEVOTHYROXINE SODIUM 75 MCG: 0.03 TABLET ORAL at 05:34

## 2024-08-17 RX ADMIN — Medication 1 CAPSULE: at 09:59

## 2024-08-17 RX ADMIN — Medication 10 ML: at 09:39

## 2024-08-17 RX ADMIN — POTASSIUM BICARBONATE 50 MEQ: 978 TABLET, EFFERVESCENT ORAL at 10:15

## 2024-08-17 RX ADMIN — TACROLIMUS 1 MG: 1 CAPSULE ORAL at 09:59

## 2024-08-17 RX ADMIN — TACROLIMUS 1 MG: 1 CAPSULE ORAL at 20:35

## 2024-08-17 RX ADMIN — FLUOXETINE 10 MG: 10 CAPSULE ORAL at 09:58

## 2024-08-17 RX ADMIN — INSULIN LISPRO 2 UNITS: 100 INJECTION, SOLUTION INTRAVENOUS; SUBCUTANEOUS at 16:58

## 2024-08-17 RX ADMIN — HYDROCORTISONE SODIUM SUCCINATE 50 MG: 100 INJECTION, POWDER, FOR SOLUTION INTRAMUSCULAR; INTRAVENOUS at 09:39

## 2024-08-17 RX ADMIN — SODIUM CHLORIDE, PRESERVATIVE FREE 15 ML: 5 INJECTION INTRAVENOUS at 09:00

## 2024-08-17 RX ADMIN — Medication 250 MG: at 09:57

## 2024-08-17 RX ADMIN — Medication 1 CAPSULE: at 16:51

## 2024-08-17 RX ADMIN — HEPARIN SODIUM 5000 UNITS: 5000 INJECTION INTRAVENOUS; SUBCUTANEOUS at 05:34

## 2024-08-17 RX ADMIN — Medication 250 MG: at 21:25

## 2024-08-17 RX ADMIN — SODIUM CHLORIDE: 9 INJECTION, SOLUTION INTRAVENOUS at 09:01

## 2024-08-17 RX ADMIN — HEPARIN SODIUM 5000 UNITS: 5000 INJECTION INTRAVENOUS; SUBCUTANEOUS at 21:34

## 2024-08-17 RX ADMIN — MORPHINE SULFATE 2 MG: 2 INJECTION, SOLUTION INTRAMUSCULAR; INTRAVENOUS at 18:05

## 2024-08-17 RX ADMIN — PIPERACILLIN AND TAZOBACTAM 3375 MG: 3; .375 INJECTION, POWDER, LYOPHILIZED, FOR SOLUTION INTRAVENOUS at 20:30

## 2024-08-17 RX ADMIN — Medication 10 ML: at 18:06

## 2024-08-17 RX ADMIN — PIPERACILLIN AND TAZOBACTAM 3375 MG: 3; .375 INJECTION, POWDER, LYOPHILIZED, FOR SOLUTION INTRAVENOUS at 09:06

## 2024-08-17 RX ADMIN — PANTOPRAZOLE SODIUM 40 MG: 40 TABLET, DELAYED RELEASE ORAL at 16:52

## 2024-08-17 RX ADMIN — SODIUM BICARBONATE 650 MG: 650 TABLET ORAL at 20:25

## 2024-08-17 RX ADMIN — SODIUM BICARBONATE 650 MG: 650 TABLET ORAL at 10:15

## 2024-08-17 ASSESSMENT — PAIN DESCRIPTION - PAIN TYPE
TYPE: SURGICAL PAIN
TYPE: CHRONIC PAIN;SURGICAL PAIN

## 2024-08-17 ASSESSMENT — PAIN SCALES - GENERAL
PAINLEVEL_OUTOF10: 7
PAINLEVEL_OUTOF10: 8
PAINLEVEL_OUTOF10: 0
PAINLEVEL_OUTOF10: 9
PAINLEVEL_OUTOF10: 0

## 2024-08-17 ASSESSMENT — PAIN DESCRIPTION - LOCATION
LOCATION: BUTTOCKS
LOCATION: ABDOMEN
LOCATION: ABDOMEN

## 2024-08-17 ASSESSMENT — PAIN DESCRIPTION - ORIENTATION
ORIENTATION: ANTERIOR
ORIENTATION: RIGHT;LEFT;ANTERIOR

## 2024-08-17 ASSESSMENT — PAIN DESCRIPTION - DESCRIPTORS
DESCRIPTORS: ACHING;SORE
DESCRIPTORS: PATIENT UNABLE TO DESCRIBE

## 2024-08-17 ASSESSMENT — PAIN - FUNCTIONAL ASSESSMENT: PAIN_FUNCTIONAL_ASSESSMENT: PREVENTS OR INTERFERES SOME ACTIVE ACTIVITIES AND ADLS

## 2024-08-18 LAB
ALBUMIN SERPL-MCNC: 3.5 G/DL (ref 3.4–5)
ANION GAP SERPL CALCULATED.3IONS-SCNC: 16 MMOL/L (ref 3–16)
BLOOD BANK DISPENSE STATUS: NORMAL
BLOOD BANK PRODUCT CODE: NORMAL
BPU ID: NORMAL
BUN SERPL-MCNC: 49 MG/DL (ref 7–20)
CALCIUM SERPL-MCNC: 8.4 MG/DL (ref 8.3–10.6)
CHLORIDE SERPL-SCNC: 104 MMOL/L (ref 99–110)
CO2 SERPL-SCNC: 22 MMOL/L (ref 21–32)
CREAT SERPL-MCNC: 3.9 MG/DL (ref 0.6–1.1)
CYSTATIN C SERPL-MCNC: 3.1 MG/L (ref 0.5–1.2)
CYSTATIN C SERPL-MCNC: 3.5 MG/L (ref 0.5–1.2)
DESCRIPTION BLOOD BANK: NORMAL
EGFR BY CYSTATIN C: 15 ML/MIN/BSA
EGFR BY CYSTATIN C: 17 ML/MIN/BSA
GFR SERPLBLD CREATININE-BSD FMLA CKD-EPI: 14 ML/MIN/{1.73_M2}
GLUCOSE BLD-MCNC: 150 MG/DL (ref 70–99)
GLUCOSE BLD-MCNC: 170 MG/DL (ref 70–99)
GLUCOSE BLD-MCNC: 195 MG/DL (ref 70–99)
GLUCOSE BLD-MCNC: 203 MG/DL (ref 70–99)
GLUCOSE SERPL-MCNC: 141 MG/DL (ref 70–99)
MAGNESIUM SERPL-MCNC: 2 MG/DL (ref 1.8–2.4)
PERFORMED ON: ABNORMAL
PHOSPHATE SERPL-MCNC: 4.7 MG/DL (ref 2.5–4.9)
POTASSIUM SERPL-SCNC: 3.4 MMOL/L (ref 3.5–5.1)
REASON FOR REJECTION: NORMAL
REJECTED TEST: NORMAL
SODIUM SERPL-SCNC: 142 MMOL/L (ref 136–145)

## 2024-08-18 PROCEDURE — 2700000000 HC OXYGEN THERAPY PER DAY

## 2024-08-18 PROCEDURE — 6360000002 HC RX W HCPCS: Performed by: STUDENT IN AN ORGANIZED HEALTH CARE EDUCATION/TRAINING PROGRAM

## 2024-08-18 PROCEDURE — 6370000000 HC RX 637 (ALT 250 FOR IP): Performed by: STUDENT IN AN ORGANIZED HEALTH CARE EDUCATION/TRAINING PROGRAM

## 2024-08-18 PROCEDURE — 36415 COLL VENOUS BLD VENIPUNCTURE: CPT

## 2024-08-18 PROCEDURE — 6370000000 HC RX 637 (ALT 250 FOR IP): Performed by: NURSE PRACTITIONER

## 2024-08-18 PROCEDURE — 51702 INSERT TEMP BLADDER CATH: CPT

## 2024-08-18 PROCEDURE — 6370000000 HC RX 637 (ALT 250 FOR IP): Performed by: INTERNAL MEDICINE

## 2024-08-18 PROCEDURE — 2580000003 HC RX 258: Performed by: INTERNAL MEDICINE

## 2024-08-18 PROCEDURE — 6360000002 HC RX W HCPCS: Performed by: INTERNAL MEDICINE

## 2024-08-18 PROCEDURE — 94761 N-INVAS EAR/PLS OXIMETRY MLT: CPT

## 2024-08-18 PROCEDURE — 83735 ASSAY OF MAGNESIUM: CPT

## 2024-08-18 PROCEDURE — 80069 RENAL FUNCTION PANEL: CPT

## 2024-08-18 PROCEDURE — 1200000000 HC SEMI PRIVATE

## 2024-08-18 PROCEDURE — 2500000003 HC RX 250 WO HCPCS: Performed by: STUDENT IN AN ORGANIZED HEALTH CARE EDUCATION/TRAINING PROGRAM

## 2024-08-18 PROCEDURE — 2580000003 HC RX 258: Performed by: STUDENT IN AN ORGANIZED HEALTH CARE EDUCATION/TRAINING PROGRAM

## 2024-08-18 PROCEDURE — 2580000003 HC RX 258: Performed by: ANESTHESIOLOGY

## 2024-08-18 RX ORDER — TACROLIMUS 0.5 MG/1
0.5 CAPSULE ORAL NIGHTLY
Status: DISCONTINUED | OUTPATIENT
Start: 2024-08-18 | End: 2024-08-18

## 2024-08-18 RX ORDER — MIDODRINE HYDROCHLORIDE 5 MG/1
7.5 TABLET ORAL
Status: DISCONTINUED | OUTPATIENT
Start: 2024-08-18 | End: 2024-08-20 | Stop reason: HOSPADM

## 2024-08-18 RX ORDER — TACROLIMUS 1 MG/1
1 CAPSULE ORAL 2 TIMES DAILY
Status: DISCONTINUED | OUTPATIENT
Start: 2024-08-18 | End: 2024-08-20 | Stop reason: HOSPADM

## 2024-08-18 RX ORDER — SODIUM CHLORIDE, SODIUM LACTATE, POTASSIUM CHLORIDE, CALCIUM CHLORIDE 600; 310; 30; 20 MG/100ML; MG/100ML; MG/100ML; MG/100ML
INJECTION, SOLUTION INTRAVENOUS CONTINUOUS
Status: ACTIVE | OUTPATIENT
Start: 2024-08-18 | End: 2024-08-18

## 2024-08-18 RX ORDER — POTASSIUM CHLORIDE 20 MEQ/1
20 TABLET, EXTENDED RELEASE ORAL ONCE
Status: DISCONTINUED | OUTPATIENT
Start: 2024-08-18 | End: 2024-08-18

## 2024-08-18 RX ORDER — TACROLIMUS 0.5 MG/1
1 CAPSULE ORAL DAILY
Status: DISCONTINUED | OUTPATIENT
Start: 2024-08-19 | End: 2024-08-18

## 2024-08-18 RX ADMIN — Medication 250 MG: at 09:17

## 2024-08-18 RX ADMIN — SODIUM CHLORIDE, PRESERVATIVE FREE 10 ML: 5 INJECTION INTRAVENOUS at 08:57

## 2024-08-18 RX ADMIN — SODIUM CHLORIDE, PRESERVATIVE FREE 10 ML: 5 INJECTION INTRAVENOUS at 21:40

## 2024-08-18 RX ADMIN — Medication 250 MG: at 21:38

## 2024-08-18 RX ADMIN — HEPARIN SODIUM 5000 UNITS: 5000 INJECTION INTRAVENOUS; SUBCUTANEOUS at 21:38

## 2024-08-18 RX ADMIN — PANTOPRAZOLE SODIUM 40 MG: 40 TABLET, DELAYED RELEASE ORAL at 16:20

## 2024-08-18 RX ADMIN — SODIUM BICARBONATE 650 MG: 650 TABLET ORAL at 09:07

## 2024-08-18 RX ADMIN — OXYCODONE HYDROCHLORIDE 5 MG: 5 TABLET ORAL at 09:05

## 2024-08-18 RX ADMIN — SODIUM CHLORIDE 20 ML/HR: 9 INJECTION, SOLUTION INTRAVENOUS at 09:04

## 2024-08-18 RX ADMIN — MIDODRINE HYDROCHLORIDE 7.5 MG: 5 TABLET ORAL at 12:05

## 2024-08-18 RX ADMIN — PIPERACILLIN AND TAZOBACTAM 3375 MG: 3; .375 INJECTION, POWDER, LYOPHILIZED, FOR SOLUTION INTRAVENOUS at 09:05

## 2024-08-18 RX ADMIN — FLUOXETINE 10 MG: 10 CAPSULE ORAL at 09:09

## 2024-08-18 RX ADMIN — MIDODRINE HYDROCHLORIDE 7.5 MG: 5 TABLET ORAL at 16:46

## 2024-08-18 RX ADMIN — MIDODRINE HYDROCHLORIDE 5 MG: 5 TABLET ORAL at 09:07

## 2024-08-18 RX ADMIN — Medication 1 CAPSULE: at 09:05

## 2024-08-18 RX ADMIN — TACROLIMUS 1 MG: 1 CAPSULE ORAL at 21:38

## 2024-08-18 RX ADMIN — HEPARIN SODIUM 5000 UNITS: 5000 INJECTION INTRAVENOUS; SUBCUTANEOUS at 14:47

## 2024-08-18 RX ADMIN — POTASSIUM BICARBONATE 20 MEQ: 782 TABLET, EFFERVESCENT ORAL at 12:48

## 2024-08-18 RX ADMIN — HEPARIN SODIUM 5000 UNITS: 5000 INJECTION INTRAVENOUS; SUBCUTANEOUS at 05:34

## 2024-08-18 RX ADMIN — TACROLIMUS 1 MG: 1 CAPSULE ORAL at 09:08

## 2024-08-18 RX ADMIN — PANTOPRAZOLE SODIUM 40 MG: 40 TABLET, DELAYED RELEASE ORAL at 05:33

## 2024-08-18 RX ADMIN — Medication 6 MG: at 09:09

## 2024-08-18 RX ADMIN — PROCHLORPERAZINE EDISYLATE 10 MG: 5 INJECTION INTRAMUSCULAR; INTRAVENOUS at 16:51

## 2024-08-18 RX ADMIN — Medication 1 CAPSULE: at 16:47

## 2024-08-18 RX ADMIN — ASPIRIN 81 MG: 81 TABLET, CHEWABLE ORAL at 09:05

## 2024-08-18 RX ADMIN — PROCHLORPERAZINE EDISYLATE 10 MG: 5 INJECTION INTRAMUSCULAR; INTRAVENOUS at 16:46

## 2024-08-18 RX ADMIN — SODIUM CHLORIDE, POTASSIUM CHLORIDE, SODIUM LACTATE AND CALCIUM CHLORIDE 30 ML/HR: 600; 310; 30; 20 INJECTION, SOLUTION INTRAVENOUS at 14:36

## 2024-08-18 RX ADMIN — INSULIN LISPRO 2 UNITS: 100 INJECTION, SOLUTION INTRAVENOUS; SUBCUTANEOUS at 12:04

## 2024-08-18 RX ADMIN — LEVOTHYROXINE SODIUM 75 MCG: 0.03 TABLET ORAL at 05:34

## 2024-08-18 RX ADMIN — ATORVASTATIN CALCIUM 40 MG: 40 TABLET, FILM COATED ORAL at 09:06

## 2024-08-18 RX ADMIN — PIPERACILLIN AND TAZOBACTAM 3375 MG: 3; .375 INJECTION, POWDER, LYOPHILIZED, FOR SOLUTION INTRAVENOUS at 21:40

## 2024-08-18 ASSESSMENT — PAIN DESCRIPTION - LOCATION: LOCATION: BUTTOCKS

## 2024-08-18 ASSESSMENT — PAIN SCALES - GENERAL
PAINLEVEL_OUTOF10: 5
PAINLEVEL_OUTOF10: 5

## 2024-08-18 ASSESSMENT — PAIN DESCRIPTION - ORIENTATION
ORIENTATION: MID
ORIENTATION: LOWER;MID

## 2024-08-18 ASSESSMENT — PAIN DESCRIPTION - DESCRIPTORS: DESCRIPTORS: ACHING;BURNING

## 2024-08-18 ASSESSMENT — PAIN SCALES - WONG BAKER: WONGBAKER_NUMERICALRESPONSE: NO HURT

## 2024-08-18 ASSESSMENT — PAIN DESCRIPTION - PAIN TYPE: TYPE: ACUTE PAIN;SURGICAL PAIN

## 2024-08-19 LAB
ALBUMIN SERPL-MCNC: 2.8 G/DL (ref 3.4–5)
ANION GAP SERPL CALCULATED.3IONS-SCNC: 13 MMOL/L (ref 3–16)
BUN SERPL-MCNC: 49 MG/DL (ref 7–20)
CALCIUM SERPL-MCNC: 8.1 MG/DL (ref 8.3–10.6)
CHLORIDE SERPL-SCNC: 104 MMOL/L (ref 99–110)
CO2 SERPL-SCNC: 24 MMOL/L (ref 21–32)
CREAT SERPL-MCNC: 3.8 MG/DL (ref 0.6–1.1)
DEPRECATED RDW RBC AUTO: 14.6 % (ref 12.4–15.4)
GFR SERPLBLD CREATININE-BSD FMLA CKD-EPI: 14 ML/MIN/{1.73_M2}
GLUCOSE BLD-MCNC: 112 MG/DL (ref 70–99)
GLUCOSE BLD-MCNC: 195 MG/DL (ref 70–99)
GLUCOSE BLD-MCNC: 246 MG/DL (ref 70–99)
GLUCOSE BLD-MCNC: 323 MG/DL (ref 70–99)
GLUCOSE SERPL-MCNC: 233 MG/DL (ref 70–99)
HCT VFR BLD AUTO: 23.5 % (ref 36–48)
HGB BLD-MCNC: 7.5 G/DL (ref 12–16)
MAGNESIUM SERPL-MCNC: 1.8 MG/DL (ref 1.8–2.4)
MCH RBC QN AUTO: 28.3 PG (ref 26–34)
MCHC RBC AUTO-ENTMCNC: 31.8 G/DL (ref 31–36)
MCV RBC AUTO: 89 FL (ref 80–100)
PERFORMED ON: ABNORMAL
PHOSPHATE SERPL-MCNC: 3.2 MG/DL (ref 2.5–4.9)
PLATELET # BLD AUTO: 60 K/UL (ref 135–450)
PMV BLD AUTO: 9.4 FL (ref 5–10.5)
POTASSIUM SERPL-SCNC: 3.1 MMOL/L (ref 3.5–5.1)
RBC # BLD AUTO: 2.64 M/UL (ref 4–5.2)
SODIUM SERPL-SCNC: 141 MMOL/L (ref 136–145)
WBC # BLD AUTO: 12.2 K/UL (ref 4–11)

## 2024-08-19 PROCEDURE — 6370000000 HC RX 637 (ALT 250 FOR IP): Performed by: STUDENT IN AN ORGANIZED HEALTH CARE EDUCATION/TRAINING PROGRAM

## 2024-08-19 PROCEDURE — 99232 SBSQ HOSP IP/OBS MODERATE 35: CPT | Performed by: INTERNAL MEDICINE

## 2024-08-19 PROCEDURE — 6360000002 HC RX W HCPCS: Performed by: STUDENT IN AN ORGANIZED HEALTH CARE EDUCATION/TRAINING PROGRAM

## 2024-08-19 PROCEDURE — 6370000000 HC RX 637 (ALT 250 FOR IP): Performed by: NURSE PRACTITIONER

## 2024-08-19 PROCEDURE — 1200000000 HC SEMI PRIVATE

## 2024-08-19 PROCEDURE — 80197 ASSAY OF TACROLIMUS: CPT

## 2024-08-19 PROCEDURE — 94761 N-INVAS EAR/PLS OXIMETRY MLT: CPT

## 2024-08-19 PROCEDURE — 2700000000 HC OXYGEN THERAPY PER DAY

## 2024-08-19 PROCEDURE — 83735 ASSAY OF MAGNESIUM: CPT

## 2024-08-19 PROCEDURE — 6370000000 HC RX 637 (ALT 250 FOR IP): Performed by: INTERNAL MEDICINE

## 2024-08-19 PROCEDURE — 2580000003 HC RX 258: Performed by: ANESTHESIOLOGY

## 2024-08-19 PROCEDURE — 2580000003 HC RX 258: Performed by: STUDENT IN AN ORGANIZED HEALTH CARE EDUCATION/TRAINING PROGRAM

## 2024-08-19 PROCEDURE — 51702 INSERT TEMP BLADDER CATH: CPT

## 2024-08-19 PROCEDURE — 99232 SBSQ HOSP IP/OBS MODERATE 35: CPT | Performed by: SURGERY

## 2024-08-19 PROCEDURE — 6360000002 HC RX W HCPCS: Performed by: INTERNAL MEDICINE

## 2024-08-19 PROCEDURE — 2500000003 HC RX 250 WO HCPCS: Performed by: STUDENT IN AN ORGANIZED HEALTH CARE EDUCATION/TRAINING PROGRAM

## 2024-08-19 PROCEDURE — 97606 NEG PRS WND THER DME>50 SQCM: CPT

## 2024-08-19 PROCEDURE — 36415 COLL VENOUS BLD VENIPUNCTURE: CPT

## 2024-08-19 PROCEDURE — APPNB45 APP NON BILLABLE 31-45 MINUTES: Performed by: CLINICAL NURSE SPECIALIST

## 2024-08-19 PROCEDURE — 80069 RENAL FUNCTION PANEL: CPT

## 2024-08-19 PROCEDURE — 85027 COMPLETE CBC AUTOMATED: CPT

## 2024-08-19 RX ORDER — INSULIN GLARGINE 100 [IU]/ML
16 INJECTION, SOLUTION SUBCUTANEOUS ONCE
Status: COMPLETED | OUTPATIENT
Start: 2024-08-19 | End: 2024-08-19

## 2024-08-19 RX ORDER — FLUCONAZOLE 100 MG/1
200 TABLET ORAL ONCE
Status: COMPLETED | OUTPATIENT
Start: 2024-08-19 | End: 2024-08-19

## 2024-08-19 RX ORDER — FLUCONAZOLE 100 MG/1
100 TABLET ORAL DAILY
Status: DISCONTINUED | OUTPATIENT
Start: 2024-08-20 | End: 2024-08-20 | Stop reason: HOSPADM

## 2024-08-19 RX ORDER — OXYCODONE HYDROCHLORIDE 5 MG/1
5 TABLET ORAL EVERY 4 HOURS PRN
Status: DISCONTINUED | OUTPATIENT
Start: 2024-08-19 | End: 2024-08-20 | Stop reason: HOSPADM

## 2024-08-19 RX ORDER — PREDNISONE 10 MG/1
10 TABLET ORAL DAILY
Status: DISCONTINUED | OUTPATIENT
Start: 2024-08-19 | End: 2024-08-20 | Stop reason: HOSPADM

## 2024-08-19 RX ORDER — INSULIN GLARGINE 100 [IU]/ML
16 INJECTION, SOLUTION SUBCUTANEOUS NIGHTLY
Status: DISCONTINUED | OUTPATIENT
Start: 2024-08-20 | End: 2024-08-20 | Stop reason: HOSPADM

## 2024-08-19 RX ADMIN — PIPERACILLIN AND TAZOBACTAM 3375 MG: 3; .375 INJECTION, POWDER, LYOPHILIZED, FOR SOLUTION INTRAVENOUS at 09:49

## 2024-08-19 RX ADMIN — INSULIN GLARGINE 16 UNITS: 100 INJECTION, SOLUTION SUBCUTANEOUS at 14:02

## 2024-08-19 RX ADMIN — OXYCODONE HYDROCHLORIDE 5 MG: 5 TABLET ORAL at 17:13

## 2024-08-19 RX ADMIN — MIDODRINE HYDROCHLORIDE 7.5 MG: 5 TABLET ORAL at 17:01

## 2024-08-19 RX ADMIN — SODIUM CHLORIDE, PRESERVATIVE FREE 10 ML: 5 INJECTION INTRAVENOUS at 20:06

## 2024-08-19 RX ADMIN — TACROLIMUS 1 MG: 1 CAPSULE ORAL at 10:14

## 2024-08-19 RX ADMIN — PANTOPRAZOLE SODIUM 40 MG: 40 TABLET, DELAYED RELEASE ORAL at 14:02

## 2024-08-19 RX ADMIN — INSULIN LISPRO 2 UNITS: 100 INJECTION, SOLUTION INTRAVENOUS; SUBCUTANEOUS at 10:13

## 2024-08-19 RX ADMIN — PROCHLORPERAZINE EDISYLATE 10 MG: 5 INJECTION INTRAMUSCULAR; INTRAVENOUS at 02:12

## 2024-08-19 RX ADMIN — MORPHINE SULFATE 2 MG: 2 INJECTION, SOLUTION INTRAMUSCULAR; INTRAVENOUS at 02:12

## 2024-08-19 RX ADMIN — POLYETHYLENE GLYCOL 3350 17 G: 17 POWDER, FOR SOLUTION ORAL at 10:14

## 2024-08-19 RX ADMIN — ATORVASTATIN CALCIUM 40 MG: 40 TABLET, FILM COATED ORAL at 09:45

## 2024-08-19 RX ADMIN — Medication 1 CAPSULE: at 17:01

## 2024-08-19 RX ADMIN — OXYCODONE HYDROCHLORIDE 5 MG: 5 TABLET ORAL at 09:45

## 2024-08-19 RX ADMIN — FLUOXETINE 10 MG: 10 CAPSULE ORAL at 09:45

## 2024-08-19 RX ADMIN — PROCHLORPERAZINE EDISYLATE 10 MG: 5 INJECTION INTRAMUSCULAR; INTRAVENOUS at 20:11

## 2024-08-19 RX ADMIN — TACROLIMUS 1 MG: 1 CAPSULE ORAL at 20:06

## 2024-08-19 RX ADMIN — PREDNISONE 10 MG: 10 TABLET ORAL at 14:02

## 2024-08-19 RX ADMIN — Medication 6 MG: at 10:14

## 2024-08-19 RX ADMIN — Medication 250 MG: at 10:25

## 2024-08-19 RX ADMIN — ASPIRIN 81 MG: 81 TABLET, CHEWABLE ORAL at 09:45

## 2024-08-19 RX ADMIN — SENNOSIDES AND DOCUSATE SODIUM 2 TABLET: 50; 8.6 TABLET ORAL at 10:14

## 2024-08-19 RX ADMIN — INSULIN LISPRO 6 UNITS: 100 INJECTION, SOLUTION INTRAVENOUS; SUBCUTANEOUS at 12:19

## 2024-08-19 RX ADMIN — MIDODRINE HYDROCHLORIDE 7.5 MG: 5 TABLET ORAL at 09:44

## 2024-08-19 RX ADMIN — FLUCONAZOLE 200 MG: 100 TABLET ORAL at 14:02

## 2024-08-19 RX ADMIN — PANTOPRAZOLE SODIUM 40 MG: 40 TABLET, DELAYED RELEASE ORAL at 06:49

## 2024-08-19 RX ADMIN — Medication 1 CAPSULE: at 09:44

## 2024-08-19 RX ADMIN — LEVOTHYROXINE SODIUM 75 MCG: 0.03 TABLET ORAL at 06:46

## 2024-08-19 RX ADMIN — MIDODRINE HYDROCHLORIDE 7.5 MG: 5 TABLET ORAL at 12:15

## 2024-08-19 ASSESSMENT — PAIN - FUNCTIONAL ASSESSMENT: PAIN_FUNCTIONAL_ASSESSMENT: INTOLERABLE, UNABLE TO DO ANY ACTIVE OR PASSIVE ACTIVITIES

## 2024-08-19 ASSESSMENT — PAIN SCALES - WONG BAKER: WONGBAKER_NUMERICALRESPONSE: NO HURT

## 2024-08-19 ASSESSMENT — PAIN DESCRIPTION - DESCRIPTORS
DESCRIPTORS: ACHING
DESCRIPTORS: ACHING

## 2024-08-19 ASSESSMENT — PAIN DESCRIPTION - LOCATION
LOCATION: SACRUM
LOCATION: SACRUM

## 2024-08-19 ASSESSMENT — PAIN SCALES - GENERAL: PAINLEVEL_OUTOF10: 7

## 2024-08-19 NOTE — CARE COORDINATION
Writer received call from Simin @ [facility] patient is approved, and accepted to go to [facility] @ [facility] when medically ready.     Fiorella Rosario RN

## 2024-08-20 ENCOUNTER — APPOINTMENT (OUTPATIENT)
Dept: GENERAL RADIOLOGY | Age: 44
DRG: 853 | End: 2024-08-20
Payer: COMMERCIAL

## 2024-08-20 VITALS
HEIGHT: 61 IN | OXYGEN SATURATION: 94 % | RESPIRATION RATE: 16 BRPM | SYSTOLIC BLOOD PRESSURE: 125 MMHG | DIASTOLIC BLOOD PRESSURE: 61 MMHG | BODY MASS INDEX: 26.36 KG/M2 | HEART RATE: 76 BPM | WEIGHT: 139.6 LBS | TEMPERATURE: 97.6 F

## 2024-08-20 LAB
ANION GAP SERPL CALCULATED.3IONS-SCNC: 14 MMOL/L (ref 3–16)
BUN SERPL-MCNC: 53 MG/DL (ref 7–20)
CALCIUM SERPL-MCNC: 8.4 MG/DL (ref 8.3–10.6)
CHLORIDE SERPL-SCNC: 102 MMOL/L (ref 99–110)
CO2 SERPL-SCNC: 24 MMOL/L (ref 21–32)
CREAT SERPL-MCNC: 3.7 MG/DL (ref 0.6–1.1)
GFR SERPLBLD CREATININE-BSD FMLA CKD-EPI: 15 ML/MIN/{1.73_M2}
GLUCOSE BLD-MCNC: 116 MG/DL (ref 70–99)
GLUCOSE BLD-MCNC: 130 MG/DL (ref 70–99)
GLUCOSE SERPL-MCNC: 122 MG/DL (ref 70–99)
MAGNESIUM SERPL-MCNC: 1.8 MG/DL (ref 1.8–2.4)
PERFORMED ON: ABNORMAL
PERFORMED ON: ABNORMAL
POTASSIUM SERPL-SCNC: 3 MMOL/L (ref 3.5–5.1)
SODIUM SERPL-SCNC: 140 MMOL/L (ref 136–145)
TACROLIMUS BLOOD: 5.4 NG/ML (ref 5–20)

## 2024-08-20 PROCEDURE — 2580000003 HC RX 258: Performed by: ANESTHESIOLOGY

## 2024-08-20 PROCEDURE — 71045 X-RAY EXAM CHEST 1 VIEW: CPT

## 2024-08-20 PROCEDURE — 6370000000 HC RX 637 (ALT 250 FOR IP): Performed by: STUDENT IN AN ORGANIZED HEALTH CARE EDUCATION/TRAINING PROGRAM

## 2024-08-20 PROCEDURE — 36415 COLL VENOUS BLD VENIPUNCTURE: CPT

## 2024-08-20 PROCEDURE — 80197 ASSAY OF TACROLIMUS: CPT

## 2024-08-20 PROCEDURE — 6370000000 HC RX 637 (ALT 250 FOR IP): Performed by: INTERNAL MEDICINE

## 2024-08-20 PROCEDURE — 6360000002 HC RX W HCPCS: Performed by: INTERNAL MEDICINE

## 2024-08-20 PROCEDURE — 2700000000 HC OXYGEN THERAPY PER DAY

## 2024-08-20 PROCEDURE — 94761 N-INVAS EAR/PLS OXIMETRY MLT: CPT

## 2024-08-20 PROCEDURE — 51702 INSERT TEMP BLADDER CATH: CPT

## 2024-08-20 PROCEDURE — 6370000000 HC RX 637 (ALT 250 FOR IP): Performed by: NURSE PRACTITIONER

## 2024-08-20 PROCEDURE — 83735 ASSAY OF MAGNESIUM: CPT

## 2024-08-20 PROCEDURE — 80048 BASIC METABOLIC PNL TOTAL CA: CPT

## 2024-08-20 PROCEDURE — 6360000002 HC RX W HCPCS: Performed by: STUDENT IN AN ORGANIZED HEALTH CARE EDUCATION/TRAINING PROGRAM

## 2024-08-20 RX ORDER — POTASSIUM CHLORIDE 7.45 MG/ML
10 INJECTION INTRAVENOUS
Status: DISPENSED | OUTPATIENT
Start: 2024-08-20 | End: 2024-08-20

## 2024-08-20 RX ORDER — FLUCONAZOLE 100 MG/1
100 TABLET ORAL DAILY
Qty: 6 TABLET | Refills: 0
Start: 2024-08-21 | End: 2024-08-27

## 2024-08-20 RX ORDER — LACTOBACILLUS RHAMNOSUS GG 10B CELL
1 CAPSULE ORAL 2 TIMES DAILY WITH MEALS
Qty: 30 CAPSULE | Refills: 0
Start: 2024-08-20

## 2024-08-20 RX ORDER — OXYCODONE HYDROCHLORIDE 5 MG/1
5 TABLET ORAL EVERY 4 HOURS PRN
Qty: 10 TABLET | Refills: 0 | Status: SHIPPED | OUTPATIENT
Start: 2024-08-20 | End: 2024-08-23

## 2024-08-20 RX ORDER — LEVOTHYROXINE SODIUM 0.07 MG/1
75 TABLET ORAL DAILY
Qty: 30 TABLET | Refills: 3
Start: 2024-08-21

## 2024-08-20 RX ORDER — INSULIN GLARGINE 100 [IU]/ML
16 INJECTION, SOLUTION SUBCUTANEOUS NIGHTLY
Qty: 10 ML | Refills: 3
Start: 2024-08-20

## 2024-08-20 RX ORDER — FLUOXETINE 10 MG/1
10 CAPSULE ORAL DAILY
Qty: 30 CAPSULE | Refills: 3
Start: 2024-08-21

## 2024-08-20 RX ORDER — MIDODRINE HYDROCHLORIDE 2.5 MG/1
7.5 TABLET ORAL
Qty: 90 TABLET | Refills: 3
Start: 2024-08-20

## 2024-08-20 RX ADMIN — MIDODRINE HYDROCHLORIDE 7.5 MG: 5 TABLET ORAL at 08:11

## 2024-08-20 RX ADMIN — ATORVASTATIN CALCIUM 40 MG: 40 TABLET, FILM COATED ORAL at 08:13

## 2024-08-20 RX ADMIN — MIDODRINE HYDROCHLORIDE 7.5 MG: 5 TABLET ORAL at 11:45

## 2024-08-20 RX ADMIN — POTASSIUM CHLORIDE 10 MEQ: 7.46 INJECTION, SOLUTION INTRAVENOUS at 10:53

## 2024-08-20 RX ADMIN — OXYCODONE HYDROCHLORIDE 5 MG: 5 TABLET ORAL at 04:07

## 2024-08-20 RX ADMIN — PREDNISONE 10 MG: 10 TABLET ORAL at 08:13

## 2024-08-20 RX ADMIN — POTASSIUM CHLORIDE 10 MEQ: 7.46 INJECTION, SOLUTION INTRAVENOUS at 13:16

## 2024-08-20 RX ADMIN — SODIUM CHLORIDE, PRESERVATIVE FREE 10 ML: 5 INJECTION INTRAVENOUS at 08:14

## 2024-08-20 RX ADMIN — TACROLIMUS 1 MG: 1 CAPSULE ORAL at 08:13

## 2024-08-20 RX ADMIN — Medication 6 MG: at 08:13

## 2024-08-20 RX ADMIN — Medication 1 CAPSULE: at 08:12

## 2024-08-20 RX ADMIN — PROCHLORPERAZINE EDISYLATE 10 MG: 5 INJECTION INTRAMUSCULAR; INTRAVENOUS at 04:07

## 2024-08-20 RX ADMIN — FLUCONAZOLE 100 MG: 100 TABLET ORAL at 08:13

## 2024-08-20 RX ADMIN — FLUOXETINE 10 MG: 10 CAPSULE ORAL at 08:13

## 2024-08-20 RX ADMIN — OXYCODONE HYDROCHLORIDE 5 MG: 5 TABLET ORAL at 14:12

## 2024-08-20 RX ADMIN — ASPIRIN 81 MG: 81 TABLET, CHEWABLE ORAL at 08:13

## 2024-08-20 RX ADMIN — LEVOTHYROXINE SODIUM 75 MCG: 0.03 TABLET ORAL at 08:12

## 2024-08-20 RX ADMIN — POTASSIUM CHLORIDE 10 MEQ: 7.46 INJECTION, SOLUTION INTRAVENOUS at 11:54

## 2024-08-20 ASSESSMENT — PAIN DESCRIPTION - LOCATION: LOCATION: COCCYX

## 2024-08-20 ASSESSMENT — PAIN SCALES - GENERAL
PAINLEVEL_OUTOF10: 7
PAINLEVEL_OUTOF10: 10

## 2024-08-20 ASSESSMENT — PAIN - FUNCTIONAL ASSESSMENT: PAIN_FUNCTIONAL_ASSESSMENT: INTOLERABLE, UNABLE TO DO ANY ACTIVE OR PASSIVE ACTIVITIES

## 2024-08-20 ASSESSMENT — PAIN SCALES - WONG BAKER: WONGBAKER_NUMERICALRESPONSE: HURTS WORST

## 2024-08-20 ASSESSMENT — PAIN DESCRIPTION - ORIENTATION: ORIENTATION: MID

## 2024-08-20 NOTE — DISCHARGE SUMMARY
Discharge Summary    Name:  Carolina Arguelles /Age/Sex: 1980 (44 y.o. female)   Admit Date: 8/3/2024  Discharge Date: 24   MRN & CSN:  [id number] & [id number] Encounter Date and Time 24 8:12 AM EDT    Attending:  Bernard Woods MD Discharging Provider: BERNARD WOODS MD       Hospital Course:       The patient is a 44 Y F with a h/o HTN, HLD, DM2 (confirmed type 2 with patient), CKD4 in the setting of kidney transplantation, and neurogenic bladder with chronic intermittent straight catheterization.  She appears chronically ill and debilitated and it looks like she has been admitted often for years.  The patient says that she used to be much heavier and has wasted away over the years.  She says she has been wheelchair-bound for at least a year.  She lives with her mother, who helps her with ADL's.  Has [facility].  She has a chronic L ischial wound and follows with the wound care clinic.  The wound gradually became more painful and had purulent drainage so she presented to the ED on 8/3.  She was found to be septic.  CT was concerning for necrotizing fasciitis of the wound.        Infected left ischial ulcer with sepsis.  Debrided on .  Wound vac placed.  Zosyn per ID (completed course ), fluconazole through , also completed a course of PO vanc for secondary prophylaxis (had c.diff colitis in 3/2024).     MARCO A on CKD4, with renal transplant in place.  Baseline Cr perhaps about 2.5.  Completed course of IVFs.  Continue tacro and prednisone.  Appreciate nephrology input, they will continue to monitor for any HD needs.      Neurogenic bladder.  Had been using intermittent straight caths at home, now with rodriguez here.  Emphysematous pyelitis ruled out, appreciate urology input.     PEG was removed on  during the debridement in the OR.  This was because the mother informed surgery that the patient hadn't been using the PEG for feeds anyway.  Here, though, she ate very little, so PEG had to be

## 2024-08-20 NOTE — CARE COORDINATION
CASE MANAGEMENT DISCHARGE SUMMARY      Discharge to: [facility]    Precertification completed: Yes  Hospital Exemption Notification (HENS) completed: N/A    IMM given: (date) 8/20/24 Verbal w/ Kelly    New Durable Medical Equipment ordered/agency: Deferred    Transportation: Squad      Medical Transport explained to pt/family. Pt/family voice no agency preference.    Agency used: [facility]   time: 1500   Ambulance form completed: Yes    Confirmed discharge plan with:     Patient: yes     Family:  yes    Name: Kelly Contact number: [phone]     Facility/Agency, name:  [facility]     Phone number for report to facility: [phone]     RN, name: Michelle    Note: Discharging nurse to complete ISSAC, reconcile AVS, and place final copy with patient's discharge packet. RN to ensure that written prescriptions for  Level II medications are sent with patient to the facility as per protocol.      Fiorella Rosario RN

## 2024-08-20 NOTE — PLAN OF CARE
Problem: Discharge Planning  Goal: Discharge to home or other facility with appropriate resources  8/12/2024 1823 by Simona Saravia RN  Outcome: Progressing  8/12/2024 0518 by Tricia Huertas RN  Outcome: Progressing     Problem: Safety - Adult  Goal: Free from fall injury  8/12/2024 1823 by Simona Saravia RN  Outcome: Progressing  8/12/2024 0518 by Tricia Huertas RN  Outcome: Progressing     Problem: Skin/Tissue Integrity  Goal: Absence of new skin breakdown  Description: 1.  Monitor for areas of redness and/or skin breakdown  2.  Assess vascular access sites hourly  3.  Every 4-6 hours minimum:  Change oxygen saturation probe site  4.  Every 4-6 hours:  If on nasal continuous positive airway pressure, respiratory therapy assess nares and determine need for appliance change or resting period.  8/12/2024 1823 by Simona Saravia RN  Outcome: Progressing  8/12/2024 0518 by Tricia Huertas RN  Outcome: Progressing     Problem: Pain  Goal: Verbalizes/displays adequate comfort level or baseline comfort level  8/12/2024 1823 by Simona Saravia RN  Outcome: Progressing  8/12/2024 0518 by Tricia Huertas RN  Outcome: Progressing     Problem: Chronic Conditions and Co-morbidities  Goal: Patient's chronic conditions and co-morbidity symptoms are monitored and maintained or improved  8/12/2024 1823 by Simona Saravia RN  Outcome: Progressing  8/12/2024 0518 by Tricia Huertas RN  Outcome: Progressing     Problem: Neurosensory - Adult  Goal: Achieves maximal functionality and self care  Outcome: Progressing     Problem: Respiratory - Adult  Goal: Achieves optimal ventilation and oxygenation  Outcome: Progressing     Problem: Skin/Tissue Integrity - Adult  Goal: Oral mucous membranes remain intact  Outcome: Progressing     Problem: Musculoskeletal - Adult  Goal: Return ADL status to a safe level of function  Outcome: Progressing     Problem: Gastrointestinal - Adult  Goal: 
  Problem: Discharge Planning  Goal: Discharge to home or other facility with appropriate resources  8/14/2024 1707 by Ramandeep Dias RN  Outcome: Progressing  Flowsheets (Taken 8/14/2024 1201)  Discharge to home or other facility with appropriate resources: Identify barriers to discharge with patient and caregiver     Problem: Safety - Adult  Goal: Free from fall injury  8/14/2024 2148 by Codi Harrington, RN  Outcome: Progressing  Flowsheets (Taken 8/14/2024 2148)  Free From Fall Injury:   Instruct family/caregiver on patient safety   Based on caregiver fall risk screen, instruct family/caregiver to ask for assistance with transferring infant if caregiver noted to have fall risk factors  8/14/2024 1707 by Ramandeep Dias RN  Outcome: Progressing  Flowsheets (Taken 8/14/2024 1430)  Free From Fall Injury: Instruct family/caregiver on patient safety     Problem: Skin/Tissue Integrity  Goal: Absence of new skin breakdown  Description: 1.  Monitor for areas of redness and/or skin breakdown  2.  Assess vascular access sites hourly  3.  Every 4-6 hours minimum:  Change oxygen saturation probe site  4.  Every 4-6 hours:  If on nasal continuous positive airway pressure, respiratory therapy assess nares and determine need for appliance change or resting period.  8/14/2024 1707 by Ramandeep Dias RN  Outcome: Progressing     Problem: Pain  Goal: Verbalizes/displays adequate comfort level or baseline comfort level  8/14/2024 1707 by Ramandeep Dias RN  Outcome: Progressing     Problem: Chronic Conditions and Co-morbidities  Goal: Patient's chronic conditions and co-morbidity symptoms are monitored and maintained or improved  8/14/2024 1707 by Ramandeep Dias RN  Outcome: Progressing  Flowsheets (Taken 8/14/2024 1201)  Care Plan - Patient's Chronic Conditions and Co-Morbidity Symptoms are Monitored and Maintained or Improved: Monitor and assess patient's chronic conditions and comorbid symptoms for stability, 
  Problem: Discharge Planning  Goal: Discharge to home or other facility with appropriate resources  8/14/2024 1707 by Ramandeep Dias RN  Outcome: Progressing  Flowsheets (Taken 8/14/2024 1201)  Discharge to home or other facility with appropriate resources: Identify barriers to discharge with patient and caregiver  8/14/2024 1707 by Ramandeep Dias RN  Outcome: Progressing  Flowsheets (Taken 8/14/2024 1201)  Discharge to home or other facility with appropriate resources: Identify barriers to discharge with patient and caregiver     Problem: Safety - Adult  Goal: Free from fall injury  8/14/2024 1707 by Ramandeep Dias RN  Outcome: Progressing  Flowsheets (Taken 8/14/2024 1430)  Free From Fall Injury: Instruct family/caregiver on patient safety  8/14/2024 1707 by Ramandeep Dias RN  Outcome: Progressing  Flowsheets (Taken 8/14/2024 1430)  Free From Fall Injury: Instruct family/caregiver on patient safety     Problem: Skin/Tissue Integrity  Goal: Absence of new skin breakdown  Description: 1.  Monitor for areas of redness and/or skin breakdown  2.  Assess vascular access sites hourly  3.  Every 4-6 hours minimum:  Change oxygen saturation probe site  4.  Every 4-6 hours:  If on nasal continuous positive airway pressure, respiratory therapy assess nares and determine need for appliance change or resting period.  8/14/2024 1707 by Ramandeep Dias RN  Outcome: Progressing  8/14/2024 1707 by Ramandeep Dias RN  Outcome: Progressing     Problem: Pain  Goal: Verbalizes/displays adequate comfort level or baseline comfort level  8/14/2024 1707 by Ramandeep Dias, RN  Outcome: Progressing  8/14/2024 1707 by Ramandeep Dias RN  Outcome: Progressing     Problem: Chronic Conditions and Co-morbidities  Goal: Patient's chronic conditions and co-morbidity symptoms are monitored and maintained or improved  8/14/2024 1707 by Ramandeep Dias RN  Outcome: Progressing  Flowsheets (Taken 8/14/2024 1201)  Care Plan - 
  Problem: Discharge Planning  Goal: Discharge to home or other facility with appropriate resources  8/15/2024 2313 by Everett Rodríguez, RN  Outcome: Progressing  Flowsheets (Taken 8/15/2024 2000)  Discharge to home or other facility with appropriate resources:   Identify barriers to discharge with patient and caregiver   Arrange for needed discharge resources and transportation as appropriate   Identify discharge learning needs (meds, wound care, etc)   Refer to discharge planning if patient needs post-hospital services based on physician order or complex needs related to functional status, cognitive ability or social support system     Problem: Safety - Adult  Goal: Free from fall injury  8/15/2024 2313 by Everett Rodríguez, RN  Outcome: Progressing     Problem: Skin/Tissue Integrity  Goal: Absence of new skin breakdown  Description: 1.  Monitor for areas of redness and/or skin breakdown  2.  Assess vascular access sites hourly  3.  Every 4-6 hours minimum:  Change oxygen saturation probe site  4.  Every 4-6 hours:  If on nasal continuous positive airway pressure, respiratory therapy assess nares and determine need for appliance change or resting period.    Problem: Pain  Goal: Verbalizes/displays adequate comfort level or baseline comfort level  8/15/2024 2313 by Everett Rodríguez, SHIRA  Outcome: Progressing     Problem: Chronic Conditions and Co-morbidities  Goal: Patient's chronic conditions and co-morbidity symptoms are monitored and maintained or improved  8/15/2024 2313 by Everett Rodríguez, RN  Outcome: Progressing  Flowsheets (Taken 8/15/2024 2000)  Care Plan - Patient's Chronic Conditions and Co-Morbidity Symptoms are Monitored and Maintained or Improved:   Monitor and assess patient's chronic conditions and comorbid symptoms for stability, deterioration, or improvement   Collaborate with multidisciplinary team to address chronic and comorbid conditions and prevent 
  Problem: Discharge Planning  Goal: Discharge to home or other facility with appropriate resources  8/17/2024 1121 by Celia Garcia RN  Outcome: Progressing     Problem: Safety - Adult  Goal: Free from fall injury  8/17/2024 1121 by Celia Garcia RN  Outcome: Progressing  Bed in lowest position, wheels locked, 2/4 side rails up, nonskid footwear on. Bed/ chair check alarm in place, call light within reach. Pt instructed to call out when needing assistance. Pt stated understanding.      Problem: Skin/Tissue Integrity  Goal: Absence of new skin breakdown  Description: 1.  Monitor for areas of redness and/or skin breakdown  2.  Assess vascular access sites hourly  3.  Every 4-6 hours minimum:  Change oxygen saturation probe site  4.  Every 4-6 hours:  If on nasal continuous positive airway pressure, respiratory therapy assess nares and determine need for appliance change or resting period.  8/17/2024 1121 by Celia Garcia RN  Outcome: Progressing     Problem: Pain  Goal: Verbalizes/displays adequate comfort level or baseline comfort level  8/17/2024 1121 by Celia Garcia RN  Outcome: Progressing  Pt scoring pain on 0-10 scale. Pain medications given per MAR. Pt instructed to call out when pain level increasing. Call light within reach.     Problem: Chronic Conditions and Co-morbidities  Goal: Patient's chronic conditions and co-morbidity symptoms are monitored and maintained or improved  8/17/2024 1121 by Celia Garcia RN  Outcome: Progressing     Problem: Neurosensory - Adult  Goal: Achieves maximal functionality and self care  8/17/2024 1121 by Celia Garcia RN  Outcome: Progressing     Problem: Respiratory - Adult  Goal: Achieves optimal ventilation and oxygenation  8/17/2024 1121 by Celia Garcia RN  Outcome: Progressing     Problem: Skin/Tissue Integrity - Adult  Goal: Oral mucous membranes remain intact  8/17/2024 1121 by Celia Garcia RN  Outcome: Progressing     Problem: Skin/Tissue Integrity - 
  Problem: Discharge Planning  Goal: Discharge to home or other facility with appropriate resources  8/17/2024 2225 by Stefany Robles RN  Outcome: Progressing  8/17/2024 1412 by Celia Garcia RN  Outcome: Progressing  8/17/2024 1121 by Celia Garcia RN  Outcome: Progressing     Problem: Safety - Adult  Goal: Free from fall injury  8/17/2024 2225 by Stefany Robles RN  Outcome: Progressing  8/17/2024 1412 by Celia Garcia RN  Outcome: Progressing  8/17/2024 1121 by Celia Garcia RN  Outcome: Progressing     Problem: Skin/Tissue Integrity  Goal: Absence of new skin breakdown  Description: 1.  Monitor for areas of redness and/or skin breakdown  2.  Assess vascular access sites hourly  3.  Every 4-6 hours minimum:  Change oxygen saturation probe site  4.  Every 4-6 hours:  If on nasal continuous positive airway pressure, respiratory therapy assess nares and determine need for appliance change or resting period.  8/17/2024 2225 by Stefany Robles RN  Outcome: Progressing  8/17/2024 1412 by Celia Garcia RN  Outcome: Progressing  8/17/2024 1121 by Celia Garcia RN  Outcome: Progressing     Problem: Pain  Goal: Verbalizes/displays adequate comfort level or baseline comfort level  8/17/2024 2225 by Stefany Robles RN  Outcome: Progressing  8/17/2024 1412 by Celia Garcia RN  Outcome: Progressing  8/17/2024 1121 by Celia Garcia RN  Outcome: Progressing     Problem: Chronic Conditions and Co-morbidities  Goal: Patient's chronic conditions and co-morbidity symptoms are monitored and maintained or improved  8/17/2024 2225 by Stefany Robles RN  Outcome: Progressing  8/17/2024 1412 by Celia Garcia RN  Outcome: Progressing  8/17/2024 1121 by Celia Garcia RN  Outcome: Progressing     Problem: Neurosensory - Adult  Goal: Achieves maximal functionality and self care  8/17/2024 2225 by Stefany Robles RN  Outcome: Progressing  8/17/2024 1412 by Celia Garcia RN  Outcome: 
  Problem: Discharge Planning  Goal: Discharge to home or other facility with appropriate resources  8/20/2024 1026 by Michelle Contreras RN  Outcome: Adequate for Discharge  8/20/2024 1025 by Michelle Contreras RN  Outcome: Progressing  Flowsheets (Taken 8/20/2024 0750)  Discharge to home or other facility with appropriate resources: Identify barriers to discharge with patient and caregiver     Problem: Safety - Adult  Goal: Free from fall injury  8/20/2024 1026 by Michelle Contreras RN  Outcome: Adequate for Discharge  8/20/2024 1025 by Michelle Contreras RN  Outcome: Progressing     Problem: Skin/Tissue Integrity  Goal: Absence of new skin breakdown  Description: 1.  Monitor for areas of redness and/or skin breakdown  2.  Assess vascular access sites hourly  3.  Every 4-6 hours minimum:  Change oxygen saturation probe site  4.  Every 4-6 hours:  If on nasal continuous positive airway pressure, respiratory therapy assess nares and determine need for appliance change or resting period.  Outcome: Adequate for Discharge     Problem: Pain  Goal: Verbalizes/displays adequate comfort level or baseline comfort level  Outcome: Adequate for Discharge     Problem: Chronic Conditions and Co-morbidities  Goal: Patient's chronic conditions and co-morbidity symptoms are monitored and maintained or improved  Outcome: Adequate for Discharge     Problem: Neurosensory - Adult  Goal: Achieves maximal functionality and self care  Outcome: Adequate for Discharge     Problem: Respiratory - Adult  Goal: Achieves optimal ventilation and oxygenation  Outcome: Adequate for Discharge     Problem: Skin/Tissue Integrity - Adult  Goal: Oral mucous membranes remain intact  Outcome: Adequate for Discharge  Goal: Incisions, wounds, or drain sites healing without S/S of infection  Outcome: Adequate for Discharge     Problem: Musculoskeletal - Adult  Goal: Return ADL status to a safe level of function  Outcome: Adequate for Discharge     Problem: Gastrointestinal - 
  Problem: Discharge Planning  Goal: Discharge to home or other facility with appropriate resources  8/4/2024 0924 by Monica Madrid RN  Outcome: Progressing     Problem: Safety - Adult  Goal: Free from fall injury  8/4/2024 0924 by Monica Madrid RN  Outcome: Progressing     Problem: ABCDS Injury Assessment  Goal: Absence of physical injury  Outcome: Progressing     Problem: Skin/Tissue Integrity  Goal: Absence of new skin breakdown  Description: 1.  Monitor for areas of redness and/or skin breakdown  2.  Assess vascular access sites hourly  3.  Every 4-6 hours minimum:  Change oxygen saturation probe site  4.  Every 4-6 hours:  If on nasal continuous positive airway pressure, respiratory therapy assess nares and determine need for appliance change or resting period.  8/4/2024 0924 by Monica Madrid RN  Outcome: Progressing     Problem: Pain  Goal: Verbalizes/displays adequate comfort level or baseline comfort level  8/4/2024 0924 by Monica Madrid RN  Outcome: Progressing     
  Problem: Discharge Planning  Goal: Discharge to home or other facility with appropriate resources  Outcome: Not Progressing     Problem: Neurosensory - Adult  Goal: Achieves maximal functionality and self care  Outcome: Not Progressing     Problem: Skin/Tissue Integrity - Adult  Goal: Incisions, wounds, or drain sites healing without S/S of infection  Outcome: Not Progressing     Problem: Musculoskeletal - Adult  Goal: Return ADL status to a safe level of function  Outcome: Not Progressing     Problem: Gastrointestinal - Adult  Goal: Maintains adequate nutritional intake  Outcome: Not Progressing     Problem: Nutrition Deficit:  Goal: Optimize nutritional status  8/14/2024 0237 by Kady Kang, RN  Outcome: Not Progressing  8/13/2024 1254 by Carolyn Carey, MS, RD, LD  Outcome: Not Progressing  Flowsheets (Taken 8/13/2024 1231)  Nutrient intake appropriate for improving, restoring, or maintaining nutritional needs:   Assess nutritional status and recommend course of action   Monitor oral intake, labs, and treatment plans   Recommend appropriate diets, oral nutritional supplements, and vitamin/mineral supplements   Recommend, monitor, and adjust tube feedings and TPN/PPN based on assessed needs     
  Problem: Discharge Planning  Goal: Discharge to home or other facility with appropriate resources  Outcome: Progressing     Problem: Safety - Adult  Goal: Free from fall injury  8/8/2024 0921 by Luiza Hale, RN  Outcome: Progressing     Problem: Skin/Tissue Integrity  Goal: Absence of new skin breakdown  Description: 1.  Monitor for areas of redness and/or skin breakdown  2.  Assess vascular access sites hourly  3.  Every 4-6 hours minimum:  Change oxygen saturation probe site  4.  Every 4-6 hours:  If on nasal continuous positive airway pressure, respiratory therapy assess nares and determine need for appliance change or resting period.  8/8/2024 0921 by Luiza Hale, RN  Outcome: Progressing     Problem: Pain  Goal: Verbalizes/displays adequate comfort level or baseline comfort level  8/8/2024 0921 by Liuza Hale, RN  Outcome: Progressing     Problem: Chronic Conditions and Co-morbidities  Goal: Patient's chronic conditions and co-morbidity symptoms are monitored and maintained or improved  8/8/2024 0921 by Luiza Hale, RN  Outcome: Progressing     Problem: Nutrition Deficit:  Goal: Optimize nutritional status  8/8/2024 0456 by Tricia Huertas, RN  Outcome: Not Progressing     
  Problem: Discharge Planning  Goal: Discharge to home or other facility with appropriate resources  Outcome: Progressing     Problem: Safety - Adult  Goal: Free from fall injury  Outcome: Progressing     Problem: Pain  Goal: Verbalizes/displays adequate comfort level or baseline comfort level  Recent Flowsheet Documentation  Taken 8/19/2024 0745 by Michelle Contreras, RN  Verbalizes/displays adequate comfort level or baseline comfort level: Encourage patient to monitor pain and request assistance     Problem: Skin/Tissue Integrity  Goal: Absence of new skin breakdown  Description: 1.  Monitor for areas of redness and/or skin breakdown  2.  Assess vascular access sites hourly  3.  Every 4-6 hours minimum:  Change oxygen saturation probe site  4.  Every 4-6 hours:  If on nasal continuous positive airway pressure, respiratory therapy assess nares and determine need for appliance change or resting period.  Outcome: Not Progressing     
  Problem: Discharge Planning  Goal: Discharge to home or other facility with appropriate resources  Outcome: Progressing     Problem: Safety - Adult  Goal: Free from fall injury  Outcome: Progressing     Problem: Skin/Tissue Integrity  Goal: Absence of new skin breakdown  Description: 1.  Monitor for areas of redness and/or skin breakdown  2.  Assess vascular access sites hourly  3.  Every 4-6 hours minimum:  Change oxygen saturation probe site  4.  Every 4-6 hours:  If on nasal continuous positive airway pressure, respiratory therapy assess nares and determine need for appliance change or resting period.  Outcome: Progressing     Problem: Pain  Goal: Verbalizes/displays adequate comfort level or baseline comfort level  Outcome: Progressing     
  Problem: Discharge Planning  Goal: Discharge to home or other facility with appropriate resources  Outcome: Progressing     Problem: Safety - Adult  Goal: Free from fall injury  Outcome: Progressing     Problem: Skin/Tissue Integrity  Goal: Absence of new skin breakdown  Description: 1.  Monitor for areas of redness and/or skin breakdown  2.  Assess vascular access sites hourly  3.  Every 4-6 hours minimum:  Change oxygen saturation probe site  4.  Every 4-6 hours:  If on nasal continuous positive airway pressure, respiratory therapy assess nares and determine need for appliance change or resting period.  Outcome: Progressing     Problem: Pain  Goal: Verbalizes/displays adequate comfort level or baseline comfort level  Outcome: Progressing     Problem: Chronic Conditions and Co-morbidities  Goal: Patient's chronic conditions and co-morbidity symptoms are monitored and maintained or improved  Outcome: Progressing     Problem: Metabolic/Fluid and Electrolytes - Adult  Goal: Electrolytes maintained within normal limits  Outcome: Progressing     Problem: Hematologic - Adult  Goal: Maintains hematologic stability  Outcome: Progressing     
  Problem: Discharge Planning  Goal: Discharge to home or other facility with appropriate resources  Outcome: Progressing     Problem: Safety - Adult  Goal: Free from fall injury  Outcome: Progressing     Problem: Skin/Tissue Integrity  Goal: Absence of new skin breakdown  Description: 1.  Monitor for areas of redness and/or skin breakdown  2.  Assess vascular access sites hourly  3.  Every 4-6 hours minimum:  Change oxygen saturation probe site  4.  Every 4-6 hours:  If on nasal continuous positive airway pressure, respiratory therapy assess nares and determine need for appliance change or resting period.  Outcome: Progressing     Problem: Pain  Goal: Verbalizes/displays adequate comfort level or baseline comfort level  Outcome: Progressing     Problem: Chronic Conditions and Co-morbidities  Goal: Patient's chronic conditions and co-morbidity symptoms are monitored and maintained or improved  Outcome: Progressing     Problem: Neurosensory - Adult  Goal: Achieves maximal functionality and self care  Outcome: Progressing     Problem: Respiratory - Adult  Goal: Achieves optimal ventilation and oxygenation  Outcome: Progressing     Problem: Skin/Tissue Integrity - Adult  Goal: Oral mucous membranes remain intact  Outcome: Progressing     Problem: Musculoskeletal - Adult  Goal: Return ADL status to a safe level of function  Outcome: Progressing     Problem: Gastrointestinal - Adult  Goal: Maintains or returns to baseline bowel function  Outcome: Progressing  Goal: Maintains adequate nutritional intake  Outcome: Progressing     Problem: Genitourinary - Adult  Goal: Absence of urinary retention  Outcome: Progressing     Problem: Infection - Adult  Goal: Absence of infection at discharge  Outcome: Progressing     Problem: Metabolic/Fluid and Electrolytes - Adult  Goal: Electrolytes maintained within normal limits  Outcome: Progressing     Problem: Hematologic - Adult  Goal: Maintains hematologic stability  Outcome: 
  Problem: Discharge Planning  Goal: Discharge to home or other facility with appropriate resources  Outcome: Progressing     Problem: Safety - Adult  Goal: Free from fall injury  Outcome: Progressing     Problem: Skin/Tissue Integrity  Goal: Absence of new skin breakdown  Description: 1.  Monitor for areas of redness and/or skin breakdown  2.  Assess vascular access sites hourly  3.  Every 4-6 hours minimum:  Change oxygen saturation probe site  4.  Every 4-6 hours:  If on nasal continuous positive airway pressure, respiratory therapy assess nares and determine need for appliance change or resting period.  Outcome: Progressing     Problem: Pain  Goal: Verbalizes/displays adequate comfort level or baseline comfort level  Outcome: Progressing  Flowsheets (Taken 8/16/2024 1000 by Margie Cordoba RN)  Verbalizes/displays adequate comfort level or baseline comfort level: Encourage patient to monitor pain and request assistance     Problem: Chronic Conditions and Co-morbidities  Goal: Patient's chronic conditions and co-morbidity symptoms are monitored and maintained or improved  Outcome: Progressing     Problem: Neurosensory - Adult  Goal: Achieves maximal functionality and self care  Outcome: Progressing     Problem: Respiratory - Adult  Goal: Achieves optimal ventilation and oxygenation  Outcome: Progressing     Problem: Skin/Tissue Integrity - Adult  Goal: Oral mucous membranes remain intact  Outcome: Progressing  Goal: Incisions, wounds, or drain sites healing without S/S of infection  Outcome: Progressing     Problem: Musculoskeletal - Adult  Goal: Return ADL status to a safe level of function  Outcome: Progressing     Problem: Gastrointestinal - Adult  Goal: Maintains or returns to baseline bowel function  Outcome: Progressing  Goal: Maintains adequate nutritional intake  Outcome: Progressing     Problem: Genitourinary - Adult  Goal: Absence of urinary retention  Outcome: Progressing     Problem: Infection - 
  Problem: Discharge Planning  Goal: Discharge to home or other facility with appropriate resources  Outcome: Progressing  Flowsheets (Taken 8/15/2024 0801)  Discharge to home or other facility with appropriate resources: Identify barriers to discharge with patient and caregiver     Problem: Safety - Adult  Goal: Free from fall injury  8/15/2024 1041 by aRmandeep Dias, RN  Outcome: Progressing  Flowsheets (Taken 8/15/2024 1030)  Free From Fall Injury: Instruct family/caregiver on patient safety  8/14/2024 2148 by Codi Harrington, RN  Outcome: Progressing  Flowsheets (Taken 8/14/2024 2148)  Free From Fall Injury:   Instruct family/caregiver on patient safety   Based on caregiver fall risk screen, instruct family/caregiver to ask for assistance with transferring infant if caregiver noted to have fall risk factors     Problem: Skin/Tissue Integrity  Goal: Absence of new skin breakdown  Description: 1.  Monitor for areas of redness and/or skin breakdown  2.  Assess vascular access sites hourly  3.  Every 4-6 hours minimum:  Change oxygen saturation probe site  4.  Every 4-6 hours:  If on nasal continuous positive airway pressure, respiratory therapy assess nares and determine need for appliance change or resting period.  Outcome: Progressing     Problem: Pain  Goal: Verbalizes/displays adequate comfort level or baseline comfort level  Outcome: Progressing     Problem: Chronic Conditions and Co-morbidities  Goal: Patient's chronic conditions and co-morbidity symptoms are monitored and maintained or improved  Outcome: Progressing  Flowsheets (Taken 8/15/2024 0801)  Care Plan - Patient's Chronic Conditions and Co-Morbidity Symptoms are Monitored and Maintained or Improved: Monitor and assess patient's chronic conditions and comorbid symptoms for stability, deterioration, or improvement     Problem: Neurosensory - Adult  Goal: Achieves maximal functionality and self care  Outcome: Progressing  Flowsheets (Taken 8/15/2024 
  Problem: Discharge Planning  Goal: Discharge to home or other facility with appropriate resources  Outcome: Progressing  Flowsheets (Taken 8/20/2024 0750)  Discharge to home or other facility with appropriate resources: Identify barriers to discharge with patient and caregiver     
  Problem: Genitourinary - Adult  Goal: Absence of urinary retention  Outcome: Not Progressing     Problem: Nutrition Deficit:  Goal: Optimize nutritional status  Outcome: Not Progressing   Pt has a history of renal transplant and retention. Pt is currently ordered to be straight cathed Q4-6 hours. Pt is straight cathed @ home Q6 hrs.  Pt also continues to have decreased nutritional intake.   Problem: Skin/Tissue Integrity  Goal: Absence of new skin breakdown  Description: 1.  Monitor for areas of redness and/or skin breakdown  2.  Assess vascular access sites hourly  3.  Every 4-6 hours minimum:  Change oxygen saturation probe site  4.  Every 4-6 hours:  If on nasal continuous positive airway pressure, respiratory therapy assess nares and determine need for appliance change or resting period.  Outcome: Progressing     Problem: Pain  Goal: Verbalizes/displays adequate comfort level or baseline comfort level  Outcome: Progressing     Problem: Respiratory - Adult  Goal: Achieves optimal ventilation and oxygenation  Outcome: Progressing     Problem: Gastrointestinal - Adult  Goal: Maintains or returns to baseline bowel function  Outcome: Progressing     Problem: Metabolic/Fluid and Electrolytes - Adult  Goal: Electrolytes maintained within normal limits  Outcome: Progressing     Problem: Hematologic - Adult  Goal: Maintains hematologic stability  Outcome: Progressing      
  Problem: Neurosensory - Adult  Goal: Achieves maximal functionality and self care  8/12/2024 2307 by Kady Kang RN  Outcome: Not Progressing  8/12/2024 1823 by Simona Saravia RN  Outcome: Progressing     Problem: Skin/Tissue Integrity - Adult  Goal: Incisions, wounds, or drain sites healing without S/S of infection  Outcome: Not Progressing     Problem: Gastrointestinal - Adult  Goal: Maintains adequate nutritional intake  8/12/2024 2307 by Kady Kang RN  Outcome: Not Progressing  8/12/2024 1823 by Simona Saravia RN  Outcome: Progressing     Problem: Genitourinary - Adult  Goal: Absence of urinary retention  8/12/2024 2307 by Kady Kang RN  Outcome: Not Progressing  8/12/2024 1823 by Simona Saravia RN  Outcome: Progressing     Problem: Metabolic/Fluid and Electrolytes - Adult  Goal: Hemodynamic stability and optimal renal function maintained  Outcome: Not Progressing     Problem: Nutrition Deficit:  Goal: Optimize nutritional status  8/12/2024 2307 by Kady Kang RN  Outcome: Not Progressing  8/12/2024 1823 by Simona Saravia RN  Outcome: Progressing     
  Problem: Safety - Adult  Goal: Free from fall injury  8/10/2024 1901 by Rita Parson RN  Outcome: Progressing   Pt will remain free from falls throughout hospital stay. Fall precautions in place, bed alarm on, bed in lowest position with wheels locked and side rails 2/4 up. Room door open and hourly rounding completed. Will continue to monitor throughout shift.     Problem: Skin/Tissue Integrity  Goal: Absence of new skin breakdown  Description: 1.  Monitor for areas of redness and/or skin breakdown  2.  Assess vascular access sites hourly  3.  Every 4-6 hours minimum:  Change oxygen saturation probe site  4.  Every 4-6 hours:  If on nasal continuous positive airway pressure, respiratory therapy assess nares and determine need for appliance change or resting period.  8/10/2024 1901 by Rita Parson RN  Outcome: Progressing     Problem: Pain  Goal: Verbalizes/displays adequate comfort level or baseline comfort level  8/10/2024 1901 by Rita Parson RN  Outcome: Progressing  Pt will be satisfied with pain control. Pt uses numeric pain rating scale with reassessments after pain med administration. Will continue to monitor progression throughout shift.      Problem: Chronic Conditions and Co-morbidities  Goal: Patient's chronic conditions and co-morbidity symptoms are monitored and maintained or improved  8/10/2024 1901 by Rita Parson RN  Outcome: Progressing  Pt will have accuchecks before meals and at bedtime with sliding scale insulin in place for coverage. Will continue to monitor for signs and symptoms of hypoglycemia and hyperglycemia throughout shift.      Problem: Skin/Tissue Integrity - Adult  Goal: Oral mucous membranes remain intact  8/10/2024 1901 by Rita Parson RN  Outcome: Progressing     
  Problem: Safety - Adult  Goal: Free from fall injury  Outcome: Progressing     Problem: ABCDS Injury Assessment  Goal: Absence of physical injury  Outcome: Progressing     Problem: Skin/Tissue Integrity  Goal: Absence of new skin breakdown  Description: 1.  Monitor for areas of redness and/or skin breakdown  2.  Assess vascular access sites hourly  3.  Every 4-6 hours minimum:  Change oxygen saturation probe site  4.  Every 4-6 hours:  If on nasal continuous positive airway pressure, respiratory therapy assess nares and determine need for appliance change or resting period.  Outcome: Progressing     Problem: Pain  Goal: Verbalizes/displays adequate comfort level or baseline comfort level  Outcome: Progressing     Problem: Chronic Conditions and Co-morbidities  Goal: Patient's chronic conditions and co-morbidity symptoms are monitored and maintained or improved  Outcome: Progressing     
  Problem: Safety - Adult  Goal: Free from fall injury  Outcome: Progressing     Problem: Skin/Tissue Integrity  Goal: Absence of new skin breakdown  Description: 1.  Monitor for areas of redness and/or skin breakdown  2.  Assess vascular access sites hourly  3.  Every 4-6 hours minimum:  Change oxygen saturation probe site  4.  Every 4-6 hours:  If on nasal continuous positive airway pressure, respiratory therapy assess nares and determine need for appliance change or resting period.  Outcome: Progressing     Problem: Pain  Goal: Verbalizes/displays adequate comfort level or baseline comfort level  Outcome: Adequate for Discharge     Problem: Chronic Conditions and Co-morbidities  Goal: Patient's chronic conditions and co-morbidity symptoms are monitored and maintained or improved  Outcome: Progressing     Problem: Gastrointestinal - Adult  Goal: Maintains or returns to baseline bowel function  Outcome: Progressing     Problem: Infection - Adult  Goal: Absence of infection at discharge  Outcome: Progressing     Problem: Nutrition Deficit:  Goal: Optimize nutritional status  Outcome: Not Progressing     Problem: Nutrition Deficit:  Goal: Optimize nutritional status  Outcome: Not Progressing     
  Problem: Safety - Adult  Goal: Free from fall injury  Outcome: Progressing     Problem: Skin/Tissue Integrity  Goal: Absence of new skin breakdown  Description: 1.  Monitor for areas of redness and/or skin breakdown  2.  Assess vascular access sites hourly  3.  Every 4-6 hours minimum:  Change oxygen saturation probe site  4.  Every 4-6 hours:  If on nasal continuous positive airway pressure, respiratory therapy assess nares and determine need for appliance change or resting period.  Outcome: Progressing     Problem: Skin/Tissue Integrity - Adult  Goal: Oral mucous membranes remain intact  Outcome: Progressing     Problem: Nutrition Deficit:  Goal: Optimize nutritional status  Outcome: Progressing     
  Problem: Safety - Adult  Goal: Free from fall injury  Outcome: Progressing  Flowsheets (Taken 8/9/2024 2105)  Free From Fall Injury:   Instruct family/caregiver on patient safety   Based on caregiver fall risk screen, instruct family/caregiver to ask for assistance with transferring infant if caregiver noted to have fall risk factors     Problem: Pain  Goal: Verbalizes/displays adequate comfort level or baseline comfort level  Outcome: Progressing  Flowsheets (Taken 8/9/2024 2105)  Verbalizes/displays adequate comfort level or baseline comfort level:   Assess pain using appropriate pain scale   Encourage patient to monitor pain and request assistance   Administer analgesics based on type and severity of pain and evaluate response   Implement non-pharmacological measures as appropriate and evaluate response   Consider cultural and social influences on pain and pain management   Notify Licensed Independent Practitioner if interventions unsuccessful or patient reports new pain     
  Problem: Skin/Tissue Integrity  Goal: Absence of new skin breakdown  Description: 1.  Monitor for areas of redness and/or skin breakdown  2.  Assess vascular access sites hourly  3.  Every 4-6 hours minimum:  Change oxygen saturation probe site  4.  Every 4-6 hours:  If on nasal continuous positive airway pressure, respiratory therapy assess nares and determine need for appliance change or resting period.  Outcome: Not Progressing     Problem: Gastrointestinal - Adult  Goal: Maintains adequate nutritional intake  Outcome: Not Progressing     Problem: Genitourinary - Adult  Goal: Absence of urinary retention  Outcome: Not Progressing     
4 Eyes Skin Assessment     The patient is being assess for  Shift Handoff    I agree that 2 RN's have performed a thorough Head to Toe Skin Assessment on the patient. ALL assessment sites listed below have been assessed.       Areas assessed by both nurses:   [x]   Head, Face, and Ears   [x]   Shoulders, Back, and Chest  [x]   Arms, Elbows, and Hands   [x]   Coccyx, Sacrum, and Ischum  [x]   Legs, Feet, and Heels        Does the Patient have Skin Breakdown?  Yes a wound was noted on the Admission Assessment and an WOUND LDA was Initiated documentation include the Maryellen-wound, Wound Assessment, Measurements, Dressing Treatment, Drainage, and Color\",         Bobby Prevention initiated:  Yes   Wound Care Orders initiated:  Yes      WOC nurse consulted for Pressure Injury (Stage 3,4, Unstageable, DTI, NWPT, and Complex wounds):  Yes      Nurse 1 eSignature: Electronically signed by Tricia Huertas RN on 8/12/24 at 12:49 AM EDT    **SHARE this note so that the co-signing nurse is able to place an eSignature**    Nurse 2 eSignature: Electronically signed by OMAR IRWIN RN on 8/12/24 at 5:06 AM EDT            
Increase function to baseline    
Increase patients ADLs/functional status to baseline.    
Patient requires straight cath chronic, even at home average of every 6 hours per mom. Mother refused a Nugent Catheter, Refuses pain medication other than regular tylenol (it is listed as an allergy however mother stated that is not accurate). Patient mom aware that patient is refusing to eat. She states at home she will not eat if she does not feel good, she would rather sleep. Will continue to encourage, assist, monitor.     Problem: Gastrointestinal - Adult  Goal: Maintains adequate nutritional intake  8/5/2024 1419 by Erin Peralta RN  Outcome: Not Progressing  8/5/2024 1419 by Erin Peralta RN  Reactivated      Problem: Discharge Planning  Goal: Discharge to home or other facility with appropriate resources  8/5/2024 1419 by Erin Peralta RN  Outcome: Progressing     Problem: Safety - Adult  Goal: Free from fall injury  8/5/2024 1419 by Erin Peralta RN  Outcome: Progressing     Problem: ABCDS Injury Assessment  Goal: Absence of physical injury  8/5/2024 1419 by Erin Peralta RN  Outcome: Progressing     Problem: Skin/Tissue Integrity  Goal: Absence of new skin breakdown  Description: 1.  Monitor for areas of redness and/or skin breakdown  2.  Assess vascular access sites hourly  3.  Every 4-6 hours minimum:  Change oxygen saturation probe site  4.  Every 4-6 hours:  If on nasal continuous positive airway pressure, respiratory therapy assess nares and determine need for appliance change or resting period.  8/5/2024 1419 by Erin Peralta RN  Outcome: Progressing     Problem: Pain  Goal: Verbalizes/displays adequate comfort level or baseline comfort level  8/5/2024 1419 by Erin Peralta RN  Outcome: Progressing     Problem: Chronic Conditions and Co-morbidities  Goal: Patient's chronic conditions and co-morbidity symptoms are monitored and maintained or improved  8/5/2024 1419 by Erin Peralta RN  Outcome: Progressing     Problem: Neurosensory - Adult  Goal: Achieves maximal 
Urology re- consulted for rodriguez placement    - This is a 43yo Female with transplant kidney admitted with infected gluteal wound.  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  Her urine culture ended up being negative.  We suspected her kidney findings on CT were related to her chronic intermittent catheterization with reflux at the anastomosis of the transplanted kidney which would lead to air being seen in both bladder and kidney on CT    - She is still admitted to the hospital and not doing well.  She may need CRRT.  Strict urine output is needed.  the patient has a history of a Rodriguez catheter balloon being  placed into the transplanted kidney. On the basis of this, her mother previously declined a urethral catheter which is understandable.  With her worsening status family is now agreeable to urethral catheter placement    - I discussed this case with Dr Cardona.  OK for nursing staff to place a 16 Greek rodriguez in the urethra.   I discussed placement with the patient's bedside nurse.  If rodriguez placed today please ensure a stat lock is placed to secure the rodriguez.    The family may only allow for urology team to place the catheter.  If that is the case we will place it during rounds on 8/14/24     - Full progress note to follow on 8/14/24     Alison Chavez PA-C  [facility]        
hemodynamic stability  8/13/2024 1057 by Simona Saravia RN  Outcome: Progressing  8/12/2024 2307 by Kady Kang RN  Outcome: Progressing     Problem: Neurosensory - Adult  Goal: Achieves maximal functionality and self care  8/13/2024 1057 by Simona Saravia RN  Outcome: Progressing  8/12/2024 2307 by Kady Kang RN  Outcome: Not Progressing     Problem: Skin/Tissue Integrity - Adult  Goal: Incisions, wounds, or drain sites healing without S/S of infection  8/13/2024 1057 by Simona Saravia RN  Outcome: Progressing  8/12/2024 2307 by Kady Kang RN  Outcome: Not Progressing     Problem: Gastrointestinal - Adult  Goal: Maintains adequate nutritional intake  8/13/2024 1057 by Simona Saravia RN  Outcome: Progressing  8/12/2024 2307 by Kady Kang RN  Outcome: Not Progressing     Problem: Genitourinary - Adult  Goal: Absence of urinary retention  8/13/2024 1057 by Simona Saravia RN  Outcome: Progressing  8/12/2024 2307 by Kady Kang RN  Outcome: Not Progressing     Problem: Metabolic/Fluid and Electrolytes - Adult  Goal: Hemodynamic stability and optimal renal function maintained  8/13/2024 1057 by Simona Saravia RN  Outcome: Progressing  8/12/2024 2307 by Kady Kang RN  Outcome: Not Progressing     Problem: Nutrition Deficit:  Goal: Optimize nutritional status  8/13/2024 1057 by Simona Saravia RN  Outcome: Progressing  8/12/2024 2307 by Kady Kang RN  Outcome: Not Progressing     
no

## 2024-08-20 NOTE — PROGRESS NOTES
Infectious Disease Follow up Notes    CC : infected pressure wound      Antibiotics:  Meropenem 500 q8  Linezolid 600 IV q12   Po vanc 250 BID  Culturelle     Tacro 1mg BID, prednisone 10 daily     Admit Date:   8/3/2024  Hospital Day: 4    Subjective:   She remains AF    BM x3 yesterday recorded, x1 so far today.   No acute changes     Objective:     Patient Vitals for the past 8 hrs:   BP Temp Temp src Pulse Resp SpO2   08/06/24 1345 (!) 127/51 -- -- 80 16 100 %   08/06/24 1126 110/66 97.7 °F (36.5 °C) Oral 74 16 99 %   08/06/24 0745 (!) 114/47 98.1 °F (36.7 °C) Oral 83 14 96 %         EXAM:  General: much more alert, speaking on the phone  NAD     HEENT:  PERRL, sclera anicteric   MMM, no thrush    NECK:   Supple      LUNGS:   CTA christine without W/R/R   CV:   RRR  ABD: Soft, flat, NT     EXT:  no focal rash  Ischial wound dressed        LINE:   PIV in place     8/5/24      8/3/24            Scheduled Meds:   vashe wound therapy   Topical Daily    polyethylene glycol  17 g Oral BID    sennosides-docusate sodium  2 tablet Oral BID    linezolid  600 mg IntraVENous Q12H    meropenem  500 mg IntraVENous Q8H    lactobacillus  1 capsule Oral BID WC    vancomycin  250 mg Oral BID    tacrolimus  1 mg Oral BID    predniSONE  10 mg Oral Daily    FLUoxetine  10 mg Oral Daily    aspirin  81 mg Oral Daily    atorvastatin  40 mg Oral Daily    [Held by provider] clopidogrel  75 mg Oral Daily    pantoprazole  40 mg Oral BID AC    [Held by provider] traZODone  100 mg Oral Nightly    sodium chloride flush  5-40 mL IntraVENous 2 times per day    heparin (porcine)  5,000 Units SubCUTAneous 3 times per day    insulin glargine  7 Units SubCUTAneous Nightly    insulin lispro  0-8 Units SubCUTAneous TID WC    insulin lispro  0-4 Units SubCUTAneous Nightly    midodrine  5 mg Oral TID WC       Continuous Infusions:   sodium chloride 100 mL/hr at 08/06/24 1405    
                                                                                Infectious Disease Follow up Notes    CC : infected pressure wound      Antibiotics:  Zosyn 3.375 q8  Po vanc 250 BID  Culturelle     Glycolax and senokot BID     Tacro 1mg BID, prednisone 10 daily     Admit Date:   8/3/2024  Hospital Day: 10    Subjective:   Some labile temperatures over the weekend noted.  Currently AF  Po intake is poor.  Patient has refused NGT placement.   On 6L NC   BM x6 recorded prior 24 hours     Objective:     Patient Vitals for the past 8 hrs:   BP Temp Temp src Pulse Resp SpO2 Weight   08/12/24 1104 135/67 97.9 °F (36.6 °C) Oral 75 20 97 % --   08/12/24 0933 -- -- -- -- 20 -- --   08/12/24 0745 (!) 121/93 97.7 °F (36.5 °C) Oral (!) 113 -- 93 % --   08/12/24 0630 -- -- -- -- -- -- 57 kg (125 lb 10.6 oz)       EXAM:  General: alert, NAD     HEENT:  PERRL, sclera anicteric   MMM, no thrush    NECK:   Supple      LUNGS:   CTA christine without W/R/R   CV:   RRR  ABD: Soft, flat, NT     EXT:  no focal rash  Ischial wound VAC        LINE:   PIV in place     8/12    8/9      8/7    8/5/24      8/3/24            Scheduled Meds:   magnesium sulfate  4,000 mg IntraVENous Once    sodium chloride flush  5-40 mL IntraVENous 2 times per day    potassium chloride  40 mEq Oral Once    piperacillin-tazobactam  3,375 mg IntraVENous Q8H    vashe wound therapy   Topical Daily    polyethylene glycol  17 g Oral BID    sennosides-docusate sodium  2 tablet Oral BID    lactobacillus  1 capsule Oral BID WC    vancomycin  250 mg Oral BID    tacrolimus  1 mg Oral BID    predniSONE  10 mg Oral Daily    FLUoxetine  10 mg Oral Daily    aspirin  81 mg Oral Daily    atorvastatin  40 mg Oral Daily    [Held by provider] clopidogrel  75 mg Oral Daily    pantoprazole  40 mg Oral BID AC    [Held by provider] traZODone  100 mg Oral Nightly    sodium chloride flush  5-40 mL IntraVENous 2 times per day    heparin (porcine)  5,000 Units SubCUTAneous 3 
                                                                                Infectious Disease Follow up Notes    CC : infected pressure wound      Antibiotics:  Zosyn 3.375 q8  Po vanc 250 BID  Culturelle     Tacro 1mg BID, prednisone 10 daily     Admit Date:   8/3/2024  Hospital Day: 6    Subjective:   She remains AF, 24h Tm 99.1  BM x1 today, none recorded yesterday    She has not new focal complaints     Objective:     Patient Vitals for the past 8 hrs:   BP Temp Temp src Pulse Resp SpO2   08/08/24 1545 101/60 98.6 °F (37 °C) Axillary 89 18 95 %   08/08/24 1127 (!) 119/53 99.1 °F (37.3 °C) Oral (!) 46 18 95 %         EXAM:  General: alert, NAD     HEENT:  PERRL, sclera anicteric   MMM, no thrush    NECK:   Supple      LUNGS:   CTA christine without W/R/R   CV:   RRR  ABD: Soft, flat, NT     EXT:  no focal rash  Ischial wound VAC        LINE:   PIV in place     8/7    8/5/24      8/3/24            Scheduled Meds:   potassium chloride  40 mEq Oral Once    piperacillin-tazobactam  3,375 mg IntraVENous Q8H    vashe wound therapy   Topical Daily    polyethylene glycol  17 g Oral BID    sennosides-docusate sodium  2 tablet Oral BID    lactobacillus  1 capsule Oral BID WC    vancomycin  250 mg Oral BID    tacrolimus  1 mg Oral BID    predniSONE  10 mg Oral Daily    FLUoxetine  10 mg Oral Daily    aspirin  81 mg Oral Daily    atorvastatin  40 mg Oral Daily    [Held by provider] clopidogrel  75 mg Oral Daily    pantoprazole  40 mg Oral BID AC    [Held by provider] traZODone  100 mg Oral Nightly    sodium chloride flush  5-40 mL IntraVENous 2 times per day    heparin (porcine)  5,000 Units SubCUTAneous 3 times per day    insulin glargine  7 Units SubCUTAneous Nightly    insulin lispro  0-8 Units SubCUTAneous TID WC    insulin lispro  0-4 Units SubCUTAneous Nightly    midodrine  5 mg Oral TID WC       Continuous Infusions:   sodium bicarbonate 150 mEq in dextrose 5 % 1,000 mL infusion 75 mL/hr at 08/08/24 1550    sodium 
                               [facility] Nurse  Follow-up Progress Note       NAME:  Carolina Arguelles  MEDICAL RECORD NUMBER:  [id number]  AGE:  44 y.o.   GENDER:  female  :  1980  TODAY'S DATE:  2024    Subjective: Neelima?   Is my Mom here?  Sebas Almaguer PN with General Surgery, evaluated left buttocks with Wound Care.   Both feel wound NPWT is making a different at this time.  NEERAJ Almaguer will consult with Dr. Lou as to next step.   For now a dressing of Vashe moistened gauze with dry gauze over it.   Patient does have loose stool.  Inform bedside nurse, Simona, of dressing change.  Will leave NPWT appliance in room for now.    Wound Identification:  Wound Type: pressure  Contributing Factors: diabetes, poor glucose control, chronic pressure, decreased mobility, and shear force        Patient Goal of Care:  [x] Wound Healing  [] Odor Control  [] Palliative Care  [x] Pain Control   [] Other:     Objective: lying in bed    BP (!) 121/93   Pulse (!) 113   Temp 97.7 °F (36.5 °C) (Oral)   Resp 20   Ht 1.549 m (5' 1\")   Wt 57 kg (125 lb 10.6 oz)   SpO2 93%   BMI 23.74 kg/m²   Bobby Risk Score: Bobby Scale Score: 12  Assessment: over 50% moist slough brown present in left ischium, distal has <25% red granulation   Measurements:  Negative Pressure Wound Therapy Buttocks Left (Active)   Wound Type Pressure ulcer: Stage IV 24 0010   Unit Type [id number] VAC [employer] machine 08/10/24 2034   Dressing Type Other (Comment) 24 0929   Number of pieces used 3 24 09   Number of pieces removed 3 24 0939   Cycle Continuous 24 0010   Target Pressure (mmHg) 125 08/10/24 2034   Intensity 5 24   Irrigation Solution Vashe 24 0010   Instillation Volume  18 mL 2439   Soak Time  10 minutes 24   Vac Frequency 2 hours 24 09   Canister changed? No 24   Dressing Status Dry;Intact 08/10/24 2034   Dressing 
           Progress Note    HISTORY     CC:  Infected left gluteal wound           We are following for CKD in kidney transplant        Subjective/   HPI:  Scr slightly better.  Says she is not eating much.  NO edema.  Says that she is in pain and not wanting to eat, little appetite     ROS:  Constitutional:  No fevers, No Chills, + weakness  Cardiovascular:  No palpations  Respiratory:  No wheezing, no cough  Skin:  No rash, no itching  :  No hematuria, no dysuria     Social Hx:  No Family at the bedside     Past Medical and Surgical History:  - Reviewed, no changes     EXAM       Objective/     Vitals:    08/08/24 0215 08/08/24 0315 08/08/24 0737 08/08/24 1127   BP: (!) 87/46 95/73 (!) 115/92 (!) 119/53   Pulse:  90 91 (!) 46   Resp:  16 16 18   Temp:  97.9 °F (36.6 °C) 98.6 °F (37 °C) 99.1 °F (37.3 °C)   TempSrc:  Axillary Axillary Oral   SpO2:  95% 99% 95%   Weight:       Height:         24HR INTAKE/OUTPUT:    Intake/Output Summary (Last 24 hours) at 8/8/2024 1516  Last data filed at 8/8/2024 1009  Gross per 24 hour   Intake 3391.03 ml   Output 400 ml   Net 2991.03 ml       Gen: alert, awake, ill-appearing  Skin: no rash, turgor wnl  Heent:  eomi, mmm  Neck: no bruits or jvd noted, thyroid normal  Cardiovascular:  S1, S2 without m/r/g  Respiratory: CTA B without w/r/r; respiratory effort normal  Abdomen:  +bs, soft, nt, nd, no hepatosplenomegaly  Ext: no lower extremity edema  Psychiatric: mood and affect appropriate; judgement and insight intact        MEDICAL DECISION MAKING       Data/  Recent Labs     08/07/24 0720 08/08/24 0400 08/08/24  0433   WBC 21.3* 16.2* 16.8*   HGB 8.5* 7.9* 8.3*   HCT 27.6* 24.9* 27.0*   MCV 90.6 87.7 89.7   * 122* 95*       Recent Labs     08/07/24 0720 08/08/24  0400 08/08/24  0433    138 142   K 2.9* 4.0 4.1    109 112*   CO2 15* 16* 15*   GLUCOSE 61* 129* 131*   PHOS 1.7* 5.2* 5.2*   MG 1.80 1.40* 1.50*   BUN 54* 43* 45*   CREATININE 2.4* 2.3* 2.3* 
           Progress Note    HISTORY     CC:  Infected left gluteal wound           We are following for CKD in kidney transplant        Subjective/   HPI:  Scr slightly better.  Says she is not eating yet.  No swelling.  No fevers.  Good urine output     ROS:  Constitutional:  No fevers, No Chills, + weakness  Cardiovascular:  No palpations  Respiratory:  No wheezing, no cough  Skin:  No rash, no itching  :  No hematuria, no dysuria     Social Hx:  No Family at the bedside     Past Medical and Surgical History:  - Reviewed, no changes     EXAM       Objective/     Vitals:    08/06/24 0024 08/06/24 0400 08/06/24 0745 08/06/24 1126   BP: 119/64 (!) 108/56 (!) 114/47 110/66   Pulse: 80 80 83 74   Resp: 16 16 14 16   Temp: 97.6 °F (36.4 °C) 98.7 °F (37.1 °C) 98.1 °F (36.7 °C) 97.7 °F (36.5 °C)   TempSrc: Oral Oral Oral Oral   SpO2: 98% 96% 96% 99%   Weight:       Height:         24HR INTAKE/OUTPUT:    Intake/Output Summary (Last 24 hours) at 8/6/2024 1337  Last data filed at 8/6/2024 1108  Gross per 24 hour   Intake 689.82 ml   Output 1250 ml   Net -560.18 ml       Gen: alert, awake, ill-appearing  Skin: no rash, turgor wnl  Heent:  eomi, mmm  Neck: no bruits or jvd noted, thyroid normal  Cardiovascular:  S1, S2 without m/r/g  Respiratory: CTA B without w/r/r; respiratory effort normal  Abdomen:  +bs, soft, nt, nd, no hepatosplenomegaly  Ext: no lower extremity edema  Psychiatric: mood and affect appropriate; judgement and insight intact        MEDICAL DECISION MAKING       Data/  Recent Labs     08/04/24 0434 08/05/24 0456 08/06/24  0745   WBC 25.6* 24.6* 18.5*   HGB 8.8* 8.9* 9.0*   HCT 28.1* 28.1* 28.5*   MCV 90.1 88.6 88.6    142 80*       Recent Labs     08/04/24 0434 08/05/24  0456 08/06/24  0514   * 134* 135*   K 3.9 3.8 3.7   CL 99 100 103   CO2 20* 23 18*   GLUCOSE 120* 204* 167*   PHOS  --  2.5 2.9   MG  --  1.50* 1.90   * 89* 70*   CREATININE 3.4* 3.2* 2.7*   LABGLOM 17* 18* 22* 
           Progress Note    HISTORY     CC:  Infected left gluteal wound           We are following for CKD in kidney transplant        Subjective/   HPI:  Scr slightly better.  Says she is not eating yet.  No swelling.  No fevers.  Good urine output     ROS:  Constitutional:  No fevers, No Chills, + weakness  Cardiovascular:  No palpations  Respiratory:  No wheezing, no cough  Skin:  No rash, no itching  :  No hematuria, no dysuria     Social Hx:  No Family at the bedside     Past Medical and Surgical History:  - Reviewed, no changes     EXAM       Objective/     Vitals:    08/07/24 0535 08/07/24 0800 08/07/24 1013 08/07/24 1115   BP: (!) 87/47 (!) 92/48 (!) 113/47 (!) 104/53   Pulse: 93 93 89 90   Resp: 16      Temp:  99.7 °F (37.6 °C) 98.1 °F (36.7 °C) 97.7 °F (36.5 °C)   TempSrc:  Oral Oral Axillary   SpO2: 96% 100% 97% 100%   Weight:       Height:         24HR INTAKE/OUTPUT:    Intake/Output Summary (Last 24 hours) at 8/7/2024 1515  Last data filed at 8/7/2024 1237  Gross per 24 hour   Intake 1252.11 ml   Output 925 ml   Net 327.11 ml       Gen: alert, awake, ill-appearing  Skin: no rash, turgor wnl  Heent:  eomi, mmm  Neck: no bruits or jvd noted, thyroid normal  Cardiovascular:  S1, S2 without m/r/g  Respiratory: CTA B without w/r/r; respiratory effort normal  Abdomen:  +bs, soft, nt, nd, no hepatosplenomegaly  Ext: no lower extremity edema  Psychiatric: mood and affect appropriate; judgement and insight intact        MEDICAL DECISION MAKING       Data/  Recent Labs     08/05/24 0456 08/06/24  0745 08/07/24  0720   WBC 24.6* 18.5* 21.3*   HGB 8.9* 9.0* 8.5*   HCT 28.1* 28.5* 27.6*   MCV 88.6 88.6 90.6    80* 124*       Recent Labs     08/05/24  0456 08/06/24  0514 08/07/24  0720   * 135* 139   K 3.8 3.7 2.9*    103 109   CO2 23 18* 15*   GLUCOSE 204* 167* 61*   PHOS 2.5 2.9 1.7*   MG 1.50* 1.90 1.80   BUN 89* 70* 54*   CREATININE 3.2* 2.7* 2.4*   LABGLOM 18* 22* 25*         Assessment/ 
           Progress Note    HISTORY     CC:  Infected left gluteal wound           We are following for CKD in kidney transplant        Subjective/   HPI:  Scr slightly better.  Somnolent.  No fevers.  Good urine output     ROS:  Constitutional:  No fevers, No Chills, + weakness  Cardiovascular:  No palpations  Respiratory:  No wheezing, no cough  Skin:  No rash, no itching  :  No hematuria, no dysuria     Social Hx:  No Family at the bedside     Past Medical and Surgical History:  - Reviewed, no changes     EXAM       Objective/     Vitals:    08/05/24 1343 08/05/24 1606 08/05/24 1622 08/05/24 1709   BP: 116/69 (!) 126/46     Pulse: 75 77     Resp: 16 16     Temp:   (!) 93.7 °F (34.3 °C) (!) 93.6 °F (34.2 °C)   TempSrc:   Rectal Rectal   SpO2:  99%     Weight:       Height:         24HR INTAKE/OUTPUT:    Intake/Output Summary (Last 24 hours) at 8/5/2024 1725  Last data filed at 8/5/2024 1606  Gross per 24 hour   Intake 1040 ml   Output 1000 ml   Net 40 ml     Constitutional:  NAD        MEDICAL DECISION MAKING       Data/  Recent Labs     08/03/24  1319 08/04/24  0434 08/05/24  0456   WBC 20.8* 25.6* 24.6*   HGB 10.1* 8.8* 8.9*   HCT 31.5* 28.1* 28.1*   MCV 89.6 90.1 88.6    162 142     Recent Labs     08/03/24  1319 08/04/24  0434 08/05/24  0456   * 130* 134*   K 4.1 3.9 3.8   CL 93* 99 100   CO2 23 20* 23   GLUCOSE 207* 120* 204*   PHOS  --   --  2.5   MG  --   --  1.50*   * 102* 89*   CREATININE 3.9* 3.4* 3.2*   LABGLOM 14* 17* 18*       Assessment/     - Acute kidney injury - pre-renal/ATN injury in setting of sepsis from suspected left gluteal wound/transplant kidney pyelitis   Scr improving      - Kidney transplant with CKD stage 4     - Hyponatremia / improving with IV fluids     - Neurogenic bladder with cystitis and pyelitis on CT / ID following.  Kidney function improving     - Left Gluteal Wound:  ID and surgery following     Plan/     IV fluids   Follow labs  Back on 
           Progress Note    HISTORY     CC:  Infected left gluteal wound           We are following for CKD in kidney transplant        Subjective/   HPI:  Scr stable.  Started on HCO3 gtt.  She is not eating.  Says no appetite     ROS:  Constitutional:  No fevers, No Chills, + weakness  Cardiovascular:  No palpations  Respiratory:  No wheezing, no cough  Skin:  No rash, no itching  :  No hematuria, no dysuria     Social Hx:  No Family at the bedside     Past Medical and Surgical History:  - Reviewed, no changes     EXAM       Objective/     Vitals:    08/09/24 0418 08/09/24 0736 08/09/24 1121 08/09/24 1226   BP: (!) 100/48 101/80  104/84   Pulse: 85 87  88   Resp: 16 18  18   Temp: 98 °F (36.7 °C) 98.2 °F (36.8 °C)  98.8 °F (37.1 °C)   TempSrc: Axillary Oral  Oral   SpO2: 95% 92%  98%   Height:   1.549 m (5' 1\")      24HR INTAKE/OUTPUT:    Intake/Output Summary (Last 24 hours) at 8/9/2024 1515  Last data filed at 8/9/2024 0955  Gross per 24 hour   Intake 30 ml   Output 600 ml   Net -570 ml       Gen: alert, awake, ill-appearing  Skin: no rash, turgor wnl  Heent:  eomi, mmm  Neck: no bruits or jvd noted, thyroid normal  Cardiovascular:  S1, S2 without m/r/g  Respiratory: CTA B without w/r/r; respiratory effort normal  Abdomen:  +bs, soft, nt, nd, no hepatosplenomegaly  Ext: no lower extremity edema  Psychiatric: mood and affect appropriate; judgement and insight intact        MEDICAL DECISION MAKING       Data/  Recent Labs     08/08/24  0400 08/08/24  0433 08/09/24  0752   WBC 16.2* 16.8* 18.5*   HGB 7.9* 8.3* 8.0*   HCT 24.9* 27.0* 26.0*   MCV 87.7 89.7 91.3   * 95* 96*       Recent Labs     08/08/24  0400 08/08/24  0433 08/09/24  0752    142 135*   K 4.0 4.1 4.1    112* 108   CO2 16* 15* 15*   GLUCOSE 129* 131* 189*   PHOS 5.2* 5.2* 3.7   MG 1.40* 1.50* 1.70*   BUN 43* 45* 36*   CREATININE 2.3* 2.3* 2.3*   LABGLOM 26* 26* 26*         Assessment/     - Acute kidney injury - pre-renal/ATN 
           Progress Note    HISTORY     CC:  Infected left gluteal wound           We are following for CKD in kidney transplant        Subjective/   HPI:  Scr stable.  Started on HCO3 gtt.  She is not eating.  Says no appetite     ROS:  Constitutional:  No fevers, No Chills, + weakness  Cardiovascular:  No palpations  Respiratory:  No wheezing, no cough  Skin:  No rash, no itching  :  No hematuria, no dysuria     Social Hx:  No Family at the bedside     Past Medical and Surgical History:  - Reviewed, no changes     EXAM       Objective/     Vitals:    08/10/24 0443 08/10/24 0513 08/10/24 0817 08/10/24 1118   BP:   (!) 122/100 (!) 101/55   Pulse:   (!) 101 (!) 108   Resp: 20 20 18 18   Temp:   98.3 °F (36.8 °C) 97.9 °F (36.6 °C)   TempSrc:   Oral Axillary   SpO2:    94%   Height:         24HR INTAKE/OUTPUT:    Intake/Output Summary (Last 24 hours) at 8/10/2024 1316  Last data filed at 8/10/2024 0912  Gross per 24 hour   Intake 0 ml   Output 650 ml   Net -650 ml     Gen: alert, awake, ill-appearing  Skin: no rash, turgor wnl  Heent:  eomi, mmm  Neck: no bruits or jvd noted, thyroid normal  Cardiovascular:  S1, S2 without m/r/g  Respiratory: CTA B without w/r/r; respiratory effort normal  Abdomen:  +bs, soft, nt, nd, no hepatosplenomegaly  Ext: no lower extremity edema  Psychiatric: mood and affect appropriate; judgement and insight intact        MEDICAL DECISION MAKING       Data/  Recent Labs     08/08/24 0433 08/09/24  0752 08/10/24  0504   WBC 16.8* 18.5* 19.9*   HGB 8.3* 8.0* 8.9*   HCT 27.0* 26.0* 28.0*   MCV 89.7 91.3 87.1   PLT 95* 96* 101*     Recent Labs     08/08/24 0433 08/09/24  0752 08/10/24  0504    135* 139   K 4.1 4.1 3.5   * 108 101   CO2 15* 15* 24   GLUCOSE 131* 189* 262*   PHOS 5.2* 3.7 3.5   MG 1.50* 1.70* 1.60*   BUN 45* 36* 33*   CREATININE 2.3* 2.3* 2.2*   LABGLOM 26* 26* 28*       Assessment/     - Acute kidney injury - pre-renal/ATN injury in setting of sepsis from suspected 
           Progress Note    HISTORY     CC:  Infected left gluteal wound           We are following for CKD in kidney transplant        Subjective/   HPI:  Scr worse with IV fluids stopped.     ROS:  Constitutional:  No fevers, No Chills, + weakness  Cardiovascular:  No palpations  Respiratory:  No wheezing, no cough  Skin:  No rash, no itching  :  No hematuria, no dysuria     Social Hx:  No Family at the bedside     Past Medical and Surgical History:  - Reviewed, no changes     EXAM       Objective/     Vitals:    08/13/24 0456 08/13/24 0539 08/13/24 0854 08/13/24 1103   BP: 106/63  107/63 134/78   Pulse: 83  78 78   Resp:   16 14   Temp: 98.4 °F (36.9 °C)  97.9 °F (36.6 °C) 97.5 °F (36.4 °C)   TempSrc:   Oral Oral   SpO2: 93%  95% 96%   Weight:  55.7 kg (122 lb 12.7 oz)     Height:         24HR INTAKE/OUTPUT:    Intake/Output Summary (Last 24 hours) at 8/13/2024 1235  Last data filed at 8/13/2024 0539  Gross per 24 hour   Intake 100 ml   Output 222 ml   Net -122 ml     Gen: alert, awake, ill-appearing  Skin: no rash, turgor wnl  Heent:  eomi, mmm  Neck: no bruits or jvd noted, thyroid normal  Cardiovascular:  S1, S2 without m/r/g  Respiratory: CTA B without w/r/r; respiratory effort normal  Abdomen:  +bs, soft, nt, nd, no hepatosplenomegaly  Ext: no lower extremity edema  Psychiatric: flat affect, alert to questions but somnolent         MEDICAL DECISION MAKING       Data/  Recent Labs     08/11/24  1325 08/12/24  0539 08/13/24  0750   WBC 13.3* 11.7* 11.7*   HGB 7.9* 7.6* 7.1*   HCT 25.1* 24.4* 22.4*   MCV 87.9 88.3 86.8   PLT 88* 58* 70*     Recent Labs     08/11/24  1325 08/11/24  1342 08/12/24  0539 08/13/24  0750     --  137 137   K 3.5  --  3.4*  3.4* 3.7     --  102 101   CO2 27  --  21 25   GLUCOSE 88  --  197* 111*   PHOS 4.0  --  4.6  --    MG  --   --  1.40*  --    BUN 45*  --  45* 42*   CREATININE 2.6* 2.6* 2.8* 3.1*   LABGLOM 23*  --  21* 18*       Assessment/     - Acute kidney injury 
           Progress Note    HISTORY     CC:  Infected left gluteal wound           We are following for CKD in kidney transplant        Subjective/   HPI:  Scr worse with IV fluids stopped.  Urine volume much lower and trying to collect 24 hour urine.  Not eating.    ROS:  Constitutional:  No fevers, No Chills, + weakness  Cardiovascular:  No palpations  Respiratory:  No wheezing, no cough  Skin:  No rash, no itching  :  No hematuria, no dysuria     Social Hx:  No Family at the bedside     Past Medical and Surgical History:  - Reviewed, no changes     EXAM       Objective/     Vitals:    08/11/24 0558 08/11/24 0806 08/11/24 1057 08/11/24 1128   BP:  (!) 106/55 118/82    Pulse:  85 87    Resp:   16    Temp:    97.5 °F (36.4 °C)   TempSrc:    Axillary   SpO2:   91%    Weight: 59.9 kg (132 lb 0.9 oz)      Height:         24HR INTAKE/OUTPUT:    Intake/Output Summary (Last 24 hours) at 8/11/2024 1519  Last data filed at 8/11/2024 1454  Gross per 24 hour   Intake 240 ml   Output 145 ml   Net 95 ml     Gen: alert, awake, ill-appearing  Skin: no rash, turgor wnl  Heent:  eomi, mmm  Neck: no bruits or jvd noted, thyroid normal  Cardiovascular:  S1, S2 without m/r/g  Respiratory: CTA B without w/r/r; respiratory effort normal  Abdomen:  +bs, soft, nt, nd, no hepatosplenomegaly  Ext: no lower extremity edema  Psychiatric: flat affect, alert to questions but somnolent         MEDICAL DECISION MAKING       Data/  Recent Labs     08/09/24  0752 08/10/24  0504 08/11/24  1325   WBC 18.5* 19.9* 13.3*   HGB 8.0* 8.9* 7.9*   HCT 26.0* 28.0* 25.1*   MCV 91.3 87.1 87.9   PLT 96* 101* 88*     Recent Labs     08/09/24  0752 08/10/24  0504 08/11/24  1325   * 139 136   K 4.1 3.5 3.5    101 100   CO2 15* 24 27   GLUCOSE 189* 262* 88   PHOS 3.7 3.5 4.0   MG 1.70* 1.60*  --    BUN 36* 33* 45*   CREATININE 2.3* 2.2* 2.6*   LABGLOM 26* 28* 23*       Assessment/     - Acute kidney injury - pre-renal/ATN injury in setting of sepsis 
           Progress Note    HISTORY     CC:  Infected left gluteal wound           We are following for CKD in kidney transplant        Subjective/   HPI:  Scr worse with IV fluids stopped.  Urine volume much lower and trying to collect 24 hour urine.  Not eating.    ROS:  Constitutional:  No fevers, No Chills, + weakness  Cardiovascular:  No palpations  Respiratory:  No wheezing, no cough  Skin:  No rash, no itching  :  No hematuria, no dysuria     Social Hx:  No Family at the bedside     Past Medical and Surgical History:  - Reviewed, no changes     EXAM       Objective/     Vitals:    08/12/24 0028 08/12/24 0445 08/12/24 0630 08/12/24 0745   BP:  103/83  (!) 121/93   Pulse:  87  (!) 113   Resp: 18 20     Temp:  97.7 °F (36.5 °C)  97.7 °F (36.5 °C)   TempSrc:    Oral   SpO2:  93%  93%   Weight:   57 kg (125 lb 10.6 oz)    Height:         24HR INTAKE/OUTPUT:    Intake/Output Summary (Last 24 hours) at 8/12/2024 1016  Last data filed at 8/12/2024 0010  Gross per 24 hour   Intake 60 ml   Output 140 ml   Net -80 ml     Gen: alert, awake, ill-appearing  Skin: no rash, turgor wnl  Heent:  eomi, mmm  Neck: no bruits or jvd noted, thyroid normal  Cardiovascular:  S1, S2 without m/r/g  Respiratory: CTA B without w/r/r; respiratory effort normal  Abdomen:  +bs, soft, nt, nd, no hepatosplenomegaly  Ext: no lower extremity edema  Psychiatric: flat affect, alert to questions but somnolent         MEDICAL DECISION MAKING       Data/  Recent Labs     08/10/24  0504 08/11/24  1325 08/12/24  0539   WBC 19.9* 13.3* 11.7*   HGB 8.9* 7.9* 7.6*   HCT 28.0* 25.1* 24.4*   MCV 87.1 87.9 88.3   * 88* 58*     Recent Labs     08/10/24  0504 08/11/24  1325 08/12/24  0539    136 137   K 3.5 3.5 3.4*  3.4*    100 102   CO2 24 27 21   GLUCOSE 262* 88 197*   PHOS 3.5 4.0 4.6   MG 1.60*  --   --    BUN 33* 45* 45*   CREATININE 2.2* 2.6* 2.8*   LABGLOM 28* 23* 21*       Assessment/     - Acute kidney injury - pre-renal/ATN 
           Progress Note    HISTORY     CC:  Infected left gluteal wound           We are following for CKD in kidney transplant        Subjective/   HPI: Was transferred to the ICU for closer monitoring.  Blood pressure is improving.  Nugent catheter placed yesterday had 447 mL output charted yesterday.  No urine output charted yet since midnight.    ROS:  Constitutional:  No fevers, No Chills, + weakness  Cardiovascular:  No palpations  Respiratory:  No wheezing, no cough  Skin:  No rash, no itching  :  No hematuria, no dysuria     Social Hx:  No Family at the bedside     Past Medical and Surgical History:  - Reviewed, no changes     EXAM       Objective/     Vitals:    08/14/24 0516 08/14/24 0539 08/14/24 0759 08/14/24 0939   BP:   105/76 (!) 111/46   Pulse:   71 72   Resp: 16  12 27   Temp:   (!) 93.6 °F (34.2 °C) (!) 93.7 °F (34.3 °C)   TempSrc:   Rectal Core   SpO2:   99%    Weight:  54.4 kg (119 lb 14.9 oz)     Height:         24HR INTAKE/OUTPUT:    Intake/Output Summary (Last 24 hours) at 8/14/2024 1034  Last data filed at 8/13/2024 2300  Gross per 24 hour   Intake 92.79 ml   Output 225 ml   Net -132.21 ml     Gen: alert, awake, ill-appearing  Skin: no rash, turgor wnl  Heent:  eomi, mmm  Neck: no bruits or jvd noted, thyroid normal  Cardiovascular:  S1, S2 without m/r/g  Respiratory: CTA B without w/r/r; respiratory effort normal  Abdomen:  +bs, soft, nt, nd, no hepatosplenomegaly  Ext: no lower extremity edema  Psychiatric: flat affect, alert to questions but somnolent         MEDICAL DECISION MAKING       Data/  Recent Labs     08/11/24  1325 08/12/24  0539 08/13/24  0750   WBC 13.3* 11.7* 11.7*   HGB 7.9* 7.6* 7.1*   HCT 25.1* 24.4* 22.4*   MCV 87.9 88.3 86.8   PLT 88* 58* 70*     Recent Labs     08/11/24  1325 08/11/24  1342 08/12/24  0539 08/13/24  0750     --  137 137   K 3.5  --  3.4*  3.4* 3.7     --  102 101   CO2 27  --  21 25   GLUCOSE 88  --  197* 111*   PHOS 4.0  --  4.6  --    MG 
           Progress Note    HISTORY     CC:  Infected left gluteal wound           We are following for MARCO A on CKD in kidney transplant        Subjective/   HPI: Patient was seen and examined.     ROS:  No fever or chills. She  is more awake today . Alert and oriented to self and place    Social Hx:  No family at the bedside     Past Medical and Surgical History:  - Reviewed, no changes     EXAM       Objective/     Vitals:    08/19/24 0342 08/19/24 0558 08/19/24 0745 08/19/24 0945   BP: 93/64  (!) 103/46    Pulse: 72  77    Resp:   16 16   Temp: 97.1 °F (36.2 °C)  97 °F (36.1 °C)    TempSrc: Oral  Axillary    SpO2: 94%  92%    Weight:  63.3 kg (139 lb 9.6 oz)     Height:         24HR INTAKE/OUTPUT:    Intake/Output Summary (Last 24 hours) at 8/19/2024 1023  Last data filed at 8/19/2024 0700  Gross per 24 hour   Intake 1456.78 ml   Output 950 ml   Net 506.78 ml     Gen: sleeping comfortably, ill-appearing  Skin: no rash, dry  Heent:  mouth dry  Neck: no bruits or jvd noted, thyroid normal  Cardiovascular:  S1, S2 without m/r/g  Respiratory:scatted rhonchi respiratory effort normal  Abdomen:  +bs, soft, nt, nd  Ext: no lower extremity edema  Psychiatric: flat affect, alert to questions but somnolent         MEDICAL DECISION MAKING       Data/  Recent Labs     08/17/24  0728 08/19/24  0607   WBC 10.6 12.2*   HGB 8.0* 7.5*   HCT 24.4* 23.5*   MCV 90.1 89.0   PLT 86* 60*     Recent Labs     08/17/24  0728 08/18/24  0620 08/19/24  0607    142 141   K 3.3* 3.4* 3.1*    104 104   CO2 17* 22 24   GLUCOSE 170* 141* 233*   PHOS 5.3* 4.7 3.2   MG 2.10 2.00 1.80   BUN 46* 49* 49*   CREATININE 3.6* 3.9* 3.8*   LABGLOM 15* 14* 14*       Assessment/     - Acute kidney injury - pre-renal/ATN injury in setting of sepsis from suspected left gluteal wound/transplant kidney pyelitis creatinine likely overestimating renal function     - Kidney transplant with CKD stage 4 / not on dialysis but ? If more uremic than labs 
           Progress Note    HISTORY     CC:  Infected left gluteal wound           We are following for MARCO A on CKD in kidney transplant        Subjective/   HPI: Patient was seen and examined.     ROS:  No fever or chills. She  is more awake today . Alert and oriented to self and place    Social Hx:  No family at the bedside     Past Medical and Surgical History:  - Reviewed, no changes     EXAM       Objective/     Vitals:    08/20/24 0421 08/20/24 0452 08/20/24 0512 08/20/24 0800   BP: 138/72      Pulse:  66 80    Resp:    16   Temp:       TempSrc:    Axillary   SpO2:  (!) 81% 94% 93%   Weight:       Height:         24HR INTAKE/OUTPUT:    Intake/Output Summary (Last 24 hours) at 8/20/2024 1033  Last data filed at 8/20/2024 0814  Gross per 24 hour   Intake 1474 ml   Output 820 ml   Net 654 ml     Gen: sleeping comfortably, ill-appearing  Skin: no rash, dry  Heent:  mouth dry  Neck: no bruits or jvd noted, thyroid normal  Cardiovascular:  S1, S2 without m/r/g  Respiratory:scatted rhonchi respiratory effort normal  Abdomen:  +bs, soft, nt, nd  Ext: no lower extremity edema  Psychiatric: flat affect, alert to questions but somnolent         MEDICAL DECISION MAKING       Data/  Recent Labs     08/19/24  0607   WBC 12.2*   HGB 7.5*   HCT 23.5*   MCV 89.0   PLT 60*     Recent Labs     08/18/24  0620 08/19/24  0607 08/20/24  0848    141 140   K 3.4* 3.1* 3.0*    104 102   CO2 22 24 24   GLUCOSE 141* 233* 122*   PHOS 4.7 3.2  --    MG 2.00 1.80 1.80   BUN 49* 49* 53*   CREATININE 3.9* 3.8* 3.7*   LABGLOM 14* 14* 15*       Assessment/     - Acute kidney injury - pre-renal/ATN injury in setting of sepsis from suspected left gluteal wound/transplant kidney pyelitis creatinine likely overestimating renal function     - Kidney transplant with CKD stage 4 / not on dialysis but ? If more uremic than labs suggest. Volume status acceptable. On Prograf 1mg BID and Hydrocortisone (Prednisone on hold while on this). [place] 
           Progress Note    HISTORY     CC:  Infected left gluteal wound           We are following for MARCO A on CKD in kidney transplant        Subjective/   HPI: Patient was seen and examined. Had debridement of the decubitus ulcer yesterday. Transferred out of the ICU =. BW=839 ml the past 24H    ROS:  No fever or chills. She  is drowsy . Not participating much in interview    Social Hx:  No family at the bedside     Past Medical and Surgical History:  - Reviewed, no changes     EXAM       Objective/     Vitals:    08/17/24 0143 08/17/24 0213 08/17/24 0545 08/17/24 0845   BP:    125/69   Pulse:    80   Resp: 18 18  12   Temp:    97.8 °F (36.6 °C)   TempSrc:       SpO2:    92%   Weight:   62 kg (136 lb 11 oz)    Height:         24HR INTAKE/OUTPUT:    Intake/Output Summary (Last 24 hours) at 8/17/2024 0937  Last data filed at 8/17/2024 0542  Gross per 24 hour   Intake 360 ml   Output 550 ml   Net -190 ml     Gen: sleeping comfortably, ill-appearing  Skin: no rash, dry  Heent:  mouth dry  Neck: no bruits or jvd noted, thyroid normal  Cardiovascular:  S1, S2 without m/r/g  Respiratory:scatted rhonchi respiratory effort normal  Abdomen:  +bs, soft, nt, nd  Ext: no lower extremity edema  Psychiatric: flat affect, alert to questions but somnolent         MEDICAL DECISION MAKING       Data/  Recent Labs     08/14/24  1243 08/15/24  0011 08/15/24  0429 08/17/24  0728   WBC 9.7  --  8.5 10.6   HGB 6.3* 9.3* 8.5* 8.0*   HCT 19.3* 29.5* 26.1* 24.4*   MCV 86.9  --  89.2 90.1   PLT 67*  --  64* 86*     Recent Labs     08/14/24  1243 08/14/24  2327 08/15/24  0429 08/17/24  0728    137 137 139   K 3.3*  3.3* 4.3 4.2 3.3*    98* 98* 102   CO2 21 16* 17* 17*   GLUCOSE 106* 128* 135* 170*   PHOS 5.1* 5.3* 5.8* 5.3*   MG 2.20  --  2.30 2.10   BUN 39* 37* 38* 46*   CREATININE 3.3* 3.2* 3.4* 3.6*   LABGLOM 17* 18* 17* 15*       Assessment/     - Acute kidney injury - pre-renal/ATN injury in setting of sepsis from suspected 
           Progress Note    HISTORY     CC:  Infected left gluteal wound           We are following for MARCO A on CKD in kidney transplant        Subjective/   HPI: Patient was seen and examined. Had debridement of the decubitus ulcer yesterday. Transferred out of the ICU today. WD=[id number] the past 24H    ROS:  No fever or chills. She said she is tired.     Social Hx:  No family at the bedside     Past Medical and Surgical History:  - Reviewed, no changes     EXAM       Objective/     Vitals:    08/16/24 1000 08/16/24 1100 08/16/24 1150 08/16/24 1343   BP: 136/80 (!) 130/97 (!) 92/56 137/64   Pulse: 82 67 80 74   Resp: 19 13 12    Temp:   97.5 °F (36.4 °C)    TempSrc:       SpO2: 96%  92%    Weight:       Height:         24HR INTAKE/OUTPUT:    Intake/Output Summary (Last 24 hours) at 8/16/2024 1431  Last data filed at 8/16/2024 0800  Gross per 24 hour   Intake 335 ml   Output 700 ml   Net -365 ml     Gen: sleeping comfortably, ill-appearing  Skin: no rash, dry  Heent:  mouth dry  Neck: no bruits or jvd noted, thyroid normal  Cardiovascular:  S1, S2 without m/r/g  Respiratory:scatted rhonchi respiratory effort normal  Abdomen:  +bs, soft, nt, nd  Ext: no lower extremity edema  Psychiatric: flat affect, alert to questions but somnolent         MEDICAL DECISION MAKING       Data/  Recent Labs     08/14/24  1243 08/15/24  0011 08/15/24  0429   WBC 9.7  --  8.5   HGB 6.3* 9.3* 8.5*   HCT 19.3* 29.5* 26.1*   MCV 86.9  --  89.2   PLT 67*  --  64*     Recent Labs     08/14/24  1243 08/14/24  2327 08/15/24  0429    137 137   K 3.3*  3.3* 4.3 4.2    98* 98*   CO2 21 16* 17*   GLUCOSE 106* 128* 135*   PHOS 5.1* 5.3* 5.8*   MG 2.20  --  2.30   BUN 39* 37* 38*   CREATININE 3.3* 3.2* 3.4*   LABGLOM 17* 18* 17*       Assessment/     - Acute kidney injury - pre-renal/ATN injury in setting of sepsis from suspected left gluteal wound/transplant kidney pyelitis creatinine likely overestimating renal function     - Kidney 
           Progress Note    HISTORY     CC:  Infected left gluteal wound           We are following for MARCO A on CKD in kidney transplant        Subjective/   HPI: Patient was seen and examined. UO=1.1L the past 24H with rodriguez in. BP stable. Remains on 4LPM via nc.    ROS:  No fever or chills     Social Hx:  Family at the bedside     Past Medical and Surgical History:  - Reviewed, no changes     EXAM       Objective/     Vitals:    08/15/24 0600 08/15/24 0700 08/15/24 0800 08/15/24 0900   BP: (!) 155/78 136/69 (!) 124/94 137/85   Pulse: 71 83 70 86   Resp: 10 18 (!) 8 17   Temp:   (!) 96.7 °F (35.9 °C) 97.2 °F (36.2 °C)   TempSrc:   Bladder Bladder   SpO2: 100% (!) 79% 96%    Weight:       Height:         24HR INTAKE/OUTPUT:    Intake/Output Summary (Last 24 hours) at 8/15/2024 1039  Last data filed at 8/15/2024 0801  Gross per 24 hour   Intake 347.5 ml   Output 1115 ml   Net -767.5 ml     Gen: alert, awake, ill-appearing  Skin: no rash, turgor wnl  Heent:  eomi, mmm  Neck: no bruits or jvd noted, thyroid normal  Cardiovascular:  S1, S2 without m/r/g  Respiratory: CTA B without w/r/r; respiratory effort normal  Abdomen:  +bs, soft, nt, nd, no hepatosplenomegaly  Ext: no lower extremity edema  Psychiatric: flat affect, alert to questions but somnolent         MEDICAL DECISION MAKING       Data/  Recent Labs     08/13/24  0750 08/14/24  1243 08/15/24  0011 08/15/24  0429   WBC 11.7* 9.7  --  8.5   HGB 7.1* 6.3* 9.3* 8.5*   HCT 22.4* 19.3* 29.5* 26.1*   MCV 86.8 86.9  --  89.2   PLT 70* 67*  --  64*     Recent Labs     08/14/24  1243 08/14/24  2327 08/15/24  0429    137 137   K 3.3*  3.3* 4.3 4.2    98* 98*   CO2 21 16* 17*   GLUCOSE 106* 128* 135*   PHOS 5.1* 5.3* 5.8*   MG 2.20  --  2.30   BUN 39* 37* 38*   CREATININE 3.3* 3.2* 3.4*   LABGLOM 17* 18* 17*       Assessment/     - Acute kidney injury - pre-renal/ATN injury in setting of sepsis from suspected left gluteal wound/transplant kidney pyelitis 
       [facility] GENERAL SURGERY    Surgery Progress Note           POD #  3/5    PATIENT NAME: Carolina Arguelles     TODAY'S DATE: 8/9/2024    INTERVAL HISTORY:    Patient seen during dressing change with wound care.  Pain overall controlled.  No new complaints this morning.     OBJECTIVE:   VITALS:  /80   Pulse 87   Temp 98.2 °F (36.8 °C) (Oral)   Resp 18   Ht 1.549 m (5' 1\")   Wt 56.7 kg (125 lb)   SpO2 92%   BMI 23.62 kg/m²     INTAKE/OUTPUT:    I/O last 3 completed shifts:  In: 3211 [P.O.:60; I.V.:2115; IV Piggyback:1036]  Out: 850 [Urine:850]  No intake/output data recorded.              CONSTITUTIONAL:  awake and alert  LUNGS: normal work of breathing  ABDOMEN: soft, non-tender, non-distended  WOUND: hemostatic; combination of slough and granulation tissue at base        Data:  CBC:   Recent Labs     08/08/24 0400 08/08/24 0433 08/09/24  0752   WBC 16.2* 16.8* 18.5*   HGB 7.9* 8.3* 8.0*   HCT 24.9* 27.0* 26.0*   * 95* 96*     BMP:    Recent Labs     08/08/24 0400 08/08/24 0433 08/09/24  0752    142 135*   K 4.0 4.1 4.1    112* 108   CO2 16* 15* 15*   BUN 43* 45* 36*   CREATININE 2.3* 2.3* 2.3*   GLUCOSE 129* 131* 189*     Hepatic: No results for input(s): \"AST\", \"ALT\", \"BILITOT\", \"ALKPHOS\" in the last 72 hours.    Invalid input(s): \"ALB\"  Mag:      Recent Labs     08/08/24 0400 08/08/24 0433 08/09/24  0752   MG 1.40* 1.50* 1.70*      Phos:     Recent Labs     08/08/24 0400 08/08/24 0433 08/09/24  0752   PHOS 5.2* 5.2* 3.7      INR: No results for input(s): \"INR\" in the last 72 hours.      Radiology Review:       ASSESSMENT AND PLAN:  44 y.o. female status post EXCISIONAL DEBRIDEMENT OF LEFT ISCHIAL DECUBITUS ULCER & PEG TUBE REMOVAL (8/4), AND REPEAT LEFT ISCHEAL WOUND DEBRIDEMENT (8/6).    - No further surgical intervention at this time; okay for discharge from surgical team perspective  - Continue NPWT with intermittent Vashe instillation  - Will need outpatient follow-up to 
       [facility] GENERAL SURGERY    Surgery Progress Note           POD # 1    PATIENT NAME: Carolina Arguelles     TODAY'S DATE: 8/5/2024    INTERVAL HISTORY:    Pt  doing okay, no complaints until dressing change from which she understandably has some discomfort.     OBJECTIVE:   VITALS:  /67   Pulse 84   Temp 97.4 °F (36.3 °C) (Axillary)   Resp 16   Ht 1.549 m (5' 1\")   Wt 56.7 kg (125 lb)   SpO2 96%   BMI 23.62 kg/m²     INTAKE/OUTPUT:    I/O last 3 completed shifts:  In: 1100 [P.O.:800; I.V.:300]  Out: 2025 [Urine:2025]  No intake/output data recorded.              CONSTITUTIONAL:  awake and alert  LUNGS:  no crackles or wheezing  ABDOMEN:  prior peg site okay  INCISION: with persistent grey, foul smelling slough material throughout much of wound - please refer to wound care not for photo and dimensions    Data:  CBC:   Recent Labs     08/03/24  1319 08/04/24 0434 08/05/24 0456   WBC 20.8* 25.6* 24.6*   HGB 10.1* 8.8* 8.9*   HCT 31.5* 28.1* 28.1*    162 142     BMP:    Recent Labs     08/03/24  1319 08/04/24 0434 08/05/24 0456   * 130* 134*   K 4.1 3.9 3.8   CL 93* 99 100   CO2 23 20* 23   * 102* 89*   CREATININE 3.9* 3.4* 3.2*   GLUCOSE 207* 120* 204*     Hepatic:   Recent Labs     08/03/24  1319 08/05/24 0456   AST 16 6*   ALT 19 14   BILITOT <0.2 0.3   ALKPHOS 135* 125     Mag:      Recent Labs     08/05/24 0456   MG 1.50*      Phos:     Recent Labs     08/05/24 0456   PHOS 2.5          ASSESSMENT AND PLAN:  44 y.o. female status post EXCISIONAL DEBRIDEMENT, LEFT ISCHIAL DECUBITUS ULCER, PEG TUBE REMOVAL     Will require further surgical debridement, will look for OR time tomorrow.    Continue with local wound care with Vashe solution.   Continue with IV antibiotics.     NPO midnight.     Electronically signed by NIRAV Rincon CNP   I have personally performed a face to face diagnostic evaluation on this patient and agree with the progress note and care plan of 
       [facility] GENERAL SURGERY    Surgery Progress Note           POD # 1    PATIENT NAME: Carolina Arguelles     TODAY'S DATE: 8/16/2024    INTERVAL HISTORY:    Pt  s/p latest ischial wound debridement yesterday.  Patient c/o wound pain..     OBJECTIVE:   VITALS:  /64   Pulse 74   Temp 97.5 °F (36.4 °C)   Resp 12   Ht 1.549 m (5' 1\")   Wt 61.3 kg (135 lb 2.3 oz)   SpO2 92%   BMI 25.53 kg/m²     INTAKE/OUTPUT:    I/O last 3 completed shifts:  In: 435 [I.V.:200; NG/GT:235]  Out: 1540 [Urine:1540]  I/O this shift:  In: 100 [NG/GT:100]  Out: 100 [Urine:100]              CONSTITUTIONAL:  fatigued and alert  LUNGS:     ABDOMEN:   PEG tube in place  INCISION: clean, no drainage, open, good granulation tissue, minimal residual fibrinous exudate, no purulence currently    Data:  CBC:   Recent Labs     08/14/24  1243 08/15/24  0011 08/15/24  0429   WBC 9.7  --  8.5   HGB 6.3* 9.3* 8.5*   HCT 19.3* 29.5* 26.1*   PLT 67*  --  64*     BMP:    Recent Labs     08/14/24  1243 08/14/24  2327 08/15/24  0429    137 137   K 3.3*  3.3* 4.3 4.2    98* 98*   CO2 21 16* 17*   BUN 39* 37* 38*   CREATININE 3.3* 3.2* 3.4*   GLUCOSE 106* 128* 135*     Hepatic: No results for input(s): \"AST\", \"ALT\", \"BILITOT\", \"ALKPHOS\" in the last 72 hours.    Invalid input(s): \"ALB\"  Mag:      Recent Labs     08/14/24  1243 08/15/24  0429   MG 2.20 2.30      Phos:     Recent Labs     08/14/24  1243 08/14/24  2327 08/15/24  0429   PHOS 5.1* 5.3* 5.8*      INR:   Recent Labs     08/15/24  0429   INR 2.24*         Radiology Review:       ASSESSMENT AND PLAN:  44 y.o. female status post repeat excisional debridement of ischial decubitus wound   - resume Vac therapy to wound   - will recheck wound on Monday w/ next Vac change         Electronically signed by EMIR AMARAL MD     
       [facility] GENERAL SURGERY    Surgery Progress Note           POD #  1 & 3    PATIENT NAME: Carolina Arguelles     TODAY'S DATE: 8/7/2024    INTERVAL HISTORY:    Pt doing okay, resting comfortably     OBJECTIVE:   VITALS:  BP (!) 104/53   Pulse 90   Temp 97.7 °F (36.5 °C) (Axillary)   Resp 16   Ht 1.549 m (5' 1\")   Wt 56.7 kg (125 lb)   SpO2 100%   BMI 23.62 kg/m²     INTAKE/OUTPUT:    I/O last 3 completed shifts:  In: 1916.1 [P.O.:360; I.V.:806.3; IV Piggyback:749.8]  Out: 1700 [Urine:1700]  No intake/output data recorded.              CONSTITUTIONAL:  awake and alert  LUNGS:  no crackles or wheezing  ABDOMEN:  prior peg site okay  INCISION: wound much  today - see wound care not for dimensions and photo - some grey fibrinous slough superior lateral margin    Data:  CBC:   Recent Labs     08/05/24 0456 08/06/24  0745 08/07/24  0720   WBC 24.6* 18.5* 21.3*   HGB 8.9* 9.0* 8.5*   HCT 28.1* 28.5* 27.6*    80* 124*       BMP:    Recent Labs     08/05/24 0456 08/06/24  0514 08/07/24  0720   * 135* 139   K 3.8 3.7 2.9*    103 109   CO2 23 18* 15*   BUN 89* 70* 54*   CREATININE 3.2* 2.7* 2.4*   GLUCOSE 204* 167* 61*       Hepatic:   Recent Labs     08/05/24 0456   AST 6*   ALT 14   BILITOT 0.3   ALKPHOS 125       Mag:      Recent Labs     08/05/24 0456 08/06/24  0514 08/07/24  0720   MG 1.50* 1.90 1.80        Phos:     Recent Labs     08/05/24 0456 08/06/24  0514 08/07/24  0720   PHOS 2.5 2.9 1.7*            ASSESSMENT AND PLAN:  44 y.o. female status post EXCISIONAL DEBRIDEMENT, LEFT ISCHIAL DECUBITUS ULCER x 2, PEG TUBE REMOVAL     Will place wound vac today and re-eval on Friday    Continue with antibiotics.     Electronically signed by NIRAV Rincon - YOANDY     Surgery Staff    I have examined this patient, and read and agree with the note by Wendy Almaguer CNP from today; more than half of the total time was spent by me on the encounter.     Resting comfortably but now 
       [facility] GENERAL SURGERY    Surgery Progress Note           POD #  2/4    PATIENT NAME: Carolina Arguelles     TODAY'S DATE: 8/8/2024    INTERVAL HISTORY:    Pt  resting comfortably; c/o pain at ischial wound but improved vs yesterday, no complications with Vac.     OBJECTIVE:   VITALS:  BP (!) 119/53   Pulse (!) 46   Temp 99.1 °F (37.3 °C) (Oral)   Resp 18   Ht 1.549 m (5' 1\")   Wt 56.7 kg (125 lb)   SpO2 95%   BMI 23.62 kg/m²     INTAKE/OUTPUT:    I/O last 3 completed shifts:  In: 4543.1 [P.O.:420; I.V.:2115; IV Piggyback:2008.1]  Out: 875 [Urine:875]  No intake/output data recorded.              CONSTITUTIONAL:  awake and alert  LUNGS:       INCISION: Vac in place    Data:  CBC:   Recent Labs     08/07/24 0720 08/08/24 0400 08/08/24  0433   WBC 21.3* 16.2* 16.8*   HGB 8.5* 7.9* 8.3*   HCT 27.6* 24.9* 27.0*   * 122* 95*     BMP:    Recent Labs     08/07/24 0720 08/08/24 0400 08/08/24  0433    138 142   K 2.9* 4.0 4.1    109 112*   CO2 15* 16* 15*   BUN 54* 43* 45*   CREATININE 2.4* 2.3* 2.3*   GLUCOSE 61* 129* 131*     Hepatic: No results for input(s): \"AST\", \"ALT\", \"BILITOT\", \"ALKPHOS\" in the last 72 hours.    Invalid input(s): \"ALB\"  Mag:      Recent Labs     08/07/24  0720 08/08/24 0400 08/08/24  0433   MG 1.80 1.40* 1.50*      Phos:     Recent Labs     08/07/24  0720 08/08/24 0400 08/08/24  0433   PHOS 1.7* 5.2* 5.2*      INR: No results for input(s): \"INR\" in the last 72 hours.      Radiology Review:       ASSESSMENT AND PLAN:  44 y.o. female status post EXCISIONAL DEBRIDEMENT, LEFT ISCHIAL DECUBITUS ULCER x 2, PEG TUBE REMOVAL    - continue current care   - will check wound tomorrow w/ next Vac change   - should be ready for d/c at any point after dressing change tomorrow, from surgical perspective         Electronically signed by EMIR AMARAL MD     
       [facility] GENERAL SURGERY    Surgery Progress Note           POD # 3  PATIENT NAME: Carolina Arguelles     TODAY'S DATE: 8/19/2024    INTERVAL HISTORY:    Pt awake and alert. Rectal tube in place     OBJECTIVE:   VITALS:  BP (!) 103/46   Pulse 77   Temp 97 °F (36.1 °C) (Axillary)   Resp 16   Ht 1.549 m (5' 1\")   Wt 63.3 kg (139 lb 9.6 oz)   SpO2 92%   BMI 26.38 kg/m²     INTAKE/OUTPUT:    I/O last 3 completed shifts:  In: 2045.8 [P.O.:20; I.V.:157; NG/GT:1574; IV Piggyback:294.8]  Out: 1200 [Urine:800; Stool:400]  No intake/output data recorded.              CONSTITUTIONAL:  awake and alert    INCISION: see wound care note for photos and dimensions - vac changed by them this AM and in place.     Data:  CBC:   Recent Labs     08/17/24  0728 08/19/24  0607   WBC 10.6 12.2*   HGB 8.0* 7.5*   HCT 24.4* 23.5*   PLT 86* 60*     BMP:    Recent Labs     08/17/24  0728 08/18/24 0620 08/19/24  0607    142 141   K 3.3* 3.4* 3.1*    104 104   CO2 17* 22 24   BUN 46* 49* 49*   CREATININE 3.6* 3.9* 3.8*   GLUCOSE 170* 141* 233*     Hepatic: No results for input(s): \"AST\", \"ALT\", \"BILITOT\", \"ALKPHOS\" in the last 72 hours.    Invalid input(s): \"ALB\"  Mag:      Recent Labs     08/17/24  0728 08/18/24  0620 08/19/24  0607   MG 2.10 2.00 1.80      Phos:     Recent Labs     08/17/24  0728 08/18/24  0620 08/19/24  0607   PHOS 5.3* 4.7 3.2          ASSESSMENT AND PLAN:  44 y.o. female status post repeat excisional debridement of ischial decubitus wound  Continue wound vac and antibiotics.       Electronically signed by NIRAV Rincon CNP   I have personally performed a face to face diagnostic evaluation on this patient and agree with the progress note and care plan of Wendy Almaguer CNP. More than half of the time spent on this encounter was completed by me including the history, physical examination and the entire medical decision making. My findings are as follows:    Pt stable over weekend  BP (!) 
       [facility] GENERAL SURGERY    Surgery Progress Note           PO    PATIENT NAME: Carolina Arguelles     TODAY'S DATE: 8/12/2024    INTERVAL HISTORY:    Pt resting comfortably.     OBJECTIVE:   VITALS:  BP (!) 121/93   Pulse (!) 113   Temp 97.7 °F (36.5 °C) (Oral)   Resp 20   Ht 1.549 m (5' 1\")   Wt 57 kg (125 lb 10.6 oz)   SpO2 93%   BMI 23.74 kg/m²     INTAKE/OUTPUT:    I/O last 3 completed shifts:  In: 300 [P.O.:300]  Out: 270 [Urine:120; Drains:150]  No intake/output data recorded.              CONSTITUTIONAL:  awake and alert    INCISION: please see wound care note for photo and dimensions - wound remains to have a fairly sig amount of slough - some healthy granulation tissue lateral inferior    Data:  CBC:   Recent Labs     08/10/24  0504 08/11/24  1325 08/12/24  0539   WBC 19.9* 13.3* 11.7*   HGB 8.9* 7.9* 7.6*   HCT 28.0* 25.1* 24.4*   * 88* 58*     BMP:    Recent Labs     08/10/24  0504 08/11/24  1325 08/12/24  0539    136 137   K 3.5 3.5 3.4*  3.4*    100 102   CO2 24 27 21   BUN 33* 45* 45*   CREATININE 2.2* 2.6* 2.8*   GLUCOSE 262* 88 197*     Hepatic:   Recent Labs     08/10/24  0504   AST 12*   ALT 10   BILITOT 0.3   ALKPHOS 105     Mag:      Recent Labs     08/10/24  0504   MG 1.60*      Phos:     Recent Labs     08/10/24  0504 08/11/24  1325 08/12/24  0539   PHOS 3.5 4.0 4.6          ASSESSMENT AND PLAN:  44 y.o. female status post ischial pressure ulcer s/p debridement x2     Unfortunately it appears that this will require further excisional debridement in the OR - will look for time tomorrow or Wednesday.     Wet to dry dressing for now.     Evaluated with wound care team.       Electronically signed by NIRAV Rincon CNP     I have personally performed a face to face diagnostic evaluation on this patient and agree with the progress note and care plan of Wendy Almaguer CNP. More than half of the time spent on this encounter was completed by me including the 
     Urology Progress Note      Subjective: Carolina Arguelles denies complaints     Vitals:  BP (!) 114/47   Pulse 83   Temp 98.1 °F (36.7 °C) (Oral)   Resp 14   Ht 1.549 m (5' 1\")   Wt 56.7 kg (125 lb)   SpO2 96%   BMI 23.62 kg/m²   Temp  Av.2 °F (35.7 °C)  Min: 93.6 °F (34.2 °C)  Max: 98.7 °F (37.1 °C)    Intake/Output Summary (Last 24 hours) at 2024 0933  Last data filed at 2024 0911  Gross per 24 hour   Intake 809.82 ml   Output 1275 ml   Net -465.18 ml         Exam:   Physical:  Awake and alert, oriented to self   Neck is supple, trachea is midline  Respiratory effort is normal  Cardiovascular show no extremity swelling    Labs:  WBC:    Lab Results   Component Value Date/Time    WBC 18.5 2024 07:45 AM     Hemoglobin/Hematocrit:    Lab Results   Component Value Date/Time    HGB 9.0 2024 07:45 AM    HCT 28.5 2024 07:45 AM     BMP:    Lab Results   Component Value Date/Time     2024 05:14 AM    K 3.7 2024 05:14 AM    K 3.9 2024 04:34 AM     2024 05:14 AM    CO2 18 2024 05:14 AM    BUN 70 2024 05:14 AM    CREATININE 2.7 2024 05:14 AM    CALCIUM 7.7 2024 05:14 AM    GFRAA 38 2021 08:24 AM    GFRAA >60 2013 05:20 PM    LABGLOM 22 2024 05:14 AM    LABGLOM 24 2024 01:20 PM     PT/INR:    Lab Results   Component Value Date/Time    PROTIME 14.3 2024 09:11 AM    INR 1.11 2024 09:11 AM     PTT:    Lab Results   Component Value Date/Time    APTT 35.0 10/09/2015 04:55 AM   [APTT    Urinalysis: neg    Urine Culture ng    Blood Culture:  ng    Antibiotic Therapy:  vanc, zyvox    Imaging:     CT ABDOMEN PELVIS WO CONTRAST Additional Contrast? None   Final Result   1. 4.5 x 3.0 cm gas and fluid collection within the left gluteal region just   inferior to the ischial tuberosity likely corresponding to known decubitus   ulcer.  However there is soft tissue gas extending medially into the gluteal 
     Urology Progress Note      Subjective: Carolina Arguelles is sleeping during rounds     Vitals:  /67   Pulse 84   Temp 97.4 °F (36.3 °C) (Axillary)   Resp 16   Ht 1.549 m (5' 1\")   Wt 56.7 kg (125 lb)   SpO2 96%   BMI 23.62 kg/m²   Temp  Av.8 °F (36.6 °C)  Min: 97.4 °F (36.3 °C)  Max: 98.2 °F (36.8 °C)    Intake/Output Summary (Last 24 hours) at 2024 0959  Last data filed at 2024 0424  Gross per 24 hour   Intake 1100 ml   Output 925 ml   Net 175 ml       Exam:   Physical:  Sleeping in bed  Neck is supple, trachea is midline  Respiratory effort is normal  Cardiovascular show no extremity swelling    Labs:  WBC:    Lab Results   Component Value Date/Time    WBC 24.6 2024 04:56 AM     Hemoglobin/Hematocrit:    Lab Results   Component Value Date/Time    HGB 8.9 2024 04:56 AM    HCT 28.1 2024 04:56 AM     BMP:    Lab Results   Component Value Date/Time     2024 04:56 AM    K 3.8 2024 04:56 AM    K 3.9 2024 04:34 AM     2024 04:56 AM    CO2 23 2024 04:56 AM    BUN 89 2024 04:56 AM    CREATININE 3.2 2024 04:56 AM    CALCIUM 9.0 2024 04:56 AM    GFRAA 38 2021 08:24 AM    GFRAA >60 2013 05:20 PM    LABGLOM 18 2024 04:56 AM    LABGLOM 24 2024 01:20 PM     PT/INR:    Lab Results   Component Value Date/Time    PROTIME 14.3 2024 09:11 AM    INR 1.11 2024 09:11 AM     PTT:    Lab Results   Component Value Date/Time    APTT 35.0 10/09/2015 04:55 AM   [APTT    Urinalysis: neg    Urine Culture:  p    Blood Culture:  ng    Antibiotic Therapy:  vanc, zyvox, clinda     Imaging:     CT ABDOMEN PELVIS WO CONTRAST Additional Contrast? None   Final Result   1. 4.5 x 3.0 cm gas and fluid collection within the left gluteal region just   inferior to the ischial tuberosity likely corresponding to known decubitus   ulcer.  However there is soft tissue gas extending medially into the gluteal   soft tissues 
    Gastroenterology Progress Note    The patient denies any new GI complaints, but not speaking today- hypothermic under \"bear hugger\"    BP (!) 140/32   Pulse 68   Temp (!) 95.4 °F (35.2 °C) (Rectal)   Resp 16   Ht 1.549 m (5' 1\")   Wt 54.4 kg (119 lb 14.9 oz)   SpO2 97%   BMI 22.66 kg/m²     Abdomen:  Soft, non-distended, non-tender    Labs:  Recent Labs     08/11/24  1325 08/12/24  0539 08/13/24  0750   WBC 13.3* 11.7* 11.7*   HGB 7.9* 7.6* 7.1*   HCT 25.1* 24.4* 22.4*   PLT 88* 58* 70*       Recent Labs     08/11/24  1325 08/12/24  0539 08/13/24  0750    137 137   K 3.5 3.4*  3.4* 3.7    102 101   CO2 27 21 25   BUN 45* 45* 42*       No results for input(s): \"ALT\" in the last 72 hours.    Invalid input(s): \"ALB\", \"CBILI\", \"TBILI\", \"SGOT\", \"ASTPC\", \"ALTPC\", \"GPT\", \"AP\"    Invalid input(s): \"AMYL\", \"LIP\"    Assessment:Malnutrition and poor p.o. intake.  The patient is recovering from a infected decubitus ulcer with sepsis.  She previous had a PEG tube placed 10/2023 but this was apparently removed last week and we are asked to replace a new PEG tube.  The patient's  Plavix is on hold. She is currently refusing although she may not be fully competent and apparently goes back and forth over agreeing and not agreeing with PEG placement.  Staff says her parents wish to have the PEG placed.  Pt having further debridement of her infected decubitus ulcer tomorrow- we will try and coordinate PEG placement.     Plan: Recommend NG tube feedings in the meantime although the patient seems unamenable to this. Discussion with family and pt on possible palliative care done- they wish to proceed with PEG.  We stand ready to place the PEG with surgery in the OR tomorrow- please let us know the timing.    Vic Harrison MD, [facility] [place]  [facility]  8/14/2024      Please note that some or all of this record was generated using voice recognition software. If there are any questions about the content of this document, 
    [facility] SURGERY    PATIENT NAME: Carolina Arguelles     TODAY'S DATE: 8/14/2024    CHIEF COMPLAINT: none    INTERVAL HISTORY/HPI:    Pt moved to ICU today due to increasing lethargy.  Still minimal to no PO intake for nutrition.     OBJECTIVE:  VITALS:  /74   Pulse 83   Temp 98.6 °F (37 °C) (Bladder)   Resp 15   Ht 1.549 m (5' 1\")   Wt 54.4 kg (119 lb 14.9 oz)   SpO2 (!) 83%   BMI 22.66 kg/m²     INTAKE/OUTPUT:    I/O last 3 completed shifts:  In: 192.8 [IV Piggyback:192.8]  Out: 447 [Urine:447]  I/O this shift:  In: -   Out: 425 [Urine:425]    CONSTITUTIONAL:  fatigued and somnolent  LUNGS:     ABDOMEN:  Ischial dressing in place    Data:  CBC:   Recent Labs     08/12/24  0539 08/13/24  0750 08/14/24  1243   WBC 11.7* 11.7* 9.7   HGB 7.6* 7.1* 6.3*   HCT 24.4* 22.4* 19.3*   PLT 58* 70* 67*     BMP:    Recent Labs     08/12/24  0539 08/13/24  0750 08/14/24  1243    137 140   K 3.4*  3.4* 3.7 3.3*  3.3*    101 101   CO2 21 25 21   BUN 45* 42* 39*   CREATININE 2.8* 3.1* 3.3*   GLUCOSE 197* 111* 106*     Hepatic: No results for input(s): \"AST\", \"ALT\", \"BILITOT\", \"ALKPHOS\" in the last 72 hours.    Invalid input(s): \"ALB\"  Mag:      Recent Labs     08/12/24  0539 08/14/24  1243   MG 1.40* 2.20      Phos:     Recent Labs     08/12/24  0539 08/14/24  1243   PHOS 4.6 5.1*      INR: No results for input(s): \"INR\" in the last 72 hours.    Radiology Review:         ASSESSMENT AND PLAN:  Ischial decubitus wound, poor healing likely contributed to significantly by her poor nutritional status   - GI available for replacing PEG tomorrow   - I have talked today w/ pt's mother - she indicates she does wish to proceed with all necessary care, and consents to repeat debridement tomorrow   - planning for combined PEG/wound debridement, likely late tomorrow afternoon.  Appreciate GI availability.      Electronically signed by EMIR AMARAL MD     
    In-Patient Progress Note    Patient:  Carolina Arguelles 44 y.o. female MRN: [id number]     Date of Service: 8/4/2024    Hospital Day: 2      Chief complaint: had concerns including Wound Check (Parents reports patient has a bed sore on her left buttock. Patient has been see at wound care where they debrided the wound and packed the wound. Home health nurse thought her wound looked infected. ).      Assessment and Plan   Carolina Arguelles, a 44 y.o. female, was admitted on 8/3/2024 with complaints of had concerns including Wound Check (Parents reports patient has a bed sore on her left buttock. Patient has been see at wound care where they debrided the wound and packed the wound. Home health nurse thought her wound looked infected. ).    Assessment and plan    Infected left gluteal wound with concern of necrotizing fasciitis per imaging.  Surgery team onboard. Discussed with Dr. Elias - plan for I&D. Continue vancomycin and zosyn, add clindamycin. Consult ID. Hold plavix for now and resume post op.     Sepsis due to above     Hypotension acute on chronic.       Lactic acidosis due to above 2.4--> 2.2 -> 1.4     Severe constipation with impacted stool. S/p enema. Patient reportedly had a large bowel movement per nursing team. No plan for diverting colostomy for now per general surgery. This may need to be revisited in the future. Start oral bowel regimen - PEG BID and Senokot BID.     End-stage renal disease s/p renal transplant.      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  Unsure of chronicity.  Urology team onboard. ?plan for suprapubic catheter.     Chronic conditions include:end-stage renal disease on hemodialysis, chronic hypotension on midodrine, congestive heart failure, type 2 diabetes mellitus          Diet Diet NPO   DVT Prophylaxis [] Lovenox, []  Heparin, [] SCDs, [] Ambulation,  [] Eliquis, [] Xarelto  [] Coumadin   Peptic ulcer prophylaxis PPI   Code Status Full Code   Disposition 
   08/20/24 1213 08/20/24 1221   Oxygen Therapy/Pulse Ox   O2 Therapy Oxygen (S)  Oxygen humidified   O2 Device Nasal cannula  (Placed on high flow nasal cannula 15L) (S)  High flow nasal cannula   O2 Flow Rate (L/min) 6 L/min (S)  15 L/min  (MD aware, CXR ordered)   Pulse  --  76   Respirations  --  16   SpO2 (!) 85 % 95 %       
  Ehsan Shabbir, MD    Hospitalist Progress Note      Name:  Carolina Arguelles /Age/Sex: 1980  (44 y.o. female)   MRN & CSN:  [id number] & [id number] Admission Date/Time: 8/3/2024 12:09 PM   Location:  0540/0540-01 PCP: Radha Pompa, APRN - CNP       I saw and examined the patient on 2024 at 9:25 AM.    Hospital Day: 16  Barriers to discharge: Postop care, antimicrobial plan, worsening kidney function, consultant clearance  Assessment and Plan:     Carolina Arguelles is a 44 y.o. female with pmh of end-stage renal disease on hemodialysis, chronic hypotension on midodrine, congestive heart failure, type 2 diabetes mellitus presented with worsening wound. It is left gluteal chronic wound for which she follows with wound clinic.  Now reports of increased pain and discharge.  Patient is a poor historian and only able to provide limited history.     In the ER, patient was hypotensive 88/58, heart rate 68, sodium 130, lactic acid 2.4, WBC 20.8, hemoglobin 10.1, CT abdomen pelvis showed 4.5 x 3.0 cm gas and fluid collection within the left gluteal region just inferior to the ischial tuberosity likely corresponding to known decubitus ulcer along with soft tissue gas extending medially into the gluteal soft tissues into the perineum concerning for necrotizing fasciitis.  ER consulted general surgery for possible necrotizing fasciitis who recommended hospitalization and surgery will consult.  Started on Vanco and Zosyn.        Infected pressure wound. Surgery following. Wound Cx grew E coli/Bacteroides  Patient on Zosyn per ID.  Status post debridement.  Wound photos reviewed.  Patient may need wound VAC.  Wound care/VAC per surgery  Hypotension due to above.  Started on midodrine, getting free water flushes through PEG tube  Dysphagia with PEG tube.  Continue tube feeds. PEG tube replaced by surgery 8/15/24.  New onset water diarrhea. Will check C diff  MARCO A on CKD.  Creatinine up trending.  Nephrology following.  
  Hospital Medicine Progress Note      Date of Admission: 8/3/2024  Hospital Day: 10    Chief Admission Complaint: Left buttocks pain     Subjective: Unfortunately patient has not been eating.  Patient ate very little yesterday.  Patient is still reluctant for us to place an NG tube.  Mom supposed to be here this afternoon to discuss further with regards to PEG tube placement.      Assessment/Plan:      Current Principal Problem:  Sepsis (HCC)    Infected pressure gluteal wound with abscess and possible necrotizing fasciitis status post I&D on 8/6 and 8/7  MARCO A on CKD with history of renal transplant on immunosuppressants  Protein malnutrition presented with PEG tube status post removal on 8/6  Type 2 diabetes  Chronic hypotension  [place]ong[place] metabolic acidosis      Plan    Patient continuing to have labile temperatures however leukocytosis eventually has significantly improved.  I will defer to infectious disease regards to antibiotic management currently on Zosyn and p.o. Vanco (for secondary prophylaxis of C. difficile which was positive in March)  General Surgery planning to take patient back to the OR tomorrow for another debridement.  Follow-up on blood cultures and Fungitell.  Nephrology following for renal function.  Putting Nugent in for accurate 24-hour urine output which seems to be very low.  Patient appears very volume overloaded and has anasarca which could be due to low albumin and poor renal function.  Will defer consideration for diuretics to nephrology.  But I will stop all IV fluids.  Patient not eating.  Refused NG tube.  I will discuss with mom today with regards to need for PEG tube placement versus TPN versus palliative care.  Immunosuppression has been resumed.      Physical Exam Performed:      General appearance: In no acute distress.  Respiratory:  Normal respiratory effort.   Cardiovascular:  Regular rate and rhythm.  Abdomen:  Soft, non-tender, non-distended.  Musculoskeletal:  No 
  Hospital Medicine Progress Note      Date of Admission: 8/3/2024  Hospital Day: 13    Chief Admission Complaint:  Back pain     Subjective:  Patient poor historian. Her friend is in the room. No chest pain. No SOB.     Presenting Admission History:       Carolina Arguelles is a 44 y.o. female with pmh of end-stage renal disease on hemodialysis, chronic hypotension on midodrine, congestive heart failure, type 2 diabetes mellitus presented with worsening wound. It is left gluteal chronic wound for which she follows with wound clinic.  Now reports of increased pain and discharge.  Patient is a poor historian and only able to provide limited history.     In the ER, patient was hypotensive 88/58, heart rate 68, sodium 130, lactic acid 2.4, WBC 20.8, hemoglobin 10.1, CT abdomen pelvis showed 4.5 x 3.0 cm gas and fluid collection within the left gluteal region just inferior to the ischial tuberosity likely corresponding to known decubitus ulcer along with soft tissue gas extending medially into the gluteal soft tissues into the perineum concerning for necrotizing fasciitis.  ER consulted general surgery for possible necrotizing fasciitis who recommended hospitalization and surgery will consult.  Started on Vanco and Zosyn.     On my evaluation laying comfortably in bed not in acute distress.  Blood pressure has improved.  Surgery recommendations are pending.  Nontoxic-appearing, in mild distress due to pain.        Assessment/Plan:      Current Principal Problem:  Sepsis (HCC)    Infected pressure wound. Surgery following. Plan for debridement.   Dysphagia with PEG tube.  Patient with protein calorie malnutrition.  Plan to replace PEG per GI.  MARCO A on CKD.  Creatinine needs to worsen.  Nephrology following.  Patient may need HD if this persist.  Myxedema coma.  Patient appears more alert.  Will continue current dose of levothyroxine    Physical Exam Performed:      General appearance:  No apparent distress.  Laying in 
  Hospital Medicine Progress Note      Date of Admission: 8/3/2024  Hospital Day: 14    Chief Admission Complaint:  Back pain     Subjective:  Patient poor historian. Her friend is in the room. No chest pain. No SOB.     Presenting Admission History:       Carolina Arguelles is a 44 y.o. female with pmh of end-stage renal disease on hemodialysis, chronic hypotension on midodrine, congestive heart failure, type 2 diabetes mellitus presented with worsening wound. It is left gluteal chronic wound for which she follows with wound clinic.  Now reports of increased pain and discharge.  Patient is a poor historian and only able to provide limited history.     In the ER, patient was hypotensive 88/58, heart rate 68, sodium 130, lactic acid 2.4, WBC 20.8, hemoglobin 10.1, CT abdomen pelvis showed 4.5 x 3.0 cm gas and fluid collection within the left gluteal region just inferior to the ischial tuberosity likely corresponding to known decubitus ulcer along with soft tissue gas extending medially into the gluteal soft tissues into the perineum concerning for necrotizing fasciitis.  ER consulted general surgery for possible necrotizing fasciitis who recommended hospitalization and surgery will consult.  Started on Vanco and Zosyn.     On my evaluation laying comfortably in bed not in acute distress.  Blood pressure has improved.  Surgery recommendations are pending.  Nontoxic-appearing, in mild distress due to pain.        Assessment/Plan:      Current Principal Problem:  Sepsis (HCC)    Infected pressure wound. Surgery following.  Patient on Zosyn.  Status post debridement.  Wound photos reviewed.  Patient may need wound VAC.  Will discuss with wound care team.  Dysphagia with PEG tube.  Continue tube feeds.  PEG tube replaced by surgery.  MARCO A on CKD.  Creatinine needs to worsen.  Nephrology following.  Patient may need HD if this persist.  Continue to monitor closely.  Check labs in AM.  Myxedema coma.  Patient appears more alert. 
  Hospital Medicine Progress Note      Date of Admission: 8/3/2024  Hospital Day: 3    Chief Admission Complaint:  \"Pain and chronic gluteal wound\"     Subjective:  no new c/o    Presenting Admission History:         \"Carolina Arguelles is a 44 y.o. female with pmh of end-stage renal disease on hemodialysis, chronic hypotension on midodrine, congestive heart failure, type 2 diabetes mellitus presented with worsening wound. It is left gluteal chronic wound for which she follows with wound clinic.  Now reports of increased pain and discharge.  Patient is a poor historian and only able to provide limited history.     In the ER, patient was hypotensive 88/58, heart rate 68, sodium 130, lactic acid 2.4, WBC 20.8, hemoglobin 10.1, CT abdomen pelvis showed 4.5 x 3.0 cm gas and fluid collection within the left gluteal region just inferior to the ischial tuberosity likely corresponding to known decubitus ulcer along with soft tissue gas extending medially into the gluteal soft tissues into the perineum concerning for necrotizing fasciitis.  ER consulted general surgery for possible necrotizing fasciitis who recommended hospitalization and surgery will consult.  Started on Vanco and Zosyn.     On my evaluation laying comfortably in bed not in acute distress.  Blood pressure has improved.  Surgery recommendations are pending.  Nontoxic-appearing, in mild distress due to pain\".        Assessment/Plan:      Current Principal Problem:  Sepsis (HCC)      Infected left gluteal wound with concern of necrotizing fasciitis per imaging.  Surgery team onboard. Discussed with Dr. Elias - plan for I&D. Continue vancomycin and zosyn, add clindamycin. Consult ID. Hold plavix for now and resume post op.     Sepsis due to above     Hypotension acute on chronic.       Lactic acidosis due to above 2.4--> 2.2 -> 1.4     Severe constipation with impacted stool. S/p enema. Patient reportedly had a large bowel movement per nursing team. No plan for 
  Hospital Medicine Progress Note      Date of Admission: 8/3/2024  Hospital Day: 5    Chief Admission Complaint:  \"Pain and chronic gluteal wound\"     Subjective:  no new c/o    Presenting Admission History:         \"Carolina Arguelles is a 44 y.o. female with pmh of end-stage renal disease on hemodialysis, chronic hypotension on midodrine, congestive heart failure, type 2 diabetes mellitus presented with worsening wound. It is left gluteal chronic wound for which she follows with wound clinic.  Now reports of increased pain and discharge.  Patient is a poor historian and only able to provide limited history.     In the ER, patient was hypotensive 88/58, heart rate 68, sodium 130, lactic acid 2.4, WBC 20.8, hemoglobin 10.1, CT abdomen pelvis showed 4.5 x 3.0 cm gas and fluid collection within the left gluteal region just inferior to the ischial tuberosity likely corresponding to known decubitus ulcer along with soft tissue gas extending medially into the gluteal soft tissues into the perineum concerning for necrotizing fasciitis.  ER consulted general surgery for possible necrotizing fasciitis who recommended hospitalization and surgery will consult.  Started on Vanco and Zosyn.     On my evaluation laying comfortably in bed not in acute distress.  Blood pressure has improved.  Surgery recommendations are pending.  Nontoxic-appearing, in mild distress due to pain\".        Assessment/Plan:      Current Principal Problem:  Sepsis (HCC)      Infected left gluteal wound with concern of necrotizing fasciitis per imaging.  General Surgery consulted and appreciated s/p I&D 4 August w/out complications.  Infectious Disease consulted and appreciated now on Merrem/Zyvox.     Sepsis - w/ Leukocytosis/Tachycardia/Fever/Elevated Lactate POArrival 2nd to above infection.  Continue IVF as appropriate and monitor clinical response w/ ABX as written.     Constipation - w/ impacted stool. S/P enema. Patient reportedly had a large bowel 
  Hospital Medicine Progress Note      Date of Admission: 8/3/2024  Hospital Day: 8    Chief Admission Complaint: Left buttocks pain     Subjective: Patient reports she is not hungry and does not want to eat.  She still reports having significant amount of pain.  Otherwise denies any other symptoms.        Assessment/Plan:      Current Principal Problem:  Sepsis (HCC)    Infected pressure gluteal wound with abscess and possible necrotizing fasciitis status post I&D on 8/6 and 8/7  MARCO A on CKD with history of renal transplant on immunosuppressants  Protein malnutrition presented with PEG tube status post removal on 8/6  Type 2 diabetes  Chronic hypotension  Nongap metabolic acidosis      Plan    Patient has remained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 8/17/2024 at 9:17 AM    Minimum 35 Minutes spent in preparation of following this patient including face to face encounter, discussions with the patient and/or family, medication reconciliation, and transition of care preparation.            Medications:   Medications:    aspirin  81 mg Oral Daily    hydrocortisone sodium succinate PF  50 mg IntraVENous Daily    sodium chloride flush  5-40 mL IntraVENous 2 times per day    ondansetron  4 mg IntraVENous Once    vitamin A  6 mg Oral Daily    levothyroxine  75 mcg PEG Tube Daily    vancomycin  250 mg Oral 2 times per day    piperacillin-tazobactam  3,375 mg IntraVENous Q12H    [Held by provider] furosemide  80 mg IntraVENous q8h    [Held by provider] albumin human 25%  25 g IntraVENous Q8H    vashe wound therapy   Topical Daily    polyethylene glycol  17 g Oral BID    sennosides-docusate sodium  2 
dialysis but ? If more uremic than labs suggest. Volume status acceptable. On Prograf 1mg BID and Hydrocortisone (Prednisone on hold while on this). Tacro level on 8/14/24 was 12.1 but not a trough.    - Hyponatremia  , hypokalemia     - Neurogenic bladder with cystitis and pyelitis on CT / ID following.      - Left Gluteal Wound:  ID and surgery following. S/P debridement 8/15/24.     -AGMA    Plan/   -Continue holding Lasix and albumin.  - Replace potassium . S/p bicarb tabs  -prograf on higher side. Will decrease prograf dose.   -encourage po hydration   -Discussed possibility of dialysis, no urgent indication at this time.  -Avoid hypotension and nephrotoxic medications.  -LR today   -if no improvement with decreasing tacro goal to 4-8 , check echo to assess ivc size.   -diarrhea workup per primary . If persistent , might need ID again  - increase midodrine      Thank you for asking us to participate in the management of your patient, please do not hesitate to contact me for any concerns regarding my recommendations as outlined above.    -----------------------------  Rasheed Negro MD   [facility]    
0828   Number of days: 3     Incision 08/04/24 Abdomen Medial;Upper (Active)   Dressing Status Clean;Dry;Intact 08/09/24 0828   Incision Cleansed Not Cleansed 08/09/24 0828   Dressing/Treatment Dry dressing;Foam 08/09/24 0828   Drainage Amount Small (< 25%) 08/09/24 0828   Drainage Description Serosanguinous 08/09/24 0828   Odor None 08/07/24 2034   Maryellen-incision Assessment Fragile;Intact 08/05/24 0424   Number of days: 4       Response to treatment:  With complaints of pain.     Pain Assessment:  Severity:  5 / 10  Quality of pain: sharp, burning  Wound Pain Timing/Severity: intermittent  Premedicated: Yes IV morphine  Plan:   Plan of Care: Wound 08/06/24 Ischium Left returned to OR 8/6 inicsion extended and left open-Dressing/Treatment: Negative pressure wound therapy  Wound 08/05/24 Perineum Anterior;Other (Comment) incontinence dermatitis-Dressing/Treatment: ABD, Roll gauze  [REMOVED] Wound 08/03/24 Buttocks Left Tunneled-Dressing/Treatment: LEYDI Elias here and observed wound bed.  Dr Woods here at the end of dressing change.  Updated on wound status and MD viewed photo.    Current dressing removed.  Site cleansed with normal saline.  Pat dry.  Maryellen wound prepped with strip hydrocolloid paste to prevent stool from going into wound bed. 3 blue verflo plus sponges placed in wound bed with tracking to left hip.  Covered with silicone VAC drape.  Connected to VAC ulta rental machine at 125 mmHg continuous medium suction.  Veraflo irrigation instil 18 ml, dwell 10 minutes every 2 hours with VASCHE solution. Plan to continue changing 3 times a week.     Change cannister once a week or when full.    If suction fails or patient is discharged:  - remove vac dressing  - cleanse wound with normal saline   - pack wound with saline moistened gauze and change every 12 hours    Specialty Bed Required : Yes   [] Low Air Loss   [x] Pressure Redistribution Isoflex gel therapy pressure redistribution mattress in place.   [] 
burning  Wound Pain Timing/Severity: intermittent  Premedicated: No  Plan:   Plan of Care: Wound 08/06/24 Ischium Left returned to OR 8/6 inicsion extended and left open-Dressing/Treatment: Negative pressure wound therapy  [REMOVED] Wound 08/05/24 Perineum Anterior;Other (Comment) incontinence dermatitis-Dressing/Treatment: Zinc paste  [REMOVED] Wound 08/03/24 Buttocks Left Tunneled-Dressing/Treatment: ABD  [REMOVED] Wound 08/18/24 Ankle Left;Medial deep purple non blanchable not opened.-Dressing/Treatment:  (pretective foam and podous boot and elevation off bed)        NPWT came off Saturday 0932 r/t to loose bowels.    Gauze removed.   2 black pieces used.     Recommendations;  Replace to left buttocks' NPWT.        Removed old guaze moist to dry dressing after soaking with normal saline.   Flushed wound with normal saline, pat dry.  Lower right edge slight bleeding.  Wiped around wound & over left leg with barrier film then placed Vac drape same.  Inserted foam to wound and trailed over left leg, covered with Vac drape.  Suction applied 125 mmHg continuous.  To be changed on M-W-F     Change cannister once a week or when full.    If suction fails or patient is discharged:  - remove vac dressing  - cleanse wound with normal saline   - pack wound with saline moistened gauze and change every 12 hours    Specialty Bed Required : Yes   [] Low Air Loss   [x] Pressure Redistribution  [] Fluid Immersion  [] Bariatric  [] Total Pressure Relief  [] Other:     Current Diet: ADULT DIET; Regular; 3 carb choices (45 gm/meal); Low Potassium (Less than 3000 mg/day); Low Phosphorus (Less than 1000 mg)  ADULT TUBE FEEDING; PEG; Renal Formula; Continuous; 20; Yes; 10; Q 4 hours; 50; 60; Q 4 hours  Dietician consult:  Yes    Discharge Plan:  Placement for patient upon discharge: skilled nursing   Patient appropriate for Outpatient Wound Care Center: Yes    Referrals:  [x]   [] Home Health Care  [] Supplies  [] 
vomiting 08/13/2012     Priority: High    Essential hypertension      Priority: Medium    Hyperlipidemia      Priority: Low    Decubitus ulcer of ischial area, left, stage IV ([facility]) 08/04/2024    Immunocompromised ([facility]) 08/04/2024    History of Clostridioides difficile infection 08/04/2024    Sepsis ([facility]) 08/03/2024    Pressure ulcer with necrosis of soft tissues through to underlying muscle, tendon, or bone ([facility]) 08/03/2024    Acute cystitis with hematuria 03/09/2024    Hypocalcemia 02/21/2024    Retention of urine 02/08/2024    Dysphagia, oropharyngeal phase 12/31/2023    Melena 11/28/2023    Muscle weakness (generalized) 11/10/2023    Unsteadiness on feet 11/10/2023    Necrotizing fasciitis (HCC) 11/09/2023    Neuromuscular dysfunction of bladder, unspecified 11/09/2023    Unspecified sequelae of cerebral infarction 11/09/2023    Lacunar infarct, acute ([facility]) 08/18/2023    Immunodeficiency due to drugs (CODE) ([facility]) 03/18/2023     Overview Note:     Formatting of this note might be different from the original.   Taking mycophenolate and tacrolimus      Last Assessment & Plan:    Formatting of this note might be different from the original.   Condition: stable      Follow up in: three months  Formatting of this note might be different from the original.   Formatting of this note might be different from the original.   Taking mycophenolate and tacrolimus      Last Assessment & Plan:    Formatting of this note might be different from the original.   Condition: stable      Follow up in: three months      Type 2 diabetes mellitus without complication ([facility]) 09/24/2021    Moderate recurrent major depression ([facility]) 09/24/2021    Renal osteodystrophy 09/24/2021    Prolonged QT interval 09/24/2021    Acute kidney injury superimposed on CKD ([facility]) 07/11/2021    Gastroparesis due to DM ([facility]) 11/18/2020    Type 1 diabetes mellitus with hyperglycemia ([facility]) 06/15/2020    History of renal transplant 06/15/2020     Overview Note:     
56.7 kg (125 lb)   SpO2 100%   BMI 23.62 kg/m²     LABS:  WBC:    Lab Results   Component Value Date/Time    WBC 21.3 08/07/2024 07:20 AM     H/H:    Lab Results   Component Value Date/Time    HGB 8.5 08/07/2024 07:20 AM    HCT 27.6 08/07/2024 07:20 AM     PTT:    Lab Results   Component Value Date/Time    APTT 35.0 10/09/2015 04:55 AM   [APTT}  PT/INR:    Lab Results   Component Value Date/Time    PROTIME 14.3 03/11/2024 09:11 AM    INR 1.11 03/11/2024 09:11 AM     HgBA1c:    Lab Results   Component Value Date/Time    LABA1C 7.0 08/03/2024 01:19 PM       Assessment; 25% moist eschar with good  muscle showing.   Bobby Risk Score: Bobby Scale Score: 13    Sepsis (HCC)    Measurements:  Negative Pressure Wound Therapy Buttocks Left (Active)   Wound Type Pressure ulcer: Stage IV 08/07/24 1245   Unit Type [id number] 08/07/24 1245   Dressing Type Other (Comment) 08/07/24 1245   Number of pieces used 3 08/07/24 1245   Number of pieces removed 0 08/07/24 1245   Cycle Continuous 08/07/24 1245   Target Pressure (mmHg) 125 08/07/24 1245   Intensity 1 08/07/24 1245   Irrigation Solution Vashe 08/07/24 1245   Soak Time  10 minutes 08/07/24 1245   Vac Frequency 2 hours 08/07/24 1245   Canister changed? Yes 08/07/24 1245   Dressing Status New dressing applied 08/07/24 1245   Dressing Changed Changed/New 08/07/24 1245   Drainage Amount Small 08/07/24 1245   Drainage Description Serosanguinous 08/07/24 1245   Dressing Change Due 08/09/24 08/07/24 1245   Wound Assessment Exposed structure muscle;Slough 08/07/24 1245   Maryellen-wound Assessment Intact 08/07/24 1245   Shape irregular 08/07/24 1245   Odor None 08/07/24 1245   Number of days: 0       Wound 08/06/24 Ischium Left incision left open 8/4, returned to OR 8/6 inicsion extended and left open (Active)   Wound Image   08/07/24 1245   Wound Etiology Pressure Stage 4 08/06/24 1109   Dressing Status Clean;Dry;Intact 08/06/24 2004   Wound Cleansed Cleansed with saline 08/05/24 4031 
Dependent/Total  Skin Care: Bath wipes;Moisture barrier     Activity Tolerance  Activity Tolerance: Treatment limited secondary to decreased cognition;Patient tolerated evaluation without incident        Vision  Vision: Within Functional Limits  Hearing  Hearing: Within functional limits    Cognition  Overall Cognitive Status: Exceptions  Arousal/Alertness: Appears intact  Following Commands: Follows one step commands with repetition  Attention Span: Attends with cues to redirect  Safety Judgement: Decreased awareness of need for assistance;Decreased awareness of need for safety  Problem Solving: Assistance required to generate solutions;Assistance required to identify errors made  Insights: Decreased awareness of deficits  Initiation: Requires cues for some    Orientation  Orientation Level: Oriented to place;Oriented to person          Education Given To: Patient  Education Provided: Role of Therapy;Orientation;Plan of Care;Precautions;ADL Adaptive Strategies;Transfer Training;Mobility Training  Education Method: Demonstration;Verbal  Barriers to Learning: Cognition  Education Outcome: Continued education needed  Disease Specific:Pt educated on benefits of OOB activity to decrease risks associated with prolonged bedrest while in hospital.    LUE AROM (degrees)  LUE AROM : Exceptions  LUE General AROM: Patient holds L elbow and hand in a flexed position, with time OT was able to extend with minimal discomfort. Pt is unable to extend independently when asked.  RUE AROM (degrees)  RUE AROM : WFL        AM-PAC - ADL  AM-PAC Daily Activity - Inpatient   How much help is needed for putting on and taking off regular lower body clothing?: Total  How much help is needed for bathing (which includes washing, rinsing, drying)?: Total  How much help is needed for toileting (which includes using toilet, bedpan, or urinal)?: Total  How much help is needed for putting on and taking off regular upper body clothing?: Total  How 
Personally reviewed and interpreted for clinical significance.     Recent Labs     08/17/24  0728 08/19/24  0607   WBC 10.6 12.2*   HGB 8.0* 7.5*   HCT 24.4* 23.5*   PLT 86* 60*     Recent Labs     08/17/24  0728 08/18/24  0620 08/19/24  0607    142 141   K 3.3* 3.4* 3.1*    104 104   CO2 17* 22 24   BUN 46* 49* 49*   CREATININE 3.6* 3.9* 3.8*   CALCIUM 8.3 8.4 8.1*   PHOS 5.3* 4.7 3.2     No results for input(s): \"AST\", \"ALT\", \"BILIDIR\", \"BILITOT\", \"ALKPHOS\" in the last 72 hours.  No results for input(s): \"INR\" in the last 72 hours.  No results for input(s): \"CKTOTAL\", \"TROPHS\" in the last 72 hours.    Urinalysis:      Lab Results   Component Value Date/Time    NITRU Negative 08/04/2024 06:55 PM    WBCUA 0-2 08/04/2024 06:55 PM    BACTERIA 1+ 08/04/2024 06:55 PM    RBCUA 0-2 08/04/2024 06:55 PM    BLOODU Negative 08/04/2024 06:55 PM    SPECGRAV 1.020 05/17/2013 07:22 PM    GLUCOSEU Negative 08/04/2024 06:55 PM    GLUCOSEU NEGATIVE 08/04/2011 08:30 PM       Consults:     PHARMACY TO DOSE VANCOMYCIN  IP CONSULT TO GENERAL SURGERY  IP CONSULT TO UROLOGY  IP CONSULT TO NEPHROLOGY  PHARMACY TO DOSE VANCOMYCIN  IP CONSULT TO INFECTIOUS DISEASES  IP CONSULT TO VASCULAR ACCESS TEAM  IP CONSULT TO DIETITIAN  IP CONSULT TO SOCIAL WORK  IP CONSULT TO ETHICS  IP CONSULT TO GI  IP CONSULT TO PALLIATIVE CARE  IP CONSULT TO UROLOGY  IP CONSULT TO CRITICAL CARE  IP CONSULT TO DIETITIAN    I personally reviewed Lab Studies and Imaging and spoke with the  to plan details for eventual discharge.    BERNARD BUCK MD    Full Code      
Personally reviewed in detail in conjunction w/ labs as documented for evidence of drug toxicity.     Infusion Medications    sodium chloride 5 mL/hr at 08/14/24 1003    sodium chloride 25 mL/hr at 08/09/24 0952    dextrose       Scheduled Medications    [START ON 8/15/2024] levothyroxine  50 mcg IntraVENous Daily    hydrocortisone sodium succinate PF  50 mg IntraVENous Q6H    liothyronine  5 mcg Oral q8h    piperacillin-tazobactam  3,375 mg IntraVENous Q12H    furosemide  80 mg IntraVENous q8h    albumin human 25%  25 g IntraVENous Q8H    sodium chloride flush  5-40 mL IntraVENous 2 times per day    potassium chloride  40 mEq Oral Once    vashe wound therapy   Topical Daily    polyethylene glycol  17 g Oral BID    sennosides-docusate sodium  2 tablet Oral BID    lactobacillus  1 capsule Oral BID WC    vancomycin  250 mg Oral BID    tacrolimus  1 mg Oral BID    predniSONE  10 mg Oral Daily    FLUoxetine  10 mg Oral Daily    aspirin  81 mg Oral Daily    atorvastatin  40 mg Oral Daily    [Held by provider] clopidogrel  75 mg Oral Daily    pantoprazole  40 mg Oral BID AC    [Held by provider] traZODone  100 mg Oral Nightly    sodium chloride flush  5-40 mL IntraVENous 2 times per day    heparin (porcine)  5,000 Units SubCUTAneous 3 times per day    insulin lispro  0-8 Units SubCUTAneous TID     insulin lispro  0-4 Units SubCUTAneous Nightly    midodrine  5 mg Oral TID      PRN Meds: sodium chloride flush, sodium chloride, oxyCODONE **OR** oxyCODONE, morphine, acetaminophen, sodium chloride flush, sodium chloride, glucose, dextrose bolus **OR** dextrose bolus, glucagon (rDNA), dextrose, bisacodyl     Labs:  Personally reviewed and interpreted for clinical significance.     Recent Labs     08/11/24  1325 08/12/24  0539 08/13/24  0750   WBC 13.3* 11.7* 11.7*   HGB 7.9* 7.6* 7.1*   HCT 25.1* 24.4* 22.4*   PLT 88* 58* 70*     Recent Labs     08/11/24  1325 08/11/24  1342 08/12/24  0539 08/13/24  0750     --  137 
discharge.    BERNARD BUCK MD    Full Code        
mL IntraVENous 2 times per day    potassium chloride  40 mEq Oral Once    vashe wound therapy   Topical Daily    polyethylene glycol  17 g Oral BID    sennosides-docusate sodium  2 tablet Oral BID    lactobacillus  1 capsule Oral BID     vancomycin  250 mg Oral BID    tacrolimus  1 mg Oral BID    predniSONE  10 mg Oral Daily    FLUoxetine  10 mg Oral Daily    aspirin  81 mg Oral Daily    atorvastatin  40 mg Oral Daily    [Held by provider] clopidogrel  75 mg Oral Daily    pantoprazole  40 mg Oral BID AC    [Held by provider] traZODone  100 mg Oral Nightly    sodium chloride flush  5-40 mL IntraVENous 2 times per day    heparin (porcine)  5,000 Units SubCUTAneous 3 times per day    insulin lispro  0-8 Units SubCUTAneous TID     insulin lispro  0-4 Units SubCUTAneous Nightly    midodrine  5 mg Oral TID      PRN Meds: sodium chloride flush, sodium chloride, oxyCODONE **OR** oxyCODONE, morphine, acetaminophen, sodium chloride flush, sodium chloride, glucose, dextrose bolus **OR** dextrose bolus, glucagon (rDNA), dextrose, bisacodyl     Labs:  Personally reviewed and interpreted for clinical significance.     Recent Labs     08/11/24  1325 08/12/24  0539 08/13/24  0750   WBC 13.3* 11.7* 11.7*   HGB 7.9* 7.6* 7.1*   HCT 25.1* 24.4* 22.4*   PLT 88* 58* 70*     Recent Labs     08/11/24  1325 08/11/24  1342 08/12/24  0539 08/13/24  0750     --  137 137   K 3.5  --  3.4*  3.4* 3.7     --  102 101   CO2 27  --  21 25   BUN 45*  --  45* 42*   CREATININE 2.6* 2.6* 2.8* 3.1*   CALCIUM 6.9*  --  7.3* 7.7*   MG  --   --  1.40*  --    PHOS 4.0  --  4.6  --      No results for input(s): \"PROBNP\", \"TROPHS\" in the last 72 hours.  No results for input(s): \"LABA1C\" in the last 72 hours.  No results for input(s): \"AST\", \"ALT\", \"BILIDIR\", \"BILITOT\", \"ALKPHOS\" in the last 72 hours.    Recent Labs     08/12/24  0539   TSH 12.10*       Urine Cultures:   Lab Results   Component Value Date/Time    LABURIN No growth at 18 to 
Intracardiac thrombus 02/03/2020     Overview Note:     Formatting of this note might be different from the original.   Ms. Arguelles had intracardiac thrombosis.  She had AngioJet, but unfortunately they were unable to clear the entire mass.  She had to be on chronic Eliquis.  This was likely related to her port.  She has been treated as if she had endocarditis.  Last Assessment & Plan:    Formatting of this note might be different from the original.   Condition: stable      Follow up in: three months      Hypoalbuminemia 02/03/2020    Hypophosphatemia 02/03/2020    Idiopathic cardiomyopathy (HCC) 02/03/2020     Overview Note:     Formatting of this note might be different from the original.   Followed by Dr. Moses; on Coreg.  Last Assessment & Plan:    Formatting of this note might be different from the original.   Condition: stable      Follow up in: three months  Formatting of this note might be different from the original.   Last Assessment & Plan:    Formatting of this note might be different from the original.   Condition: stable      Follow up in: three months   Formatting of this note might be different from the original.   Followed by Dr. Moses; on Coreg.      Dupuytren contracture 02/03/2020    Hypomagnesemia 02/03/2020     Overview Note:     Formatting of this note might be different from the original.   Mg level 1.7; on magnesium oxide 400 mg BID.      Gastroesophageal reflux disease 02/03/2020     Overview Note:     Last Assessment & Plan:    Formatting of this note might be different from the original.   Condition: stable      Reviewed use of antacid medication and/or diet modifications of decreasing caffeine, spicy foods, chocolate, and avoiding alcohol, tobacco, NSAIDs, and reducing citrus acids.       Follow up in: six months      Iron deficiency anemia 01/22/2020    Anemia due to chronic kidney disease 01/22/2020     Overview Note:     Formatting of this note might be different from the original.   
Units SubCUTAneous 3 times per day    insulin glargine  7 Units SubCUTAneous Nightly    insulin lispro  0-8 Units SubCUTAneous TID WC    insulin lispro  0-4 Units SubCUTAneous Nightly    midodrine  5 mg Oral TID WC     PRN Meds: sodium chloride flush, sodium chloride, glucose, dextrose bolus **OR** dextrose bolus, glucagon (rDNA), dextrose, morphine, bisacodyl     Labs:  Personally reviewed and interpreted for clinical significance.     Recent Labs     08/04/24  0434 08/05/24 0456 08/06/24  0745   WBC 25.6* 24.6* 18.5*   HGB 8.8* 8.9* 9.0*   HCT 28.1* 28.1* 28.5*    142 80*       Recent Labs     08/04/24  0434 08/05/24  0456 08/06/24  0514   * 134* 135*   K 3.9 3.8 3.7   CL 99 100 103   CO2 20* 23 18*   * 89* 70*   CREATININE 3.4* 3.2* 2.7*   CALCIUM 9.8 9.0 7.7*   MG  --  1.50* 1.90   PHOS  --  2.5 2.9       No results for input(s): \"PROBNP\", \"TROPHS\" in the last 72 hours.  Recent Labs     08/03/24  1319   LABA1C 7.0       Recent Labs     08/03/24  1319 08/05/24  0456   AST 16 6*   ALT 19 14   BILIDIR  --  <0.2   BILITOT <0.2 0.3   ALKPHOS 135* 125       Recent Labs     08/03/24  1326 08/03/24  1629   LACTA 2.4* 2.2*         Urine Cultures:   Lab Results   Component Value Date/Time    LABURIN No growth at 18 to 36 hours 08/04/2024 06:55 PM     Blood Cultures:   Lab Results   Component Value Date/Time    BC  08/03/2024 03:02 PM     No Growth to date.  Any change in status will be called.     Lab Results   Component Value Date/Time    BLOODCULT2  08/03/2024 03:02 PM     No Growth to date.  Any change in status will be called.     Organism:   Lab Results   Component Value Date/Time    ORG Escherichia coli 08/04/2024 03:26 PM         Prieto Meadows MD      
This was likely related to her port.  She has been treated as if she had endocarditis.  Last Assessment & Plan:    Formatting of this note might be different from the original.   Condition: stable      Follow up in: three months      Hypoalbuminemia 02/03/2020    Hypophosphatemia 02/03/2020    Idiopathic cardiomyopathy (HCC) 02/03/2020     Overview Note:     Formatting of this note might be different from the original.   Followed by Dr. Moses; on Coreg.  Last Assessment & Plan:    Formatting of this note might be different from the original.   Condition: stable      Follow up in: three months  Formatting of this note might be different from the original.   Last Assessment & Plan:    Formatting of this note might be different from the original.   Condition: stable      Follow up in: three months   Formatting of this note might be different from the original.   Followed by Dr. Moses; on Coreg.      Dupuytren contracture 02/03/2020    Hypomagnesemia 02/03/2020     Overview Note:     Formatting of this note might be different from the original.   Mg level 1.7; on magnesium oxide 400 mg BID.      Gastroesophageal reflux disease 02/03/2020     Overview Note:     Last Assessment & Plan:    Formatting of this note might be different from the original.   Condition: stable      Reviewed use of antacid medication and/or diet modifications of decreasing caffeine, spicy foods, chocolate, and avoiding alcohol, tobacco, NSAIDs, and reducing citrus acids.       Follow up in: six months      Iron deficiency anemia 01/22/2020    Anemia due to chronic kidney disease 01/22/2020     Overview Note:     Formatting of this note might be different from the original.   Taking supplement. Diagnosis in Nephrology notes DOS 03/2021 [facility]      Last Assessment & Plan:    Formatting of this note might be different from the original.   Condition: stable      Follow up in: six months  Formatting of this note might be different from the 
3/21/19.  Formatting of this note might be different from the original.   Formatting of this note might be different from the original.   Taking supplement. Diagnosis in Nephrology notes DOS 03/2021 [facility]      Last Assessment & Plan:    Formatting of this note might be different from the original.   Condition: stable      Follow up in: six months   Formatting of this note might be different from the original.   Hgb 11.4; ferritin 395 and iron sat 74% on 3/18/19, iron sat 15% on 1/10/19. Vitamin B12 380 and folate 13.8 on 4/27/19; RBC folate 1805 on 3/21/19.      Bacteremia due to Escherichia coli 01/22/2020    Right atrial mass 11/22/2019    Postural dizziness with near syncope 03/26/2019    Benign essential hypertension 03/03/2019     Overview Note:     Formatting of this note might be different from the original.   BP running low.      Abnormal liver function tests 12/04/2018    Calculus of gallbladder with chronic cholecystitis without obstruction 12/04/2018    Retinopathy due to secondary diabetes mellitus ([facility]) 11/09/2016    Retained ureteral stent of transplanted kidney ([facility]) 06/21/2016    Vascular port complication     ESRD (end stage renal disease) ([facility]) 09/29/2015    Fluid overload 09/29/2015    DM (diabetes mellitus) ([facility]) 11/30/2014       History of DM, HLD, HTN, CVA  Immunosuppressed s/p renal trasplant  CKD    Neurogenic bladder for which ISC is performed at home, now with rodriguez placed 8/13/24    Referred for admission for management of infected pressure wound L gluteal wound with abscess and possible necrotizing infection   OR 8/4/24 - I&D L ischial wound and PEG removal   Repeat I&D 8/7/24, 8/15/24  PEG replaced 8/15/24  E coli, Bacteroides in wound culture     Changes of emphysematous cystitis and pyelitis suggested on CT though UA was negative for infection - ruled out   Urology notes reviewed     History of CDI 3/2024 - inactive   Fecal impaction noted on imaging, constipation confirmed 
  Do not suspect sepsis     Positive beta-D-glucan  Suspect this is false positive in context of extensive abx exposure     PCM   Ongoing discussion around replacing the PEG     No abx allergies     Plan:     I think benefit of systemic abx has been maximized  Wound is more granular   Will DC Zosyn    Add systemic antifungal for 7d  Fluconazole can enhance the effect of tacro - please monitor and adjust accordingly         No family in the room    Will sign off   Call back if further ID input is needed        Alison Sheth MD  Phone: [phone]   Fax : [phone]

## 2024-08-20 NOTE — DISCHARGE SUMMARY
Pt discharged with transport. Called report to [facility], per facility request rectal tube, rodriguez, midline, and PEG tube left in place. Pt on 4 L 02, sat 93%. Wound vac removed, wound dressed wet to dry per facility instruction. Scripts sent with transport.

## 2024-08-21 LAB — TACROLIMUS BLOOD: 11.8 NG/ML (ref 5–20)

## (undated) DEVICE — SKIN MARKER,REGULAR TIP WITH RULER AND LABELS: Brand: DEVON

## (undated) DEVICE — STANDARD HYPODERMIC NEEDLE,POLYPROPYLENE HUB: Brand: MONOJECT

## (undated) DEVICE — COVER XR CASS W20XL41IN UNIV ADH STRP

## (undated) DEVICE — BANDAGE ROLL,100% COTTON, 8 PLY, LARGE: Brand: KERLIX

## (undated) DEVICE — BLADE ES ELASTOMERIC COAT INSUL DURABLE BEND UPTO 90DEG

## (undated) DEVICE — DRAPE,UNDERBUTTOCKS,PCH,STERILE: Brand: MEDLINE

## (undated) DEVICE — MIC* PERCUTANEOUS ENDOSCOPIC GASTROSTOMY PEG KIT - 20 FR - PULL: Brand: MIC PEG TUBE

## (undated) DEVICE — ELECTRODE,ECG,STRESS,FOAM,3PK: Brand: MEDLINE

## (undated) DEVICE — PEG STANDARD SYSTEM: Brand: ENTAKE

## (undated) DEVICE — HANDPIECE SET WITH HIGH FLOW TIP AND SUCTION TUBE: Brand: INTERPULSE

## (undated) DEVICE — SOLUTION IRRIG 1000ML 0.9% SOD CHL USP POUR PLAS BTL

## (undated) DEVICE — CONMED SCOPE SAVER BITE BLOCK, 20X27 MM: Brand: SCOPE SAVER

## (undated) DEVICE — 1010 S-DRAPE TOWEL DRAPE 10/BX: Brand: STERI-DRAPE™

## (undated) DEVICE — BINDER ABD UNISX 9IN 45IN SM AND M UNIV

## (undated) DEVICE — BANDAGE GAUZE 4.5INX4.1YD STERILE LF PRM25865P

## (undated) DEVICE — Device

## (undated) DEVICE — KIT INF CTRL 2OZ LUB TBNG L12FT DBL END BRSH SYR OP4

## (undated) DEVICE — INTENDED FOR TISSUE SEPARATION, AND OTHER PROCEDURES THAT REQUIRE A SHARP SURGICAL BLADE TO PUNCTURE OR CUT.: Brand: BARD-PARKER ® STAINLESS STEEL BLADES

## (undated) DEVICE — INTRODUCER KIT FOR GASTROSTOMY FEEDING TUBE: Brand: AVANOS

## (undated) DEVICE — GLOVE ORANGE PI 7 1/2   MSG9075

## (undated) DEVICE — INVIEW CLEAR LEGGINGS: Brand: CONVERTORS

## (undated) DEVICE — 3M™ MEDIPORE™ SOFT CLOTH TAPE, 4 INCH X 10 YARDS, 12 ROLLS/CASE, 2964: Brand: 3M™ MEDIPORE™

## (undated) DEVICE — KIT DSG ABTHERA SENSATRAC

## (undated) DEVICE — CONVERTED USE 304929 SPONGE GAUZE CURITY 4X4IN 16-PLY ST

## (undated) DEVICE — YANKAUER,BULB TIP,W/O VENT,RIGID,STERILE: Brand: MEDLINE

## (undated) DEVICE — SYRINGE MED 10ML TRNSLUC BRL PLUNG BLK MRK POLYPR CTRL

## (undated) DEVICE — PREMIUM WET SKIN PREP TRAY: Brand: MEDLINE INDUSTRIES, INC.

## (undated) DEVICE — Z DISCONTINUED APPLICATOR SURG PREP 0.35OZ 2% CHG 70% ISO ALC W/ HI LT

## (undated) DEVICE — Z DISCONTINUED USE 2276105 GOWN PROTCT UNIV CHST W28IN L49IN SL 24IN BLU SPUNBOND FLM

## (undated) DEVICE — BINDER ABD UNISX 9IN 62IN L AND XL UNIV